# Patient Record
Sex: FEMALE | Employment: FULL TIME | URBAN - METROPOLITAN AREA
[De-identification: names, ages, dates, MRNs, and addresses within clinical notes are randomized per-mention and may not be internally consistent; named-entity substitution may affect disease eponyms.]

---

## 2017-07-14 ENCOUNTER — TRANSFERRED RECORDS (OUTPATIENT)
Dept: HEALTH INFORMATION MANAGEMENT | Facility: CLINIC | Age: 47
End: 2017-07-14

## 2017-07-17 ENCOUNTER — PRE VISIT (OUTPATIENT)
Dept: ORTHOPEDICS | Facility: CLINIC | Age: 47
End: 2017-07-17

## 2017-07-17 NOTE — TELEPHONE ENCOUNTER
Records received from [facility]. Waiting for images.   Included  Office notes: 6/5/17, 7/14/17  Radiology reports: US right lower ext 6/6/17   MR tib fib right 6/9/17   Xray right knee on 7/14/17

## 2017-07-17 NOTE — TELEPHONE ENCOUNTER
1.  Date/reason for appt: 7/19/17 8:30AM - Rt Lower Leg Mass  2.  Referring provider: Dr. Sebastián Kinsey  3.  Call to patient (Yes / No - short description): no, pt is referred  4.  Previous care at / records requested from: [facility] -- Faxed request for records    [facility] imaging in pacs:  US Rt Lower Ext of Soft Tissue 6/6/17  XR Bilat Knee 7/14/17  MRI Rt Tib/Fib 6/9/17

## 2017-07-19 ENCOUNTER — OFFICE VISIT (OUTPATIENT)
Dept: ORTHOPEDICS | Facility: CLINIC | Age: 47
End: 2017-07-19

## 2017-07-19 VITALS — HEIGHT: 62 IN | WEIGHT: 190.8 LBS | BODY MASS INDEX: 35.11 KG/M2

## 2017-07-19 DIAGNOSIS — M67.40 GANGLION CYST: Primary | ICD-10-CM

## 2017-07-19 RX ORDER — LEVOTHYROXINE SODIUM 150 UG/1
175 TABLET ORAL EVERY MORNING
COMMUNITY
Start: 2017-05-23 | End: 2021-01-20

## 2017-07-19 RX ORDER — PAROXETINE 10 MG/1
40 TABLET, FILM COATED ORAL
COMMUNITY
Start: 2017-06-05 | End: 2021-01-20

## 2017-07-19 RX ORDER — PRAMIPEXOLE DIHYDROCHLORIDE 0.12 MG/1
0.5 TABLET ORAL AT BEDTIME
COMMUNITY
Start: 2017-06-05 | End: 2021-01-06

## 2017-07-19 RX ORDER — PROPRANOLOL HCL 60 MG
60 CAPSULE, EXTENDED RELEASE 24HR ORAL EVERY MORNING
COMMUNITY
Start: 2017-06-05 | End: 2021-06-21

## 2017-07-19 ASSESSMENT — ENCOUNTER SYMPTOMS
BLOOD IN STOOL: 1
ARTHRALGIAS: 1
MYALGIAS: 1
SLEEP DISTURBANCES DUE TO BREATHING: 0
EXERCISE INTOLERANCE: 0
HEARTBURN: 0
JAUNDICE: 0
FATIGUE: 0
EYE PAIN: 0
SPEECH CHANGE: 0
NUMBNESS: 1
PARALYSIS: 0
ABDOMINAL PAIN: 0
POLYPHAGIA: 0
LEG PAIN: 1
RECTAL PAIN: 0
DOUBLE VISION: 0
LEG SWELLING: 1
ORTHOPNEA: 0
VOMITING: 0
SYNCOPE: 0
EYE IRRITATION: 0
PALPITATIONS: 0
INSOMNIA: 1
LOSS OF CONSCIOUSNESS: 0
PANIC: 0
STIFFNESS: 0
WEIGHT LOSS: 0
BACK PAIN: 0
TINGLING: 1
CHILLS: 0
BOWEL INCONTINENCE: 0
FEVER: 0
TREMORS: 0
NERVOUS/ANXIOUS: 1
DIZZINESS: 1
NECK PAIN: 0
LIGHT-HEADEDNESS: 0
EYE REDNESS: 0
NAUSEA: 0
JOINT SWELLING: 0
TACHYCARDIA: 0
BLOATING: 1
SEIZURES: 0
WEAKNESS: 0
HYPERTENSION: 0
DEPRESSION: 1
MUSCLE WEAKNESS: 0
DECREASED CONCENTRATION: 0
DECREASED APPETITE: 0
HYPOTENSION: 0
INCREASED ENERGY: 0
CLAUDICATION: 0
NIGHT SWEATS: 0
DISTURBANCES IN COORDINATION: 0
RECTAL BLEEDING: 1
DIARRHEA: 0
HEADACHES: 1
MUSCLE CRAMPS: 0
EYE WATERING: 0
POLYDIPSIA: 0
MEMORY LOSS: 0
ALTERED TEMPERATURE REGULATION: 0
HALLUCINATIONS: 0
WEIGHT GAIN: 0
CONSTIPATION: 0

## 2017-07-19 NOTE — LETTER
7/19/2017       RE: Carol Gaujardo  [address] [facility] [address]     Dear Colleague,    Thank you for referring your patient, Carol Guajardo, to the [facility] at [facility]. Please see a copy of my visit note below.    [facility] Orthopedic Consultation    Carol Guajardo MRN# [id number]   Age: 47 year old YOB: 1970         Requesting physician: Sebastián Kinsey MD                   Impression and Recommendation (Resident / Clinician):   Impression:  Complex synovial cyst, right knee, with extension into fascial plane of medial gastroc and soleus     Recomendations:  Discussed operative and nonoperative management. Discussed high rate of recurrence(up to 50%).  Explained that if pain starts to limit activity, work, or sleep, would recommend excision with udnerstanding of high rate of recurrence.  Pt understands, states mother has had recurrent bakers cysts.  Explained no urgncy to surgery, this is unlikely to be a cancer.  Pt will discuss with family and contact clinic re decision.  Otherwise WBAT.  ACE wrap to RLE prn.  Fu PRN.            Chief Complaint:   Progressively painful right medial calf mass x 2-3mos         History of Present Illness (Resident / Clinician):   This patient is a 47 year old female with a right medial calf mass.             Past Medical History:   Anxiety, depression, RLS, cholestatoma        Past Surgical History:   B/l CTR, tubal ligation, cholesteatoma excision, no complications          Social History:   Works as  at [employer]  Social History   Substance Use Topics     Smoking status: Light Tobacco Smoker     Smokeless tobacco: Current User     Alcohol use Yes             Family History:   hxof bakers cyst in mom, denies family history of anesthesia, bleeding or clotting complications.          Immunizations:   Immunizations are up to date          Allergies:     Allergies   Allergen  Reactions     Amoxicillin-Pot Clavulanate Hives     Other reaction(s): Edema     Penicillins              Medications:     Current Outpatient Prescriptions   Medication Sig     levothyroxine (SYNTHROID/LEVOTHROID) 150 MCG tablet Take 150 mcg by mouth     PARoxetine (PAXIL) 10 MG tablet Take 10 mg by mouth     pramipexole (MIRAPEX) 0.125 MG tablet Take 0.125 mg by mouth     propranolol (INDERAL LA) 60 MG 24 hr capsule Take 60 mg by mouth     IBUPROFEN PO      No current facility-administered medications for this visit.              Review of Systems:   10 point ROS negative unless noted in HPI          Physical Exam:     General: Awake, alert, appropriate, following commands, NAD.  Neuro: CN II-XII grossly intact.   Skin: No rashes,  skin color normal.  HEENT: Normal.   Lungs: Breathing comfortably and nonlabored, no wheezes or stridor noted.  Heart/Cardiovascular: Regular pulse, no peripheral cyanosis.  Abdomen: Soft, non-tender, non-distended.   Right Lower Extremity: firm tender mass along medial aspect of calf at approx mid calf, no overlying skin changed, does not appear to move with ankle AROM, ankle AROM -15-35, painless, knee ROM 0-125 painless, otherwise No deformity, skin intact. No significant tenderness to palpation over thigh, knee, ankle/foot. No pain with ROM hip/knee/ankle. Motor intact distally TA/GSC/EHL/FHL with 5/5 strength. SILT sp/dp/tibial/saph/sural nerves. DP/PT pulses palpable, 2+, toes warm and well perfused. Compartments soft, no adenopathy appreciated  Psych: normal mood and affect           Data:   Reviewed, bakers cyst along posteromedial aspect of knee confluent via rent in medial gastroc tendon to fascial plane between medial head of gastroc and soleus with larger fluid collection (measuring [id number] mm), hyperintense to fat, isointense with synovial fluid, no surrounding soft tissue edema, mild OA right knee      Lizandro Arguello MD  Orthopedic Surgery, PGY-4  [phone]        This patient was seen and discussed with Dr. Bailey who agrees with the plan             I was present with the resident during the history and exam.  I discussed the case with the resident and agree with the findings as documented in the assessment and plan.      Again, thank you for allowing me to participate in the care of your patient.      Sincerely,    Ion Bailey MD

## 2017-07-19 NOTE — NURSING NOTE
"Reason For Visit:   Chief Complaint   Patient presents with     Consult     Pt. states that she is here today for Right Lower Leg Mass. She mention that she first noticed the mass in May 2017, increase in size. She is having discomfort and pressure. Referring:  YURY RICHEY        Pain Assessment  Patient Currently in Pain: Yes  0-10 Pain Scale: 4  Primary Pain Location: Leg  Pain Orientation: Right  Pain Descriptors: Discomfort  Alleviating Factors: Rest, NSAIDS  Aggravating Factors: Movement, Standing, Walking               HEIGHT: 5' 2\", WEIGHT: 190 lbs 12.8 oz, BMI: Body mass index is 34.9 kg/(m^2).      Current Outpatient Prescriptions   Medication Sig Dispense Refill     levothyroxine (SYNTHROID/LEVOTHROID) 150 MCG tablet Take 150 mcg by mouth       PARoxetine (PAXIL) 10 MG tablet Take 10 mg by mouth       pramipexole (MIRAPEX) 0.125 MG tablet Take 0.125 mg by mouth       propranolol (INDERAL LA) 60 MG 24 hr capsule Take 60 mg by mouth       IBUPROFEN PO             Allergies   Allergen Reactions     Amoxicillin-Pot Clavulanate Hives     Other reaction(s): Edema     Penicillins        "

## 2017-07-19 NOTE — PROGRESS NOTES
[facility] Orthopedic Consultation    Carol Guajardo MRN# [id number]   Age: 47 year old YOB: 1970         Requesting physician: Sebastián Kinsey MD                   Impression and Recommendation (Resident / Clinician):   Impression:  Complex synovial cyst, right knee, with extension into fascial plane of medial gastroc and soleus     Recomendations:  Discussed operative and nonoperative management. Discussed high rate of recurrence(up to 50%).  Explained that if pain starts to limit activity, work, or sleep, would recommend excision with udnerstanding of high rate of recurrence.  Pt understands, states mother has had recurrent bakers cysts.  Explained no urgncy to surgery, this is unlikely to be a cancer.  Pt will discuss with family and contact clinic re decision.  Otherwise WBAT.  ACE wrap to RLE prn.  Fu PRN.            Chief Complaint:   Progressively painful right medial calf mass x 2-3mos         History of Present Illness (Resident / Clinician):   This patient is a 47 year old female with a right medial calf mass.             Past Medical History:   Anxiety, depression, RLS, cholestatoma        Past Surgical History:   B/l CTR, tubal ligation, cholesteatoma excision, no complications          Social History:   Works as  at [employer]  Social History   Substance Use Topics     Smoking status: Light Tobacco Smoker     Smokeless tobacco: Current User     Alcohol use Yes             Family History:   hxof bakers cyst in mom, denies family history of anesthesia, bleeding or clotting complications.          Immunizations:   Immunizations are up to date          Allergies:     Allergies   Allergen Reactions     Amoxicillin-Pot Clavulanate Hives     Other reaction(s): Edema     Penicillins              Medications:     Current Outpatient Prescriptions   Medication Sig     levothyroxine (SYNTHROID/LEVOTHROID) 150 MCG tablet Take 150 mcg by mouth     PARoxetine (PAXIL) 10 MG tablet  Take 10 mg by mouth     pramipexole (MIRAPEX) 0.125 MG tablet Take 0.125 mg by mouth     propranolol (INDERAL LA) 60 MG 24 hr capsule Take 60 mg by mouth     IBUPROFEN PO      No current facility-administered medications for this visit.              Review of Systems:   10 point ROS negative unless noted in HPI          Physical Exam:     General: Awake, alert, appropriate, following commands, NAD.  Neuro: CN II-XII grossly intact.   Skin: No rashes,  skin color normal.  HEENT: Normal.   Lungs: Breathing comfortably and nonlabored, no wheezes or stridor noted.  Heart/Cardiovascular: Regular pulse, no peripheral cyanosis.  Abdomen: Soft, non-tender, non-distended.   Right Lower Extremity: firm tender mass along medial aspect of calf at approx mid calf, no overlying skin changed, does not appear to move with ankle AROM, ankle AROM -15-35, painless, knee ROM 0-125 painless, otherwise No deformity, skin intact. No significant tenderness to palpation over thigh, knee, ankle/foot. No pain with ROM hip/knee/ankle. Motor intact distally TA/GSC/EHL/FHL with 5/5 strength. SILT sp/dp/tibial/saph/sural nerves. DP/PT pulses palpable, 2+, toes warm and well perfused. Compartments soft, no adenopathy appreciated  Psych: normal mood and affect           Data:   Reviewed, bakers cyst along posteromedial aspect of knee confluent via rent in medial gastroc tendon to fascial plane between medial head of gastroc and soleus with larger fluid collection (measuring [id number] mm), hyperintense to fat, isointense with synovial fluid, no surrounding soft tissue edema, mild OA right knee      Lizandro Arguello MD  Orthopedic Surgery, PGY-4  [phone]       This patient was seen and discussed with Dr. Bailey who agrees with the plan

## 2017-10-27 ENCOUNTER — OFFICE VISIT (OUTPATIENT)
Dept: OTOLARYNGOLOGY | Facility: CLINIC | Age: 47
End: 2017-10-27
Payer: COMMERCIAL

## 2017-10-27 VITALS — TEMPERATURE: 96.4 F | HEIGHT: 62 IN | RESPIRATION RATE: 18 BRPM

## 2017-10-27 DIAGNOSIS — H92.12 OTORRHEA, LEFT: Primary | ICD-10-CM

## 2017-10-27 DIAGNOSIS — H95.192 ENCOUNTER FOR MASTOIDECTOMY CAVITY DEBRIDEMENT, LEFT: ICD-10-CM

## 2017-10-27 PROCEDURE — 99203 OFFICE O/P NEW LOW 30 MIN: CPT | Mod: 25 | Performed by: OTOLARYNGOLOGY

## 2017-10-27 PROCEDURE — 69220 CLEAN OUT MASTOID CAVITY: CPT | Performed by: OTOLARYNGOLOGY

## 2017-10-27 RX ORDER — NEOMYCIN SULFATE, POLYMYXIN B SULFATE AND HYDROCORTISONE 10; 3.5; 1 MG/ML; MG/ML; [USP'U]/ML
4 SUSPENSION/ DROPS AURICULAR (OTIC) 3 TIMES DAILY
Qty: 5 ML | Refills: 0 | Status: SHIPPED | OUTPATIENT
Start: 2017-10-27 | End: 2017-11-03

## 2017-10-27 NOTE — PATIENT INSTRUCTIONS
General Scheduling Information  To schedule your CT/MRI scan, please contact [place] [facility] at [phone]   [address] [place] [address]    To schedule your Surgery, please contact our Specialty Schedulers at [phone]    ENT Clinic Locations Clinic Hours Telephone Number     [facility]  [address] [place] [address]   Tuesday:       8:00am -- 4:00pm    Wednesday:  8:00am - 4:00pm   To schedule an appointment with   Dr. Kendall,   please contact our   Specialty Scheduling Department at:     [phone]       [facility]  [address] [place] [address]   Friday:          8:00am - 4:00pm         Urgent Care Locations Clinic Hours Telephone Numbers     [facility]  [address] [place] [address]     Monday-Friday:     11:00pm - 9:00pm    Saturday-Sunday:  9:00am - 5:00pm   [phone]     [facility]  [address] [place] [address]     Monday-Friday:      5:00pm - 9:00pm     Saturday-Sunday:  9:00am - 5:00pm   [phone]

## 2017-10-27 NOTE — NURSING NOTE
"Chief Complaint   Patient presents with     Consult     ear pain        Initial Temp 96.4  F (35.8  C) (Tympanic)  Resp 18  Ht 1.575 m (5' 2\") Estimated body mass index is 34.9 kg/(m^2) as calculated from the following:    Height as of 7/19/17: 1.575 m (5' 2\").    Weight as of 7/19/17: 86.5 kg (190 lb 12.8 oz).  Medication Reconciliation: rochelle Boggs MA    "

## 2017-10-27 NOTE — MR AVS SNAPSHOT
After Visit Summary   10/27/2017    Carol Guajardo    MRN: [id number]           Patient Information     Date Of Birth          1970        Visit Information        Provider Department      10/27/2017 2:30 PM Piotr Kendall MD [facility]        Today's Diagnoses     Otorrhea, left    -  1    Encounter for mastoidectomy cavity debridement, left          Care Instructions    General Scheduling Information  To schedule your CT/MRI scan, please contact [place] [facility] at [phone] [address] [place] [address]    To schedule your Surgery, please contact our Specialty Schedulers at [phone]    ENT Clinic Locations Clinic Hours Telephone Number     [facility]  [address]  [place] [address]   Tuesday:       8:00am -- 4:00pm    Wednesday:  8:00am - 4:00pm   To schedule an appointment with   Dr. Kendall,   please contact our   Specialty Scheduling Department at:     [phone]       [facility]  [address] [place] [address]   Friday:          8:00am - 4:00pm         Urgent Care Locations Clinic Hours Telephone Numbers     [facility]  [address] [place] [address]     Monday-Friday:     11:00pm - 9:00pm    Saturday-Sunday:  9:00am - 5:00pm   [phone]     [facility]  [address] [place] [address]     Monday-Friday:      5:00pm - 9:00pm     Saturday-Sunday:  9:00am - 5:00pm   [phone]                 Follow-ups after your visit        Who to contact     If you have questions or need follow up information about today's clinic visit or your schedule please contact [facility] directly at [phone].  Normal or non-critical lab and imaging results will be communicated to you by MyChart, letter or phone within 4 business days after the clinic has received the results. If you do not hear from us within 7 days, please contact the clinic through MyChart or phone. If you have a  "critical or abnormal lab result, we will notify you by phone as soon as possible.  Submit refill requests through [employer] or call your pharmacy and they will forward the refill request to us. Please allow 3 business days for your refill to be completed.          Additional Information About Your Visit        [employer] Information     [employer] lets you send messages to your doctor, view your test results, renew your prescriptions, schedule appointments and more. To sign up, go to [email or web address] . Click on \"Log in\" on the left side of the screen, which will take you to the Welcome page. Then click on \"Sign up Now\" on the right side of the page.     You will be asked to enter the access code listed below, as well as some personal information. Please follow the directions to create your username and password.     Your access code is: [email or web address]  Expires: 2018  4:18 PM     Your access code will  in 90 days. If you need help or a new code, please call your [facility] clinic or [phone].        Care EveryWhere ID     This is your Care EveryWhere ID. This could be used by other organizations to access your [facility] medical records  [id number]        Your Vitals Were     Temperature Respirations Height             96.4  F (35.8  C) (Tympanic) 18 1.575 m (5' 2\")          Blood Pressure from Last 3 Encounters:   No data found for BP    Weight from Last 3 Encounters:   17 86.5 kg (190 lb 12.8 oz)              We Performed the Following     Debride Mast. Unilat          Today's Medication Changes          These changes are accurate as of: 10/27/17  4:18 PM.  If you have any questions, ask your nurse or doctor.               Start taking these medicines.        Dose/Directions    neomycin-polymyxin-hydrocortisone 3.5-11926-9 otic suspension   Commonly known as:  CORTISPORIN   Used for:  Otorrhea, left   Started by:  Piotr Kendall MD        Dose:  4 drop   Place 4 drops Into the left ear 3 times " daily for 7 days   Quantity:  5 mL   Refills:  0            Where to get your medicines      Some of these will need a paper prescription and others can be bought over the counter.  Ask your nurse if you have questions.     Bring a paper prescription for each of these medications     neomycin-polymyxin-hydrocortisone 3.5-99261-8 otic suspension                Primary Care Provider Office Phone # Fax #    Chava Mayberry [phone]       [place] [id number] [facility] [address]        Equal Access to Services     DANI CHO : Hadii dari ku hadasho Soomaali, waaxda luqadaha, qaybta kaalmada adeegyada, waxjason scherer . So [facility] [phone].    ATENCIÓN: Si habla español, tiene a andrews disposición servicios gratuitos de asistencia lingüística. [facility] [phone].    We comply with applicable federal civil rights laws and [place] laws. We do not discriminate on the basis of race, color, national origin, age, disability, sex, sexual orientation, or gender identity.            Thank you!     Thank you for choosing [facility]  for your [facility]. Our goal is always to provide you with excellent care. Hearing back from our patients is one way we can continue to improve our services. Please take a few minutes to complete the written survey that you may receive in the mail after your visit with us. Thank you!             Your Updated Medication List - Protect others around you: Learn how to safely use, store and throw away your medicines at www.disposemymeds.org.          This list is accurate as of: 10/27/17  4:18 PM.  Always use your most recent med list.                   Brand Name Dispense Instructions for use Diagnosis    IBUPROFEN PO           levothyroxine 150 MCG tablet    SYNTHROID/LEVOTHROID     Take 150 mcg by mouth        neomycin-polymyxin-hydrocortisone 3.5-59546-5 otic suspension    CORTISPORIN    5 mL    Place 4 drops Into the left  ear 3 times daily for 7 days    Otorrhea, left       PARoxetine 10 MG tablet    PAXIL     Take 10 mg by mouth        pramipexole 0.125 MG tablet    MIRAPEX     Take 0.125 mg by mouth        propranolol 60 MG 24 hr capsule    INDERAL LA     Take 60 mg by mouth

## 2017-10-27 NOTE — LETTER
10/27/2017         RE: Carol Guajardo  [address] [facility] [address]        Dear Colleague,    Thank you for referring your patient, Carol Guajardo, to the [facility]. Please see a copy of my visit note below.    Chief Complaint - left ear pain    History of Present Illness - Carol Guajardo is a 47 year old female who presents to me today with left ear aches. Had surgery one year for cholesteatoma. Also had some debri coming out of the ear. Hasn't had f/u because of financial issues. Has some dizziness briefly. Some otorrhea. She has some hearing loss left ear. Right ear is fine. No surgery right side. Smokes.     Past Medical History -   Patient Active Problem List   Diagnosis     Anxiety state     Depression     HLD (hyperlipidemia)     Vitamin D deficiency     Thyroid activity decreased     Obesity     Nicotine dependence     Mass of right lower leg       Current Medications -   Current Outpatient Prescriptions:      levothyroxine (SYNTHROID/LEVOTHROID) 150 MCG tablet, Take 150 mcg by mouth, Disp: , Rfl:      PARoxetine (PAXIL) 10 MG tablet, Take 10 mg by mouth, Disp: , Rfl:      pramipexole (MIRAPEX) 0.125 MG tablet, Take 0.125 mg by mouth, Disp: , Rfl:      propranolol (INDERAL LA) 60 MG 24 hr capsule, Take 60 mg by mouth, Disp: , Rfl:      IBUPROFEN PO, , Disp: , Rfl:     Allergies -   Allergies   Allergen Reactions     Amoxicillin-Pot Clavulanate Hives     Other reaction(s): Edema     Ciprofloxacin      Penicillins        Social History -   Social History     Social History     Marital status:      Spouse name: N/A     Number of children: N/A     Years of education: N/A     Social History Main Topics     Smoking status: Light Tobacco Smoker     Smokeless tobacco: Current User     Alcohol use Yes     Drug use: None     Sexual activity: Not Asked     Other Topics Concern     None     Social History Narrative       Family History - no ear history in family    Review of Systems - As  "per HPI and PMHx, otherwise 7 system review of the head and neck negative.    Physical Exam  Temp 96.4  F (35.8  C) (Tympanic)  Resp 18  Ht 1.575 m (5' 2\")  General - The patient is nontoxic, in no distress.  Alert and oriented to person and place, answers questions and cooperates with examination appropriately.   Voice and Breathing - The patient was breathing comfortably without the use of accessory muscles. There was no wheezing, stridor, or stertor.  The patients voice was clear and strong.  Ears - right ear canal, tympanic membrane, and middle ear is normal. Left ear with a canal wall down mastoidectomy. She has lots of debri, wax and crusts impacting ear and in mastoid bowl. See below.   Eyes - Extraocular movements intact. Sclera were not icteric or injected.  Neck - Palpation of the occipital, submental, submandibular, internal jugular chain, and supraclavicular nodes did not demonstrate any abnormal lymph nodes or masses. No parotid masses. Palpation of the thyroid was soft and smooth, with no nodules or goiter appreciated.  The trachea was mobile and midline.  Neurological - Cranial nerves 2 through 12 were grossly intact. House-Brackmann grade 1 out of 6 bilaterally.    Cerumen Removal    Physical Exam and Procedure  Ears - On examination of the ears, I found that the left ear was impacted with cerumen and the mastoid was full. Using the binocular surgical microscope to perform cerumen removal/mastoid debridement.  I began by using a cerumen loop to gently lift the edges of the cerumen mass away from the walls of the external canal.  Once I did this, I was able to pull/ away fragments of wax and debris with an alligator. I then turned to the mastoid posteriorly. I used the alligator to lift large crusts out. Some moisture beneath the crusts. I removed all the wax and debri. The tympanic membrane was intact, but looked like there was a cartilage graft posteriorly. No sign of perforation or middle ear " effusion. No granulation.         Assessment and Plan - Carol Guajardo is a 47 year old female who presents to me today with left ear canal wall down mastoidectomy. She has pain, likely due to not cleaning this in a year. Has some otorrhea beneath the crusts. I cleaned this today. She should use cortisporin if it remains painful or has otorrhea. Recheck and clean in 6 months.    Piotr Kendall MD  Otolaryngology  [facility]      Again, thank you for allowing me to participate in the care of your patient.        Sincerely,        Piotr Kendall MD

## 2017-10-27 NOTE — PROGRESS NOTES
"Chief Complaint - left ear pain    History of Present Illness - Carol Guajardo is a 47 year old female who presents to me today with left ear aches. Had surgery one year for cholesteatoma. Also had some debri coming out of the ear. Hasn't had f/u because of financial issues. Has some dizziness briefly. Some otorrhea. She has some hearing loss left ear. Right ear is fine. No surgery right side. Smokes.     Past Medical History -   Patient Active Problem List   Diagnosis     Anxiety state     Depression     HLD (hyperlipidemia)     Vitamin D deficiency     Thyroid activity decreased     Obesity     Nicotine dependence     Mass of right lower leg       Current Medications -   Current Outpatient Prescriptions:      levothyroxine (SYNTHROID/LEVOTHROID) 150 MCG tablet, Take 150 mcg by mouth, Disp: , Rfl:      PARoxetine (PAXIL) 10 MG tablet, Take 10 mg by mouth, Disp: , Rfl:      pramipexole (MIRAPEX) 0.125 MG tablet, Take 0.125 mg by mouth, Disp: , Rfl:      propranolol (INDERAL LA) 60 MG 24 hr capsule, Take 60 mg by mouth, Disp: , Rfl:      IBUPROFEN PO, , Disp: , Rfl:     Allergies -   Allergies   Allergen Reactions     Amoxicillin-Pot Clavulanate Hives     Other reaction(s): Edema     Ciprofloxacin      Penicillins        Social History -   Social History     Social History     Marital status:      Spouse name: N/A     Number of children: N/A     Years of education: N/A     Social History Main Topics     Smoking status: Light Tobacco Smoker     Smokeless tobacco: Current User     Alcohol use Yes     Drug use: None     Sexual activity: Not Asked     Other Topics Concern     None     Social History Narrative       Family History - no ear history in family    Review of Systems - As per HPI and PMHx, otherwise 7 system review of the head and neck negative.    Physical Exam  Temp 96.4  F (35.8  C) (Tympanic)  Resp 18  Ht 1.575 m (5' 2\")  General - The patient is nontoxic, in no distress.  Alert and oriented to " person and place, answers questions and cooperates with examination appropriately.   Voice and Breathing - The patient was breathing comfortably without the use of accessory muscles. There was no wheezing, stridor, or stertor.  The patients voice was clear and strong.  Ears - right ear canal, tympanic membrane, and middle ear is normal. Left ear with a canal wall down mastoidectomy. She has lots of debri, wax and crusts impacting ear and in mastoid bowl. See below.   Eyes - Extraocular movements intact. Sclera were not icteric or injected.  Neck - Palpation of the occipital, submental, submandibular, internal jugular chain, and supraclavicular nodes did not demonstrate any abnormal lymph nodes or masses. No parotid masses. Palpation of the thyroid was soft and smooth, with no nodules or goiter appreciated.  The trachea was mobile and midline.  Neurological - Cranial nerves 2 through 12 were grossly intact. House-Brackmann grade 1 out of 6 bilaterally.    Cerumen Removal    Physical Exam and Procedure  Ears - On examination of the ears, I found that the left ear was impacted with cerumen and the mastoid was full. Using the binocular surgical microscope to perform cerumen removal/mastoid debridement.  I began by using a cerumen loop to gently lift the edges of the cerumen mass away from the walls of the external canal.  Once I did this, I was able to pull/ away fragments of wax and debris with an alligator. I then turned to the mastoid posteriorly. I used the alligator to lift large crusts out. Some moisture beneath the crusts. I removed all the wax and debri. The tympanic membrane was intact, but looked like there was a cartilage graft posteriorly. No sign of perforation or middle ear effusion. No granulation.         Assessment and Plan - Carol Guajardo is a 47 year old female who presents to me today with left ear canal wall down mastoidectomy. She has pain, likely due to not cleaning this in a year. Has some  otorrhea beneath the crusts. I cleaned this today. She should use cortisporin if it remains painful or has otorrhea. Recheck and clean in 6 months.    Piotr Kendall MD  Otolaryngology  [facility]

## 2018-04-10 ENCOUNTER — OFFICE VISIT (OUTPATIENT)
Dept: OTOLARYNGOLOGY | Facility: CLINIC | Age: 48
End: 2018-04-10
Payer: COMMERCIAL

## 2018-04-10 VITALS
SYSTOLIC BLOOD PRESSURE: 145 MMHG | HEART RATE: 70 BPM | OXYGEN SATURATION: 98 % | RESPIRATION RATE: 16 BRPM | DIASTOLIC BLOOD PRESSURE: 81 MMHG

## 2018-04-10 DIAGNOSIS — H95.192 ENCOUNTER FOR MASTOIDECTOMY CAVITY DEBRIDEMENT, LEFT: Primary | ICD-10-CM

## 2018-04-10 PROCEDURE — 69220 CLEAN OUT MASTOID CAVITY: CPT | Performed by: OTOLARYNGOLOGY

## 2018-04-10 PROCEDURE — 99213 OFFICE O/P EST LOW 20 MIN: CPT | Mod: 25 | Performed by: OTOLARYNGOLOGY

## 2018-04-10 NOTE — MR AVS SNAPSHOT
After Visit Summary   4/10/2018    Carol Guajardo    MRN: [id number]           Patient Information     Date Of Birth          1970        Visit Information        Provider Department      4/10/2018 9:45 AM Piotr [facility]all [facility]        Today's Diagnoses     Encounter for mastoidectomy cavity debridement, left    -  1      Care Instructions    General Scheduling Information  To schedule your CT/MRI scan, please contact [place] at [phone] [address] [place] [address]    To schedule your Surgery, please contact our Specialty Schedulers at [phone]    ENT Clinic Locations Clinic Hours Telephone Number     [facility]  [address]  [place] [address]   Tuesday:       8:00am -- 4:00pm    Wednesday:  8:00am - 4:00pm   To schedule an appointment with   Dr. Kendall,   please contact our   Specialty Scheduling Department at:     [phone]       [facility]  [address]   [place] [address]   Friday:          8:00am - 4:00pm         Urgent Care Locations Clinic Hours Telephone Numbers     [facility]  [address] [place] [address]     Monday-Friday:     11:00pm - 9:00pm    Saturday-Sunday:  9:00am - 5:00pm   [phone]     [facility]  [address]   [place] [address]     Monday-Friday:      5:00pm - 9:00pm     Saturday-Sunday:  9:00am - 5:00pm   [phone]               Follow-ups after your visit        Who to contact     If you have questions or need follow up information about today's clinic visit or your schedule please contact [facility] directly at [phone].  Normal or non-critical lab and imaging results will be communicated to you by MyChart, letter or phone within 4 business days after the clinic has received the results. If you do not hear from us within 7 days, please contact the clinic through MyChart or phone. If you have a critical or abnormal lab  "result, we will notify you by phone as soon as possible.  Submit refill requests through [employer] or call your pharmacy and they will forward the refill request to us. Please allow 3 business days for your refill to be completed.          Additional Information About Your Visit        [employer] Information     [employer] lets you send messages to your doctor, view your test results, renew your prescriptions, schedule appointments and more. To sign up, go to [email or web address] . Click on \"Log in\" on the left side of the screen, which will take you to the Welcome page. Then click on \"Sign up Now\" on the right side of the page.     You will be asked to enter the access code listed below, as well as some personal information. Please follow the directions to create your username and password.     Your access code is: [email or web address]  Expires: 2018 10:16 AM     Your access code will  in 90 days. If you need help or a new code, please call your [facility] clinic or [phone].        Care EveryWhere ID     This is your Care EveryWhere ID. This could be used by other organizations to access your [facility] medical records  [id number]        Your Vitals Were     Pulse Respirations Pulse Oximetry             70 16 98%          Blood Pressure from Last 3 Encounters:   04/10/18 145/81    Weight from Last 3 Encounters:   17 86.5 kg (190 lb 12.8 oz)              We Performed the Following     Debride Mast. [facility]        Primary Care Provider Office Phone # Fax #    Chava Mayberry [phone]       [address] [address]        Equal Access to Services     [facility]: Hadii dari garcía hadalona Sodominic, waaxda luqadaha, qaybta kaalmada nato, giles hobbs. So [facility] [phone].    ATENCIÓN: Si habla español, tiene a andrews disposición servicios gratuitos de asistencia lingüística. Llame al [phone].    We comply with applicable " federal civil rights laws and [place] laws. We do not discriminate on the basis of race, color, national origin, age, disability, sex, sexual orientation, or gender identity.            Thank you!     Thank you for choosing [facility]  for your care. Our goal is always to provide you with excellent care. Hearing back from our patients is one way we can continue to improve our services. Please take a few minutes to complete the written survey that you may receive in the mail after your visit with us. Thank you!             Your Updated Medication List - Protect others around you: Learn how to safely use, store and throw away your medicines at www.disposemymeds.org.          This list is accurate as of 4/10/18 10:16 AM.  Always use your most recent med list.                   Brand Name Dispense Instructions for use Diagnosis    IBUPROFEN PO           levothyroxine 150 MCG tablet    SYNTHROID/LEVOTHROID     Take 150 mcg by mouth        PARoxetine 10 MG tablet    PAXIL     Take 10 mg by mouth        pramipexole 0.125 MG tablet    MIRAPEX     Take 0.125 mg by mouth        propranolol 60 MG 24 hr capsule    INDERAL LA     Take 60 mg by mouth

## 2018-04-10 NOTE — LETTER
4/10/2018         RE: Carol Guajardo  [address] [facility] [address]        Dear Colleague,    Thank you for referring your patient, Carol Guajardo, to the [facility]. Please see a copy of my visit note below.    Chief Complaint - left ear mastoid cleaning    History of Present Illness - Carol Guajardo is a 48 year old female who returns for left mastoid cleaning. Had surgery a couple years ago for cholesteatoma. She has some hearing loss left ear. Right ear is fine. No surgery right side. Smokes. Ear feels full. No pain or drainage.     Past Medical History -   Patient Active Problem List   Diagnosis     Anxiety state     Depression     HLD (hyperlipidemia)     Vitamin D deficiency     Thyroid activity decreased     Obesity     Nicotine dependence     Mass of right lower leg       Current Medications -   Current Outpatient Prescriptions:      levothyroxine (SYNTHROID/LEVOTHROID) 150 MCG tablet, Take 150 mcg by mouth, Disp: , Rfl:      PARoxetine (PAXIL) 10 MG tablet, Take 10 mg by mouth, Disp: , Rfl:      pramipexole (MIRAPEX) 0.125 MG tablet, Take 0.125 mg by mouth, Disp: , Rfl:      propranolol (INDERAL LA) 60 MG 24 hr capsule, Take 60 mg by mouth, Disp: , Rfl:      IBUPROFEN PO, , Disp: , Rfl:     Allergies -   Allergies   Allergen Reactions     Amoxicillin-Pot Clavulanate Hives     Other reaction(s): Edema     Ciprofloxacin      Penicillins        Social History -   Social History     Social History     Marital status:      Spouse name: N/A     Number of children: N/A     Years of education: N/A     Social History Main Topics     Smoking status: Light Tobacco Smoker     Smokeless tobacco: Current User     Alcohol use Yes     Drug use: None     Sexual activity: Not Asked     Other Topics Concern     None     Social History Narrative       Family History - no ear history in family    Review of Systems - As per HPI and PMHx, otherwise 7 system review of the head and neck  negative.    Physical Exam  /81  Pulse 70  Resp 16  SpO2 98%  General - The patient is nontoxic, in no distress.  Alert and oriented to person and place, answers questions and cooperates with examination appropriately.   Voice and Breathing - The patient was breathing comfortably without the use of accessory muscles. There was no wheezing, stridor, or stertor.  The patients voice was clear and strong.  Ears - right ear canal, tympanic membrane, and middle ear is normal. Left ear with a canal wall down mastoidectomy. She has lots of debri, wax and crusts impacting ear and in mastoid bowl. See below.   Neurological - Cranial nerves 2 through 12 were grossly intact. House-Brackmann grade 1 out of 6 bilaterally.    Cerumen Removal    Physical Exam and Procedure  Ears - On examination of the ears, I found that the left ear was impacted with cerumen and the mastoid was full of debri. Using the binocular surgical microscope to perform cerumen removal/mastoid debridement.  I began by using a cerumen loop to gently lift the edges of the cerumen mass away from the walls of the external canal.  Once I did this, I was able to pull away fragments of wax and debris with an alligator. I then turned to the mastoid posteriorly. I used the alligator to lift large crusts out. Some moisture beneath the crusts.  I suctioned some too, but she got dizzy. I removed all the wax and debri. The tympanic membrane was intact, but looked like there was a cartilage graft posteriorly. No sign of perforation or middle ear effusion. No granulation.         Assessment and Plan - Carol Guajardo is a 48 year old female who returns to me today with left ear canal wall down mastoidectomy. Has some otorrhea beneath the crusts. I cleaned this today. She should use cortisporin for a few days. Recheck and clean in 6 months. Otherwise can try debrox every few months (with flushing at home).    Piotr Kendall MD  Otolaryngology  [facility]      Again, thank you for allowing me to participate in the care of your patient.        Sincerely,        Piotr Kendall MD

## 2018-04-10 NOTE — PROGRESS NOTES
Chief Complaint - left ear mastoid cleaning    History of Present Illness - Carol Guajardo is a 48 year old female who returns for left mastoid cleaning. Had surgery a couple years ago for cholesteatoma. She has some hearing loss left ear. Right ear is fine. No surgery right side. Smokes. Ear feels full. No pain or drainage.     Past Medical History -   Patient Active Problem List   Diagnosis     Anxiety state     Depression     HLD (hyperlipidemia)     Vitamin D deficiency     Thyroid activity decreased     Obesity     Nicotine dependence     Mass of right lower leg       Current Medications -   Current Outpatient Prescriptions:      levothyroxine (SYNTHROID/LEVOTHROID) 150 MCG tablet, Take 150 mcg by mouth, Disp: , Rfl:      PARoxetine (PAXIL) 10 MG tablet, Take 10 mg by mouth, Disp: , Rfl:      pramipexole (MIRAPEX) 0.125 MG tablet, Take 0.125 mg by mouth, Disp: , Rfl:      propranolol (INDERAL LA) 60 MG 24 hr capsule, Take 60 mg by mouth, Disp: , Rfl:      IBUPROFEN PO, , Disp: , Rfl:     Allergies -   Allergies   Allergen Reactions     Amoxicillin-Pot Clavulanate Hives     Other reaction(s): Edema     Ciprofloxacin      Penicillins        Social History -   Social History     Social History     Marital status:      Spouse name: N/A     Number of children: N/A     Years of education: N/A     Social History Main Topics     Smoking status: Light Tobacco Smoker     Smokeless tobacco: Current User     Alcohol use Yes     Drug use: None     Sexual activity: Not Asked     Other Topics Concern     None     Social History Narrative       Family History - no ear history in family    Review of Systems - As per HPI and PMHx, otherwise 7 system review of the head and neck negative.    Physical Exam  /81  Pulse 70  Resp 16  SpO2 98%  General - The patient is nontoxic, in no distress.  Alert and oriented to person and place, answers questions and cooperates with examination appropriately.   Voice and Breathing -  The patient was breathing comfortably without the use of accessory muscles. There was no wheezing, stridor, or stertor.  The patients voice was clear and strong.  Ears - right ear canal, tympanic membrane, and middle ear is normal. Left ear with a canal wall down mastoidectomy. She has lots of debri, wax and crusts impacting ear and in mastoid bowl. See below.   Neurological - Cranial nerves 2 through 12 were grossly intact. House-Brackmann grade 1 out of 6 bilaterally.    Cerumen Removal    Physical Exam and Procedure  Ears - On examination of the ears, I found that the left ear was impacted with cerumen and the mastoid was full of debri. Using the binocular surgical microscope to perform cerumen removal/mastoid debridement.  I began by using a cerumen loop to gently lift the edges of the cerumen mass away from the walls of the external canal.  Once I did this, I was able to pull away fragments of wax and debris with an alligator. I then turned to the mastoid posteriorly. I used the alligator to lift large crusts out. Some moisture beneath the crusts.  I suctioned some too, but she got dizzy. I removed all the wax and debri. The tympanic membrane was intact, but looked like there was a cartilage graft posteriorly. No sign of perforation or middle ear effusion. No granulation.         Assessment and Plan - Carol Guajardo is a 48 year old female who returns to me today with left ear canal wall down mastoidectomy. Has some otorrhea beneath the crusts. I cleaned this today. She should use cortisporin for a few days. Recheck and clean in 6 months. Otherwise can try debrox every few months (with flushing at home).    Piotr Kendall MD  Otolaryngology  [facility]

## 2018-04-10 NOTE — PATIENT INSTRUCTIONS
General Scheduling Information  To schedule your CT/MRI scan, please contact [place] at [phone]   [address] [place] [address]    To schedule your Surgery, please contact our Specialty Schedulers at [phone]    ENT Clinic Locations Clinic Hours Telephone Number     [facility]  [address] [place] [place] [address]   Tuesday:       8:00am -- 4:00pm    Wednesday:  8:00am - 4:00pm   To schedule an appointment with   Dr. Kendall,   please contact our   Specialty Scheduling Department at:     [phone]       [facility]  [address] [place] [address]   Friday:          8:00am - 4:00pm         Urgent Care Locations Clinic Hours Telephone Numbers     [facility]  [address] [place] [address]     Monday-Friday:     11:00pm - 9:00pm    Saturday-Sunday:  9:00am - 5:00pm   [phone]     [facility]  [address] [place] [address]     Monday-Friday:      5:00pm - 9:00pm     Saturday-Sunday:  9:00am - 5:00pm   [phone]

## 2018-07-25 ENCOUNTER — OFFICE VISIT (OUTPATIENT)
Dept: ORTHOPEDICS | Facility: CLINIC | Age: 48
End: 2018-07-25
Payer: COMMERCIAL

## 2018-07-25 VITALS — HEIGHT: 62 IN

## 2018-07-25 DIAGNOSIS — M17.12 PRIMARY OSTEOARTHRITIS OF LEFT KNEE: Primary | ICD-10-CM

## 2018-07-25 RX ORDER — NAPROXEN 500 MG/1
TABLET ORAL
COMMUNITY
Start: 2018-06-01 | End: 2019-09-13

## 2018-07-25 RX ORDER — HYDROCODONE BITARTRATE AND ACETAMINOPHEN 5; 325 MG/1; MG/1
1 TABLET ORAL PRN
COMMUNITY
Start: 2018-06-21 | End: 2019-09-13 | Stop reason: ALTCHOICE

## 2018-07-25 ASSESSMENT — ENCOUNTER SYMPTOMS
NERVOUS/ANXIOUS: 1
PANIC: 1
DECREASED CONCENTRATION: 0
DEPRESSION: 0
MUSCLE CRAMPS: 0
BACK PAIN: 0
STIFFNESS: 1
ARTHRALGIAS: 1
MUSCLE WEAKNESS: 0
INSOMNIA: 0
MYALGIAS: 1
NECK PAIN: 0
JOINT SWELLING: 1

## 2018-07-25 NOTE — PROGRESS NOTES
Chief concern: Left medial sided knee pain and left knee ganglion cyst    Patient is a 48-year-old female patient known to Dr. Bailey for consultation 1 year ago when she was referred to him for a right-sided calf complex ganglion cyst was ultimately managed conservatively.  She now returns for a visit regarding left-sided knee pain and a ganglion cyst that has cropped up near the fibular head left side.  She says that she received an MRI from the outside hospital which demonstrated a medial meniscus tear in addition to a Baker's cyst lateral ganglion cyst.  Since April as her knee is gotten progressively worse.  It really causes a lot of pain particular on the medial side after the end of a 10 hour workday.  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  She denies any tingling numbness or loss of function below the knee.  He says the cyst itself conservatively about the size of the and The size of a ping-pong ball in a matter of 2 weeks.  Is mostly asymptomatic but can get inflamed and tender at the end of the day.  Of note she received a corticosteroid injection to her knee 3 weeks ago which provided her noticeable pain relief and decreasing swelling but did not take away 100% of her pain    Exam  Focused examination of the left knee demonstrates medial sided joint line and proximal tibial tenderness.  There are no overlying skin changes.  She does have a ganglion cyst about 2-1/2 cm in diameter to the lateral aspect of her proximal tibia just anterior to the fibular head.  This is firm but compressible and nontender.  She stable to varus and valgus stress at extension and 30  of flexion.  Unable to reproduce locking or catching on David's test.  Fires EHL FHL gastrocsoleus and tibialis anterior with 5 out of 5 strength.  Sensation is intact throughout the foot.    Imaging  Review of MRI of the left knee demonstrates a medial compartment with full-thickness cartilage loss.  There  is reactive edema at the femoral condyle and medial tibial plateau.  There is extrusion of the medial meniscus outside of the joint.  There is also evidence of a Baker's cyst posteriorly.  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  She also has a Baker's cyst and a complex ganglion cyst appears to originate from the tibiofibular joint.    Ultimately the patient has a total knee replacement in her future.  Given her age smoking status and her weight is would not be recommended at this time.  She is here to exhaust all conservative measures.  Given her isolated medial compartment symptoms she would likely benefit from a medial  brace.  She may also pursue a repeat corticosteroid injection 3 months from her first injection.  Otherwise she is encouraged to engage with her primary care physician in smoking cessation and a strategic weight loss program in anticipation of a future reconstruction.  Otherwise the patient will return to clinic as needed.  She will call with any further questions or concerns    Seen and discussed with Dr. Leo Villanueva MD 07/25/2018  Orthopaedic Surgery Resident, PGY-4  Pager: [phone]

## 2018-07-25 NOTE — LETTER
7/25/2018       RE: Carol Guajardo  [address] [facility] [address]     Dear Colleague,    Thank you for referring your patient, Carol Guajardo, to the [facility]. Please see a copy of my visit note below.    Chief concern: Left medial sided knee pain and left knee ganglion cyst    Patient is a 48-year-old female patient known to Dr. Bailey for consultation 1 year ago when she was referred to him for a right-sided calf complex ganglion cyst was ultimately managed conservatively.  She now returns for a visit regarding left-sided knee pain and a ganglion cyst that has cropped up near the fibular head left side.  She says that she received an MRI from the outside hospital which demonstrated a medial meniscus tear in addition to a Baker's cyst lateral ganglion cyst.  Since April as her knee is gotten progressively worse.  It really causes a lot of pain particular on the medial side after the end of a 10 hour workday.  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  She denies any tingling numbness or loss of function below the knee.  He says the cyst itself conservatively about the size of the and The size of a ping-pong ball in a matter of 2 weeks.  Is mostly asymptomatic but can get inflamed and tender at the end of the day.  Of note she received a corticosteroid injection to her knee 3 weeks ago which provided her noticeable pain relief and decreasing swelling but did not take away 100% of her pain    Exam  Focused examination of the left knee demonstrates medial sided joint line and proximal tibial tenderness.  There are no overlying skin changes.  She does have a ganglion cyst about 2-1/2 cm in diameter to the lateral aspect of her proximal tibia just anterior to the fibular head.  This is firm but compressible and nontender.  She stable to varus and valgus stress at extension and 30  of flexion.  Unable to reproduce  locking or catching on David's test.  Fires EHL FHL gastrocsoleus and tibialis anterior with 5 out of 5 strength.  Sensation is intact throughout the foot.    Imaging  Review of MRI of the left knee demonstrates a medial compartment with full-thickness cartilage loss.  There is reactive edema at the femoral condyle and medial tibial plateau.  There is extrusion of the medial meniscus outside of the joint.  There is also evidence of a Baker's cyst posteriorly.  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  She also has a Baker's cyst and a complex ganglion cyst appears to originate from the tibiofibular joint.    Ultimately the patient has a total knee replacement in her future.  Given her age smoking status and her weight is would not be recommended at this time.  She is here to exhaust all conservative measures.  Given her isolated medial compartment symptoms she would likely benefit from a medial  brace.  She may also pursue a repeat corticosteroid injection 3 months from her first injection.  Otherwise she is encouraged to engage with her primary care physician in smoking cessation and a strategic weight loss program in anticipation of a future reconstruction.  Otherwise the patient will return to clinic as needed.  She will call with any further questions or concerns    Seen and discussed with Dr. Leo Villanueva MD 07/25/2018  Orthopaedic Surgery Resident, PGY-4  Pager: [phone]      I was present with the resident during the history and exam.  I discussed the case with the resident and agree with the findings as documented in the assessment and plan.    Again, thank you for allowing me to participate in the care of your patient.       Sincerely,    Ion Bailey MD

## 2018-07-25 NOTE — MR AVS SNAPSHOT
After Visit Summary   7/25/2018    Carol Guajardo    MRN: [id number]           Patient Information     Date Of Birth          1970        Visit Information        Provider Department      7/25/2018 9:30 AM Ion Bailey MD [facility]        Today's Diagnoses     Primary osteoarthritis of left knee    -  1       Follow-ups after your visit        Additional Services     ORTHOPEDICS ADULT REFERRAL       Your provider has referred you to: [facility]: Orthopaedic Clinic [facility] [phone]         Medial  brace, left knee [email or web address]      Please be aware that coverage of these services is subject to the terms and limitations of your health insurance plan.  Call member services at your health plan with any benefit or coverage questions.      Please bring the following to your appointment:    >>   Any x-rays, CTs or MRIs which have been performed.  Contact the facility where they were done to arrange for  prior to your scheduled appointment.    >>   List of current medications   >>   This referral request   >>   Any documents/labs given to you for this referral                  Who to contact     Please call your clinic at [phone] to:    Ask questions about your health    Make or cancel appointments    Discuss your medicines    Learn about your test results    Speak to your doctor            Additional Information About Your Visit        MyChart Information     [employer] is an electronic gateway that provides easy, online access to your medical records. With [employer], you can request a clinic appointment, read your test results, renew a prescription or communicate with your care team.     To sign up for [employer] visit the website at [email or web address]   You will be asked to enter the access code listed below, as well as some personal information. Please follow the directions to create your username and  "password.     Your access code is: [email or web address]  Expires: 10/23/2018 11:07 AM     Your access code will  in 90 days. If you need help or a new code, please contact your [facility] or call [phone] for assistance.        Care EveryWhere ID     This is your Care EveryWhere ID. This could be used by other organizations to access your [place] medical records  [id number]        Your Vitals Were     Height                   1.575 m (5' 2\")            Blood Pressure from Last 3 Encounters:   04/10/18 145/81    Weight from Last 3 Encounters:   17 86.5 kg (190 lb 12.8 oz)              We Performed the Following     ORTHOPEDICS ADULT REFERRAL       Information about OPIOIDS     PRESCRIPTION OPIOIDS: WHAT YOU NEED TO KNOW   We gave you an opioid (narcotic) pain medicine. It is important to manage your pain, but opioids are not always the best choice. You should first try all the other options your care team gave you. Take this medicine for as short a time (and as few doses) as possible.     These medicines have risks:    DO NOT drive when on new or higher doses of pain medicine. These medicines can affect your alertness and reaction times, and you could be arrested for driving under the influence (DUI). If you need to use opioids long-term, talk to your care team about driving.    DO NOT operate heave machinery    DO NOT do any other dangerous activities while taking these medicines.     DO NOT drink any alcohol while taking these medicines.      If the opioid prescribed includes acetaminophen, DO NOT take with any other medicines that contain acetaminophen. Read all labels carefully. Look for the word  acetaminophen  or  Tylenol.  Ask your pharmacist if you have questions or are unsure.    You can get addicted to pain medicines, especially if you have a history of addiction (chemical, alcohol or substance dependence). Talk to your care team about ways to reduce this " risk.    Store your pills in a secure place, locked if possible. We will not replace any lost or stolen medicine. If you don t finish your medicine, please throw away (dispose) as directed by your pharmacist. The [place] Pollution Control Agency has more information about safe disposal: https://www.pca.Count includes the [facility].mn.us/living-green/managing-unwanted-medications.     All opioids tend to cause constipation. Drink plenty of water and eat foods that have a lot of fiber, such as fruits, vegetables, prune juice, apple juice and high-fiber cereal. Take a laxative (Miralax, milk of magnesia, Colace, Senna) if you don t move your bowels at least every other day.          Primary Care Provider Office Phone # Fax #    Chava Mayberry [phone]       [address] [address]        Equal Access to Services     [facility] : Hadii dari adeno Sodominic, waaxda luqadaha, qaybta kaalmada nato, giles scherer . So [facility] [phone].    ATENCIÓN: Si habla español, tiene a andrews disposición servicios gratuitos de asistencia lingüística. Teddypeter al [phone].    We comply with applicable federal civil rights laws and [place] laws. We do not discriminate on the basis of race, color, national origin, age, disability, sex, sexual orientation, or gender identity.            Thank you!     Thank you for choosing [facility]  for your care. Our goal is always to provide you with excellent care. Hearing back from our patients is one way we can continue to improve our services. Please take a few minutes to complete the written survey that you may receive in the mail after your visit with us. Thank you!             Your Updated Medication List - Protect others around you: Learn how to safely use, store and throw away your medicines at www.disposemymeds.org.          This list is accurate as of 7/25/18 11:07 AM.  Always use your most recent med  list.                   Brand Name Dispense Instructions for use Diagnosis    HYDROcodone-acetaminophen 5-325 MG per tablet    NORCO          IBUPROFEN PO           levothyroxine 150 MCG tablet    SYNTHROID/LEVOTHROID     Take 150 mcg by mouth        naproxen 500 MG tablet    NAPROSYN          PARoxetine 10 MG tablet    PAXIL     Take 10 mg by mouth        pramipexole 0.125 MG tablet    MIRAPEX     Take 0.125 mg by mouth        propranolol 60 MG 24 hr capsule    INDERAL LA     Take 60 mg by mouth

## 2018-07-25 NOTE — NURSING NOTE
"Reason For Visit:   Chief Complaint   Patient presents with     Consult     New Issue, Lt knee mass, with medial meniscal tear       Primary MD: Chava Mayberry     1.575 m (5' 2\")    [id number] [address] [facility]    Allergies   Allergen Reactions     Amoxicillin-Pot Clavulanate Hives     Other reaction(s): Edema     Ciprofloxacin      Penicillins        Current Outpatient Prescriptions   Medication     IBUPROFEN PO     levothyroxine (SYNTHROID/LEVOTHROID) 150 MCG tablet     PARoxetine (PAXIL) 10 MG tablet     pramipexole (MIRAPEX) 0.125 MG tablet     propranolol (INDERAL LA) 60 MG 24 hr capsule     HYDROcodone-acetaminophen (NORCO) 5-325 MG per tablet     naproxen (NAPROSYN) 500 MG tablet     No current facility-administered medications for this visit.        Pain Assessment  Patient Currently in Pain: Yes  Primary Pain Location: Knee  Pain Orientation: Left  Pain Descriptors: Aching  Alleviating Factors: Rest, NSAIDS, Pain medication, Other (comment) (Injection)  Aggravating Factors: Standing, Walking, Movement, Stairs, Bending, Squatting        Rubens AYALA, BRAULIO    "

## 2018-07-30 ENCOUNTER — TELEPHONE (OUTPATIENT)
Dept: ORTHOPEDICS | Facility: CLINIC | Age: 48
End: 2018-07-30

## 2018-07-30 DIAGNOSIS — M17.12 PRIMARY OSTEOARTHRITIS OF LEFT KNEE: Primary | ICD-10-CM

## 2018-07-30 NOTE — TELEPHONE ENCOUNTER
[facility] Call Center    Phone Message    May a detailed message be left on voicemail: yes    Reason for Call: Other: Pt calling regarding order for brace. Order was routed to orthopedics, but we do not schedule for this. Needs different order for DME. Please call pt to discuss     Action Taken: Message routed to:  [facility] Orthopedics

## 2018-07-30 NOTE — TELEPHONE ENCOUNTER
Patient notified of order being completed. She was told that she could also bring the order to either [facility] or [facility] if she would like as they are closer to her home. The order will be mailed to her home address.

## 2019-07-18 DIAGNOSIS — M67.40 GANGLION CYST: ICD-10-CM

## 2019-07-18 DIAGNOSIS — M17.12 PRIMARY OSTEOARTHRITIS OF LEFT KNEE: Primary | ICD-10-CM

## 2019-07-19 ENCOUNTER — OFFICE VISIT (OUTPATIENT)
Dept: ORTHOPEDICS | Facility: CLINIC | Age: 49
End: 2019-07-19
Payer: COMMERCIAL

## 2019-07-19 ENCOUNTER — ANCILLARY PROCEDURE (OUTPATIENT)
Dept: GENERAL RADIOLOGY | Facility: CLINIC | Age: 49
End: 2019-07-19
Attending: ORTHOPAEDIC SURGERY
Payer: COMMERCIAL

## 2019-07-19 VITALS
SYSTOLIC BLOOD PRESSURE: 142 MMHG | BODY MASS INDEX: 40.05 KG/M2 | DIASTOLIC BLOOD PRESSURE: 83 MMHG | WEIGHT: 204 LBS | HEIGHT: 60 IN

## 2019-07-19 DIAGNOSIS — M17.12 PRIMARY OSTEOARTHRITIS OF LEFT KNEE: ICD-10-CM

## 2019-07-19 DIAGNOSIS — M67.40 GANGLION CYST: ICD-10-CM

## 2019-07-19 DIAGNOSIS — M17.12 PRIMARY OSTEOARTHRITIS OF LEFT KNEE: Primary | ICD-10-CM

## 2019-07-19 RX ORDER — BUPROPION HYDROCHLORIDE 300 MG/1
300 TABLET ORAL EVERY MORNING
COMMUNITY
Start: 2019-04-04 | End: 2021-03-23

## 2019-07-19 ASSESSMENT — PAIN SCALES - GENERAL: PAINLEVEL: SEVERE PAIN (6)

## 2019-07-19 ASSESSMENT — MIFFLIN-ST. JEOR: SCORE: 1471.84

## 2019-07-19 NOTE — PROGRESS NOTES
Reason For Visit:   Chief Complaint   Patient presents with     Left Knee - Pain     Knee Pain     left knee pain x 4 months. steroid shot in march 21st. wants to have knee  replacement. burning and numbness going down leg x 2 months       /83 (BP Location: Right arm, Patient Position: Sitting, Cuff Size: Adult Regular)   Ht 1.524 m (5')   Wt 92.5 kg (204 lb)   BMI 39.84 kg/m           Ino Hedrick, CMA

## 2019-07-19 NOTE — PROGRESS NOTES
This patient has been struggling with left knee osteoarthritis especially over the last year.  She has been wearing off  brace which does not seem to help much.  She takes occasional Percocet at night for pain.  She had a knee injection 4 months ago which gave her only 1 week of relief.  She has a very active job and works 10 hours a day including doing bending squatting and lifting and carrying.  I reviewed her patient survey information in the EMR but there has not been any major changes in her medical or social history the family history over the last year.    On examination she is alert oriented has a normal distress.  There is no edema erythema or adenopathy in her left leg.  Respirations are regular and unlabored eyes are nonicteric.  In the left knee there is minimal swelling.  She has full extension and flexion past 90 degrees.  The leg is perfused and sensate.    Standing x-rays show medial left joint space narrowing and soft tissue swelling in the lateral aspect of the left knee.  This patient has a known ganglion cyst on the lateral aspect of her knee.    The patient reminds me that the cyst makes brace were difficult.  We discussed knee replacement.  She is aware of the risk of infection requiring additional surgery.  She knows of the risk of blood clots that necessitate preventative anticoagulation treatment after surgery.  She knows that that to get a good result she must do some therapy and work on her motion.  She is also aware of the risk of infection and wound complication.  I have asked her to stop cigarettes for 2 months to eliminate that risk factor.  I have also asked her to avoid narcotic pain medicines so that pain control can be optimized at the time of surgery.  She has agreed to do these things with some hesitance as she is afraid about gaining weight.  Her spouse who is here seems very supportive.  We will plan for a left total knee at the end of September.

## 2019-07-19 NOTE — NURSING NOTE
Teaching Flowsheet   Relevant Diagnosis: Left total knee arthroplasty  Teaching Topic: Pre op teaching     Person(s) involved in teaching:   Patient and      Motivation Level:  Asks Questions: Yes  Eager to Learn: Yes  Cooperative: Yes  Receptive (willing/able to accept information): Yes  Any cultural factors/Mormonism beliefs that may influence understanding or compliance? No       Patient demonstrates understanding of the following:  Reason for the appointment, diagnosis and treatment plan: Yes  Knowledge of proper use of medications and conditions for which they are ordered (with special attention to potential side effects or drug interactions): Yes  Which situations necessitate calling provider and whom to contact: Yes       Teaching Concerns Addressed: RN discussed pre op teaching with patient and . RN reiterated to patient the importance to quit smoking and pain medication. RN explained to patient post total joint, patient will need to take ABX life time each time patient goes to the dentist for any dental work that is not a regular cleaning. RN advised patient to sign up with joint replacement class and go through the booklet. RN advised patient to pre-hab her knee for strength. RN told patient regarding shower and location of surgery.   Surgery date is set on 09/30/19. RN sent a message to Silver to help schedule her for PAC due to her health history and nicotine dependence. Patient and  demonstrated understanding.       Proper use and care of meds (medical equip, care aids, etc.): Yes  Nutritional needs and diet plan: Yes  Pain management techniques: Yes  Wound Care: Yes  How and/when to access community resources: Yes     Instructional Materials Used/Given: Pre op packet, ABX prior to dental form, Joint replacement class and booklet and antibacterial soap.     Time spent with patient: 15 minutes.

## 2019-07-19 NOTE — LETTER
7/19/2019       RE: Carol Guajardo  [address] [facility] [address]     Dear Colleague,    Thank you for referring your patient, Carol Guajardo, to [facility]. Please see a copy of my visit note below.    Reason For Visit:   Chief Complaint   Patient presents with     Left Knee - Pain     Knee Pain     left knee pain x 4 months. steroid shot in march 21st. wants to have knee  replacement. burning and numbness going down leg x 2 months       /83 (BP Location: Right arm, Patient Position: Sitting, Cuff Size: Adult Regular)   Ht 1.524 m (5')   Wt 92.5 kg (204 lb)   BMI 39.84 kg/m       Ino Hedrick, BRAULIO      This patient has been struggling with left knee osteoarthritis especially over the last year.  She has been wearing off  brace which does not seem to help much.  She takes occasional Percocet at night for pain.  She had a knee injection 4 months ago which gave her only 1 week of relief.  She has a very active job and works 10 hours a day including doing bending squatting and lifting and carrying.  I reviewed her patient survey information in the EMR but there has not been any major changes in her medical or social history the family history over the last year.    On examination she is alert oriented has a normal distress.  There is no edema erythema or adenopathy in her left leg.  Respirations are regular and unlabored eyes are nonicteric.  In the left knee there is minimal swelling.  She has full extension and flexion past 90 degrees.  The leg is perfused and sensate.    Standing x-rays show medial left joint space narrowing and soft tissue swelling in the lateral aspect of the left knee.  This patient has a known ganglion cyst on the lateral aspect of her knee.    The patient reminds me that the cyst makes brace were difficult.  We discussed knee replacement.  She is aware of the risk of infection requiring additional  surgery.  She knows of the risk of blood clots that necessitate preventative anticoagulation treatment after surgery.  She knows that that to get a good result she must do some therapy and work on her motion.  She is also aware of the risk of infection and wound complication.  I have asked her to stop cigarettes for 2 months to eliminate that risk factor.  I have also asked her to avoid narcotic pain medicines so that pain control can be optimized at the time of surgery.  She has agreed to do these things with some hesitance as she is afraid about gaining weight.  Her spouse who is here seems very supportive.  We will plan for a left total knee at the end of September.    Again, thank you for allowing me to participate in the care of your patient.      Sincerely,    Ion Bailey MD

## 2019-09-03 ENCOUNTER — TELEPHONE (OUTPATIENT)
Dept: ORTHOPEDICS | Facility: CLINIC | Age: 49
End: 2019-09-03

## 2019-09-03 NOTE — TELEPHONE ENCOUNTER
RN returned call to patient and left voice message to call us back or use My chart for her questions.

## 2019-09-03 NOTE — TELEPHONE ENCOUNTER
[facility] Call Center    Phone Message    May a detailed message be left on voicemail: yes    Reason for Call: Other: Pt has questions about surgery and would like to speak to Nely.  Pt has lunch from 11:15-11:45am and is off work at 3:30pm.     Action Taken: Message routed to:  [facility] Ortho

## 2019-09-03 NOTE — TELEPHONE ENCOUNTER
RN returned call to Carol.  RN left a voice message to call us or My chart us her questions regarding her surgery.

## 2019-09-05 ENCOUNTER — TELEPHONE (OUTPATIENT)
Dept: ORTHOPEDICS | Facility: CLINIC | Age: 49
End: 2019-09-05

## 2019-09-05 NOTE — TELEPHONE ENCOUNTER
[facility] Call Center    Phone Message    May a detailed message be left on voicemail: yes    Reason for Call: Other: Tierra called in and would like to speak with 's nurse regarding what her rheumatologist is recommending for her surgery on 09/30. The doctor is recommending synovial tissue biopsy and pathology analysis to evaluate for inflammatory or melignant issues. They wanted to make sure  was aware of these tissues and if someone can call Tierra back to let her know he is aware of these recommendations and that he got the message. Thank you.     Action Taken: Message routed to:  [facility] ortho

## 2019-09-09 ENCOUNTER — TELEPHONE (OUTPATIENT)
Dept: ORTHOPEDICS | Facility: CLINIC | Age: 49
End: 2019-09-09

## 2019-09-09 NOTE — TELEPHONE ENCOUNTER
RN returned call to Tierra. She states that Rheumatology reached out to Dr. Smart in a clinic note asking for :    rheumatologist is recommending for her surgery on 09/30. The doctor is recommending synovial tissue biopsy and pathology analysis to evaluate for inflammatory or melignant issues. They wanted to make sure  was aware of these tissues and if someone can call Tierra back to let her know he is aware of these recommendations and that he got the message.    RN will pass the message along.  Tierra states that if he wants to speak with her PCP, Dr. Mayberry he may call him and speak with him.

## 2019-09-11 ENCOUNTER — TELEPHONE (OUTPATIENT)
Dept: ORTHOPEDICS | Facility: CLINIC | Age: 49
End: 2019-09-11

## 2019-09-11 NOTE — TELEPHONE ENCOUNTER
RN called and left a voice message.  I My charted you some basic information.  If you have more questions than that, please either call again or mY chart.

## 2019-09-11 NOTE — TELEPHONE ENCOUNTER
FUTURE VISIT INFORMATION      SURGERY INFORMATION:    Date: 9/30    Location: ur or    Surgeon: DR WAKEFIELD    Anesthesia Type: OTHER        RECORDS REQUESTED FROM:       Primary Care Provider:SAVANNAH MACHADO    Pertinent Medical History: HLD, OBESITY    Most recent EKG with tracings/strips:    Most recent ECHO:    Most recent Cardiac Stress Test:    Most recent Coronary Angiogram:

## 2019-09-13 ENCOUNTER — ANESTHESIA EVENT (OUTPATIENT)
Dept: SURGERY | Facility: CLINIC | Age: 49
End: 2019-09-13

## 2019-09-13 ENCOUNTER — PRE VISIT (OUTPATIENT)
Dept: SURGERY | Facility: CLINIC | Age: 49
End: 2019-09-13

## 2019-09-13 ENCOUNTER — OFFICE VISIT (OUTPATIENT)
Dept: SURGERY | Facility: CLINIC | Age: 49
End: 2019-09-13
Payer: COMMERCIAL

## 2019-09-13 VITALS
OXYGEN SATURATION: 98 % | SYSTOLIC BLOOD PRESSURE: 123 MMHG | BODY MASS INDEX: 40.84 KG/M2 | WEIGHT: 208 LBS | HEIGHT: 60 IN | HEART RATE: 60 BPM | RESPIRATION RATE: 20 BRPM | TEMPERATURE: 98 F | DIASTOLIC BLOOD PRESSURE: 87 MMHG

## 2019-09-13 DIAGNOSIS — M17.12 PRIMARY OSTEOARTHRITIS OF LEFT KNEE: ICD-10-CM

## 2019-09-13 DIAGNOSIS — Z01.818 PREOP EXAMINATION: Primary | ICD-10-CM

## 2019-09-13 DIAGNOSIS — Z01.818 PREOP EXAMINATION: ICD-10-CM

## 2019-09-13 LAB
ALBUMIN UR-MCNC: NEGATIVE MG/DL
ANION GAP SERPL CALCULATED.3IONS-SCNC: 6 MMOL/L (ref 3–14)
APPEARANCE UR: ABNORMAL
BILIRUB UR QL STRIP: NEGATIVE
BUN SERPL-MCNC: 15 MG/DL (ref 7–30)
CALCIUM SERPL-MCNC: 8.8 MG/DL (ref 8.5–10.1)
CHLORIDE SERPL-SCNC: 106 MMOL/L (ref 94–109)
CO2 SERPL-SCNC: 25 MMOL/L (ref 20–32)
COLOR UR AUTO: YELLOW
CREAT SERPL-MCNC: 0.54 MG/DL (ref 0.52–1.04)
ERYTHROCYTE [DISTWIDTH] IN BLOOD BY AUTOMATED COUNT: 12.7 % (ref 10–15)
GFR SERPL CREATININE-BSD FRML MDRD: >90 ML/MIN/{1.73_M2}
GLUCOSE SERPL-MCNC: 85 MG/DL (ref 70–99)
GLUCOSE UR STRIP-MCNC: NEGATIVE MG/DL
HCT VFR BLD AUTO: 38.8 % (ref 35–47)
HGB BLD-MCNC: 12.1 G/DL (ref 11.7–15.7)
HGB UR QL STRIP: ABNORMAL
HYALINE CASTS #/AREA URNS LPF: 1 /LPF (ref 0–2)
KETONES UR STRIP-MCNC: NEGATIVE MG/DL
LEUKOCYTE ESTERASE UR QL STRIP: NEGATIVE
MCH RBC QN AUTO: 30 PG (ref 26.5–33)
MCHC RBC AUTO-ENTMCNC: 31.2 G/DL (ref 31.5–36.5)
MCV RBC AUTO: 96 FL (ref 78–100)
MRSA DNA SPEC QL NAA+PROBE: NEGATIVE
MUCOUS THREADS #/AREA URNS LPF: PRESENT /LPF
NITRATE UR QL: NEGATIVE
PH UR STRIP: 6 PH (ref 5–7)
PLATELET # BLD AUTO: 314 10E9/L (ref 150–450)
POTASSIUM SERPL-SCNC: 4.2 MMOL/L (ref 3.4–5.3)
RBC # BLD AUTO: 4.04 10E12/L (ref 3.8–5.2)
RBC #/AREA URNS AUTO: 9 /HPF (ref 0–2)
SODIUM SERPL-SCNC: 137 MMOL/L (ref 133–144)
SOURCE: ABNORMAL
SP GR UR STRIP: 1.02 (ref 1–1.03)
SPECIMEN SOURCE: NORMAL
SQUAMOUS #/AREA URNS AUTO: 2 /HPF (ref 0–1)
UROBILINOGEN UR STRIP-MCNC: 0 MG/DL (ref 0–2)
WBC # BLD AUTO: 9.9 10E9/L (ref 4–11)
WBC #/AREA URNS AUTO: 5 /HPF (ref 0–5)

## 2019-09-13 RX ORDER — DIAZEPAM 2 MG
2 TABLET ORAL
COMMUNITY
Start: 2018-10-12 | End: 2020-12-16

## 2019-09-13 RX ORDER — OXYCODONE AND ACETAMINOPHEN 5; 325 MG/1; MG/1
1 TABLET ORAL EVERY 12 HOURS PRN
Status: ON HOLD | COMMUNITY
Start: 2019-07-26 | End: 2019-10-01

## 2019-09-13 RX ORDER — PAROXETINE 20 MG/1
20 TABLET, FILM COATED ORAL EVERY MORNING
COMMUNITY
End: 2021-06-21

## 2019-09-13 ASSESSMENT — LIFESTYLE VARIABLES: TOBACCO_USE: 1

## 2019-09-13 ASSESSMENT — PATIENT HEALTH QUESTIONNAIRE - PHQ9
SUM OF ALL RESPONSES TO PHQ QUESTIONS 1-9: 7
SUM OF ALL RESPONSES TO PHQ QUESTIONS 1-9: 7

## 2019-09-13 ASSESSMENT — MIFFLIN-ST. JEOR: SCORE: 1489.98

## 2019-09-13 ASSESSMENT — PAIN SCALES - GENERAL: PAINLEVEL: MILD PAIN (3)

## 2019-09-13 NOTE — PATIENT INSTRUCTIONS
Preparing for Your Surgery      Name:  Carol Guajardo   MRN:  [id number]   :  1970   Today's Date:  2019     Arriving for surgery:  Surgery date:  19  Arrival time:  6AM  Please come to:     [facility] [address]  [address] [place]  [address]    - parking is available in front of [facility] from 5:15AM to 8:00PM. If you prefer, park your car in the Green Lot.    -Proceed to the 3rd floor, check in at the Adult Surgery Waiting Lounge. [phone]    If an escort is needed stop at the Information Desk in the lobby. Inform the information person that you are here for surgery. An escort to the Adult Surgery Waiting Lounge will be provided.        What can I eat or drink?  -  You may have solid food or milk products until 8 hours prior to your surgery. Midnight  -  You may have water, apple juice or 7up/Sprite until 2 hours prior to your surgery. 19, 6AM    Which medicines can I take?  Stop Aspirin, vitamins and supplements one week prior to surgery.  Hold Ibuprofen for 24 hours and/or Naproxen for 48 hours prior to surgery.     -  Please take these medications the day of surgery:    Bupropion(Wellbutrin)  Levothyroxine    Paroxetine(Paxil)  Propranolol(Inderal)    Oxycodone-acetaminophen(Percocet) as needed    How do I prepare myself?  -  Take two showers: one the night before surgery; and one the morning of surgery.         Use Scrubcare or Hibiclens to wash from neck down, leave soap on your skin for up to one minute.  Do not get soap in your eyes or ears.  You may use your own shampoo and conditioner; no other hair products.   -  Do NOT use lotion, powder, deodorant, or antiperspirant the day of your surgery.  -  Do NOT wear any makeup, fingernail polish or jewelry.  -  Begin using Incentive Spirometer 1 week prior to surgery.  Use 4 times per day, up    to 5 breaths each time.  Bring Incentive Spirometer to hospital.  - Do not bring  your own medications to the hospital, except for inhalers and eye   drops.  -  Bring your ID and insurance card.    Questions or Concerns:  -If you are scheduled on the East or West campus and have questions or concerns regarding the day of surgery, please call Preadmission Nursing at [phone].     -For questions after surgery please call your surgeons office.           Enhanced Recovery After Surgery     This is a team effort, including you, to get you back on your feet, eating and drinking normally and out of the hospital as quickly as possible.  The goals are: 1) NO INFECTIONS and   2) RETURN TO NORMAL DIET    How can we achieve these goals?  1) STAY ACTIVE: Walk every day before your surgery; try to increase the amount every day.  Walk after surgery as much as you can-the nurses will help you.  Walking speeds healing and gets you home quicker, you heal better at home and have less risk of infection.     2) INCENTIVE SPIROMETER: Practice your incentive spirometer 4 times per day with 5 repetitions each time.  Using the incentive spirometer can strengthen your muscles between your ribs and help you have a strong cough after surgery.  A more effective cough can help prevent problems with your lungs.    3) STAY HYDRATED: Drink clear liquids up until 2 hours before your surgery. We would like you to purchase a drink such as Gatorade or Ensure Clear (not the milkshake type).  Drink this before bedtime and on the way into the hospital, drink between 8-10 ounces or until you feel hydrated.  Keeping well hydrated leads to your veins being plump, you wake up faster, and you are less likely to be nauseated. Start drinking water as soon as you can after surgery and advance to clear liquids and food as tolerated.  IV fluids contain salt, drinking fluids will minimize the amount of IV fluids you need and decrease the amount of salt you get.    The most common reason for the patient to be readmitted is dehydration. Staying  hydrated after you go home from the hospital is very important.  Ensure or Ensure Clear are good options to keep you hydrated.     4) PAIN MANAGEMENT: If we minimize the amount of opioids and narcotics, and use regional blocks (which numb the area where your surgery is) along with oral pain medications; you will have less side effects of nausea and constipation. Narcotics can slow down your bowels and cause you to stay in the hospital longer.     Our goal is to keep you comfortable; eating and drinking normally and back home safely.     Using an Incentive Spirometer    An incentive spirometer is a device that helps you do deep breathing exercises. These exercises expand your lungs, aid in circulation, and help prevent pneumonia. Deep breathing exercises also help you breathe better and improve the function of your lungs by:    Keeping your lungs clear    Strengthening your breathing muscles    Helping prevent respiratory complications or problems  The incentive spirometer gives you a way to take an active part in your care. A nurse or therapist will teach you breathing exercises. To do these exercises, you will breathe in through your mouth and not your nose. The incentive spirometer only works correctly if you breathe in through your mouth.    Steps to clear lungs  Step 1. Exhale normally. Then, inhale normally.    Relax and breathe out.  Step 2. Place your lips tightly around the mouthpiece.    Make sure the device is upright and not tilted.  Step 3. Inhale as much air as you can through the mouthpiece (don't breath through your nose).    Inhale slowly and deeply.    Hold your breath long enough to keep the balls or disk raised for at least 3 to 5 seconds, or as instructed by your healthcare provider.  Step 4. Repeat the exercise regularly.  Begin using the Incentive Spirometer one week prior to your surgery, 4 times per day-5 times each.

## 2019-09-13 NOTE — H&P
Pre-Operative H & P     CC:  Preoperative exam to assess for increased cardiopulmonary risk while undergoing surgery and anesthesia.    Date of Encounter: 9/13/2019  Primary Care Physician:  Chava Mayberry  Reason for Visit: Primary osteoarthritis of left knee [M17.12]  - Primary    HPI  Carol Guajardo is a 48 y/o female who presents for pre-operative H&P in preparation for Left Total Knee Arthroplasty with Ion Bailey MD on 9/30/19 at [facility] for treatment of Primary osteoarthritis of left knee.    Ms. Guajardo has been struggling with worsening left knee osteoarthritis over the last year.  She has been wearing off  brace which does not seem to help much.  She takes occasional Percocet at night for pain.  She had a knee injection 4 months ago which gave her only 1 week of relief.  She has a very active job and works 10 hours a day including doing bending squatting and lifting and carrying. She now presents for the above procedure.    PMH is also significant for HLD, 25 pk yr smoking hx (quit 7/2019), anxiety/depression (stable on meds), hypothyroidism (stable on med). She manages her pain w/ Percocet 5-325, 0-2 tabs/day.    History was obtained from patient & chart review.     Past Medical History  HLD  Tobacco use  Anxiety  Depression  Hypothyroidism  Osteoarthritis, knees    Past Surgical History  Past Surgical History:   Procedure Laterality Date     CARPAL TUNNEL RELEASE RT/LT Bilateral      EXTERNAL EAR SURGERY       IR LUNG BIOPSY MEDIASTINUM RIGHT Right 08/2018    RLL mass, diagnosed as fibrosis per pt     LAPAROSCOPY DIAGNOSTIC (GENERAL)  02/2019     LAPAROSCOPY, SURGICAL; REPAIR UMBILICAL HERNIA  08/22/2018       Hx of Blood transfusions/reactions: no     Hx of abnormal bleeding or anti-platelet use: no    Menstrual history: No LMP recorded.:      Steroid use in the last year: no    Personal or FH with difficulty with Anesthesia:  no    Prior to  Admission Medications  Current Outpatient Medications   Medication Sig Dispense Refill     buPROPion (WELLBUTRIN XL) 300 MG 24 hr tablet Take 300 mg by mouth every morning        diazepam (VALIUM) 2 MG tablet Take 2 mg by mouth       IBUPROFEN PO Take 600 mg by mouth as needed        levothyroxine (SYNTHROID/LEVOTHROID) 150 MCG tablet Take 175 mcg by mouth every morning 175mg       oxyCODONE-acetaminophen (PERCOCET) 5-325 MG tablet Take 1 tablet by mouth every 12 hours as needed        PARoxetine (PAXIL) 20 MG tablet Take 20 mg by mouth every morning       pramipexole (MIRAPEX) 0.125 MG tablet Take 0.5 mg by mouth At Bedtime        propranolol (INDERAL LA) 60 MG 24 hr capsule Take 60 mg by mouth every morning        PARoxetine (PAXIL) 10 MG tablet Take 40 mg by mouth          Allergies  Allergies   Allergen Reactions     Amoxicillin-Pot Clavulanate Hives     Other reaction(s): Edema     Ciprofloxacin      Penicillins        Social History  Social History     Socioeconomic History     Marital status:      Spouse name: Not on file     Number of children: Not on file     Years of education: Not on file     Highest education level: Not on file   Occupational History     Not on file   Social Needs     Financial resource strain: Not on file     Food insecurity:     Worry: Not on file     Inability: Not on file     Transportation needs:     Medical: Not on file     Non-medical: Not on file   Tobacco Use     Smoking status: Former Smoker     Packs/day: 0.75     Years: 33.00     Pack years: 24.75     Types: Paan with tobacco, gutka, zarda, khaini     Start date:      Last attempt to quit: 2019     Years since quittin.1     Smokeless tobacco: Never Used   Substance and Sexual Activity     Alcohol use: Yes     Drug use: Not on file     Sexual activity: Not on file   Lifestyle     Physical activity:     Days per week: Not on file     Minutes per session: Not on file     Stress: Not on file   Relationships  "    Social connections:     Talks on phone: Not on file     Gets together: Not on file     Attends Restorationism service: Not on file     Active member of club or organization: Not on file     Attends meetings of clubs or organizations: Not on file     Relationship status: Not on file     Intimate partner violence:     Fear of current or ex partner: Not on file     Emotionally abused: Not on file     Physically abused: Not on file     Forced sexual activity: Not on file   Other Topics Concern     Not on file   Social History Narrative     Not on file       Family History  Family History   Problem Relation Age of Onset     Anesthesia Reaction Mother         PONV     CABG Father      Heart Failure Father      Coronary Stenting Father      Cardiovascular Father         ICD implant     Deep Vein Thrombosis (DVT) No family hx of        ROS/MED HX  The complete review of systems is negative other than noted in the HPI or here.  Patient denies recent illness, fever and respiratory infection during past month.  Pt denies steroid use during past year.    ENT/Pulmonary:     (+)tobacco use, Past use 25 pk yr hx, quit 7/2019 packs/day  , . .    Neurologic:     (+)neuropathy - LLE, L foot due to L knee osteoarthritis,     Cardiovascular: Comment: Taking propanolol for depression    (+) Dyslipidemia, ----. : . . . :. . No previous cardiac testing       METS/Exercise Tolerance: Comment: Works on feet 10 hrs/day, walking/bending/lifting as an . Denies SOB & CP. Needs to ice knee daily. >4 METS   Hematologic:    Neg Hematologic     Musculoskeletal: Comment: RLE \"cyst filled with fluid,\" untreated, under surveillance per pt  (+) arthritis,  -       GI/Hepatic:  - neg GI/hepatic ROS       Renal/Genitourinary:  - ROS Renal section negative       Endo:     (+) thyroid problem hypothyroidism, Obesity, .      Psychiatric:     (+) psychiatric history anxiety and depression (taking paxil, wellbutrin, propanolol)      Infectious " "Disease:  - neg infectious disease ROS       Malignancy:      - no malignancy   Other: Comment: Takes Percocet 5-325, 0-2 tabs/day. Has had one per day over past 3 days.   (+) No chance of pregnancy C-spine cleared: N/A, H/O Chronic Pain,H/O chronic opiod use ,              PHYSICAL EXAM:   Mental Status/Neuro: A/A/O; Age Appropriate   Airway: Facies: Feasible  Mallampati: I  Mouth/Opening: Full  TM distance: > 6 cm  Neck ROM: Full   Respiratory: Auscultation: CTAB     Resp. Rate: Normal     Resp. Effort: Normal      CV: Rhythm: Regular  Rate: Age appropriate  Heart: Normal Sounds  Edema: None   Comments:      Dental: Normal Dentition              Preop Vitals    BP Readings from Last 3 Encounters:   09/13/19 123/87   07/19/19 142/83   04/10/18 145/81    Pulse Readings from Last 3 Encounters:   09/13/19 60   04/10/18 70      Resp Readings from Last 3 Encounters:   09/13/19 20   04/10/18 16   10/27/17 18    SpO2 Readings from Last 3 Encounters:   09/13/19 98%   04/10/18 98%      Temp Readings from Last 1 Encounters:   09/13/19 98  F (36.7  C) (Oral)    Ht Readings from Last 1 Encounters:   09/13/19 1.524 m (5')      Wt Readings from Last 1 Encounters:   09/13/19 94.3 kg (208 lb)    Estimated body mass index is 40.62 kg/m  as calculated from the following:    Height as of this encounter: 1.524 m (5').    Weight as of this encounter: 94.3 kg (208 lb).     Temp: 98  F (36.7  C) Temp src: Oral BP: 123/87 Pulse: 60   Resp: 20 SpO2: 98 %         208 lbs 0 oz  5' 0\"   Body mass index is 40.62 kg/m .    Physical Exam  Constitutional: Awake, alert, cooperative, no apparent distress, and appears stated age.  Eyes: Pupils equal, round and reactive to light, extra ocular muscles intact, sclera clear, conjunctiva normal.  HENT: Normocephalic, oral pharynx with moist mucus membranes, fair dentition. No goiter appreciated. No removable dental hardware.  Respiratory: Clear to auscultation bilaterally, no crackles or wheezing. No SOB " when supine.  Cardiovascular: Regular rate and rhythm, normal S1 and S2, and no murmur noted.  Carotids +2, no bruits. Mild non-pitting edema. Palpable pulses to radial, DP and PT arteries.   GI: Normal bowel sounds, soft, obese, non-tender, no masses palpated, no hepatomegaly.    Lymph/Hematologic: No cervical lymphadenopathy and no supraclavicular lymphadenopathy.  Genitourinary:  deferred  Skin: Warm and dry.  No rashes at anticipated surgical site.   Musculoskeletal: full ROM of neck. There is no redness, warmth, or swelling of the joints. Gross motor strength is normal.    Neurologic: Awake, alert, oriented to name, place and time. Cranial nerves II-XII are grossly intact. Gait is mildly antalgic. Ambulates from chair to exam table, seats self, lies supine and sits back up w/o assistance.  Neuropsychiatric: Calm, cooperative. Normal affect. Pleasant. Answers questions appropriately, follows commands w/o difficulty.    PRIOR LABS/DIAGNOSTIC STUDIES:  All labs and imaging personally reviewed    XR KNEE BILATERAL 3 VW  7/19/2019:  IMPRESSION:  1. Right knee moderate grade medial femorotibial joint compartment  osteoarthritis. Remainder of the joint spaces are intact.  2. Left knee mild patellofemoral osteoarthritis. Remainder of the  joint spaces are preserved.    Labs today: (personally reviewed)  BMP, CBC, UA, T&S, MRSA    Outside records reviewed from: Care Everywhere    ASSESSMENT and PLAN  Carol Guajardo is a 49 year old female scheduled to undergo Total Knee Arthroplasty with Ion Bailey MD on 9/30/19 at [facility] for treatment of Primary osteoarthritis of left knee.    Ms. Guajardo has been struggling with worsening left knee osteoarthritis over the last year.  She has been wearing off  brace which does not seem to help much.  She takes occasional Percocet at night for pain.  She had a knee injection 4 months ago which gave her only 1 week of relief.  She  has a very active job and works 10 hours a day including doing bending squatting and lifting and carrying. She now presents for the above procedure.     She has the following specific operative considerations:     Pt has had prior anesthetic. Type: General, MAC and Regional - no complications per pt     - Anesthesia considerations:  Refer to PAC assessment in anesthesia records  - Risk of PONV score = 3.  If > 2, anti-emetic intervention recommended.  - Taking Percocet 5-325 mg, 0-2 tabs/day    CARDIAC: METS >4, Works on feet 10 hrs/day, walking/bending/lifting as an . Denies SOB & CP. Needs to ice knee daily.      - RCRI : No serious cardiac risks.  0.4% risk of major adverse cardiac event.    (taking propanolol for depression)    PULMONARY:    - VALERIE 1/8 = low risk    - Former smoker, 25 pk yr hx, quit 7/2019    - No inhaler use    GI: no GERD     ENDO: BMI 40    - No DM    HEME: VTE risk: 0.5%    ORTHO: full neck ROM, no TMJ       Patient is optimized and is acceptable candidate for the proposed procedure, provided labs today are within acceptable range. No further diagnostic evaluation is needed.Enhanced recovery pathway initiated.    Arrival time, NPO, shower and medication instructions provided by nursing staff today.  Preparing For Your Surgery handout given    Ronit Chavez PA-C  Preoperative Assessment Center  [facility]  Clinic and Surgery Center  Phone: [phone]  Fax: [phone]

## 2019-09-13 NOTE — ANESTHESIA PREPROCEDURE EVALUATION
"Anesthesia Pre-Procedure Evaluation    Patient: Carol Guajardo   MRN:     [id number] Gender:   female   Age:    49 year old :      1970        Preoperative Diagnosis: * No surgery found *        No past medical history on file.   Past Surgical History:   Procedure Laterality Date     CARPAL TUNNEL RELEASE RT/LT Bilateral      EXTERNAL EAR SURGERY       LAPAROSCOPY DIAGNOSTIC (GENERAL)  2019     LAPAROSCOPY, SURGICAL; REPAIR UMBILICAL HERNIA  2018          Anesthesia Evaluation     . Pt has had prior anesthetic. Type: General, MAC and Regional           ROS/MED HX    ENT/Pulmonary:     (+)tobacco use, Past use 25 pk yr hx, quit 2019 packs/day  , . .    Neurologic:     (+)neuropathy - LLE, L foot due to L knee osteoarthritis,     Cardiovascular: Comment: Taking propanolol for depression    (+) Dyslipidemia, ----. : . . . :. . No previous cardiac testing       METS/Exercise Tolerance: Comment: Works on feet 10 hrs/day, walking/bending/lifting as an . Denies SOB & CP. Needs to ice knee daily. >4 METS   Hematologic:         Musculoskeletal: Comment: RLE \"cyst filled with fluid,\" untreated, under surveillance per pt  (+) arthritis,  -       GI/Hepatic:  - neg GI/hepatic ROS       Renal/Genitourinary:  - ROS Renal section negative       Endo:     (+) thyroid problem hypothyroidism, Obesity, .      Psychiatric:     (+) psychiatric history anxiety and depression (taking paxil, wellbutrin, propanolol)      Infectious Disease:  - neg infectious disease ROS       Malignancy:      - no malignancy   Other: Comment: Takes Percocet 5-325, 0-2 tabs/day. Has had one per day over past 3 days.   (+) No chance of pregnancy C-spine cleared: N/A, H/O Chronic Pain,H/O chronic opiod use ,                        PHYSICAL EXAM:   Mental Status/Neuro: A/A/O; Age Appropriate   Airway: Facies: Feasible  Mallampati: I  Mouth/Opening: Full  TM distance: > 6 cm  Neck ROM: Full   Respiratory: Auscultation: CTAB     Resp. " Rate: Normal     Resp. Effort: Normal      CV: Rhythm: Regular  Rate: Age appropriate  Heart: Normal Sounds  Edema: None   Comments:      Dental: Normal Dentition                LABS:  CBC: No results found for: WBC, HGB, HCT, PLT  BMP: No results found for: NA, POTASSIUM, CHLORIDE, CO2, BUN, CR, GLC  COAGS: No results found for: PTT, INR, FIBR  POC: No results found for: BGM, HCG, HCGS  OTHER: No results found for: PH, LACT, A1C, BERTA, PHOS, MAG, ALBUMIN, PROTTOTAL, ALT, AST, GGT, ALKPHOS, BILITOTAL, BILIDIRECT, LIPASE, AMYLASE, SKYLA, TSH, T4, T3, CRP, SED     Preop Vitals    BP Readings from Last 3 Encounters:   09/13/19 123/87   07/19/19 142/83   04/10/18 145/81    Pulse Readings from Last 3 Encounters:   09/13/19 60   04/10/18 70      Resp Readings from Last 3 Encounters:   09/13/19 20   04/10/18 16   10/27/17 18    SpO2 Readings from Last 3 Encounters:   09/13/19 98%   04/10/18 98%      Temp Readings from Last 1 Encounters:   09/13/19 98  F (36.7  C) (Oral)    Ht Readings from Last 1 Encounters:   09/13/19 1.524 m (5')      Wt Readings from Last 1 Encounters:   09/13/19 94.3 kg (208 lb)    Estimated body mass index is 40.62 kg/m  as calculated from the following:    Height as of this encounter: 1.524 m (5').    Weight as of this encounter: 94.3 kg (208 lb).     LDA:        JZG FV AN PLAN NO PONV RULE       PAC Discussion and Assessment    ASA Classification: 2  Case is suitable for: [facility]  Anesthetic techniques and relevant risks discussed: PAC Recommendations anesthetic techniques: Other.  Invasive monitoring and risk discussed: No  Types:   Possibility and Risk of blood transfusion discussed: No  NPO instructions given:   Additional anesthetic preparation and risks discussed:   Needs early admission to pre-op area:   Other:     PAC Resident/NP Anesthesia Assessment:  Carol Guajardo is a 49 year old female scheduled to undergo Total Knee Arthroplasty with Ion Bailey MD on 9/30/19 at [facility] for treatment of Primary osteoarthritis of left knee.    Ms. Guajardo has been struggling with worsening left knee osteoarthritis over the last year.  She has been wearing off  brace which does not seem to help much.  She takes occasional Percocet at night for pain.  She had a knee injection 4 months ago which gave her only 1 week of relief.  She has a very active job and works 10 hours a day including doing bending squatting and lifting and carrying. She now presents for the above procedure.     She has the following specific operative considerations:     Pt has had prior anesthetic. Type: General, MAC and Regional - no complications per pt     - Anesthesia considerations:  Refer to PAC assessment in anesthesia records  - Risk of PONV score = 3.  If > 2, anti-emetic intervention recommended.  - Taking Percocet 5-325 mg, 0-2 tabs/day    CARDIAC: METS >4, Works on feet 10 hrs/day, walking/bending/lifting as an . Denies SOB & CP. Needs to ice knee daily.      - RCRI : No serious cardiac risks.  0.4% risk of major adverse cardiac event.    (taking propanolol for depression)    PULMONARY:    - VALERIE 1/8 = low risk    - Former smoker, 25 pk yr hx, quit 7/2019    - No inhaler use    GI: no GERD     ENDO: BMI 40    - No DM    HEME: VTE risk: 0.5%    ORTHO: full neck ROM, no TMJ       Patient is optimized and is acceptable candidate for the proposed procedure, provided labs today are within acceptable range. No further diagnostic evaluation is needed.Enhanced recovery pathway initiated.      Reviewed and Signed by PAC Mid-Level Provider/Resident  Mid-Level Provider/Resident: Ronit Chavez PA-C  Date: 9/13/19  Time: 1030    Attending Anesthesiologist Anesthesia Assessment:        Anesthesiologist:   Date:   Time:   Pass/Fail:   Disposition:     PAC Pharmacist Assessment:        Pharmacist:   Date:   Time:    Ronit Chavez PA-C

## 2019-09-14 ASSESSMENT — PATIENT HEALTH QUESTIONNAIRE - PHQ9: SUM OF ALL RESPONSES TO PHQ QUESTIONS 1-9: 7

## 2019-09-29 ENCOUNTER — ANESTHESIA EVENT (OUTPATIENT)
Dept: SURGERY | Facility: CLINIC | Age: 49
DRG: 470 | End: 2019-09-29
Payer: COMMERCIAL

## 2019-09-29 ASSESSMENT — LIFESTYLE VARIABLES: TOBACCO_USE: 1

## 2019-09-29 NOTE — ANESTHESIA PREPROCEDURE EVALUATION
"Anesthesia Pre-Procedure Evaluation    Patient: Carol Guajardo   MRN:     [id number] Gender:   female   Age:    49 year old :      1970        Preoperative Diagnosis: Primary Osteoarthritis Of Left Knee   Procedure(s):  Left Total Knee Arthroplasty     Past Medical History:   Diagnosis Date     Anxiety      Depression      HLD (hyperlipidemia)      Hypothyroidism      Osteoarthritis of both knees      Right lower lobe lung mass     Biopsied 2018, diagnosed as fibrosis      Past Surgical History:   Procedure Laterality Date     CARPAL TUNNEL RELEASE RT/LT Bilateral      EXTERNAL EAR SURGERY       IR LUNG BIOPSY MEDIASTINUM RIGHT Right 2018    RLL mass, diagnosed as fibrosis per pt     LAPAROSCOPY DIAGNOSTIC (GENERAL)  2019     LAPAROSCOPY, SURGICAL; REPAIR UMBILICAL HERNIA  2018          Anesthesia Evaluation     . Pt has had prior anesthetic. Type: General, MAC and Regional    No history of anesthetic complications          ROS/MED HX    ENT/Pulmonary:     (+)tobacco use, Past use 25 pk yr hx, quit 2019 packs/day  , . .    Neurologic:     (+)neuropathy - LLE, L foot due to L knee osteoarthritis,     Cardiovascular: Comment: Taking propanolol for depression    (+) Dyslipidemia, ----. : . . . :. . No previous cardiac testing       METS/Exercise Tolerance: Comment: Works on feet 10 hrs/day, walking/bending/lifting as an . Denies SOB & CP. Needs to ice knee daily. >4 METS   Hematologic:  - neg hematologic  ROS       Musculoskeletal: Comment: RLE \"cyst filled with fluid,\" untreated, under surveillance per pt  (+) arthritis,  -       GI/Hepatic:  - neg GI/hepatic ROS       Renal/Genitourinary:  - ROS Renal section negative       Endo:     (+) thyroid problem hypothyroidism, Obesity, .      Psychiatric:     (+) psychiatric history anxiety and depression (taking paxil, wellbutrin, propanolol)      Infectious Disease:  - neg infectious disease ROS       Malignancy:      - no malignancy   Other: " Comment: Takes Percocet 5-325, 0-2 tabs/day.     Pain score that is tolerable is up to a 5.  Occassionally, the pain can be up to a 12.   (+) No chance of pregnancy C-spine cleared: N/A, H/O Chronic Pain,H/O chronic opiod use ,                        PHYSICAL EXAM:   Mental Status/Neuro: A/A/O   Airway: Facies: Feasible  Mallampati: II  Mouth/Opening: Full  TM distance: > 6 cm  Neck ROM: Full   Respiratory: Auscultation: CTAB     Resp. Rate: Normal     Resp. Effort: Normal      CV: Rhythm: Regular  Rate: Age appropriate  Heart: Normal Sounds  Edema: None   Comments:      Dental: Normal Dentition                LABS:  CBC:   Lab Results   Component Value Date    WBC 9.9 09/13/2019    HGB 12.1 09/13/2019    HCT 38.8 09/13/2019     09/13/2019     BMP:   Lab Results   Component Value Date     09/13/2019    POTASSIUM 4.2 09/13/2019    CHLORIDE 106 09/13/2019    CO2 25 09/13/2019    BUN 15 09/13/2019    CR 0.54 09/13/2019    GLC 85 09/13/2019     COAGS: No results found for: PTT, INR, FIBR  POC: No results found for: BGM, HCG, HCGS  OTHER:   Lab Results   Component Value Date    BERTA 8.8 09/13/2019        Preop Vitals    BP Readings from Last 3 Encounters:   09/13/19 123/87   07/19/19 142/83   04/10/18 145/81    Pulse Readings from Last 3 Encounters:   09/13/19 60   04/10/18 70      Resp Readings from Last 3 Encounters:   09/13/19 20   04/10/18 16   10/27/17 18    SpO2 Readings from Last 3 Encounters:   09/13/19 98%   04/10/18 98%      Temp Readings from Last 1 Encounters:   09/13/19 36.7  C (98  F) (Oral)    Ht Readings from Last 1 Encounters:   09/13/19 1.524 m (5')      Wt Readings from Last 1 Encounters:   09/13/19 94.3 kg (208 lb)    Estimated body mass index is 40.62 kg/m  as calculated from the following:    Height as of 9/13/19: 1.524 m (5').    Weight as of 9/13/19: 94.3 kg (208 lb).     LDA:        Assessment:   ASA SCORE: 2    H&P: History and physical reviewed and following examination; no interval  change.   Smoking Status:  Non-Smoker/Unknown   NPO Status: NPO Appropriate     Plan:   Anes. Type:  Spinal; MAC   Pre-Medication: Midazolam   Induction:  IV (Standard)   Airway: Native Airway   Access/Monitoring: PIV   Maintenance: Propofol Sedation     Postop Plan:   Postop Pain: Regional; Opioids  Postop Sedation/Airway: Not planned     PONV Management:   Adult Risk Factors: Female, Non-Smoker, Postop Opioids   Prevention: Ondansetron, Propofol     CONSENT: Direct conversation   Plan and risks discussed with: Mother   Blood Products: Consent Deferred (Minimal Blood Loss)       Comments for Plan/Consent:  Discussed common and potentially harmful risks for MAC (GA as backup), Spinal Anesthesia.   These risks include, but were not limited to: Conversion to secured airway, Sore throat, Airway injury, Dental injury, Aspiration, Respiratory issues (Bronchospasm, Laryngospasm, Desaturation), Hemodynamic issues (Arrhythmia, Hypotension, Ischemia), Potential long term consequences of respiratory and hemodynamic issues, PONV, Emergence delirium  Risks of invasive procedures were discussed: Neuraxial Anesthesia (Hypotension, Block Failure, Post-Punctural HA, Back pain, Infection, Nerve Injury, LA Toxicity (Seizures, Arrhythmia))    All questions were answered.                 Sarah Mendoza MD

## 2019-09-30 ENCOUNTER — ANESTHESIA (OUTPATIENT)
Dept: SURGERY | Facility: CLINIC | Age: 49
DRG: 470 | End: 2019-09-30
Payer: COMMERCIAL

## 2019-09-30 ENCOUNTER — APPOINTMENT (OUTPATIENT)
Dept: PHYSICAL THERAPY | Facility: CLINIC | Age: 49
DRG: 470 | End: 2019-09-30
Attending: ORTHOPAEDIC SURGERY
Payer: COMMERCIAL

## 2019-09-30 ENCOUNTER — HOSPITAL ENCOUNTER (INPATIENT)
Facility: CLINIC | Age: 49
LOS: 2 days | Discharge: HOME HEALTH | DRG: 470 | End: 2019-10-02
Attending: ORTHOPAEDIC SURGERY | Admitting: ORTHOPAEDIC SURGERY
Payer: COMMERCIAL

## 2019-09-30 ENCOUNTER — APPOINTMENT (OUTPATIENT)
Dept: GENERAL RADIOLOGY | Facility: CLINIC | Age: 49
DRG: 470 | End: 2019-09-30
Attending: STUDENT IN AN ORGANIZED HEALTH CARE EDUCATION/TRAINING PROGRAM
Payer: COMMERCIAL

## 2019-09-30 DIAGNOSIS — Z96.652 TOTAL KNEE REPLACEMENT STATUS, LEFT: Primary | ICD-10-CM

## 2019-09-30 LAB
ABO + RH BLD: NORMAL
ABO + RH BLD: NORMAL
BLD GP AB SCN SERPL QL: NORMAL
BLOOD BANK CMNT PATIENT-IMP: NORMAL
CREAT SERPL-MCNC: 0.55 MG/DL (ref 0.52–1.04)
GFR SERPL CREATININE-BSD FRML MDRD: >90 ML/MIN/{1.73_M2}
GLUCOSE BLDC GLUCOMTR-MCNC: 89 MG/DL (ref 70–99)
HCG UR QL: NEGATIVE
PLATELET # BLD AUTO: 332 10E9/L (ref 150–450)
SPECIMEN EXP DATE BLD: NORMAL

## 2019-09-30 PROCEDURE — 40000171 ZZH STATISTIC PRE-PROCEDURE ASSESSMENT III: Performed by: ORTHOPAEDIC SURGERY

## 2019-09-30 PROCEDURE — 25000132 ZZH RX MED GY IP 250 OP 250 PS 637: Performed by: PHYSICIAN ASSISTANT

## 2019-09-30 PROCEDURE — 27210794 ZZH OR GENERAL SUPPLY STERILE: Performed by: ORTHOPAEDIC SURGERY

## 2019-09-30 PROCEDURE — 71000015 ZZH RECOVERY PHASE 1 LEVEL 2 EA ADDTL HR: Performed by: ORTHOPAEDIC SURGERY

## 2019-09-30 PROCEDURE — 25000128 H RX IP 250 OP 636: Performed by: STUDENT IN AN ORGANIZED HEALTH CARE EDUCATION/TRAINING PROGRAM

## 2019-09-30 PROCEDURE — 12000001 ZZH R&B MED SURG/OB UMMC

## 2019-09-30 PROCEDURE — 0SRD0J9 REPLACEMENT OF LEFT KNEE JOINT WITH SYNTHETIC SUBSTITUTE, CEMENTED, OPEN APPROACH: ICD-10-PCS | Performed by: ORTHOPAEDIC SURGERY

## 2019-09-30 PROCEDURE — 00000146 ZZHCL STATISTIC GLUCOSE BY METER IP

## 2019-09-30 PROCEDURE — 71000014 ZZH RECOVERY PHASE 1 LEVEL 2 FIRST HR: Performed by: ORTHOPAEDIC SURGERY

## 2019-09-30 PROCEDURE — 81025 URINE PREGNANCY TEST: CPT | Performed by: STUDENT IN AN ORGANIZED HEALTH CARE EDUCATION/TRAINING PROGRAM

## 2019-09-30 PROCEDURE — 88305 TISSUE EXAM BY PATHOLOGIST: CPT | Performed by: ORTHOPAEDIC SURGERY

## 2019-09-30 PROCEDURE — 97110 THERAPEUTIC EXERCISES: CPT | Mod: GP

## 2019-09-30 PROCEDURE — 25000128 H RX IP 250 OP 636: Performed by: ANESTHESIOLOGY

## 2019-09-30 PROCEDURE — C1776 JOINT DEVICE (IMPLANTABLE): HCPCS | Performed by: ORTHOPAEDIC SURGERY

## 2019-09-30 PROCEDURE — 88305 TISSUE EXAM BY PATHOLOGIST: CPT | Mod: 26 | Performed by: ORTHOPAEDIC SURGERY

## 2019-09-30 PROCEDURE — 25800030 ZZH RX IP 258 OP 636: Performed by: PHYSICIAN ASSISTANT

## 2019-09-30 PROCEDURE — 25800030 ZZH RX IP 258 OP 636: Performed by: ANESTHESIOLOGY

## 2019-09-30 PROCEDURE — 36000064 ZZH SURGERY LEVEL 4 EA 15 ADDTL MIN - UMMC: Performed by: ORTHOPAEDIC SURGERY

## 2019-09-30 PROCEDURE — 97530 THERAPEUTIC ACTIVITIES: CPT | Mod: GP

## 2019-09-30 PROCEDURE — 25800025 ZZH RX 258: Performed by: ORTHOPAEDIC SURGERY

## 2019-09-30 PROCEDURE — 37000008 ZZH ANESTHESIA TECHNICAL FEE, 1ST 30 MIN: Performed by: ORTHOPAEDIC SURGERY

## 2019-09-30 PROCEDURE — 85049 AUTOMATED PLATELET COUNT: CPT | Performed by: STUDENT IN AN ORGANIZED HEALTH CARE EDUCATION/TRAINING PROGRAM

## 2019-09-30 PROCEDURE — 97161 PT EVAL LOW COMPLEX 20 MIN: CPT | Mod: GP

## 2019-09-30 PROCEDURE — 25800030 ZZH RX IP 258 OP 636: Performed by: NURSE ANESTHETIST, CERTIFIED REGISTERED

## 2019-09-30 PROCEDURE — 37000009 ZZH ANESTHESIA TECHNICAL FEE, EACH ADDTL 15 MIN: Performed by: ORTHOPAEDIC SURGERY

## 2019-09-30 PROCEDURE — 99207 ZZC CONSULT E&M CHANGED TO INITIAL LEVEL: CPT | Performed by: INTERNAL MEDICINE

## 2019-09-30 PROCEDURE — 25800030 ZZH RX IP 258 OP 636: Performed by: STUDENT IN AN ORGANIZED HEALTH CARE EDUCATION/TRAINING PROGRAM

## 2019-09-30 PROCEDURE — C1713 ANCHOR/SCREW BN/BN,TIS/BN: HCPCS | Performed by: ORTHOPAEDIC SURGERY

## 2019-09-30 PROCEDURE — 36415 COLL VENOUS BLD VENIPUNCTURE: CPT | Performed by: STUDENT IN AN ORGANIZED HEALTH CARE EDUCATION/TRAINING PROGRAM

## 2019-09-30 PROCEDURE — 25000125 ZZHC RX 250: Performed by: NURSE ANESTHETIST, CERTIFIED REGISTERED

## 2019-09-30 PROCEDURE — 36000062 ZZH SURGERY LEVEL 4 1ST 30 MIN - UMMC: Performed by: ORTHOPAEDIC SURGERY

## 2019-09-30 PROCEDURE — 27810169 ZZH OR IMPLANT GENERAL: Performed by: ORTHOPAEDIC SURGERY

## 2019-09-30 PROCEDURE — 25000125 ZZHC RX 250: Performed by: ANESTHESIOLOGY

## 2019-09-30 PROCEDURE — 40000986 XR KNEE PORT LT 1/2 VW: Mod: LT

## 2019-09-30 PROCEDURE — 25000132 ZZH RX MED GY IP 250 OP 250 PS 637: Performed by: NURSE PRACTITIONER

## 2019-09-30 PROCEDURE — 25000128 H RX IP 250 OP 636: Performed by: NURSE ANESTHETIST, CERTIFIED REGISTERED

## 2019-09-30 PROCEDURE — 25000132 ZZH RX MED GY IP 250 OP 250 PS 637: Performed by: STUDENT IN AN ORGANIZED HEALTH CARE EDUCATION/TRAINING PROGRAM

## 2019-09-30 PROCEDURE — 25000125 ZZHC RX 250: Performed by: STUDENT IN AN ORGANIZED HEALTH CARE EDUCATION/TRAINING PROGRAM

## 2019-09-30 PROCEDURE — 25000125 ZZHC RX 250: Performed by: PHYSICIAN ASSISTANT

## 2019-09-30 PROCEDURE — 25000128 H RX IP 250 OP 636: Performed by: PHYSICIAN ASSISTANT

## 2019-09-30 PROCEDURE — 99222 1ST HOSP IP/OBS MODERATE 55: CPT | Performed by: INTERNAL MEDICINE

## 2019-09-30 PROCEDURE — 82565 ASSAY OF CREATININE: CPT | Performed by: STUDENT IN AN ORGANIZED HEALTH CARE EDUCATION/TRAINING PROGRAM

## 2019-09-30 DEVICE — IMPLANTABLE DEVICE: Type: IMPLANTABLE DEVICE | Site: KNEE | Status: FUNCTIONAL

## 2019-09-30 DEVICE — BONE CEMENT RADIOPAQUE SIMPLEX HV FULL DOSE 6194-1-001: Type: IMPLANTABLE DEVICE | Site: KNEE | Status: FUNCTIONAL

## 2019-09-30 RX ORDER — METHOCARBAMOL 750 MG/1
750 TABLET, FILM COATED ORAL 4 TIMES DAILY PRN
Status: DISCONTINUED | OUTPATIENT
Start: 2019-09-30 | End: 2019-10-01

## 2019-09-30 RX ORDER — SODIUM CHLORIDE, SODIUM LACTATE, POTASSIUM CHLORIDE, CALCIUM CHLORIDE 600; 310; 30; 20 MG/100ML; MG/100ML; MG/100ML; MG/100ML
INJECTION, SOLUTION INTRAVENOUS CONTINUOUS
Status: DISCONTINUED | OUTPATIENT
Start: 2019-09-30 | End: 2019-10-01

## 2019-09-30 RX ORDER — LEVOTHYROXINE SODIUM 175 UG/1
175 TABLET ORAL EVERY MORNING
Status: DISCONTINUED | OUTPATIENT
Start: 2019-10-01 | End: 2019-10-02 | Stop reason: HOSPADM

## 2019-09-30 RX ORDER — PROPOFOL 10 MG/ML
INJECTION, EMULSION INTRAVENOUS CONTINUOUS PRN
Status: DISCONTINUED | OUTPATIENT
Start: 2019-09-30 | End: 2019-09-30

## 2019-09-30 RX ORDER — BUPROPION HYDROCHLORIDE 300 MG/1
300 TABLET ORAL EVERY MORNING
Status: DISCONTINUED | OUTPATIENT
Start: 2019-10-01 | End: 2019-10-02 | Stop reason: HOSPADM

## 2019-09-30 RX ORDER — ACETAMINOPHEN 325 MG/1
650 TABLET ORAL EVERY 4 HOURS PRN
Status: DISCONTINUED | OUTPATIENT
Start: 2019-10-03 | End: 2019-10-01

## 2019-09-30 RX ORDER — AMOXICILLIN 250 MG
2 CAPSULE ORAL 2 TIMES DAILY
Status: DISCONTINUED | OUTPATIENT
Start: 2019-09-30 | End: 2019-10-02 | Stop reason: HOSPADM

## 2019-09-30 RX ORDER — NALOXONE HYDROCHLORIDE 0.4 MG/ML
.1-.4 INJECTION, SOLUTION INTRAMUSCULAR; INTRAVENOUS; SUBCUTANEOUS
Status: DISCONTINUED | OUTPATIENT
Start: 2019-09-30 | End: 2019-09-30 | Stop reason: HOSPADM

## 2019-09-30 RX ORDER — PAROXETINE 20 MG/1
20 TABLET, FILM COATED ORAL EVERY MORNING
Status: DISCONTINUED | OUTPATIENT
Start: 2019-09-30 | End: 2019-09-30

## 2019-09-30 RX ORDER — BUPIVACAINE HYDROCHLORIDE 2.5 MG/ML
INJECTION, SOLUTION EPIDURAL; INFILTRATION; INTRACAUDAL PRN
Status: DISCONTINUED | OUTPATIENT
Start: 2019-09-30 | End: 2019-09-30

## 2019-09-30 RX ORDER — BUPIVACAINE HYDROCHLORIDE 7.5 MG/ML
INJECTION, SOLUTION INTRASPINAL PRN
Status: DISCONTINUED | OUTPATIENT
Start: 2019-09-30 | End: 2019-09-30

## 2019-09-30 RX ORDER — DIPHENHYDRAMINE HYDROCHLORIDE 50 MG/ML
25 INJECTION INTRAMUSCULAR; INTRAVENOUS EVERY 6 HOURS PRN
Status: DISCONTINUED | OUTPATIENT
Start: 2019-09-30 | End: 2019-09-30

## 2019-09-30 RX ORDER — SODIUM CHLORIDE 9 MG/ML
INJECTION, SOLUTION INTRAVENOUS CONTINUOUS
Status: DISCONTINUED | OUTPATIENT
Start: 2019-09-30 | End: 2019-10-02 | Stop reason: HOSPADM

## 2019-09-30 RX ORDER — OXYCODONE HYDROCHLORIDE 5 MG/1
5-10 TABLET ORAL
Status: DISCONTINUED | OUTPATIENT
Start: 2019-09-30 | End: 2019-10-02 | Stop reason: HOSPADM

## 2019-09-30 RX ORDER — SODIUM CHLORIDE, SODIUM LACTATE, POTASSIUM CHLORIDE, CALCIUM CHLORIDE 600; 310; 30; 20 MG/100ML; MG/100ML; MG/100ML; MG/100ML
INJECTION, SOLUTION INTRAVENOUS CONTINUOUS
Status: DISCONTINUED | OUTPATIENT
Start: 2019-09-30 | End: 2019-09-30 | Stop reason: HOSPADM

## 2019-09-30 RX ORDER — CLINDAMYCIN PHOSPHATE 900 MG/50ML
900 INJECTION, SOLUTION INTRAVENOUS
Status: COMPLETED | OUTPATIENT
Start: 2019-09-30 | End: 2019-09-30

## 2019-09-30 RX ORDER — FENTANYL CITRATE 50 UG/ML
25-50 INJECTION, SOLUTION INTRAMUSCULAR; INTRAVENOUS
Status: DISCONTINUED | OUTPATIENT
Start: 2019-09-30 | End: 2019-09-30 | Stop reason: HOSPADM

## 2019-09-30 RX ORDER — ONDANSETRON 2 MG/ML
4 INJECTION INTRAMUSCULAR; INTRAVENOUS EVERY 30 MIN PRN
Status: DISCONTINUED | OUTPATIENT
Start: 2019-09-30 | End: 2019-09-30 | Stop reason: HOSPADM

## 2019-09-30 RX ORDER — SODIUM CHLORIDE, SODIUM LACTATE, POTASSIUM CHLORIDE, CALCIUM CHLORIDE 600; 310; 30; 20 MG/100ML; MG/100ML; MG/100ML; MG/100ML
INJECTION, SOLUTION INTRAVENOUS CONTINUOUS PRN
Status: DISCONTINUED | OUTPATIENT
Start: 2019-09-30 | End: 2019-09-30

## 2019-09-30 RX ORDER — AMOXICILLIN 250 MG
1 CAPSULE ORAL 2 TIMES DAILY
Status: DISCONTINUED | OUTPATIENT
Start: 2019-09-30 | End: 2019-10-02 | Stop reason: HOSPADM

## 2019-09-30 RX ORDER — FLUMAZENIL 0.1 MG/ML
0.2 INJECTION, SOLUTION INTRAVENOUS
Status: DISCONTINUED | OUTPATIENT
Start: 2019-09-30 | End: 2019-09-30 | Stop reason: HOSPADM

## 2019-09-30 RX ORDER — GABAPENTIN 100 MG/1
300 CAPSULE ORAL
Status: COMPLETED | OUTPATIENT
Start: 2019-09-30 | End: 2019-09-30

## 2019-09-30 RX ORDER — NALOXONE HYDROCHLORIDE 0.4 MG/ML
.1-.4 INJECTION, SOLUTION INTRAMUSCULAR; INTRAVENOUS; SUBCUTANEOUS
Status: DISCONTINUED | OUTPATIENT
Start: 2019-09-30 | End: 2019-10-02 | Stop reason: HOSPADM

## 2019-09-30 RX ORDER — EPHEDRINE SULFATE 50 MG/ML
INJECTION, SOLUTION INTRAVENOUS PRN
Status: DISCONTINUED | OUTPATIENT
Start: 2019-09-30 | End: 2019-09-30

## 2019-09-30 RX ORDER — HYDROMORPHONE HYDROCHLORIDE 1 MG/ML
.3-.5 INJECTION, SOLUTION INTRAMUSCULAR; INTRAVENOUS; SUBCUTANEOUS
Status: DISCONTINUED | OUTPATIENT
Start: 2019-09-30 | End: 2019-10-02 | Stop reason: HOSPADM

## 2019-09-30 RX ORDER — PROPOFOL 10 MG/ML
INJECTION, EMULSION INTRAVENOUS PRN
Status: DISCONTINUED | OUTPATIENT
Start: 2019-09-30 | End: 2019-09-30

## 2019-09-30 RX ORDER — PHENYLEPHRINE HYDROCHLORIDE 10 MG/ML
INJECTION INTRAVENOUS PRN
Status: DISCONTINUED | OUTPATIENT
Start: 2019-09-30 | End: 2019-09-30

## 2019-09-30 RX ORDER — HYDROMORPHONE HYDROCHLORIDE 1 MG/ML
.2-.4 INJECTION, SOLUTION INTRAMUSCULAR; INTRAVENOUS; SUBCUTANEOUS EVERY 10 MIN PRN
Status: DISCONTINUED | OUTPATIENT
Start: 2019-09-30 | End: 2019-09-30 | Stop reason: HOSPADM

## 2019-09-30 RX ORDER — LIDOCAINE 40 MG/G
CREAM TOPICAL
Status: DISCONTINUED | OUTPATIENT
Start: 2019-09-30 | End: 2019-10-02 | Stop reason: HOSPADM

## 2019-09-30 RX ORDER — PROCHLORPERAZINE MALEATE 5 MG
10 TABLET ORAL EVERY 6 HOURS PRN
Status: DISCONTINUED | OUTPATIENT
Start: 2019-09-30 | End: 2019-10-02 | Stop reason: HOSPADM

## 2019-09-30 RX ORDER — FENTANYL CITRATE 50 UG/ML
25-50 INJECTION, SOLUTION INTRAMUSCULAR; INTRAVENOUS EVERY 5 MIN PRN
Status: DISCONTINUED | OUTPATIENT
Start: 2019-09-30 | End: 2019-09-30 | Stop reason: HOSPADM

## 2019-09-30 RX ORDER — ACETAMINOPHEN 325 MG/1
975 TABLET ORAL EVERY 8 HOURS
Status: DISCONTINUED | OUTPATIENT
Start: 2019-09-30 | End: 2019-09-30

## 2019-09-30 RX ORDER — MEPERIDINE HYDROCHLORIDE 25 MG/ML
12.5 INJECTION INTRAMUSCULAR; INTRAVENOUS; SUBCUTANEOUS
Status: DISCONTINUED | OUTPATIENT
Start: 2019-09-30 | End: 2019-09-30 | Stop reason: HOSPADM

## 2019-09-30 RX ORDER — ONDANSETRON 4 MG/1
4 TABLET, ORALLY DISINTEGRATING ORAL EVERY 6 HOURS PRN
Status: DISCONTINUED | OUTPATIENT
Start: 2019-09-30 | End: 2019-10-02 | Stop reason: HOSPADM

## 2019-09-30 RX ORDER — DIPHENHYDRAMINE HCL 25 MG
25 CAPSULE ORAL EVERY 6 HOURS PRN
Status: DISCONTINUED | OUTPATIENT
Start: 2019-09-30 | End: 2019-10-02 | Stop reason: HOSPADM

## 2019-09-30 RX ORDER — PROPRANOLOL HCL 60 MG
60 CAPSULE, EXTENDED RELEASE 24HR ORAL EVERY MORNING
Status: DISCONTINUED | OUTPATIENT
Start: 2019-10-01 | End: 2019-10-02 | Stop reason: HOSPADM

## 2019-09-30 RX ORDER — ACETAMINOPHEN 325 MG/1
975 TABLET ORAL ONCE
Status: COMPLETED | OUTPATIENT
Start: 2019-09-30 | End: 2019-09-30

## 2019-09-30 RX ORDER — PRAMIPEXOLE DIHYDROCHLORIDE 0.5 MG/1
0.5 TABLET ORAL AT BEDTIME
Status: DISCONTINUED | OUTPATIENT
Start: 2019-09-30 | End: 2019-10-02 | Stop reason: HOSPADM

## 2019-09-30 RX ORDER — BISACODYL 10 MG
10 SUPPOSITORY, RECTAL RECTAL DAILY
Status: DISCONTINUED | OUTPATIENT
Start: 2019-10-01 | End: 2019-10-02 | Stop reason: HOSPADM

## 2019-09-30 RX ORDER — CLINDAMYCIN PHOSPHATE 900 MG/50ML
900 INJECTION, SOLUTION INTRAVENOUS SEE ADMIN INSTRUCTIONS
Status: DISCONTINUED | OUTPATIENT
Start: 2019-09-30 | End: 2019-09-30 | Stop reason: HOSPADM

## 2019-09-30 RX ORDER — ONDANSETRON 4 MG/1
4 TABLET, ORALLY DISINTEGRATING ORAL EVERY 30 MIN PRN
Status: DISCONTINUED | OUTPATIENT
Start: 2019-09-30 | End: 2019-09-30 | Stop reason: HOSPADM

## 2019-09-30 RX ORDER — ACETAMINOPHEN 325 MG/1
975 TABLET ORAL EVERY 8 HOURS
Status: DISCONTINUED | OUTPATIENT
Start: 2019-09-30 | End: 2019-10-01

## 2019-09-30 RX ORDER — KETOROLAC TROMETHAMINE 30 MG/ML
INJECTION, SOLUTION INTRAMUSCULAR; INTRAVENOUS PRN
Status: DISCONTINUED | OUTPATIENT
Start: 2019-09-30 | End: 2019-09-30

## 2019-09-30 RX ORDER — ONDANSETRON 2 MG/ML
4 INJECTION INTRAMUSCULAR; INTRAVENOUS EVERY 6 HOURS PRN
Status: DISCONTINUED | OUTPATIENT
Start: 2019-09-30 | End: 2019-10-02 | Stop reason: HOSPADM

## 2019-09-30 RX ORDER — CLINDAMYCIN PHOSPHATE 900 MG/50ML
900 INJECTION, SOLUTION INTRAVENOUS EVERY 8 HOURS
Status: COMPLETED | OUTPATIENT
Start: 2019-09-30 | End: 2019-10-01

## 2019-09-30 RX ORDER — OXYCODONE HYDROCHLORIDE 5 MG/1
5 TABLET ORAL EVERY 4 HOURS PRN
Status: DISCONTINUED | OUTPATIENT
Start: 2019-09-30 | End: 2019-09-30

## 2019-09-30 RX ORDER — DEXAMETHASONE SODIUM PHOSPHATE 4 MG/ML
INJECTION, SOLUTION INTRA-ARTICULAR; INTRALESIONAL; INTRAMUSCULAR; INTRAVENOUS; SOFT TISSUE PRN
Status: DISCONTINUED | OUTPATIENT
Start: 2019-09-30 | End: 2019-09-30

## 2019-09-30 RX ORDER — LIDOCAINE 40 MG/G
CREAM TOPICAL
Status: DISCONTINUED | OUTPATIENT
Start: 2019-09-30 | End: 2019-09-30 | Stop reason: HOSPADM

## 2019-09-30 RX ADMIN — OXYCODONE HYDROCHLORIDE 10 MG: 5 TABLET ORAL at 15:43

## 2019-09-30 RX ADMIN — GABAPENTIN 300 MG: 300 CAPSULE ORAL at 07:15

## 2019-09-30 RX ADMIN — DEXAMETHASONE SODIUM PHOSPHATE 2 MG: 4 INJECTION, SOLUTION INTRAMUSCULAR; INTRAVENOUS at 08:20

## 2019-09-30 RX ADMIN — EPHEDRINE SULFATE 5 MG: 50 INJECTION, SOLUTION INTRAVENOUS at 08:48

## 2019-09-30 RX ADMIN — ACETAMINOPHEN 975 MG: 325 TABLET, FILM COATED ORAL at 07:15

## 2019-09-30 RX ADMIN — PHENYLEPHRINE HYDROCHLORIDE 100 MCG: 10 INJECTION INTRAVENOUS at 08:47

## 2019-09-30 RX ADMIN — PROPOFOL 75 MCG/KG/MIN: 10 INJECTION, EMULSION INTRAVENOUS at 08:40

## 2019-09-30 RX ADMIN — BUPIVACAINE HYDROCHLORIDE 20 ML: 2.5 INJECTION, SOLUTION EPIDURAL; INFILTRATION; INTRACAUDAL at 08:20

## 2019-09-30 RX ADMIN — ACETAMINOPHEN 975 MG: 325 TABLET, FILM COATED ORAL at 15:43

## 2019-09-30 RX ADMIN — SODIUM CHLORIDE, POTASSIUM CHLORIDE, SODIUM LACTATE AND CALCIUM CHLORIDE: 600; 310; 30; 20 INJECTION, SOLUTION INTRAVENOUS at 08:22

## 2019-09-30 RX ADMIN — BUPIVACAINE HYDROCHLORIDE IN DEXTROSE 1.6 ML: 7.5 INJECTION, SOLUTION SUBARACHNOID at 08:39

## 2019-09-30 RX ADMIN — OXYCODONE HYDROCHLORIDE 5 MG: 5 TABLET ORAL at 12:24

## 2019-09-30 RX ADMIN — DEXAMETHASONE SODIUM PHOSPHATE 8 MG: 4 INJECTION, SOLUTION INTRAMUSCULAR; INTRAVENOUS at 08:44

## 2019-09-30 RX ADMIN — SODIUM CHLORIDE 1 G: 9 INJECTION, SOLUTION INTRAVENOUS at 08:53

## 2019-09-30 RX ADMIN — SENNOSIDES AND DOCUSATE SODIUM 2 TABLET: 8.6; 5 TABLET ORAL at 19:03

## 2019-09-30 RX ADMIN — KETOROLAC TROMETHAMINE 15 MG: 30 INJECTION, SOLUTION INTRAMUSCULAR at 10:27

## 2019-09-30 RX ADMIN — CLINDAMYCIN PHOSPHATE 900 MG: 900 INJECTION, SOLUTION INTRAVENOUS at 16:35

## 2019-09-30 RX ADMIN — MIDAZOLAM 2 MG: 1 INJECTION INTRAMUSCULAR; INTRAVENOUS at 08:37

## 2019-09-30 RX ADMIN — PRAMIPEXOLE DIHYDROCHLORIDE 0.5 MG: 0.5 TABLET ORAL at 21:20

## 2019-09-30 RX ADMIN — PROPOFOL 30 MG: 10 INJECTION, EMULSION INTRAVENOUS at 08:41

## 2019-09-30 RX ADMIN — OXYCODONE HYDROCHLORIDE 10 MG: 5 TABLET ORAL at 19:03

## 2019-09-30 RX ADMIN — CLINDAMYCIN PHOSPHATE 900 MG: 18 INJECTION, SOLUTION INTRAVENOUS at 08:49

## 2019-09-30 RX ADMIN — FAMOTIDINE 20 MG: 20 INJECTION, SOLUTION INTRAVENOUS at 10:20

## 2019-09-30 RX ADMIN — SODIUM CHLORIDE, POTASSIUM CHLORIDE, SODIUM LACTATE AND CALCIUM CHLORIDE: 600; 310; 30; 20 INJECTION, SOLUTION INTRAVENOUS at 22:44

## 2019-09-30 RX ADMIN — PHENYLEPHRINE HYDROCHLORIDE 100 MCG: 10 INJECTION INTRAVENOUS at 08:56

## 2019-09-30 RX ADMIN — MIDAZOLAM 1 MG: 1 INJECTION INTRAMUSCULAR; INTRAVENOUS at 08:16

## 2019-09-30 RX ADMIN — DEXMEDETOMIDINE HYDROCHLORIDE 20 MCG: 100 INJECTION, SOLUTION INTRAVENOUS at 08:20

## 2019-09-30 RX ADMIN — FENTANYL CITRATE 50 MCG: 50 INJECTION INTRAMUSCULAR; INTRAVENOUS at 08:16

## 2019-09-30 RX ADMIN — PHENYLEPHRINE HYDROCHLORIDE 0.3 MCG/KG/MIN: 10 INJECTION INTRAVENOUS at 09:04

## 2019-09-30 ASSESSMENT — ACTIVITIES OF DAILY LIVING (ADL)
DRESS: 0-->INDEPENDENT
ADLS_ACUITY_SCORE: 14
ADLS_ACUITY_SCORE: 14
RETIRED_EATING: 0-->INDEPENDENT

## 2019-09-30 ASSESSMENT — MIFFLIN-ST. JEOR: SCORE: 1493.5

## 2019-09-30 NOTE — ANESTHESIA POSTPROCEDURE EVALUATION
Anesthesia POST Procedure Evaluation    Patient: Carol Guajardo   MRN:     [id number] Gender:   female   Age:    49 year old :      1970        Preoperative Diagnosis: Primary Osteoarthritis Of Left Knee   Procedure(s):  Left Total Knee Arthroplasty   Postop Comments: No value filed.       Anesthesia Type:  Not documented  Spinal, MAC    Reportable Event: NO     PAIN: Uncomplicated   Sign Out status: Comfortable, Well controlled pain     PONV: No PONV   Sign Out status:  No Nausea or Vomiting     Neuro/Psych: Uneventful perioperative course   Sign Out Status: Preoperative baseline; Age appropriate mentation     Airway/Resp.: Uneventful perioperative course   Sign Out Status: Non labored breathing, age appropriate RR; Resp. Status within EXPECTED Parameters     CV: Uneventful perioperative course   Sign Out status: Appropriate BP and perfusion indices; Appropriate HR/Rhythm     Disposition:   Sign Out in:  PACU  Disposition:  Floor  Recovery Course: Uneventful  Follow-Up: Not required     Comments/Narrative:  Patient comfortable, tolerating PO. Questions regarding anesthesia answered.           Last Anesthesia Record Vitals:  CRNA VITALS  2019 1020 - 2019 1120      2019             Pulse:  72    SpO2:  98 %          Last PACU Vitals:  Vitals Value Taken Time   /60 2019  2:10 PM   Temp 36.2  C (97.1  F) 2019 12:40 PM   Pulse 77 2019 12:30 PM   Resp 19 2019  2:30 PM   SpO2 96 % 2019 12:30 PM   Temp src     NIBP     Pulse     SpO2     Resp     Temp     Ht Rate     Temp 2     Vitals shown include unvalidated device data.      Electronically Signed By: Sarah Mendoza MD, 2019, 2:53 PM

## 2019-09-30 NOTE — CONSULTS
Consult Date:  09/30/2019      INTERNAL MEDICINE CONSULTATION       ASSESSMENT:   1.  Status post left total knee arthroplasty.  The patient is doing well postoperatively.   2.  Chronic knee pain for which the patient takes 0-2 Percocet per day.   3.  Obesity.   4.  Chronic depression, managed.   5.  History of right lower lung fibrotic changes with negative biopsy 1 year ago and the patient described negative followup.   6.  Chronic cigarette use, with discontinuation 2 months ago.      RECOMMENDATIONS:  Routine postoperative labs are ordered.  DVT prophylaxis will be with Lovenox.      The patient does not have a formal history of sleep apnea.  Her , who is in attendance, notes that she does snore and intermittently pauses in her breathing.  Her respiratory status needs to be monitored on capnography postoperatively.      Thank you for having me see this patient.      DISCUSSION:  Carol Guajardo is a 49-year-old female who underwent a left total knee arthroplasty today by Dr. Bailey for the management of osteoarthritis of the knee.  I have been asked to see her by Dr. Bailey to assess managing depression and assist in pain management postoperatively.      Please see Dr. Bailey's notes in the charting for details regarding the patient's orthopedic history and indication for surgery.  The patient notes progressive pain over the last several months in the left knee.  She works 8-hour shifts and is on her feet the entire time she works as an .      The events of surgery are noted.  The patient had spinal and local anesthesia.  Estimated blood loss was 20 mL.  Blood pressures have been stable perioperatively.      The patient is seen by me shortly after admission to the Orthopedic Unit.  Her  is present.  She describes good pain control and overall feels well.  She denies cough, chest pain, shortness of breath, nausea, vomiting.        PAST MEDICAL HISTORY:   1.  Chronic knee pain secondary to  osteoarthritis.   2.  Depression.   3.  Obesity.   4.  History of right lower lung mass versus fibrosis.  The patient did have a biopsy of the area 1 year ago, which was negative for malignancy.  She states she has had followup CT scans which revealed no change.  She does continue to follow up with her primary provider for this.   5.  History of cigarette use.  The patient discontinued cigarettes 2 months ago.      MEDICATIONS PRIOR TO ADMISSION:   1.  Wellbutrin  mg every morning.   2.  Levothyroxine 175 mcg daily.   3.  Paxil 60 mg daily.   4.  Mirapex 0.5 mg at bedtime.   5.  Propranolol ER 60 mg daily.   6.  Diazepam on an infrequent p.r.n. basis.   7.  Percocet 5/325 up to 2 pills per day, though not on a daily basis.      ALLERGIES:  AMOXICILLIN CAUSES EDEMA, CIPRO CAUSED UNCLEAR REACTION.      SOCIAL HISTORY:  The patient is .  She works as an .  She and her  live in [place].  She denies significant alcohol use.  She quit smoking cigarettes 2 months ago.      PAST SURGICAL HISTORY:  Include right lung mass biopsy, umbilical hernia repair, and bilateral carpal tunnel release.      FAMILY HISTORY:  Remarkable for coronary artery disease.      PHYSICAL EXAMINATION:   GENERAL:  She is a very pleasant female who appears comfortable lying in bed.  BMI is 40.77.   VITAL SIGNS:  Blood pressure has been normal.   HEENT:  Pharynx benign.   NECK:  Supple.   LUNGS:  Clear.  There is no thyromegaly or carotid bruits.   CARDIOVASCULAR:  Exam reveals rate and rhythm without murmurs.   ABDOMEN:  Soft, protuberant, nontender.   EXTREMITIES:  No edema.      LABORATORY DATA:  Preoperatively, included creatinine 0.55, hemoglobin was 12.1.  There are no recent EKGs available.         SAVANNAH BRICEÑO MD             D: 2019   T: 2019   MT: WT      Name:     KASSIDY HENDERSON   MRN:      [id number]        Account:       [id number]   :      1970           Consult Date:  2019       Document: [id number]

## 2019-09-30 NOTE — PLAN OF CARE
Discharge Planner PT   Patient plan for discharge: Home with assist  Current status: PT evaluation complete and treatment initiated. On commode on arrival, agreeable to PT. Completes sit<>stand transfer with CGA and use of walker. Tolerated ambulating in massey with CGA and use of walker, fairly steady but limited a bit by pain. Completes sit to supine transfer with supervision. Completed supine exercises per protocol. L knee AAROM=[id number]. Ended with needs in reach.   Barriers to return to prior living situation: safety, independence, pain, decreased L LE strength and ROM  Recommendations for discharge: Home with assist and home PT  Rationale for recommendations: For safety evaluation and progression       Entered by: Kaur Brown 09/30/2019 3:20 PM

## 2019-09-30 NOTE — ANESTHESIA CARE TRANSFER NOTE
Patient: Carol Guajardo    Procedure(s):  Left Total Knee Arthroplasty    Diagnosis: Primary Osteoarthritis Of Left Knee  Diagnosis Additional Information: No value filed.    Anesthesia Type:   Spinal, MAC     Note:  Airway :Room Air  Patient transferred to:PACU  Comments: At end of procedure, spontaneous respirations, patient alert to voice, able to follow commands. Patient breathing room air to PACU. SpO2, NiBP, and EKG monitors and alarms on and functioning, Lynda Hugger warmer connected to patient gown, report on patient's clinical status given to PACU RN, RN questions answered.Handoff Report: Identifed the Patient, Identified the Reponsible Provider, Reviewed the pertinent medical history, Discussed the surgical course, Reviewed Intra-OP anesthesia mangement and issues during anesthesia, Set expectations for post-procedure period and Allowed opportunity for questions and acknowledgement of understanding      Vitals: (Last set prior to Anesthesia Care Transfer)    CRNA VITALS  9/30/2019 1020 - 9/30/2019 1056      9/30/2019             Pulse:  72    SpO2:  98 %                Electronically Signed By: ARNULFO Davenport CRNA  September 30, 2019  10:56 AM

## 2019-09-30 NOTE — ANESTHESIA PROCEDURE NOTES
Peripheral Nerve Block Procedure Note    Staff:     Anesthesiologist:  Janes Willett MD    Resident/CRNA:  Jakob Ricks MD    Block performed by resident/CRNA in the presence of a teaching physician    Location: Pre-op  Procedure Start/Stop TImes:      9/30/2019 8:15 AM     9/30/2019 8:20 AM    patient identified, IV checked, site marked, risks and benefits discussed, informed consent, monitors and equipment checked, pre-op evaluation, at physician/surgeon's request and post-op pain management      Correct Patient: Yes      Correct Position: Yes      Correct Site: Yes      Correct Procedure: Yes      Correct Laterality:  Yes    Site Marked:  Yes  Procedure details:     Procedure:  Adductor canal    ASA:  2    Laterality:  Left    Position:  Supine    Sterile Prep: chloraprep, mask and sterile gloves      Needle:  Short bevel    Needle gauge:  21    Needle length (mm):  100    Ultrasound: Yes      Ultrasound used to identify targeted nerve, plexus, or vascular structure and placed a needle adjacent to it      Permanent Image entered into patiient's record      Abnormal pain on injection: No      Blood Aspirated: No      Paresthesias:  No    Bleeding at site: No      Bolus via:  Needle    Infusion Method:  Single Shot    Blood aspirated via catheter: No      Complications:  None

## 2019-09-30 NOTE — LETTER
Transition Communication Hand-off for Care Transitions to Next Level of Care Provider    Name: Carol Guajardo  : 1970  MRN #: [id number]  Primary Care Provider: Chava Mayberry     Primary Clinic: [place] [address] [address] [facility] [address]     Reason for Hospitalization:  Primary Osteoarthritis Of Left Knee  Total knee replacement status, left  Admit Date/Time: 2019  5:40 AM  Discharge Date: 10/1/2019  Payor Source: Payor: PREFERREDONE / Plan: [employer] PREFERREDONE / Product Type: PPO /     Reason for Communication Hand-off Referral: Avoidable readmission within 30 days    Discharge Plan: Home with home care.  [facility]  Phone  [phone]  Fax  [phone]     Discharge Needs Assessment:  Needs      Most Recent Value   Equipment Currently Used at Home  none        Follow-up specialty is recommended: Yes    Follow-up plan:    Future Appointments   Date Time Provider Department Center   10/2/2019  8:00 AM Didi Mayberry OT [facility] [place]   10/16/2019  1:00 PM Ion Bailey MD [facility]       Any outstanding tests or procedures:        Referrals     Future Labs/Procedures    Home care nursing referral     Comments:    [facility]  Phone  [phone]  Fax  [phone]     RN skilled nursing visit.   RN to assess vital signs and weight, respiratory and cardiac status, pain level and activity tolerance, hydration, nutrition and bowel status   RN to complete home safety evaluation.  RN to provide lab draws as needed and communicate results to PCP     Physical Therapy to evaluate and treat    Your provider has ordered home care nursing services. If you have not been contacted within 2 days of your discharge please call the inpatient department phone number at [phone] .            Carol Ayoub RN, BSN  Care Coordinator, 8A  Phone [phone]  Pager [phone]      AVS/Discharge Summary is the source of truth; this is a helpful guide for  improved communication of patient story

## 2019-09-30 NOTE — ANESTHESIA PROCEDURE NOTES
Spinal/LP Procedure Note    Spinal Block  Staff:     Anesthesiologist:  Sarah Hendricks MD    Referred By:  Ion Bailey MD  Location: In OR BEFORE Induction  Procedure Start/Stop Times:     patient identified, IV checked, site marked, risks and benefits discussed, informed consent, monitors and equipment checked, pre-op evaluation, at physician/surgeon's request and post-op pain management      Correct Patient: Yes      Correct Position: Yes      Correct Site: Yes      Correct Procedure: Yes      Correct Laterality:  N/A    Site Marked:  N/A  Procedure:     Procedure:  Intrathecal    ASA:  2    Diagnosis:  Knee pain    Position:  Sitting    Sterile Prep: chloraprep      Insertion site:  L3-4    Approach:  Midline    Needle Type:  Sonal    Needle gauge (G):  25    Local Skin Infiltration:  1% lidocaine    amount (ml):  3    Needle Length (in):  4    Introducer used: Yes      Introducer gauge:  20 G    Attempts:  1    Redirects:  0    CSF:  Clear    Paresthesias:  No

## 2019-09-30 NOTE — OR NURSING
PACU to Inpatient Nursing Handoff    Patient Carol Guajardo is a 49 year old female who speaks English.   Procedure Procedure(s):  Left Total Knee Arthroplasty   Surgeon(s) Primary: Ion Bailey MD  Assisting: Adelaide Willett PA-C  Resident - Assisting: Jakob Green MD     Allergies   Allergen Reactions     Amoxicillin-Pot Clavulanate Hives     Other reaction(s): Edema     Ciprofloxacin      Penicillins        Isolation  None    Past Medical History   has a past medical history of Anxiety, Depression, HLD (hyperlipidemia), Hypothyroidism, Osteoarthritis of both knees, and Right lower lobe lung mass.    Anesthesia Other   Dermatome Level Dermatomes Left: L3  Dermatomes Right: L3   Preop Meds acetaminophen (Tylenol) - time given: 0715  gabapentin (Neurontin) - time given: 0715   Nerve block Adductor canal.  Location:left. Med:bupivicaine. Time given: 0820   Intraop Meds dexamethasone (Decadron)  famotidine (Pepcid): last given at 1020  ketorolac (Toradol): last given at 1027   Local Meds Yes - Local Cocktail (morphine, ropivacaine, epinephrine, Toradol)   Antibiotics clindamycin (Cleocin) - last given at 0849     Pain Patient Currently in Pain: denies   PACU meds  oxycodone (Roxicodone): 5 mg (total dose) last given at 1224    PCA / epidural No   Capnography     Telemetry ECG Rhythm: Normal sinus rhythm   Inpatient Telemetry Monitor Ordered? No        Labs Glucose Lab Results   Component Value Date    GLC 85 09/13/2019       Hgb Lab Results   Component Value Date    HGB 12.1 09/13/2019       INR No results found for: INR   PACU Imaging Completed     Wound/Incision Incision/Surgical Site 09/30/19 Anterior;Left;Mid Knee (Active)   Incision Assessment UTV 9/30/2019 10:53 AM   Sofiya-Incision Assessment UTV 9/30/2019 10:53 AM   Dressing Intervention Clean, dry, intact 9/30/2019 10:53 AM   Number of days: 0       Incision/Surgical Site 09/30/19 Left Knee (Active)   Incision Assessment UT  9/30/2019 10:53 AM   Sofiya-Incision Assessment UTV 9/30/2019 10:53 AM   Dressing Intervention Clean, dry, intact 9/30/2019 10:53 AM   Number of days: 0      CMS        Equipment ice pack   Other LDA       IV Access Peripheral IV 09/30/19 Right Hand (Active)   Site Assessment WDL 9/30/2019 10:53 AM   Line Status Infusing 9/30/2019 10:53 AM   Phlebitis Scale 0-->no symptoms 9/30/2019 10:53 AM   Infiltration Scale 0 9/30/2019 10:53 AM   Number of days: 0      Blood Products Not applicable EBL 10 mL   Intake/Output Date 09/30/19 0700 - 10/01/19 0659   Shift 0014-2077 0646-7079 0494-9880 24 Hour Total   INTAKE   I.V. 900   900   Shift Total(mL/kg) 900(9.5)   900(9.5)   OUTPUT   Blood 10   10   Shift Total(mL/kg) 10(0.11)   10(0.11)   Weight (kg) 94.7 94.7 94.7 94.7      Drains / Peguero Closed/Suction Drain 1 Left;Lateral Knee Bulb 10 Irish (Active)   Site Description UTV 9/30/2019 10:53 AM   Dressing Status Normal: Clean, Dry & Intact 9/30/2019 10:53 AM   Status To bulb suction 9/30/2019 10:53 AM   Number of days: 0      Time of void PreOp Void Prior to Procedure: 0715 (09/30/19 0708)    PostOp Urine Occurrence: 1 (09/30/19 0715)    Diapered? No   Bladder Scan Bladder Scan Volume (mL): 254 ml (09/30/19 1212)    mL (09/30/19 1212)  tolerating sips and ice chips     Vitals    B/P: 109/64  T: 98  F (36.7  C)    Temp src: Oral  P:  Pulse: 70 (09/30/19 1130)    Heart Rate: 67 (09/30/19 1130)     R: 24  O2:  SpO2: 97 %    O2 Device: None (Room air) (09/30/19 1130)              Family/support present significant other   Patient belongings     Patient transported on bed and air mat   DC meds/scripts (obs/outpt) Not applicable   Inpatient Pain Meds Released? Yes       Special needs/considerations None   Tasks needing completion Mel Weeks, RN  [facility] [id number]

## 2019-09-30 NOTE — OP NOTE
DATE OF SURGERY: 9/30/2019    PREOPERATIVE DIAGNOSIS: Left knee arthritis    POSTOPERATIVE DIAGNOSIS: Left knee arthritis    PROCEDURE: Left total knee arthroplasty    SURGEON: Ion Bailey MD     ASSISTANT: BASHIR Alvarado, Jakob Green MD    PATIENT HISTORY: This patient has arthritis in the left knee especially in the medial compartment.  She presents now for a joint replacement understand the risks of infection bleeding numbness tingling weakness deep venous thrombosis pulmonary embolism stiffness and pain.    DESCRIPTION OF PROCEDURE: The patient was placed supine after anesthesia and the leg was washed and sterilely prepped and draped.  I did an anterior incision and a median parapatellar approach splitting the quadricep tendon.  I used an intramedullary guide to remove 8 mm in the distal femur and used an extra medullary guide and took 2 mm off the medial tibial plateau.  The patient's bones were small.  Femur sized at a #2.  Patient was much tighter medially than laterally so we took a little more off of the posterior medial condyle by rotating the guide 1 degree.  We made the anterior posterior and chamfer cuts.  The tibia sized at a 1.  I used a drop davon to keep the rotation centered over the second ray then we did a trial with the cruciate retaining components.  Patient was extremely tight medially.  Still so I released the deep component of the collateral ligament and some of the peds insertion.  She was then more balanced.  I found that a 13 mm insert fit well and completely tightened her up laterally while she was still a little lax medially from our release.  I then instrumented the tibia for universal baseplate and we cut about 8 mm off the patella and instrumented that for around 8 mm patellar button.  We removed the trial implants cleaned and dried the bone ends and cemented in a size 2 CR femur size 1 and reversal tibial baseplate with the 8 mm patellar button.  Once excess cement  was removed and it was hardened we found the patella tracked well.  We then put in our size 13 insert.  The patient was stable and would not dislocate posteriorly.  We closed the capsule and repair the quad tendon.  We repaired the peritenon and the subcutaneous tissue and then closed the skin with Monocryl followed by Steri-Strips and a sterile dry dressing.  There were no complications.  I was present for all critical portions of the procedure.  The estimate of blood loss is 50 mL.  Following surgery the patient was taken to the recovery room in stable condition.    Ion Bailey MD

## 2019-09-30 NOTE — PLAN OF CARE
Pt arrived to unit at 1240 and was oriented to room and call light  VS: VSS   O2: >90% on RA   Output: Adequate output; last PVR 15   Last BM: 9/30   Activity: WBAT; 1A with FWW and gait belt. Ambulated in massey with PT   Up for meals? Yes   Skin: Incision/drain L knee   Pain: 10mg oxycodone q3h   CMS: Intact after spinal wore off   Dressing: ACE CDI   Diet: Regular   LDA: PIV infusing, HV patent   Equipment: PCDs, IV pole, capnography, FWW, gait belt   Plan: TBD   Additional Info: Pt received spinal anesthesia + bupivicaine block + cocktail. EBL 10.

## 2019-09-30 NOTE — BRIEF OP NOTE
[facility], [place]    Brief Operative Note    Pre-operative diagnosis: Primary Osteoarthritis Of Left Knee  Post-operative diagnosis Same    Procedure(s):  Left Total Knee Arthroplasty  Surgeon(s) and Role:     * Ion Bailey MD - Primary     * Adelaide Willett PA-ANTHONY - Assisting     * Jakob Green MD - Resident - Assisting    Anesthesia: Spinal and local   Estimated blood loss: 20 cc  Drains: Left thigh    Specimens:   ID Type Source Tests Collected by Time Destination   A : Synovium Tissue Knee, Left SURGICAL PATHOLOGY EXAM Ion Bailey MD 9/30/2019  9:08 AM      Findings:  See operative report  Complications: None    Implants:    Implant Name Type Inv. Item Serial No.  Lot No. LRB No. Used   BONE CEMENT RADIOPAQUE SIMPLEX HV FULL DOSE 6194-1-001 Cement, Bone BONE CEMENT RADIOPAQUE SIMPLEX HV FULL DOSE 6194-1-001  [employer] [id number] Left 2   Universal Tibial Baseplate Metallic Hardware/[place]   NOE [id number] Left 1   IMP COMP FEM STRK TRIATHLN CR LT 2 5510-F-201 Total Joint Component/Insert IMP COMP FEM STRK TRIATHLN CR LT 2 5510-F-201  [employer] CXY3T Left 1   IMP COMP PATELLA TRI 27X8MM 5550-L-278 Total Joint Component/Insert IMP COMP PATELLA TRI 27X8MM 5550-L-278  [employer] [id number] Left 1   IMP INSERT TIBIAL HOWM TRI 1X13MM 5531-G-113 Total Joint Component/Insert IMP INSERT TIBIAL HOWM TRI 1X13MM 5531-G-113  NOE [facility] [id number] Left 1      Plan:  Ortho Primary  Activity: Up with assist.  Weight bearing status: WBAT.  Antibiotics: Clindamycin x 2 doses.  Diet: Begin with clear fluids and progress diet as tolerated.  DVT prophylaxis: Lovenox and mechanical while in the hospital, discharge on lovenox x 4 weeks.  Bracing/Splinting: None.  Elevation: Elevate LLE on pillows to keep above heart as much as possible.  Wound Care: Dressing c/d/i for 7 days.  Drains: Document output per shift,  discontinue when <30cc/shift or POD1.  Pain management: Transition from IV to orals as tolerated.  X-rays: PACU.  Physical Therapy: Ambulation, ROM, ADL's.  Occupational Therapy: ADL's.  Labs: Trend Hgb on POD #1, 2  Pathology: Pending, follow closely.  Follow-up: Clinic with Dr. Bailey in 2 weeks  Disposition: Pending progress with therapies, pain control on orals, and medical stability, anticipate discharge to home on POD #2-3.    -----  Jakob Green MD, PhD 09/30/2019  Orthopaedic Surgery Resident, PGY4  Pager: [phone]

## 2019-09-30 NOTE — PROGRESS NOTES
09/30/19 1510   Quick Adds   Type of Visit Initial PT Evaluation       Present no   Language English   Living Environment   Lives With spouse   Living Arrangements house   Home Accessibility stairs to enter home   Number of Stairs, Main Entrance 4   Stair Railings, Main Entrance railing on right side (ascending)   Transportation Anticipated family or friend will provide   Self-Care   Usual Activity Tolerance good   Current Activity Tolerance moderate   Regular Exercise No   Equipment Currently Used at Home none   Functional Level Prior   Ambulation 0-->independent   Transferring 0-->independent   Toileting 0-->independent   Bathing 0-->independent   Communication 0-->understands/communicates without difficulty   Swallowing 0-->swallows foods/liquids without difficulty   Cognition 0 - no cognition issues reported   Fall history within last six months no   Which of the above functional risks had a recent onset or change? ambulation;transferring   General Information   Onset of Illness/Injury or Date of Surgery - Date 09/30/19   Referring Physician Jakob Green MD   Patient/Family Goals Statement Did not endorse   Pertinent History of Current Problem (include personal factors and/or comorbidities that impact the POC) s/p L TKA   Precautions/Limitations fall precautions   Weight-Bearing Status - LUE full weight-bearing   Weight-Bearing Status - RUE full weight-bearing   Weight-Bearing Status - LLE weight-bearing as tolerated   Weight-Bearing Status - RLE full weight-bearing   General Observations Seated on commode upon arrival, agreeable   General Info Comments IV, hemovac, capnography   Cognitive Status Examination   Orientation orientation to person, place and time   Level of Consciousness alert   Follows Commands and Answers Questions 100% of the time;able to follow multistep instructions   Personal Safety and Judgment intact   Memory intact   Pain Assessment   Patient Currently in  Pain Yes, see Vital Sign flowsheet   Integumentary/Edema   Integumentary/Edema Comments Patient with normal post-surgical swelling   Posture    Posture Forward head position;Protracted shoulders;Kyphosis   Posture Comments Mild sitting EOB and standing   Range of Motion (ROM)   ROM Comment L knee AAROM=[id number]   Strength   Strength Comments Did not formally assess, demonstrates good quality contractions with supine exercises   Bed Mobility   Bed Mobility Comments Completes sit to supine transfer with supervision   Transfer Skills   Transfer Comments Completes sit<>stand transfer with CGA and use of walker   Gait   Gait Comments Ambulated into massey with CGA and use of walker, limited a bit by pain   Balance   Balance Comments Independent sitting balance, CGA for standing balance with UE support from walker   Sensory Examination   Sensory Perception no deficits were identified   General Therapy Interventions   Planned Therapy Interventions balance training;bed mobility training;gait training;ROM;strengthening;transfer training;risk factor education;home program guidelines;progressive activity/exercise   Clinical Impression   Criteria for Skilled Therapeutic Intervention yes, treatment indicated   PT Diagnosis impaired functional mobility   Influenced by the following impairments increased post-op pain, decreased L LE strength and ROM   Functional limitations due to impairments impaired bed mobility, transfers and ambulation   Clinical Presentation Stable/Uncomplicated   Clinical Presentation Rationale s/p L TKA, mobilizing well   Clinical Decision Making (Complexity) Low complexity   Therapy Frequency 2x/day   Predicted Duration of Therapy Intervention (days/wks) 3 days   Anticipated Equipment Needs at Discharge front wheeled walker   Anticipated Discharge Disposition Home with Assist;Home with Home Therapy   Risk & Benefits of therapy have been explained Yes   Patient, Family & other staff in agreement with plan of  "care Yes   [facility] AM-PAC TM \"6 Clicks\"   2016, Trustees of [facility], under license to [employer].  All rights reserved.   6 Clicks Short Forms Basic Mobility Inpatient Short Form   [facility]  \"6 Clicks\" V.2 Basic Mobility Inpatient Short Form   1. Turning from your back to your side while in a flat bed without using bedrails? 4 - None   2. Moving from lying on your back to sitting on the side of a flat bed without using bedrails? 4 - None   3. Moving to and from a bed to a chair (including a wheelchair)? 4 - None   4. Standing up from a chair using your arms (e.g., wheelchair, or bedside chair)? 4 - None   5. To walk in hospital room? 4 - None   6. Climbing 3-5 steps with a railing? 3 - A Little   Basic Mobility Raw Score (Score out of 24.Lower scores equate to lower levels of function) 23   Total Evaluation Time   Total Evaluation Time (Minutes) 8     "

## 2019-10-01 ENCOUNTER — APPOINTMENT (OUTPATIENT)
Dept: OCCUPATIONAL THERAPY | Facility: CLINIC | Age: 49
DRG: 470 | End: 2019-10-01
Attending: ORTHOPAEDIC SURGERY
Payer: COMMERCIAL

## 2019-10-01 ENCOUNTER — TELEPHONE (OUTPATIENT)
Dept: MEDSURG UNIT | Facility: CLINIC | Age: 49
End: 2019-10-01

## 2019-10-01 ENCOUNTER — APPOINTMENT (OUTPATIENT)
Dept: PHYSICAL THERAPY | Facility: CLINIC | Age: 49
DRG: 470 | End: 2019-10-01
Attending: STUDENT IN AN ORGANIZED HEALTH CARE EDUCATION/TRAINING PROGRAM
Payer: COMMERCIAL

## 2019-10-01 LAB
GLUCOSE SERPL-MCNC: 124 MG/DL (ref 70–99)
HGB BLD-MCNC: 10.6 G/DL (ref 11.7–15.7)

## 2019-10-01 PROCEDURE — 25000132 ZZH RX MED GY IP 250 OP 250 PS 637: Performed by: STUDENT IN AN ORGANIZED HEALTH CARE EDUCATION/TRAINING PROGRAM

## 2019-10-01 PROCEDURE — 85018 HEMOGLOBIN: CPT | Performed by: ORTHOPAEDIC SURGERY

## 2019-10-01 PROCEDURE — 97535 SELF CARE MNGMENT TRAINING: CPT | Mod: GO | Performed by: OCCUPATIONAL THERAPIST

## 2019-10-01 PROCEDURE — 25000132 ZZH RX MED GY IP 250 OP 250 PS 637: Performed by: NURSE PRACTITIONER

## 2019-10-01 PROCEDURE — 25000132 ZZH RX MED GY IP 250 OP 250 PS 637: Performed by: INTERNAL MEDICINE

## 2019-10-01 PROCEDURE — 25000128 H RX IP 250 OP 636: Performed by: STUDENT IN AN ORGANIZED HEALTH CARE EDUCATION/TRAINING PROGRAM

## 2019-10-01 PROCEDURE — 12000001 ZZH R&B MED SURG/OB UMMC

## 2019-10-01 PROCEDURE — 97530 THERAPEUTIC ACTIVITIES: CPT | Mod: GO | Performed by: OCCUPATIONAL THERAPIST

## 2019-10-01 PROCEDURE — 25000132 ZZH RX MED GY IP 250 OP 250 PS 637: Performed by: ORTHOPAEDIC SURGERY

## 2019-10-01 PROCEDURE — 25000125 ZZHC RX 250: Performed by: STUDENT IN AN ORGANIZED HEALTH CARE EDUCATION/TRAINING PROGRAM

## 2019-10-01 PROCEDURE — 99233 SBSQ HOSP IP/OBS HIGH 50: CPT | Performed by: INTERNAL MEDICINE

## 2019-10-01 PROCEDURE — 97116 GAIT TRAINING THERAPY: CPT | Mod: GP | Performed by: PHYSICAL THERAPIST

## 2019-10-01 PROCEDURE — 97110 THERAPEUTIC EXERCISES: CPT | Mod: GP | Performed by: PHYSICAL THERAPIST

## 2019-10-01 PROCEDURE — 97530 THERAPEUTIC ACTIVITIES: CPT | Mod: GP | Performed by: PHYSICAL THERAPIST

## 2019-10-01 PROCEDURE — 82947 ASSAY GLUCOSE BLOOD QUANT: CPT | Performed by: ORTHOPAEDIC SURGERY

## 2019-10-01 PROCEDURE — 36415 COLL VENOUS BLD VENIPUNCTURE: CPT | Performed by: ORTHOPAEDIC SURGERY

## 2019-10-01 PROCEDURE — 97165 OT EVAL LOW COMPLEX 30 MIN: CPT | Mod: GO | Performed by: OCCUPATIONAL THERAPIST

## 2019-10-01 RX ORDER — OXYCODONE HYDROCHLORIDE 5 MG/1
5-10 TABLET ORAL EVERY 6 HOURS PRN
Qty: 56 TABLET | Refills: 0 | Status: SHIPPED | OUTPATIENT
Start: 2019-10-01 | End: 2019-10-01

## 2019-10-01 RX ORDER — GABAPENTIN 100 MG/1
100 CAPSULE ORAL 3 TIMES DAILY
Status: DISCONTINUED | OUTPATIENT
Start: 2019-10-01 | End: 2019-10-02 | Stop reason: HOSPADM

## 2019-10-01 RX ORDER — METHOCARBAMOL 750 MG/1
750 TABLET, FILM COATED ORAL 4 TIMES DAILY PRN
Status: DISCONTINUED | OUTPATIENT
Start: 2019-10-01 | End: 2019-10-02 | Stop reason: HOSPADM

## 2019-10-01 RX ORDER — BISACODYL 10 MG
10 SUPPOSITORY, RECTAL RECTAL DAILY PRN
Qty: 2 SUPPOSITORY | Refills: 0 | Status: SHIPPED | OUTPATIENT
Start: 2019-10-01 | End: 2020-12-16

## 2019-10-01 RX ORDER — ACETAMINOPHEN 325 MG/1
650 TABLET ORAL EVERY 4 HOURS PRN
Qty: 50 TABLET | Refills: 0 | Status: SHIPPED | OUTPATIENT
Start: 2019-10-03 | End: 2020-12-16

## 2019-10-01 RX ORDER — METHOCARBAMOL 500 MG/1
500 TABLET, FILM COATED ORAL 4 TIMES DAILY PRN
Status: DISCONTINUED | OUTPATIENT
Start: 2019-10-01 | End: 2019-10-01

## 2019-10-01 RX ORDER — PAROXETINE 20 MG/1
60 TABLET, FILM COATED ORAL DAILY
Status: DISCONTINUED | OUTPATIENT
Start: 2019-10-01 | End: 2019-10-02 | Stop reason: HOSPADM

## 2019-10-01 RX ORDER — OXYCODONE HYDROCHLORIDE 5 MG/1
5-10 TABLET ORAL
Qty: 56 TABLET | Refills: 0 | Status: SHIPPED | OUTPATIENT
Start: 2019-10-01 | End: 2020-12-16

## 2019-10-01 RX ORDER — POLYETHYLENE GLYCOL 3350 17 G/17G
1 POWDER, FOR SOLUTION ORAL DAILY
Qty: 7 PACKET | Refills: 0 | Status: SHIPPED | OUTPATIENT
Start: 2019-10-01 | End: 2019-10-08

## 2019-10-01 RX ORDER — ACETAMINOPHEN 325 MG/1
650 TABLET ORAL EVERY 4 HOURS PRN
Qty: 50 TABLET | Refills: 0 | Status: SHIPPED | OUTPATIENT
Start: 2019-10-03 | End: 2019-10-01

## 2019-10-01 RX ORDER — AMOXICILLIN 250 MG
1 CAPSULE ORAL 2 TIMES DAILY
Qty: 28 TABLET | Refills: 0 | Status: SHIPPED | OUTPATIENT
Start: 2019-10-01 | End: 2020-12-16

## 2019-10-01 RX ORDER — ACETAMINOPHEN 500 MG
1000 TABLET ORAL EVERY 6 HOURS
Status: DISCONTINUED | OUTPATIENT
Start: 2019-10-01 | End: 2019-10-02 | Stop reason: HOSPADM

## 2019-10-01 RX ADMIN — ACETAMINOPHEN 975 MG: 325 TABLET, FILM COATED ORAL at 16:13

## 2019-10-01 RX ADMIN — ACETAMINOPHEN 975 MG: 325 TABLET, FILM COATED ORAL at 01:01

## 2019-10-01 RX ADMIN — HYDROMORPHONE HYDROCHLORIDE 0.5 MG: 1 INJECTION, SOLUTION INTRAMUSCULAR; INTRAVENOUS; SUBCUTANEOUS at 14:41

## 2019-10-01 RX ADMIN — LEVOTHYROXINE SODIUM 175 MCG: 175 TABLET ORAL at 07:52

## 2019-10-01 RX ADMIN — METHOCARBAMOL 750 MG: 750 TABLET, FILM COATED ORAL at 22:41

## 2019-10-01 RX ADMIN — ACETAMINOPHEN 975 MG: 325 TABLET, FILM COATED ORAL at 07:46

## 2019-10-01 RX ADMIN — ACETAMINOPHEN 1000 MG: 500 TABLET ORAL at 22:00

## 2019-10-01 RX ADMIN — OXYCODONE HYDROCHLORIDE 10 MG: 5 TABLET ORAL at 12:34

## 2019-10-01 RX ADMIN — OXYCODONE HYDROCHLORIDE 10 MG: 5 TABLET ORAL at 01:01

## 2019-10-01 RX ADMIN — HYDROMORPHONE HYDROCHLORIDE 0.5 MG: 1 INJECTION, SOLUTION INTRAMUSCULAR; INTRAVENOUS; SUBCUTANEOUS at 17:50

## 2019-10-01 RX ADMIN — OXYCODONE HYDROCHLORIDE 10 MG: 5 TABLET ORAL at 16:13

## 2019-10-01 RX ADMIN — PRAMIPEXOLE DIHYDROCHLORIDE 0.5 MG: 0.5 TABLET ORAL at 22:00

## 2019-10-01 RX ADMIN — CLINDAMYCIN PHOSPHATE 900 MG: 900 INJECTION, SOLUTION INTRAVENOUS at 01:02

## 2019-10-01 RX ADMIN — PROPRANOLOL HYDROCHLORIDE 60 MG: 60 CAPSULE, EXTENDED RELEASE ORAL at 07:53

## 2019-10-01 RX ADMIN — METHOCARBAMOL 500 MG: 500 TABLET, FILM COATED ORAL at 15:30

## 2019-10-01 RX ADMIN — SENNOSIDES AND DOCUSATE SODIUM 1 TABLET: 8.6; 5 TABLET ORAL at 19:23

## 2019-10-01 RX ADMIN — PAROXETINE 60 MG: 20 TABLET, FILM COATED ORAL at 07:53

## 2019-10-01 RX ADMIN — ENOXAPARIN SODIUM 40 MG: 40 INJECTION SUBCUTANEOUS at 10:24

## 2019-10-01 RX ADMIN — HYDROMORPHONE HYDROCHLORIDE 0.5 MG: 1 INJECTION, SOLUTION INTRAMUSCULAR; INTRAVENOUS; SUBCUTANEOUS at 22:00

## 2019-10-01 RX ADMIN — HYDROMORPHONE HYDROCHLORIDE 0.5 MG: 1 INJECTION, SOLUTION INTRAMUSCULAR; INTRAVENOUS; SUBCUTANEOUS at 07:46

## 2019-10-01 RX ADMIN — SENNOSIDES AND DOCUSATE SODIUM 2 TABLET: 8.6; 5 TABLET ORAL at 07:53

## 2019-10-01 RX ADMIN — GABAPENTIN 100 MG: 100 CAPSULE ORAL at 19:23

## 2019-10-01 RX ADMIN — BUPROPION HYDROCHLORIDE 300 MG: 300 TABLET, EXTENDED RELEASE ORAL at 07:53

## 2019-10-01 RX ADMIN — OXYCODONE HYDROCHLORIDE 10 MG: 5 TABLET ORAL at 05:57

## 2019-10-01 RX ADMIN — OXYCODONE HYDROCHLORIDE 10 MG: 5 TABLET ORAL at 09:23

## 2019-10-01 RX ADMIN — OXYCODONE HYDROCHLORIDE 10 MG: 5 TABLET ORAL at 19:23

## 2019-10-01 ASSESSMENT — ACTIVITIES OF DAILY LIVING (ADL)
ADLS_ACUITY_SCORE: 14
ADLS_ACUITY_SCORE: 13
ADLS_ACUITY_SCORE: 14
ADLS_ACUITY_SCORE: 13
ADLS_ACUITY_SCORE: 14
ADLS_ACUITY_SCORE: 14

## 2019-10-01 NOTE — PROGRESS NOTES
Care Coordinator Progress Note    Admission Date/Time:  9/30/2019  Attending MD:  Ion Bailey*    Data  Chart reviewed, discussed with interdisciplinary team.   Patient was admitted for: Total knee replacement status, left.    Concerns with insurance coverage for discharge needs: None.  Current Living Situation: Patient lives with spouse.  Support System: Supportive  Services Involved: Home Care  Transportation at Discharge: Family or friend will provide  Transportation to Medical Appointments:   - Not applicable  Barriers to Discharge: NA    Coordination of Care and Referrals: Provided patient/family with options for Home Care.        Assessment  Referral for home care, skilled nursing and physical therapy sent to [facility]. No further discharge concerns. RNCC available as needed.    [facility]  Phone  [phone]  Fax  [phone]      Plan  Anticipated Discharge Date:  10/1/2019  Anticipated Discharge Plan:  Home with home care.    Carol Ayoub RN, BSN  Care Coordinator, 8A  Phone [phone]  Pager [phone]

## 2019-10-01 NOTE — PROGRESS NOTES
[facility] Internal Medicine Progress Note            Interval History:   Record reviewed.  Status post left total knee arthroplasty 9/30 Dr. Bailey.  Seen earlier with RN.  Anticipated discharge but sub optimal pain control with oxycodone 5-10 mg q3h requiring IV dilaudid.  Spasm like discomfort.  Ambulated in massey, tolerated stairs without LH.   No CP, SOB, cough, nausea, reflux, abd pain or distention.  Passing  flatus.  Last BM 9/30.  Voiding OK.           Medications:   All medications reviewed today          Physical Exam:   Blood pressure 105/54, pulse 72, temperature 96.3  F (35.7  C), temperature source Oral, resp. rate 18, height 1.524 m (5'), weight 94.7 kg (208 lb 12.4 oz), last menstrual period 08/03/2019, SpO2 96 %.    Intake/Output Summary (Last 24 hours) at 10/1/2019 1747  Last data filed at 10/1/2019 1614  Gross per 24 hour   Intake 240 ml   Output 855 ml   Net -615 ml       General:  Alert.  Appropriate.  No distress.  No O2.     Heent:      Neck:    Skin:    Chest:  clear    Cardiac:  Reg without gallop, murmur.  No JVD.     Abdomen:  Non distended, soft, non-tender.  BS normal.     Extremities:  Perfused.  No edema, calf, posterior thigh tenderness to suggest DVT.     Neuro:            Data:     Results for orders placed or performed during the hospital encounter of 09/30/19 (from the past 24 hour(s))   Glucose   Result Value Ref Range    Glucose 124 (H) 70 - 99 mg/dL   Hemoglobin   Result Value Ref Range    Hemoglobin 10.6 (L) 11.7 - 15.7 g/dL                  Assessment and Plan:   1.  Status post left total knee arthroplasty.  Mobilizing.  Sub optimal pain control requiring IV opiates.   2.  Chronic knee pain for which on 0-2 Percocet per day.   3.  Obesity.   4.  Chronic depression, managed.   5.  History of right lower lung fibrotic changes with negative biopsy 1 year ago and the patient described negative followup.   6.  Chronic cigarette use, with discontinuation 2 months ago.   7.   Acute blood loss anemia without symptomatic compromise.        PLAN:  1)  Defer discharge.  2)  Add robaxin for spasm like discomfort.  3)  Same opiates, scheduled tylenol,  low dose neurontin.  4)  Bowel medsd, IS, lovenox, mobilize.  5)  AM labs.  Monitor clinically.   Disposition Plan   Expected discharge in 1 days to prior living arrangement once pain controlled. .     Entered: Fady Larsen 10/01/2019, 5:48 PM              Attestation:  I have reviewed today's vital signs, notes, medications, labs and imaging.     Fady Larsen MD

## 2019-10-01 NOTE — PLAN OF CARE
VS: VSS   O2: Mid 90s on RA   Output: Voiding adequately and spontaneously   Last BM: 9/30/2019   Activity: A1 w walker   Skin: Intact ex L knee incision   Pain: Managed w mild relief w oxycodone, acetaminophen, and ice pack use   CMS: Intact. Denies numbness and tingling   Dressing: CDI   Diet: Regular   LDA: R hand PIV infusing. Hemovac output 40mL   Equipment: IV pole, walker, PCDs   Plan: TBD   Additional Info:

## 2019-10-01 NOTE — PLAN OF CARE
Pt Alert and Oriented X 4. Pt is independent in her room. VSS. Afebrile. Lungs- Clear bilaterally with both anterior and posterior. IS encouraged. Bowels- active in all four quadrants. PP+ DP+. CMS and Neuro's are intact to baseline. Denies numbness and tingling in all extremities. Denies nausea, shortness of breath, and chest pain. Has pain in the left knee/incisional and given PRN oxycodone, scheduled pain meds, ICE applied, and is tolerating with discomfort. Incisional dressing is C/D/I. Voids spontaneously without difficulty in BR. Is on a regular diet and appetite was good this shift. PIV is patent in the left hand and SL. PCD on. Pt's spouse was at bedside, attentive. Pt is able to make needs known and the call light is within the pt's reach. Continue to monitor.

## 2019-10-01 NOTE — PLAN OF CARE
Patient A & O x4. Pt up with A1 with a walker. Lung sounds clear throughout. Patient denies chest pain, SOB, lightheadedness, dizziness, tingling, numbness, nausea, and vomiting. Bowel sounds active, last BM 9/30, passing flatus, and tolerating regular diet. Drinking well and voiding spontaneously without difficulties. PIV infusing R lower. Patient able to wiggle toes. CMS intact. Dressing clean, dry, and intact. Pain is tolerable and patient taking PRN oxy for pain, ice pack applied.Plan is TBD based on therapies. Patient demonstrates ability to use call light appropriately. Will continue to monitor patient.

## 2019-10-01 NOTE — PLAN OF CARE
VS: VSS   O2: >90% on RA   Output: Voiding in adequate amounts independently   Last BM: LBM 9/30 per pt report, passing flatus, BS active   Activity: Pt up independently, passed therapies, using walker    Skin: Skin intact ex for incision, skin is warm and dry   Pain: Pain is being managed with PRN oxycodone 10 mg Q3H around-the-clock dosing, was in a lot of pain this morning and in tears, also in a lot of pain near end of shift. PRN robaxin added as well and given. Given IV dilaudid 0.5 mg x2.    CMS: CMS intact, DP +2 bilaterally, able to wiggle toes   Dressing: Dressing to L knee CDI, no drainage noted.    Diet: Tolerating regular diet without N/V, adequate intake of PO fluids    LDA: PIV SL, hemovac removed by RN   Equipment: Personal belongings, walker   Plan: Continue to monitor patient and ensure oxycodone is managing pain. Plan to stay overnight for pain control and discharge home tomorrow with home care. May need increase in oxycodone or may need to switch to a different narcotic if not managing pain.   Additional Info:  at bedside and supportive. Pt upset and tearful about not discharging home but states acceptance.

## 2019-10-01 NOTE — PROGRESS NOTES
Orthopaedic Surgery Progress Note 10/01/2019    E: No acute events overnight.    S: Pain well controlled. Worked with PT and able to ambulate. Denies numbness or tingling. Denies cp/sob/n/v. Tolerating oral diet. -BM +flatus. Voiding spontaneously. Plan of care reviewed and questions answered.    O:  Temp: 96.2  F (35.7  C) Temp src: Oral BP: 131/61 Pulse: 72 Heart Rate: 78 Resp: 21 SpO2: 98 % O2 Device: None (Room air)      Exam:  Gen: No acute distress, resting comfortably in bed.  Resp: Non-labored breathing  MSK:  LLE:  - Dressings c/d/i  - SILT sp/dp/t/s/s  - Fires ta/ehl/fhl/gsc  - DP pulse palpable, foot wwp    Drain output: 25/100/40 ml last 3 shifts.    Recent Labs   Lab 10/01/19  0612 09/30/19  1118   HGB 10.6*  --    PLT  --  332       Assessment: Carol Guajardo is a 49 year old female s/p left primary TKA for OA on 9/30/2019 with Dr. Bailey. Doing well postoperatively.    Plan:  Ortho Primary  Activity: Up with assist.  Weight bearing status: WBAT.  Antibiotics: Clindamycin x 2 doses.  Diet: Begin with clear fluids and progress diet as tolerated.  DVT prophylaxis: Lovenox and mechanical while in the hospital, discharge on lovenox x 4 weeks.  Bracing/Splinting: None.  Elevation: Elevate LLE on pillows to keep above heart as much as possible.  Wound Care: Dressing c/d/i for 7 days.  Drains: Document output per shift, discontinue when <30cc/shift or POD1 per Ortho.  Pain management: Transition from IV to orals as tolerated.  X-rays: PACU - reviewed, vertical lucency below tibial component noted.  Physical Therapy: Ambulation, ROM, ADL's.  Occupational Therapy: ADL's.  Labs: Trend Hgb on POD #1, 2  Pathology: Pending, follow closely.  Follow-up: Clinic with Dr. Bailey in 2 weeks  Disposition: Pending progress with therapies, pain control on orals, and medical stability, anticipate discharge to home on POD #1-3.    Future Appointments   Date Time Provider Department Center   10/1/2019 10:30 AM Camila Fuller  R Pt, PT [facility]   10/1/2019  4:30 PM Camila Fuller R Pt, PT [facility]   10/16/2019  1:00 PM Ion Bailey MD [facility]     Jakob Green MD PhD  Orthopaedic Surgery PGY4  Pager [phone]    Please page me directly with any questions/concerns during regular weekday hours before 5pm. If there is no response, if it is a weekend, or if it is during evening hours then please page the orthopaedic surgery resident on call.

## 2019-10-01 NOTE — PROGRESS NOTES
10/01/19 0756   Quick Adds   Type of Visit Initial Occupational Therapy Evaluation   Living Environment   Lives With spouse   Living Arrangements house   Home Accessibility stairs to enter home   Number of Stairs, Main Entrance 4   Stair Railings, Main Entrance railing on right side (ascending)   Transportation Anticipated family or friend will provide   Living Environment Comment everything she needs is on one level, tub shower with no shower chair no grab bars, raised toilet with no grab bars. Did not use any equipment at home    Self-Care   Usual Activity Tolerance good   Current Activity Tolerance moderate   Regular Exercise No   Equipment Currently Used at Home none   Activity/Exercise/Self-Care Comment IND at baseline   Functional Level   Ambulation 0-->independent   Transferring 0-->independent   Toileting 0-->independent   Bathing 0-->independent   Dressing 0-->independent   Eating 0-->independent   Communication 0-->understands/communicates without difficulty   Swallowing 0-->swallows foods/liquids without difficulty   Cognition 0 - no cognition issues reported   Fall history within last six months no   Which of the above functional risks had a recent onset or change? ambulation;transferring;toileting;bathing;dressing   Prior Functional Level Comment IND   General Information   Onset of Illness/Injury or Date of Surgery - Date 09/30/19   Referring Physician Ion Bailey MD - Primary   Patient/Family Goals Statement to do what I need to do to get home   Additional Occupational Profile Info/Pertinent History of Current Problem POD #2 s/p: Left TKA   Weight-Bearing Status - LLE weight-bearing as tolerated   General Observations capno, hemovac   Cognitive Status Examination   Orientation orientation to person, place and time   Level of Consciousness alert   Follows Commands (Cognition) WNL   Memory intact   Attention No deficits were identified   Organization/Problem Solving No deficits were  identified   Executive Function No deficits were identified   Visual Perception   Visual Perception Comments No concerns   Sensory Examination   Sensory Comments No N/T reported   Pain Assessment   Patient Currently in Pain Yes, see Vital Sign flowsheet   Range of Motion (ROM)   ROM Comment B UEs and R LE WFL, L LE currently limited due to post op    Strength   Strength Comments WFL   Hand Strength   Hand Strength Comments WFL   Muscle Tone Assessment   Muscle Tone Comments WFL   Coordination   Coordination Comments WFL   Transfer Skill: Bed to Chair/Chair to Bed   Level of Maynard: Bed to Chair stand-by assist   Weight-Bearing Restrictions weight-bearing as tolerated   Assistive Device - Transfer Skill Bed to Chair Chair to Bed Rehab Eval standard walker   Transfer Skill: Sit to Stand   Level of [place]nard: Sit/Stand stand-by assist   Transfer Skill: Sit to Stand weight-bearing as tolerated   Assistive Device for Transfer: Sit/Stand standard walker   Transfer Skill: Toilet Transfer   Level of Maynard: Toilet stand-by assist   Weight-Bearing Restrictions: Toilet weight-bearing as tolerated   Assistive Device standard walker;grab bars  (commode overaly)   Bathing   Level of Maynard stand-by assist   Upper Body Dressing   Level of Maynard: Dress Upper Body independent   Lower Body Dressing   Level of Maynard: Dress Lower Body stand-by assist   Assistive Device sock-aid   Toileting   Level of Maynard: Toilet stand-by assist   Grooming   Level of Maynard: Grooming independent   Eating/Self Feeding   Level of Maynard: Eating independent   Activities of Daily Living Analysis   Impairments Contributing to Impaired Activities of Daily Living balance impaired;pain;post surgical precautions;ROM decreased   General Therapy Interventions   Planned Therapy Interventions ADL retraining   Clinical Impression   Criteria for Skilled Therapeutic Interventions Met yes, treatment indicated   OT  "Diagnosis decreased IND in ADLs and functional mobility   Influenced by the following impairments pain, post op precautions   Assessment of Occupational Performance 1-3 Performance Deficits   Identified Performance Deficits dressing, bathing, toileting   Clinical Decision Making (Complexity) Low complexity   Therapy Frequency Daily   Predicted Duration of Therapy Intervention (days/wks) 1-2 days   Anticipated Equipment Needs at Discharge   (tbd - pt thinking about sock aide and tub transfer bench)   Anticipated Discharge Disposition Home with Assist   Risks and Benefits of Treatment have been explained. Yes   Patient, Family & other staff in agreement with plan of care Yes   [facility] TM \"6 Clicks\"   2016, Trustees of [facility], under license to [email or web address].  All rights reserved.   6 Clicks Short Forms Daily Activity Inpatient Short Form   [facility]  \"6 Clicks\" Daily Activity Inpatient Short Form   1. Putting on and taking off regular lower body clothing? 3 - A Little   2. Bathing (including washing, rinsing, drying)? 3 - A Little   3. Toileting, which includes using toilet, bedpan or urinal? 4 - None   4. Putting on and taking off regular upper body clothing? 4 - None   5. Taking care of personal grooming such as brushing teeth? 4 - None   6. Eating meals? 4 - None   Daily Activity Raw Score (Score out of 24.Lower scores equate to lower levels of function) 22   Total Evaluation Time   Total Evaluation Time (Minutes) 8     "

## 2019-10-01 NOTE — DISCHARGE SUMMARY
[facility] [place]  Orthopaedic Discharge Summary    Patient: Carol Guajardo MRN# [id number]   Age/Sex: 49 year old female YOB: 1970      Date of Admission:  9/30/2019  Date of Discharge:  10/2/2019  Admitting Physician:  Ion Bailey MD  Discharge Physician:  Jakob Green MD  Primary Care Provider:  Chava Mayberry    DISCHARGE DIAGNOSIS:  Primary Osteoarthritis Of Left Knee    PROCEDURE(S):   9/30/2019 Procedure(s):  Left Total Knee Arthroplasty     BRIEF HISTORY:  Carol Mcclure is a 49-year-old female with a BMI of 41 who initially presented to clinic with left knee end-stage osteoarthritis.  She has tried and failed conservative management.  Continue nonsurgical versus operative treatment was discussed.  The nature of surgery including benefits risks and alternatives were explained.  After demonstrating understanding the patient to proceed with a total knee replacement.    HOSPITAL COURSE:    Patient was admitted on 9/30/2019 and underwent the above stated procedure(s). There were no complications and the patient was transferred to the PACU in stable condition with a drain in place.  Medicine was consulted postoperatively to aid in management of medical comorbidities. Patient received routine nursing cares on the floor.  A clear liquid diet was started and advanced to regular on POD1.  PT/OT was initiated on POD1 for safety training, ADLs, mobility and ROM. The drain was removed prior to hospital discharge.  After demonstrating the ability to tolerate a diet, pain control on PO pain meds, and clearance by PT/OT, patient was deemed medically safe for discharge to home on 10/2/2019.    Antibiotics:  Clindamycin given preop and 24 hours postop for prophylaxis.  DVT Prophylaxis:  Lovenox daily for 4 weeks.  PT/OT Progress: Has met PT/OT goals for safe mobility.  Pain Medications: Weaned off all IV pain meds by time of discharge.  Inpatient Events: No significant  events or complications.     DISCHARGE MEDICATIONS AND PROCEDURES:      Discharge Medication List as of 10/2/2019  2:05 PM      START taking these medications    Details   bisacodyl (DULCOLAX) 10 MG suppository Place 1 suppository (10 mg) rectally daily as needed, Disp-2 suppository, R-0, E-PrescribeUse if 3 days and no BM      gabapentin (NEURONTIN) 100 MG capsule Take 1 capsule (100 mg) by mouth 3 times daily for 10 days, Disp-30 capsule, R-0, E-Prescribe      methocarbamol (ROBAXIN) 750 MG tablet Take 1 tablet (750 mg) by mouth 4 times daily as needed for muscle spasms or other (pain), Disp-28 tablet, R-0, E-Prescribe      order for DME Equipment being ordered: Walker Wheels () and Walker ()  Treatment Diagnosis: Gait instabilityDisp-1 each, R-0, Local Print      polyethylene glycol (MIRALAX/GLYCOLAX) packet Take 17 g by mouth daily for 7 days, Disp-7 packet, R-0, E-Prescribe      senna-docusate (SENOKOT-S/PERICOLACE) 8.6-50 MG tablet Take 1 tablet by mouth 2 times daily, Disp-28 tablet, R-0, E-Prescribe         CONTINUE these medications which have CHANGED    Details   acetaminophen (TYLENOL) 325 MG tablet Take 2 tablets (650 mg) by mouth every 4 hours as needed for mild pain, Disp-50 tablet, R-0, E-Prescribe      enoxaparin (LOVENOX) 40 MG/0.4ML syringe Inject 0.4 mLs (40 mg) Subcutaneous every 24 hours, Disp-27 Syringe, R-1, E-Prescribe      oxyCODONE (ROXICODONE) 5 MG tablet Take 1-2 tablets (5-10 mg) by mouth every 3 hours as needed for pain, Disp-56 tablet, R-0, E-Prescribe         CONTINUE these medications which have NOT CHANGED    Details   buPROPion (WELLBUTRIN XL) 300 MG 24 hr tablet Take 300 mg by mouth every morning , Historical      diazepam (VALIUM) 2 MG tablet Take 2 mg by mouth, Historical      levothyroxine (SYNTHROID/LEVOTHROID) 150 MCG tablet Take 175 mcg by mouth every morning 175mg, Historical      !! PARoxetine (PAXIL) 10 MG tablet Take 40 mg by mouth , Historical      !!  PARoxetine (PAXIL) 20 MG tablet Take 20 mg by mouth every morning, Historical      pramipexole (MIRAPEX) 0.125 MG tablet Take 0.5 mg by mouth At Bedtime , Historical      propranolol (INDERAL LA) 60 MG 24 hr capsule Take 60 mg by mouth every morning , Historical       !! - Potential duplicate medications found. Please discuss with provider.      STOP taking these medications       IBUPROFEN PO Comments:   Reason for Stopping:         oxyCODONE-acetaminophen (PERCOCET) 5-325 MG tablet Comments:   Reason for Stopping:                 Discharge Procedure Orders   Home care nursing referral   Referral Priority: Routine Referral Type: Home Health Therapies & Aides   Number of Visits Requested: 1     Reason for your hospital stay   Order Comments: You had knee surgery.     Follow Up and recommended labs and tests   Order Comments: Follow up with Dr Bailey as planned. For questions call [phone].     Activity   Order Comments: Your activity upon discharge: as tolerated. Follow the exercises and physical therapy recommendations you were given in the hospital     Order Specific Question Answer Comments   Is discharge order? Yes      Wound care and dressings   Order Comments: Instructions to care for your wound at home: you have a waterproof dressing on which will stay on for 7 days post op. After 7 days, remove the dressing and leave open to air. You may shower over the waterproof dressing. You also may shower after the dressing comes off with it uncovered unless the wound is draining. Should you note drainage, cover wound with a clean dry dressing and call the clinic. [phone].     When to contact your care team   Order Comments: Call your physician for fevers greater than 101.5, chills, increased pain, redness, swelling or discharge at the surgical site. During regular business hours call Dr Carter's office and request to speak with his nurse or the triage nurses. If you see him at [facility] call [phone].  If you see him at [facility] [place] call [phone]. After hours or on weekends call the hospital  at [phone] and ask to speak with the resident on call.     Discharge Instructions   Order Comments: Call primary MD to arrange follow up - recheck hemoglobin in next 1-2 weeks  Dulcolox suppository if 3 days and no BM     Discharge Instructions   Order Comments: Call primary MD to arrange follow up.     Diet   Order Comments: Follow this diet upon discharge: Normal as tolerated     Order Specific Question Answer Comments   Is discharge order? Yes      Assign Questionnaire Series to Patient     FOLLOWUP:    Future Appointments   Date Time Provider Department Center   10/16/2019  1:00 PM Ion Bailey MD [facility]     Appointments at [facility]: [address] [place], [place] [id number]    ATTESTATION:  I have reviewed today's vital signs, notes, medications, labs and imaging. Patient discussed with Dr. Bailey who agrees with the above.    Jakob Green MD, PhD  Orthopaedic Surgery Resident, PGY4  Pager: [phone]

## 2019-10-01 NOTE — PLAN OF CARE
Discharge Planner OT   Patient plan for discharge: Home with assist from family  Current status: Pt supine in bed, agreeable to session. Pt experiencing increased pain this AM, waiting for IV to be replaced for pain medication. Pt SBA supine <> sit, SBA using FWW sit <> stand, toilet transfer, ambulation to/from bathroom. Training completed for LB dressing with AE, pt SBA. Unable to meet all OT goals today (tub transfer) due to increased pain.   Barriers to return to prior living situation: pain, post op precautions  Recommendations for discharge: home with assist from family  Rationale for recommendations: Pt near [place] in ADLs and functional mobility, pt has assistance available to her at home and shows confidence in her abilities.        Entered by: Sly Sutherland 10/01/2019 8:51 AM      PM: Returned in PM for tub transfer,  present, pt SBA using tub transfer bench and FWW, pt purchasing bench on her way home. Pt excited to return home and confident in 's assistance.     Occupational Therapy Discharge Summary    Reason for therapy discharge:    All goals and outcomes met, no further needs identified.    Progress towards therapy goal(s). See goals on Care Plan in [facility] electronic health record for goal details.  Goals met    Therapy recommendation(s):    No further therapy is recommended.

## 2019-10-01 NOTE — PLAN OF CARE
Discharge Planner PT   Patient plan for discharge: Home with spouse for assist and home PT  Current status: Pt demonstrated all bed mobility independently.  Sit to/from standing from EOB with FWW and SBA x 1.  Pt was able to ambulated 200' with FWW and SBA x 1.  Pt was able to safely go up/down the steps with 2 railings and then with 1 railing/HHA.  Pt demonstrated and verbalized understanding of TKA HEP, left knee ROM 0-10-65 degrees.    Barriers to return to prior living situation: No barriers to discharge home.   Recommendations for discharge: Home with home PT  Rationale for recommendations: Pt would benefit from further skilled PT for LE strengthening/ROM and gait training.        Entered by: Camila Fuller 10/01/2019 11:45 AM       Physical Therapy Discharge Summary    Reason for therapy discharge:    Discharged to home with home therapy.    Progress towards therapy goal(s). See goals on Care Plan in [facility] electronic health record for goal details.  Goals partially met.  Barriers to achieving goals:   discharge from facility.    Therapy recommendation(s):    Continued therapy is recommended.  Rationale/Recommendations:  See above.

## 2019-10-01 NOTE — TELEPHONE ENCOUNTER
Prior Authorization **INITIATED**    Medication: Enoxaparin 40mg/0.4ml syringes  Insurance Company: [employer] - Phone [phone] Fax [phone]  Pharmacy Filling the Rx: [facility] - [place], [place] - [address]  Filling Pharmacy Phone: [phone]  Filling Pharmacy Fax: [phone]  Start Date: 10/1/2019  Reference #: CMM Key: [id number]  Comments:  Plan limits to 17 day supply per fill--if not approved by the time patient needs to discharge, patient can leave with a 17 day supply of medication and refill remainder.    Nila Gandhi  [place] Discharge Pharmacy Liaison    [facility]  [address] [facility]  [address], [place] [address]   [email or web address]  [email or web address]   Phone: [phone]  Pager: [phone]  Fax: [phone]

## 2019-10-02 ENCOUNTER — PATIENT OUTREACH (OUTPATIENT)
Dept: CARE COORDINATION | Facility: CLINIC | Age: 49
End: 2019-10-02

## 2019-10-02 VITALS
TEMPERATURE: 96.8 F | SYSTOLIC BLOOD PRESSURE: 110 MMHG | WEIGHT: 208.78 LBS | DIASTOLIC BLOOD PRESSURE: 52 MMHG | HEART RATE: 72 BPM | BODY MASS INDEX: 40.99 KG/M2 | RESPIRATION RATE: 16 BRPM | OXYGEN SATURATION: 95 % | HEIGHT: 60 IN

## 2019-10-02 LAB
ANION GAP SERPL CALCULATED.3IONS-SCNC: 9 MMOL/L (ref 3–14)
BUN SERPL-MCNC: 19 MG/DL (ref 7–30)
CALCIUM SERPL-MCNC: 8.4 MG/DL (ref 8.5–10.1)
CHLORIDE SERPL-SCNC: 103 MMOL/L (ref 94–109)
CO2 SERPL-SCNC: 25 MMOL/L (ref 20–32)
CREAT SERPL-MCNC: 0.56 MG/DL (ref 0.52–1.04)
GFR SERPL CREATININE-BSD FRML MDRD: >90 ML/MIN/{1.73_M2}
GLUCOSE SERPL-MCNC: 103 MG/DL (ref 70–99)
HGB BLD-MCNC: 11.1 G/DL (ref 11.7–15.7)
POTASSIUM SERPL-SCNC: 4.3 MMOL/L (ref 3.4–5.3)
SODIUM SERPL-SCNC: 137 MMOL/L (ref 133–144)

## 2019-10-02 PROCEDURE — 99232 SBSQ HOSP IP/OBS MODERATE 35: CPT | Performed by: INTERNAL MEDICINE

## 2019-10-02 PROCEDURE — 25000128 H RX IP 250 OP 636: Performed by: STUDENT IN AN ORGANIZED HEALTH CARE EDUCATION/TRAINING PROGRAM

## 2019-10-02 PROCEDURE — 25000132 ZZH RX MED GY IP 250 OP 250 PS 637: Performed by: INTERNAL MEDICINE

## 2019-10-02 PROCEDURE — 36415 COLL VENOUS BLD VENIPUNCTURE: CPT | Performed by: STUDENT IN AN ORGANIZED HEALTH CARE EDUCATION/TRAINING PROGRAM

## 2019-10-02 PROCEDURE — 25000132 ZZH RX MED GY IP 250 OP 250 PS 637: Performed by: ORTHOPAEDIC SURGERY

## 2019-10-02 PROCEDURE — 25000132 ZZH RX MED GY IP 250 OP 250 PS 637: Performed by: STUDENT IN AN ORGANIZED HEALTH CARE EDUCATION/TRAINING PROGRAM

## 2019-10-02 PROCEDURE — 99207 ZZC CDG-CUT & PASTE-POTENTIAL IMPACT ON LEVEL: CPT | Performed by: INTERNAL MEDICINE

## 2019-10-02 PROCEDURE — 85018 HEMOGLOBIN: CPT | Performed by: STUDENT IN AN ORGANIZED HEALTH CARE EDUCATION/TRAINING PROGRAM

## 2019-10-02 PROCEDURE — 80048 BASIC METABOLIC PNL TOTAL CA: CPT | Performed by: STUDENT IN AN ORGANIZED HEALTH CARE EDUCATION/TRAINING PROGRAM

## 2019-10-02 RX ORDER — METHOCARBAMOL 750 MG/1
750 TABLET, FILM COATED ORAL 4 TIMES DAILY PRN
Qty: 28 TABLET | Refills: 0 | Status: SHIPPED | OUTPATIENT
Start: 2019-10-02 | End: 2020-12-16

## 2019-10-02 RX ORDER — GABAPENTIN 100 MG/1
100 CAPSULE ORAL 3 TIMES DAILY
Qty: 30 CAPSULE | Refills: 0 | Status: SHIPPED | OUTPATIENT
Start: 2019-10-02 | End: 2020-12-16

## 2019-10-02 RX ADMIN — OXYCODONE HYDROCHLORIDE 10 MG: 5 TABLET ORAL at 01:03

## 2019-10-02 RX ADMIN — BUPROPION HYDROCHLORIDE 300 MG: 300 TABLET, EXTENDED RELEASE ORAL at 08:02

## 2019-10-02 RX ADMIN — GABAPENTIN 100 MG: 100 CAPSULE ORAL at 08:03

## 2019-10-02 RX ADMIN — PAROXETINE 60 MG: 20 TABLET, FILM COATED ORAL at 08:02

## 2019-10-02 RX ADMIN — GABAPENTIN 100 MG: 100 CAPSULE ORAL at 13:32

## 2019-10-02 RX ADMIN — SENNOSIDES AND DOCUSATE SODIUM 2 TABLET: 8.6; 5 TABLET ORAL at 08:04

## 2019-10-02 RX ADMIN — OXYCODONE HYDROCHLORIDE 10 MG: 5 TABLET ORAL at 04:26

## 2019-10-02 RX ADMIN — OXYCODONE HYDROCHLORIDE 10 MG: 5 TABLET ORAL at 13:00

## 2019-10-02 RX ADMIN — ACETAMINOPHEN 1000 MG: 500 TABLET ORAL at 10:47

## 2019-10-02 RX ADMIN — ACETAMINOPHEN 1000 MG: 500 TABLET ORAL at 04:24

## 2019-10-02 RX ADMIN — PROPRANOLOL HYDROCHLORIDE 60 MG: 60 CAPSULE, EXTENDED RELEASE ORAL at 08:03

## 2019-10-02 RX ADMIN — LEVOTHYROXINE SODIUM 175 MCG: 175 TABLET ORAL at 08:03

## 2019-10-02 RX ADMIN — OXYCODONE HYDROCHLORIDE 10 MG: 5 TABLET ORAL at 08:13

## 2019-10-02 RX ADMIN — ENOXAPARIN SODIUM 40 MG: 40 INJECTION SUBCUTANEOUS at 10:47

## 2019-10-02 ASSESSMENT — ACTIVITIES OF DAILY LIVING (ADL)
ADLS_ACUITY_SCORE: 13
ADLS_ACUITY_SCORE: 14
ADLS_ACUITY_SCORE: 13
ADLS_ACUITY_SCORE: 13

## 2019-10-02 NOTE — PLAN OF CARE
VS: Stable.   O2: RA.   Output: Voiding independently in bathroom.   Last BM: 9/30/19.   Activity: Independent with walker, WBAT.   Skin: Incision.   Pain: Burning, aching pain in left knee managed with oxycodone, scheduled tylenol, and ice.   CMS: Intact.   Dressing: CDI.   Diet: Regular.   LDA: PIV left lower forearm SL.   Equipment: Walker, IS, refused PCDs, pillows, call light within reach.   Plan: Continue to monitor. Anticipate discharge to home.   Additional Info:

## 2019-10-02 NOTE — PROGRESS NOTES
[facility] Internal Medicine Progress Note            Interval History:   Record reviewed.  Seen with RN.   Status post left total knee arthroplasty 9/30 Dr. Bailey.  Adequate pain control with continued oxycodone, addition of robaxin and neurontin.  Ambulated in room without LH.   Passed therapy 10/1.  No CP, SOB, cough, nausea, reflux, abd pain or distention.  Passing  flatus.  Last BM 9/30.  Voiding OK.           Medications:   All medications reviewed today          Physical Exam:   Blood pressure 110/52, pulse 72, temperature 96.8  F (36  C), temperature source Oral, resp. rate 16, height 1.524 m (5'), weight 94.7 kg (208 lb 12.4 oz), last menstrual period 08/03/2019, SpO2 95 %.    Intake/Output Summary (Last 24 hours) at 10/1/2019 1748  Last data filed at 10/1/2019 1614  Gross per 24 hour   Intake 240 ml   Output 855 ml   Net -615 ml       General:  Alert.  Appropriate.  No distress.  No O2.     Heent:      Neck:    Skin:    Chest:  clear    Cardiac:  Reg without gallop, murmur.  No JVD.     Abdomen:  Non distended, soft, non-tender.  BS normal.     Extremities:  Perfused.  No edema, calf, posterior thigh tenderness to suggest DVT.     Neuro:            Data:     Results for orders placed or performed during the hospital encounter of 09/30/19 (from the past 24 hour(s))   Hemoglobin   Result Value Ref Range    Hemoglobin 11.1 (L) 11.7 - 15.7 g/dL   Basic metabolic panel   Result Value Ref Range    Sodium 137 133 - 144 mmol/L    Potassium 4.3 3.4 - 5.3 mmol/L    Chloride 103 94 - 109 mmol/L    Carbon Dioxide 25 20 - 32 mmol/L    Anion Gap 9 3 - 14 mmol/L    Glucose 103 (H) 70 - 99 mg/dL    Urea Nitrogen 19 7 - 30 mg/dL    Creatinine 0.56 0.52 - 1.04 mg/dL    GFR Estimate >90 >60 mL/min/[1.73_m2]    GFR Estimate If Black >90 >60 mL/min/[1.73_m2]    Calcium 8.4 (L) 8.5 - 10.1 mg/dL                  Assessment and Plan:   1.  Status post left total knee arthroplasty.  Mobilizing. Improved, adequate pain  control.    2.  Chronic knee pain for which on 0-2 Percocet per day.   3.  Obesity.   4.  Chronic depression, managed.   5.  History of right lower lung fibrotic changes with negative biopsy 1 year ago and the patient described negative followup.   6.  Chronic cigarette use, with discontinuation 2 months ago.   7.  Acute blood loss anemia without symptomatic compromise.        PLAN:  1)  Clinically stable for discharge.  2)  meds reviewed.  3)  Oxycodone, robaxin, neurontin.  Lovenox 1 month.  Bowel meds.   4)  Call primary MD to arrange follow up.    Disposition Plan   Expected discharge today to prior living arrangement ..     Entered: Fady Larsen 10/02/2019, 3:34 PM              Attestation:  I have reviewed today's vital signs, notes, medications, labs and imaging.     Fady Larsen MD

## 2019-10-02 NOTE — PROGRESS NOTES
Orthopaedic Surgery Progress Note 10/02/2019    E: No acute events overnight.    S: POD2. Complains of breakthrough pain yesterday. Working with PT and able to ambulate. Denies cp/sob/n/v. Tolerating oral diet. -BM, +flatus. Voiding spontaneously. Plan of care reviewed and questions answered.    O:  Temp: 97.5  F (36.4  C) Temp src: Oral BP: 113/51 Pulse: 72 Heart Rate: 71 Resp: 16 SpO2: 95 % O2 Device: None (Room air)      Exam:  Gen: No acute distress, resting comfortably in bed.  Resp: Non-labored breathing  MSK:  LLE:  - Dressings c/d/i  - SILT sp/dp/t/s/s  - Fires ta/ehl/fhl/gsc  - DP pulse palpable, foot wwp    Recent Labs   Lab 10/02/19  0543 10/01/19  0612 09/30/19  1118   HGB 11.1* 10.6*  --    PLT  --   --  332       Assessment: Carol Guajardo is a 49 year old female s/p left primary TKA for OA on 9/30/2019 with Dr. Bailey. Some breakthrough pain, progressing well postoperatively.     Plan:  Ortho Primary  Activity: Up with assist.  Weight bearing status: WBAT.  Antibiotics: Clindamycin x 2 doses - completed.  Diet: Begin with clear fluids and progress diet as tolerated.  DVT prophylaxis: Lovenox and mechanical while in the hospital, discharge on lovenox x 4 weeks.  Bracing/Splinting: None.  Elevation: Elevate LLE on pillows to keep above heart as much as possible.  Wound Care: Dressing c/d/i for 7 days.  Drains: Document output per shift, discontinue when <30cc/shift or POD1 per Ortho.  Pain management: Transition from IV to orals as tolerated.  X-rays: PACU - reviewed, vertical lucency below tibial component noted.  Physical Therapy: Ambulation, ROM, ADL's.  Occupational Therapy: ADL's.  Labs: Trend Hgb on POD #1, 2  Pathology: Pending, follow closely.  Follow-up: Clinic with Dr. Bailey in 2 weeks  Disposition: Pending progress with therapies, pain control on orals, and medical stability, anticipate discharge to home on POD #2-3.    Future Appointments   Date Time Provider Department Center   10/16/2019   1:00 PM Ion Bailey MD [facility]       Jakob Green MD PhD  Orthopaedic Surgery PG4  Pager [phone]    Please page me directly with any questions/concerns during regular weekday hours before 5pm. If there is no response, if it is a weekend, or if it is during evening hours then please page the orthopaedic surgery resident on call.

## 2019-10-02 NOTE — PLAN OF CARE
VS: /52 (BP Location: Left arm)   Pulse 72   Temp 96.8  F (36  C) (Oral)   Resp 16   Ht 1.524 m (5')   Wt 94.7 kg (208 lb 12.4 oz)   LMP 08/03/2019 (Exact Date)   SpO2 95%   BMI 40.77 kg/m     O2: Room air   Output: Voids spontaneously to bathroom   Last BM: 9/30/2019   Activity: Independent with walker   Skin: Incision on left thigh CDI.   Pain: Well-managed with oxycodone 10 mg x 3hr as well as gabapentin   CMS: Intact   Dressing: CDI   Diet: Regular   LDA: PIV in left arm removed today.   Equipment: IV pump and pole.    Plan: Patient will discharge home this afternoon with education on medications sent.   Additional Info:

## 2019-10-03 LAB — COPATH REPORT: NORMAL

## 2019-10-04 ENCOUNTER — TELEPHONE (OUTPATIENT)
Dept: ORTHOPEDICS | Facility: CLINIC | Age: 49
End: 2019-10-04

## 2019-10-04 DIAGNOSIS — M17.12 PRIMARY OSTEOARTHRITIS OF LEFT KNEE: Primary | ICD-10-CM

## 2019-10-04 RX ORDER — OXYCODONE HYDROCHLORIDE 5 MG/1
5 TABLET ORAL EVERY 6 HOURS PRN
Qty: 20 TABLET | Refills: 0 | Status: SHIPPED | OUTPATIENT
Start: 2019-10-04 | End: 2019-10-07

## 2019-10-04 NOTE — TELEPHONE ENCOUNTER
Prior Authorization **APPROVED**    Authorization Effective Date: 10/3/2019  Authorization Expiration Date: 11/3/2019  Medication: Enoxaparin 40mg/0.4ml syringes  Approved Dose/Quantity: 30 days per fill  Reference #: CMM Key: [id number]   Insurance Company: [employer] - Phone [phone] Fax [phone]  Expected CoPay: $21.49     CoPay Card Available: No    Foundation Assistance Needed: n/a  Which Pharmacy is filling the prescription (Not needed for infusion/clinic administered): [facility] [place], [place] - [address]  Pharmacy Notified: Yes  Patient Notified: No  Comments:  Plan limits to 17 day supply per fill--patient discharged with a 17 day supply of medication to refill remainder when needed.        Nila Gandhi  [place] Discharge Pharmacy Liaison    [facility]  [address] [facility]  [address] [address]   [email or web address]  [email or web address]   Phone: [phone]  Pager: [phone]  Fax: [phone]

## 2019-10-04 NOTE — TELEPHONE ENCOUNTER
Health Call Center    Phone Message    May a detailed message be left on voicemail: yes    Reason for Call: Medication Refill Request    Has the patient contacted the pharmacy for the refill? Yes   Name of medication being requested: oxyCODONE (ROXICODONE) 5 MG tablet  Provider who prescribed the medication:Marielos Alvarez APRN CNP  Pharmacy: [facility] [address]  Date medication is needed: As soon as possible because the pt is running low and will be out soon. Please call the pt back to discuss.         Action Taken: Message routed to:  [facility] ([facility] orthopedics

## 2019-10-04 NOTE — PROGRESS NOTES
Patient was called three times and no answer so post 24 hr DC follow up calls will be closed out, message was left with contact number for department seen by or following up     Call your physician for fevers greater than 101.5, chills, increased pain, redness, swelling or discharge at the surgical site. During regular business hours call Dr Carter's office and request to speak with his nurse or the triage nurses. If you see him at [facility] call [phone]. If you see him at [facility] call [phone]. After hours or on weekends call the hospital  at [phone] and ask to speak with the resident on call.

## 2019-10-05 NOTE — TELEPHONE ENCOUNTER
Health Call Center    Phone Message    May a detailed message be left on voicemail: yes    Reason for Call: Medication Question or concern regarding medication   Prescription Clarification  Name of Medication: oxyCODONE (ROXICODONE) 5 MG tablet     Prescribing Provider: Adelaide Willett PA-C   Pharmacy: [facility] [address] [place] [address] Phone: [phone]     What on the order needs clarification? Please resend medications to this pharmacy the original order had the incorrect pharmacy           Action Taken: Message routed to:  [facility] ([facility]):  ORTHOPEDICS

## 2019-10-07 DIAGNOSIS — M17.12 PRIMARY OSTEOARTHRITIS OF LEFT KNEE: ICD-10-CM

## 2019-10-07 RX ORDER — OXYCODONE HYDROCHLORIDE 5 MG/1
5 TABLET ORAL EVERY 6 HOURS PRN
Qty: 20 TABLET | Refills: 0 | Status: SHIPPED | OUTPATIENT
Start: 2019-10-07 | End: 2019-10-23

## 2019-10-15 ENCOUNTER — MEDICAL CORRESPONDENCE (OUTPATIENT)
Dept: HEALTH INFORMATION MANAGEMENT | Facility: CLINIC | Age: 49
End: 2019-10-15

## 2019-10-15 LAB — HGB BLD-MCNC: 12.2 G/DL (ref 11.7–15.7)

## 2019-10-15 PROCEDURE — 85018 HEMOGLOBIN: CPT | Performed by: FAMILY MEDICINE

## 2019-10-15 ASSESSMENT — ENCOUNTER SYMPTOMS
INSOMNIA: 1
NERVOUS/ANXIOUS: 1
DECREASED CONCENTRATION: 0
DEPRESSION: 1

## 2019-10-16 ENCOUNTER — OFFICE VISIT (OUTPATIENT)
Dept: ORTHOPEDICS | Facility: CLINIC | Age: 49
End: 2019-10-16
Payer: COMMERCIAL

## 2019-10-16 VITALS — WEIGHT: 208 LBS | HEIGHT: 60 IN | BODY MASS INDEX: 40.84 KG/M2

## 2019-10-16 DIAGNOSIS — Z96.652 HISTORY OF TOTAL KNEE ARTHROPLASTY, LEFT: ICD-10-CM

## 2019-10-16 DIAGNOSIS — M17.12 PRIMARY OSTEOARTHRITIS OF LEFT KNEE: Primary | ICD-10-CM

## 2019-10-16 ASSESSMENT — MIFFLIN-ST. JEOR: SCORE: 1489.98

## 2019-10-16 NOTE — LETTER
10/16/2019       RE: Carol Guajardo  [id number] [id number]     Dear Colleague,    Thank you for referring your patient, Carol Guajardo, to the [facility] at [facility]. Please see a copy of my visit note below.    This 49-year-old is 2 weeks out from a left total knee.  She is doing well and takes a single tablet of 5 mg of oxycodone at night but not every day.  She is doing physical therapy and can get from 0-78 with therapy.    Today her incision is clean dry and intact with the mild amount of knee swelling to the degree 1 would expect.  Calf is not swollen.  Her motion is about 5 to 72 degrees today from the.  She will continue to work with therapy and return to see me in 4 weeks to check her knee motion.  I have answered all of her questions.    Again, thank you for allowing me to participate in the care of your patient.      Sincerely,    Ion Bailey MD

## 2019-10-16 NOTE — NURSING NOTE
Reason For Visit:   Chief Complaint   Patient presents with     RECHECK     2 wk POP left TKA DOS 9/30/19       Ht 1.524 m (5')   Wt 94.3 kg (208 lb)   BMI 40.62 kg/m      Pain Assessment  Patient Currently in Pain: Denies(no pain, feels a little achy)    Veronika Castillo, ATC

## 2019-10-16 NOTE — PROGRESS NOTES
This 49-year-old is 2 weeks out from a left total knee.  She is doing well and takes a single tablet of 5 mg of oxycodone at night but not every day.  She is doing physical therapy and can get from 0-78 with therapy.    Today her incision is clean dry and intact with the mild amount of knee swelling to the degree 1 would expect.  Calf is not swollen.  Her motion is about 5 to 72 degrees today from the.  She will continue to work with therapy and return to see me in 4 weeks to check her knee motion.  I have answered all of her questions.

## 2019-10-21 ENCOUNTER — TELEPHONE (OUTPATIENT)
Dept: ORTHOPEDICS | Facility: CLINIC | Age: 49
End: 2019-10-21

## 2019-10-21 NOTE — TELEPHONE ENCOUNTER
[facility] Call Center    Phone Message    May a detailed message be left on voicemail: yes    Reason for Call: Medication Question or concern regarding medication   Prescription Clarification  Name of Medication: tylenol for pain  Prescribing Provider: Dr. Bailey   Pharmacy: [facility]   What on the order needs clarification? Pt needing to know if there is something stronger she can take for her pain, pt is trying to wean off the prescription pain medication but the the tylenol is not working          Action Taken: Message routed to:  [facility] ([facility] ortho

## 2019-10-23 DIAGNOSIS — M17.12 PRIMARY OSTEOARTHRITIS OF LEFT KNEE: ICD-10-CM

## 2019-10-23 RX ORDER — OXYCODONE HYDROCHLORIDE 5 MG/1
5 TABLET ORAL EVERY 6 HOURS PRN
Qty: 20 TABLET | Refills: 0 | Status: SHIPPED | OUTPATIENT
Start: 2019-10-23 | End: 2021-01-20

## 2019-10-28 ENCOUNTER — TELEPHONE (OUTPATIENT)
Dept: ORTHOPEDICS | Facility: CLINIC | Age: 49
End: 2019-10-28

## 2019-10-28 DIAGNOSIS — M17.12 PRIMARY OSTEOARTHRITIS OF LEFT KNEE: Primary | ICD-10-CM

## 2019-10-28 NOTE — TELEPHONE ENCOUNTER
[facility] Call Center    Phone Message    May a detailed message be left on voicemail: yes    Reason for Call: Order(s): Other:   Reason for requested: Outpatient PT  Date needed: ASAP  Provider name: Leo Brenner in [place], fax orders to [phone]      Action Taken: Message routed to:  [facility] ([facility] Ortho

## 2019-11-04 ENCOUNTER — HOSPITAL ENCOUNTER (OUTPATIENT)
Dept: PHYSICAL THERAPY | Facility: CLINIC | Age: 49
Setting detail: THERAPIES SERIES
End: 2019-11-04
Attending: PHYSICIAN ASSISTANT
Payer: COMMERCIAL

## 2019-11-04 DIAGNOSIS — M17.12 PRIMARY OSTEOARTHRITIS OF LEFT KNEE: ICD-10-CM

## 2019-11-04 PROCEDURE — 97110 THERAPEUTIC EXERCISES: CPT | Mod: GP | Performed by: PHYSICAL THERAPIST

## 2019-11-04 PROCEDURE — 97161 PT EVAL LOW COMPLEX 20 MIN: CPT | Mod: GP | Performed by: PHYSICAL THERAPIST

## 2019-11-07 ENCOUNTER — HOSPITAL ENCOUNTER (OUTPATIENT)
Dept: PHYSICAL THERAPY | Facility: CLINIC | Age: 49
Setting detail: THERAPIES SERIES
End: 2019-11-07
Attending: PHYSICIAN ASSISTANT
Payer: COMMERCIAL

## 2019-11-07 PROCEDURE — 97110 THERAPEUTIC EXERCISES: CPT | Mod: GP | Performed by: PHYSICAL THERAPIST

## 2019-11-11 ENCOUNTER — HOSPITAL ENCOUNTER (OUTPATIENT)
Dept: PHYSICAL THERAPY | Facility: CLINIC | Age: 49
Setting detail: THERAPIES SERIES
End: 2019-11-11
Attending: PHYSICIAN ASSISTANT
Payer: COMMERCIAL

## 2019-11-11 PROCEDURE — 97116 GAIT TRAINING THERAPY: CPT | Mod: GP | Performed by: PHYSICAL THERAPIST

## 2019-11-11 PROCEDURE — 97110 THERAPEUTIC EXERCISES: CPT | Mod: GP | Performed by: PHYSICAL THERAPIST

## 2019-11-13 ENCOUNTER — OFFICE VISIT (OUTPATIENT)
Dept: ORTHOPEDICS | Facility: CLINIC | Age: 49
End: 2019-11-13
Payer: COMMERCIAL

## 2019-11-13 DIAGNOSIS — Z96.652 HISTORY OF TOTAL KNEE ARTHROPLASTY, LEFT: Primary | ICD-10-CM

## 2019-11-13 NOTE — LETTER
11/13/2019       RE: Carol Guajardo  [address] [facility] [address]     Dear Colleague,    Thank you for referring your patient, Carol Guajardo, to the [facility] at [facility]. Please see a copy of my visit note below.    Chief Complaint: Left TKA, 6 wks post-op    HPI: Carol is a 49-year-old female who is here with her  today for follow-up 6 weeks status post left total knee arthroplasty by Dr. Bailey.  She reports overall she is doing fairly well.  She does have some aching in the anterior knee the last couple days, may be related to the weather changes.  She is using a cane today for prolonged walking and some pain associated with the aching.  Normally she does not use one.  She has not returned to driving yet.  She only takes pain medicine once every several days if she is really having difficulty.  She has completed physical therapy.  She is wondering about return to work.  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  No other concerns.    Physical Exam: Carol is a pleasant 49-year-old female who is alert and oriented in no apparent distress.  She has a mildly antalgic gait with cane today.  Her left knee incision is healing well.  There is a small scab present in the middle of the incision that is resolving.  No erythema or drainage.  Her range of motion is 0 to 105 degrees.  She has mild swelling.  No calf tenderness.  She is neurovascularly intact distally.  She has a small area of paresthesia just lateral to the incision.    Imaging: None    Impression: 49-year-old female doing well 6 weeks status post left total knee arthroplasty    Plan: She can continue to progress her activities as tolerated.  She can discontinue physical therapy and continue to work on her home exercises daily.  In 3 weeks she can return to work, but I would recommend a gradual return to work duties.  I would recommend 4-hour  work days x1 week, 6-hour work days x1 week, 8-hour work days x1 week, and then returned up to 10-hour work days.  I recommend intermittent bending and squatting, no kneeling, and no lifting over 20 pounds until 4 weeks after return to work.  Otherwise, we will see her back at 1 year postop for an x-ray and check of her progress.  We can see her sooner if any concerns.  She agrees with the plan of care.  All questions answered.  Patient was also examined by Dr. Bailey and he agrees with the plan of care.     Again, thank you for allowing me to participate in the care of your patient.      Sincerely,    Ion Bailey MD

## 2019-11-13 NOTE — PROGRESS NOTES
Chief Complaint: Left TKA, 6 wks post-op    HPI: Carol is a 49-year-old female who is here with her  today for follow-up 6 weeks status post left total knee arthroplasty by Dr. Bailey.  She reports overall she is doing fairly well.  She does have some aching in the anterior knee the last couple days, may be related to the weather changes.  She is using a cane today for prolonged walking and some pain associated with the aching.  Normally she does not use one.  She has not returned to driving yet.  She only takes pain medicine once every several days if she is really having difficulty.  She has completed physical therapy.  She is wondering about return to work.  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  No other concerns.    Physical Exam: Carol is a pleasant 49-year-old female who is alert and oriented in no apparent distress.  She has a mildly antalgic gait with cane today.  Her left knee incision is healing well.  There is a small scab present in the middle of the incision that is resolving.  No erythema or drainage.  Her range of motion is 0 to 105 degrees.  She has mild swelling.  No calf tenderness.  She is neurovascularly intact distally.  She has a small area of paresthesia just lateral to the incision.    Imaging: None    Impression: 49-year-old female doing well 6 weeks status post left total knee arthroplasty    Plan: She can continue to progress her activities as tolerated.  She can discontinue physical therapy and continue to work on her home exercises daily.  In 3 weeks she can return to work, but I would recommend a gradual return to work duties.  I would recommend 4-hour work days x1 week, 6-hour work days x1 week, 8-hour work days x1 week, and then returned up to 10-hour work days.  I recommend intermittent bending and squatting, no kneeling, and no lifting over 20 pounds until 4 weeks after return to work.  Otherwise, we will see her back at 1 year  postop for an x-ray and check of her progress.  We can see her sooner if any concerns.  She agrees with the plan of care.  All questions answered.  Patient was also examined by Dr. Bailey and he agrees with the plan of care.

## 2019-11-13 NOTE — NURSING NOTE
"Reason For Visit:   Chief Complaint   Patient presents with     RECHECK     6 wk POP left TKA DOS 9/30/19       There were no vitals taken for this visit.    Pain Assessment  Patient Currently in Pain: Yes(\"it's been bad last couple days\")  0-10 Pain Scale: 3  Primary Pain Location: Knee(left)  Pain Descriptors: Constant, Aching(\"feels pulling and stretchng\")  Alleviating Factors: Pain medication  Aggravating Factors: Walking, Movement    Banda Anna, ATC    "

## 2019-11-13 NOTE — LETTER
[facility]  [address] [id number] [phone]          November 13, 2019    RE:  Carol Guajardo                                                                                                                                                       [address] [facility] [address]            To whom it may concern:    Carol Guajardo is under my professional care for History of total knee arthroplasty, left. She may return to work with the following: The employee is UNABLE to return to work until 12/2/19.    When the patient returns to work, we would recommend a gradual return to work duties by working 4 hour days x 1 week, 6 hour days x 1 week, 8 hour days x 1 week and then return to full duties in week four.  During the gradual return to work, the following restrictions apply:  A) Bend: Occasionally (1-3 hours)  B) Squat: Occasionally (1-3 hours)  C) Walk/Stand: Frequently (4-8 hours)  D) Reach Above Shoulders: Frequently (4-8 hours)  E) Lift, carry, push, and pull no more than:  11-20 lbs.     Sincerely,        Carol Willett PA-C

## 2020-02-24 ENCOUNTER — HEALTH MAINTENANCE LETTER (OUTPATIENT)
Age: 50
End: 2020-02-24

## 2020-06-22 LAB — PAP SMEAR - HIM PATIENT REPORTED: NEGATIVE

## 2020-12-13 ENCOUNTER — HEALTH MAINTENANCE LETTER (OUTPATIENT)
Age: 50
End: 2020-12-13

## 2020-12-16 ENCOUNTER — OFFICE VISIT (OUTPATIENT)
Dept: FAMILY MEDICINE | Facility: CLINIC | Age: 50
End: 2020-12-16
Payer: COMMERCIAL

## 2020-12-16 VITALS
SYSTOLIC BLOOD PRESSURE: 112 MMHG | WEIGHT: 211 LBS | DIASTOLIC BLOOD PRESSURE: 70 MMHG | TEMPERATURE: 99.2 F | BODY MASS INDEX: 41.43 KG/M2 | HEIGHT: 60 IN | RESPIRATION RATE: 16 BRPM | HEART RATE: 60 BPM

## 2020-12-16 DIAGNOSIS — Z86.69 HISTORY OF CHOLESTEATOMA: ICD-10-CM

## 2020-12-16 DIAGNOSIS — F41.1 ANXIETY STATE: ICD-10-CM

## 2020-12-16 DIAGNOSIS — H65.192 ACUTE MUCOID OTITIS MEDIA OF LEFT EAR: Primary | ICD-10-CM

## 2020-12-16 PROCEDURE — 99203 OFFICE O/P NEW LOW 30 MIN: CPT | Performed by: NURSE PRACTITIONER

## 2020-12-16 RX ORDER — NEOMYCIN SULFATE, POLYMYXIN B SULFATE, HYDROCORTISONE 3.5; 10000; 1 MG/ML; [USP'U]/ML; MG/ML
3 SOLUTION/ DROPS AURICULAR (OTIC) 4 TIMES DAILY
Qty: 10 ML | Refills: 0 | Status: SHIPPED | OUTPATIENT
Start: 2020-12-16 | End: 2021-01-20

## 2020-12-16 RX ORDER — DIAZEPAM 2 MG
2 TABLET ORAL EVERY 8 HOURS PRN
Qty: 20 TABLET | Refills: 0 | Status: SHIPPED | OUTPATIENT
Start: 2020-12-16 | End: 2022-06-01

## 2020-12-16 RX ORDER — CEFDINIR 300 MG/1
300 CAPSULE ORAL 2 TIMES DAILY
Qty: 20 CAPSULE | Refills: 0 | Status: SHIPPED | OUTPATIENT
Start: 2020-12-16 | End: 2020-12-26

## 2020-12-16 RX ORDER — PAROXETINE 40 MG/1
40 TABLET, FILM COATED ORAL EVERY MORNING
COMMUNITY
Start: 2020-09-23 | End: 2021-06-21

## 2020-12-16 RX ORDER — GABAPENTIN 300 MG/1
CAPSULE ORAL
COMMUNITY
Start: 2020-11-03 | End: 2021-03-18

## 2020-12-16 ASSESSMENT — ANXIETY QUESTIONNAIRES
GAD7 TOTAL SCORE: 7
4. TROUBLE RELAXING: SEVERAL DAYS
GAD7 TOTAL SCORE: 7
5. BEING SO RESTLESS THAT IT IS HARD TO SIT STILL: SEVERAL DAYS
7. FEELING AFRAID AS IF SOMETHING AWFUL MIGHT HAPPEN: SEVERAL DAYS
GAD7 TOTAL SCORE: 7
1. FEELING NERVOUS, ANXIOUS, OR ON EDGE: SEVERAL DAYS
2. NOT BEING ABLE TO STOP OR CONTROL WORRYING: SEVERAL DAYS
7. FEELING AFRAID AS IF SOMETHING AWFUL MIGHT HAPPEN: SEVERAL DAYS
3. WORRYING TOO MUCH ABOUT DIFFERENT THINGS: SEVERAL DAYS
6. BECOMING EASILY ANNOYED OR IRRITABLE: SEVERAL DAYS

## 2020-12-16 ASSESSMENT — MIFFLIN-ST. JEOR: SCORE: 1498.59

## 2020-12-16 ASSESSMENT — PATIENT HEALTH QUESTIONNAIRE - PHQ9
SUM OF ALL RESPONSES TO PHQ QUESTIONS 1-9: 8
SUM OF ALL RESPONSES TO PHQ QUESTIONS 1-9: 8
10. IF YOU CHECKED OFF ANY PROBLEMS, HOW DIFFICULT HAVE THESE PROBLEMS MADE IT FOR YOU TO DO YOUR WORK, TAKE CARE OF THINGS AT HOME, OR GET ALONG WITH OTHER PEOPLE: SOMEWHAT DIFFICULT

## 2020-12-16 NOTE — PROGRESS NOTES
SUBJECTIVE   Carol Guajardo is a  female who presents to clinic today for the following health issue(s):       ENT Symptoms             Symptoms: cc Present Absent Comment   Fever/Chills       Fatigue       Muscle Aches       Eye Irritation       Sneezing       Nasal Nav/Drg       Sinus Pressure/Pain       Loss of smell       Dental pain       Sore Throat       Swollen Glands       Ear Pain/Fullness x x     Cough       Wheeze       Chest Pain       Shortness of breath       Rash       Other  x  Dizziness, headache - dizziness come and goes      Symptom duration:  1 week    Symptom severity:  not improving    Treatments tried:  none   Contacts:  none     Had cholesteoma - had surgery 4-5 years ago - no further follow up   Seems to have had issues since, needing wax removal  No ear infections since  If flushed gets dizzy       PCP   Chava Mayberry [phone]    Health Maintenance        Health Maintenance Due   Topic Date Due     PREVENTIVE CARE VISIT  1970     DEPRESSION ACTION PLAN  1970     MAMMO SCREENING  1970     COLORECTAL CANCER SCREENING  03/11/1980     HIV SCREENING  03/11/1985     HEPATITIS C SCREENING  03/11/1988     DTAP/TDAP/TD IMMUNIZATION (1 - Tdap) 03/11/1995     LIPID  03/11/2015     PHQ-9  03/13/2020     INFLUENZA VACCINE (1) 09/01/2020     ZOSTER IMMUNIZATION (1 of 2) 03/11/2020       HPI        Patient Active Problem List   Diagnosis     Anxiety state     Depression     HLD (hyperlipidemia)     Vitamin D deficiency     Thyroid activity decreased     Obesity     Nicotine dependence     Mass of right lower leg     Primary osteoarthritis of left knee     Total knee replacement status, left     Current Outpatient Medications   Medication     buPROPion (WELLBUTRIN XL) 300 MG 24 hr tablet     cefdinir (OMNICEF) 300 MG capsule     diazepam (VALIUM) 2 MG tablet     gabapentin (NEURONTIN) 300 MG capsule     levothyroxine (SYNTHROID/LEVOTHROID) 150 MCG tablet      neomycin-polymyxin-hydrocortisone (CORTISPORIN) 3.5-28468-5 otic solution     PARoxetine (PAXIL) 20 MG tablet     PARoxetine (PAXIL) 40 MG tablet     pramipexole (MIRAPEX) 0.125 MG tablet     propranolol (INDERAL LA) 60 MG 24 hr capsule     acetaminophen (TYLENOL) 325 MG tablet     bisacodyl (DULCOLAX) 10 MG suppository     enoxaparin (LOVENOX) 40 MG/0.4ML syringe     gabapentin (NEURONTIN) 100 MG capsule     methocarbamol (ROBAXIN) 750 MG tablet     order for DME     oxyCODONE (ROXICODONE) 5 MG tablet     oxyCODONE (ROXICODONE) 5 MG tablet     PARoxetine (PAXIL) 10 MG tablet     senna-docusate (SENOKOT-S/PERICOLACE) 8.6-50 MG tablet     No current facility-administered medications for this visit.        Reviewed and updated as needed this visit by Provider:  Tobacco  Allergies  Meds  Med Hx  Surg Hx  Fam Hx  Soc Hx     ROS:  Constitutional, HEENT, cardiovascular, pulmonary, gi and gu systems are negative, except as otherwise noted.    PHYSICAL EXAM   /70   Pulse 60   Temp 99.2  F (37.3  C) (Tympanic)   Resp 16   Ht 1.524 m (5')   Wt 95.7 kg (211 lb)   BMI 41.21 kg/m    Body mass index is 41.21 kg/m .  GENERAL APPEARANCE: healthy, alert and no distress  HENT: right ear canal and TM normal, left ear canal with dried drainage and mild erythema, TM covered by mucoid discharge and nose and mouth without ulcers or lesions  NECK: no adenopathy and no asymmetry, masses, or scars      Diagnostic Test Results:  none     ASSESSMENT & PLAN   Assessment & Plan     1. Acute mucoid otitis media of left ear  Acute, mucoid unable to visualize Tympanic membrane. History of cholesteatoma, given history and continued symptoms since surgery will refer to ENT. Schedule appointment at number attached. Start antibiotics two times daily for 10 days as well as ear drops. Tylenol and/or ibuprofen as needed.   - neomycin-polymyxin-hydrocortisone (CORTISPORIN) 3.5-90522-4 otic solution; Place 3 drops Into the left ear 4 times  daily  Dispense: 10 mL; Refill: 0  - cefdinir (OMNICEF) 300 MG capsule; Take 1 capsule (300 mg) by mouth 2 times daily for 10 days  Dispense: 20 capsule; Refill: 0  - OTOLARYNGOLOGY REFERRAL    2. History of cholesteatoma  History, schedule with ENT.   - OTOLARYNGOLOGY REFERRAL    3. Anxiety state  Chronic, uses Valium very sparingly. Needs follow up office visit to discuss further. Will give small quantity at this time. Use sparingly. Return to clinic for routine physical.   - diazepam (VALIUM) 2 MG tablet; Take 1 tablet (2 mg) by mouth every 8 hours as needed for anxiety  Dispense: 20 tablet; Refill: 0       Risks, benefits, side effects and rationale for treatment plan fully discussed with the patient and understanding expressed.    See Patient Instructions    Return in about 1 month (around 1/16/2021) for Physical Exam, Recheck.    ARNULFO Dex[facility]    Risks, benefits, side effects and rationale for treatment plan fully discussed with the patient and understanding expressed.

## 2020-12-16 NOTE — PATIENT INSTRUCTIONS
1. Acute mucoid otitis media of left ear  Acute, mucoid unable to visualize Tympanic membrane. History of cholesteatoma, given history and continued symptoms since surgery will refer to ENT. Schedule appointment at number attached. Start antibiotics two times daily for 10 days as well as ear drops. Tylenol and/or ibuprofen as needed.   - neomycin-polymyxin-hydrocortisone (CORTISPORIN) 3.5-39046-9 otic solution; Place 3 drops Into the left ear 4 times daily  Dispense: 10 mL; Refill: 0  - cefdinir (OMNICEF) 300 MG capsule; Take 1 capsule (300 mg) by mouth 2 times daily for 10 days  Dispense: 20 capsule; Refill: 0  - OTOLARYNGOLOGY REFERRAL    2. History of cholesteatoma  History, schedule with ENT.   - OTOLARYNGOLOGY REFERRAL    3. Anxiety state  Chronic, uses Valium very sparingly. Needs follow up office visit to discuss further. Will give small quantity at this time. Use sparingly. Return to clinic for routine physical.   - diazepam (VALIUM) 2 MG tablet; Take 1 tablet (2 mg) by mouth every 8 hours as needed for anxiety  Dispense: 20 tablet; Refill: 0

## 2020-12-17 ASSESSMENT — ANXIETY QUESTIONNAIRES: GAD7 TOTAL SCORE: 7

## 2020-12-17 ASSESSMENT — PATIENT HEALTH QUESTIONNAIRE - PHQ9: SUM OF ALL RESPONSES TO PHQ QUESTIONS 1-9: 8

## 2020-12-24 ENCOUNTER — MYC MEDICAL ADVICE (OUTPATIENT)
Dept: FAMILY MEDICINE | Facility: CLINIC | Age: 50
End: 2020-12-24

## 2020-12-24 NOTE — TELEPHONE ENCOUNTER
Per 12-16-20 OV-  1. Acute mucoid otitis media of left ear  Acute, mucoid unable to visualize Tympanic membrane. History of cholesteatoma, given history and continued symptoms since surgery will refer to ENT. Schedule appointment at number attached. Start antibiotics two times daily for 10 days as well as ear drops. Tylenol and/or ibuprofen as needed.   - neomycin-polymyxin-hydrocortisone (CORTISPORIN) 3.5-32940-8 otic solution; Place 3 drops Into the left ear 4 times daily  Dispense: 10 mL; Refill: 0  - cefdinir (OMNICEF) 300 MG capsule; Take 1 capsule (300 mg) by mouth 2 times daily for 10 days  Dispense: 20 capsule; Refill: 0  - OTOLARYNGOLOGY REFERRAL    Has ENT appt 01-22-20.   LM to call clinic nurse.  Mingleplay message sent offering 11:20 AM VV Dr. Valdez today.  DAWNA Navarrete RN

## 2021-01-05 ENCOUNTER — MYC MEDICAL ADVICE (OUTPATIENT)
Dept: FAMILY MEDICINE | Facility: CLINIC | Age: 51
End: 2021-01-05

## 2021-01-05 DIAGNOSIS — G25.81 RESTLESS LEG SYNDROME: Primary | ICD-10-CM

## 2021-01-06 ENCOUNTER — MYC MEDICAL ADVICE (OUTPATIENT)
Dept: FAMILY MEDICINE | Facility: CLINIC | Age: 51
End: 2021-01-06

## 2021-01-06 RX ORDER — PRAMIPEXOLE DIHYDROCHLORIDE 0.5 MG/1
1 TABLET ORAL AT BEDTIME
Qty: 60 TABLET | Refills: 0 | Status: SHIPPED | OUTPATIENT
Start: 2021-01-06 | End: 2021-01-20

## 2021-01-20 ENCOUNTER — OFFICE VISIT (OUTPATIENT)
Dept: FAMILY MEDICINE | Facility: CLINIC | Age: 51
End: 2021-01-20
Payer: COMMERCIAL

## 2021-01-20 VITALS
SYSTOLIC BLOOD PRESSURE: 130 MMHG | HEART RATE: 72 BPM | WEIGHT: 211 LBS | DIASTOLIC BLOOD PRESSURE: 70 MMHG | TEMPERATURE: 97.8 F | BODY MASS INDEX: 39.84 KG/M2 | RESPIRATION RATE: 24 BRPM | HEIGHT: 61 IN | OXYGEN SATURATION: 96 %

## 2021-01-20 DIAGNOSIS — E03.9 HYPOTHYROIDISM, UNSPECIFIED TYPE: ICD-10-CM

## 2021-01-20 DIAGNOSIS — G25.81 RESTLESS LEG SYNDROME: ICD-10-CM

## 2021-01-20 DIAGNOSIS — Z12.31 ENCOUNTER FOR SCREENING MAMMOGRAM FOR BREAST CANCER: ICD-10-CM

## 2021-01-20 DIAGNOSIS — Z00.00 ROUTINE GENERAL MEDICAL EXAMINATION AT A HEALTH CARE FACILITY: Primary | ICD-10-CM

## 2021-01-20 PROCEDURE — 99396 PREV VISIT EST AGE 40-64: CPT | Performed by: NURSE PRACTITIONER

## 2021-01-20 RX ORDER — PRAMIPEXOLE DIHYDROCHLORIDE 0.5 MG/1
1 TABLET ORAL AT BEDTIME
Qty: 180 TABLET | Refills: 1 | Status: SHIPPED | OUTPATIENT
Start: 2021-01-20 | End: 2021-08-19

## 2021-01-20 RX ORDER — LEVOTHYROXINE SODIUM 150 UG/1
150 TABLET ORAL EVERY MORNING
Qty: 90 TABLET | Refills: 3 | Status: SHIPPED | OUTPATIENT
Start: 2021-01-20 | End: 2021-01-21

## 2021-01-20 ASSESSMENT — ENCOUNTER SYMPTOMS
CHILLS: 0
HEMATOCHEZIA: 0
HEMATURIA: 0
COUGH: 0
CONSTIPATION: 0
DIARRHEA: 0
ABDOMINAL PAIN: 0

## 2021-01-20 ASSESSMENT — MIFFLIN-ST. JEOR: SCORE: 1506.53

## 2021-01-20 ASSESSMENT — PAIN SCALES - GENERAL: PAINLEVEL: MILD PAIN (2)

## 2021-01-20 NOTE — PROGRESS NOTES
SUBJECTIVE:   CC: Carol Guajardo is an 50 year old woman who presents for preventive health visit.       Patient has been advised of split billing requirements and indicates understanding: Yes  Healthy Habits:     Getting at least 3 servings of Calcium per day:  Yes    Bi-annual eye exam:  Yes    Dental care twice a year:  NO    Sleep apnea or symptoms of sleep apnea:  None    Diet:  Regular (no restrictions)    Frequency of exercise:  None    Taking medications regularly:  Yes    Medication side effects:  None    PHQ-2 Total Score: 2    Additional concerns today:  No        Today's PHQ-2 Score:   PHQ-2 ( 1999 Pfizer) 1/20/2021   Q1: Little interest or pleasure in doing things 1   Q2: Feeling down, depressed or hopeless 1   PHQ-2 Score 2   Q1: Little interest or pleasure in doing things Several days   Q2: Feeling down, depressed or hopeless Several days   PHQ-2 Score 2       Abuse: Current or Past (Physical, Sexual or Emotional) - No  Do you feel safe in your environment? Yes        Social History     Tobacco Use     Smoking status: Current Every Day Smoker     Packs/day: 0.75     Years: 33.00     Pack years: 24.75     Types: Paan with tobacco, gutka, zarda, khaini     Start date: 1986     Smokeless tobacco: Never Used   Substance Use Topics     Alcohol use: Yes         Alcohol Use 1/20/2021   Prescreen: >3 drinks/day or >7 drinks/week? No       Reviewed orders with patient.  Reviewed health maintenance and updated orders accordingly - Yes  Labs reviewed in EPIC  BP Readings from Last 3 Encounters:   01/20/21 130/70   12/16/20 112/70   10/02/19 110/52    Wt Readings from Last 3 Encounters:   01/20/21 95.7 kg (211 lb)   12/16/20 95.7 kg (211 lb)   10/16/19 94.3 kg (208 lb)                  Patient Active Problem List   Diagnosis     Anxiety state     Depression     HLD (hyperlipidemia)     Vitamin D deficiency     Thyroid activity decreased     Obesity     Nicotine dependence     Mass of right lower leg      Primary osteoarthritis of left knee     Total knee replacement status, left     Past Surgical History:   Procedure Laterality Date     ARTHROPLASTY KNEE Left 9/30/2019    Procedure: Left Total Knee Arthroplasty;  Surgeon: Ion Bailey MD;  Location: [facility] OR     CARPAL TUNNEL RELEASE RT/LT Bilateral      EXTERNAL EAR SURGERY       IR LUNG BIOPSY MEDIASTINUM RIGHT Right 08/2018    RLL mass, diagnosed as fibrosis per pt     LAPAROSCOPY DIAGNOSTIC (GENERAL)  02/2019     LAPAROSCOPY, SURGICAL; REPAIR UMBILICAL HERNIA  08/22/2018       Social History     Tobacco Use     Smoking status: Current Every Day Smoker     Packs/day: 0.75     Years: 33.00     Pack years: 24.75     Types: Paan with tobacco, gutka, zarda, khaini     Start date: 1986     Smokeless tobacco: Never Used   Substance Use Topics     Alcohol use: Yes     Family History   Problem Relation Age of Onset     Anesthesia Reaction Mother         PONV     Hyperlipidemia Mother      Hypertension Mother      Diabetes Mother      Thyroid Disease Mother      CABG Father      Heart Failure Father      Coronary Stenting Father      Cardiovascular Father         ICD implant     Lung Cancer Father      Coronary Artery Disease Father      Kidney Disease Maternal Grandmother      Breast Cancer Maternal Grandmother      Abdominal Aortic Aneurysm Maternal Grandmother      Abdominal Aortic Aneurysm Paternal Grandfather      Abdominal Aortic Aneurysm Paternal Uncle      Deep Vein Thrombosis (DVT) No family hx of          Current Outpatient Medications   Medication Sig Dispense Refill     buPROPion (WELLBUTRIN XL) 300 MG 24 hr tablet Take 300 mg by mouth every morning        diazepam (VALIUM) 2 MG tablet Take 1 tablet (2 mg) by mouth every 8 hours as needed for anxiety 20 tablet 0     gabapentin (NEURONTIN) 300 MG capsule        levothyroxine (SYNTHROID/LEVOTHROID) 150 MCG tablet Take 1 tablet (150 mcg) by mouth every morning 175mg 90 tablet 3     PARoxetine (PAXIL) 20  MG tablet Take 20 mg by mouth every morning       PARoxetine (PAXIL) 40 MG tablet TAKE 1 TABLET BY MOUTH EVERY MORNING along with 20mg tab       pramipexole (MIRAPEX) 0.5 MG tablet Take 2 tablets (1 mg) by mouth At Bedtime 180 tablet 1     propranolol (INDERAL LA) 60 MG 24 hr capsule Take 60 mg by mouth every morning        Allergies   Allergen Reactions     Amoxicillin-Pot Clavulanate Hives     Other reaction(s): Edema     Ciprofloxacin      Penicillins        Mammogram Screening: Patient over age 50, mutual decision to screen reflected in health maintenance.    Pertinent mammograms are reviewed under the imaging tab.  History of abnormal Pap smear: NO - age 30-65 PAP every 5 years with negative HPV co-testing recommended     Reviewed and updated as needed this visit by clinical staff  Tobacco  Allergies  Meds  Problems  Med Hx  Surg Hx  Fam Hx  Soc Hx          Reviewed and updated as needed this visit by Provider  Tobacco  Allergies  Meds  Problems  Med Hx  Surg Hx  Fam Hx         Past Medical History:   Diagnosis Date     Anxiety      Depression      HLD (hyperlipidemia)      Hypothyroidism      Osteoarthritis of both knees      Right lower lobe lung mass     Biopsied 8/2018, diagnosed as fibrosis      Past Surgical History:   Procedure Laterality Date     ARTHROPLASTY KNEE Left 9/30/2019    Procedure: Left Total Knee Arthroplasty;  Surgeon: Ion Bailey MD;  Location: [facility] OR     CARPAL TUNNEL RELEASE RT/LT Bilateral      EXTERNAL EAR SURGERY       IR LUNG BIOPSY MEDIASTINUM RIGHT Right 08/2018    RLL mass, diagnosed as fibrosis per pt     LAPAROSCOPY DIAGNOSTIC (GENERAL)  02/2019     LAPAROSCOPY, SURGICAL; REPAIR UMBILICAL HERNIA  08/22/2018     OB History   No obstetric history on file.       Review of Systems   Constitutional: Negative for chills.   HENT: Negative for congestion.    Respiratory: Negative for cough.    Cardiovascular: Negative for chest pain.   Gastrointestinal:  "Negative for abdominal pain, constipation, diarrhea and hematochezia.   Genitourinary: Negative for hematuria.        OBJECTIVE:   /70   Pulse 72   Temp 97.8  F (36.6  C)   Resp 24   Ht 1.537 m (5' 0.5\")   Wt 95.7 kg (211 lb)   LMP 01/12/2021   SpO2 96%   Breastfeeding No   BMI 40.53 kg/m    Physical Exam  GENERAL: healthy, alert and no distress  EYES: Eyes grossly normal to inspection, PERRL and conjunctivae and sclerae normal  HENT: normal cephalic/atraumatic, right ear: normal: no effusions, no erythema, normal landmarks, left ear: significant amount of purulent drainage in canal unable to visualize TM, nose and mouth without ulcers or lesions, oropharynx clear and oral mucous membranes moist  NECK: no adenopathy, no asymmetry, masses, or scars and thyroid normal to palpation  RESP: lungs clear to auscultation - no rales, rhonchi or wheezes  BREAST: normal without masses, tenderness or nipple discharge and no palpable axillary masses or adenopathy  CV: regular rate and rhythm, normal S1 S2, no S3 or S4, no murmur, click or rub, no peripheral edema and peripheral pulses strong  ABDOMEN: soft, nontender, no hepatosplenomegaly, no masses and bowel sounds normal  MS: no gross musculoskeletal defects noted, no edema  SKIN: no suspicious lesions or rashes  NEURO: Normal strength and tone, mentation intact and speech normal  PSYCH: mentation appears normal, affect normal/bright    Diagnostic Test Results:  Labs reviewed in Epic  none     ASSESSMENT/PLAN:   1. Routine general medical examination at a health care facility      2. Restless leg syndrome  Chronic, stable.  Continue Mirapex without any changes.  - pramipexole (MIRAPEX) 0.5 MG tablet; Take 2 tablets (1 mg) by mouth At Bedtime  Dispense: 180 tablet; Refill: 1    3. Hypothyroidism, unspecified type  Chronic, stable.  Last TSH normal.  Continue without any changes.  Repeat labs in 6 months.  - levothyroxine (SYNTHROID/LEVOTHROID) 150 MCG tablet; " "Take 1 tablet (150 mcg) by mouth every morning 175mg  Dispense: 90 tablet; Refill: 3    4. Encounter for screening mammogram for breast cancer  Screen, schedule mammogram February or after.  - *MA Screening Digital Bilateral; Future    Patient has been advised of split billing requirements and indicates understanding: Yes  COUNSELING:  Reviewed preventive health counseling, as reflected in patient instructions    Estimated body mass index is 40.53 kg/m  as calculated from the following:    Height as of this encounter: 1.537 m (5' 0.5\").    Weight as of this encounter: 95.7 kg (211 lb).      She reports that she has been smoking paan with tobacco, gutka, zarda, khaini. She started smoking about 35 years ago. She has a 24.75 pack-year smoking history. She has never used smokeless tobacco.      Counseling Resources:  ATP IV Guidelines  Pooled Cohorts Equation Calculator  Breast Cancer Risk Calculator  BRCA-Related Cancer Risk Assessment: FHS-7 Tool  FRAX Risk Assessment  ICSI Preventive Guidelines  Dietary Guidelines for Americans, 2010  USDA's MyPlate  ASA Prophylaxis  Lung CA Screening    Barbara Kirby, ARNULFO [facility]  "

## 2021-01-20 NOTE — PATIENT INSTRUCTIONS
1. Routine general medical examination at a health care facility      2. Restless leg syndrome  Chronic, stable.  Continue Mirapex without any changes.  - pramipexole (MIRAPEX) 0.5 MG tablet; Take 2 tablets (1 mg) by mouth At Bedtime  Dispense: 180 tablet; Refill: 1    3. Hypothyroidism, unspecified type  Chronic, stable.  Last TSH normal.  Continue without any changes.  Repeat labs in 6 months.  - levothyroxine (SYNTHROID/LEVOTHROID) 150 MCG tablet; Take 1 tablet (150 mcg) by mouth every morning 175mg  Dispense: 90 tablet; Refill: 3    4. Encounter for screening mammogram for breast cancer  Screen, schedule mammogram February or after.  - *MA Screening Digital Bilateral; Future      Preventive Health Recommendations  Female Ages 50 - 64    Yearly exam: See your health care provider every year in order to  o Review health changes.   o Discuss preventive care.    o Review your medicines if your doctor has prescribed any.      Get a Pap test every three years (unless you have an abnormal result and your provider advises testing more often).    If you get Pap tests with HPV test, you only need to test every 5 years, unless you have an abnormal result.     You do not need a Pap test if your uterus was removed (hysterectomy) and you have not had cancer.    You should be tested each year for STDs (sexually transmitted diseases) if you're at risk.     Have a mammogram every 1 to 2 years.    Have a colonoscopy at age 50, or have a yearly FIT test (stool test). These exams screen for colon cancer.      Have a cholesterol test every 5 years, or more often if advised.    Have a diabetes test (fasting glucose) every three years. If you are at risk for diabetes, you should have this test more often.     If you are at risk for osteoporosis (brittle bone disease), think about having a bone density scan (DEXA).    Shots: Get a flu shot each year. Get a tetanus shot every 10 years.    Nutrition:     Eat at least 5 servings of fruits  and vegetables each day.    Eat whole-grain bread, whole-wheat pasta and brown rice instead of white grains and rice.    Get adequate Calcium and Vitamin D.     Lifestyle    Exercise at least 150 minutes a week (30 minutes a day, 5 days a week). This will help you control your weight and prevent disease.    Limit alcohol to one drink per day.    No smoking.     Wear sunscreen to prevent skin cancer.     See your dentist every six months for an exam and cleaning.    See your eye doctor every 1 to 2 years.

## 2021-01-20 NOTE — PROGRESS NOTES
History of Present Illness - Carol Guajardo is a 50 year old female here to see me for the first time, but has seen Dr. Kendall as recently as 2018 for the same issue.    She has a history of a LEFT canal wall down mastoidectomy and needs cleaning regularly.    She has had some smelly drainage from the LEFT ear the last month, and was seen by her PCP who sent her to me. She was placed on Cortisporin drops.    Past medical history -   Patient Active Problem List   Diagnosis     Anxiety state     Depression     HLD (hyperlipidemia)     Vitamin D deficiency     Thyroid activity decreased     Obesity     Nicotine dependence     Mass of right lower leg     Primary osteoarthritis of left knee     Total knee replacement status, left       BP (!) 141/77 (BP Location: Right arm, Patient Position: Chair, Cuff Size: Adult Large)   Pulse 68   Temp 96.2  F (35.7  C) (Tympanic)   LMP 01/12/2021     General - The patient is well nourished and well developed, and appears to have good nutritional status.  Alert and oriented to person and place, answers questions and cooperates with examination appropriately.   Head and Face - Normocephalic and atraumatic, with no gross asymmetry noted of the contour of the facial features.  The facial nerve is intact, with strong symmetric movements.  Eyes - Extraocular movements intact, and the pupils were reactive to light.  Sclera were not icteric or injected, conjunctiva were pink and moist.  Mouth - Examination of the oral cavity shows pink, healthy, moist mucosa.  No lesions or ulceration noted.  The dentition are in good repair.  The tongue is mobile and midline.    Procedure - Cleaning and debridement of mastoid bowl  The patient was positioned semi-supine in the examination chair.  I examined the mastoid bowl and performed debridement using the binocular surgical microscope on the LEFT ear.  I began by using a cerumen loop to gently peel the squamous debris away from the anterior and  inferior aspects of the external canal.  Working my medially, I exposed the tympanic membrane and cleared the debris in an anterior to posterior direction.  Once I was clear of the TM, I then switched to a #7 suction to debulk as much debris as I could from the mastoid bowl.  Once this was done, I used an alligator forcep to grasp the edge of the impacted mass, and rotated out of the ear in several large pieces.  I inspected the mastoid bowl and it was well epithelialized.  No evidence of cholesteatoma or herniation through the tegmen.  The patient tolerated the procedure well.      A/P - Carol Guajardo  (Z90.89) History of mastoidectomy  (primary encounter diagnosis)  (H70.12) Chronic mastoiditis, left  (H61.22) Impacted cerumen of left ear    I have gotten a good debridement today, but there is some purulent otitis externa.  I will treat with Gentamicin drops for 10 days.    Follow up annually.    Time spent on history and physical, procedural debridement, review of previous records, totaled 20min

## 2021-01-21 ENCOUNTER — TELEPHONE (OUTPATIENT)
Dept: FAMILY MEDICINE | Facility: CLINIC | Age: 51
End: 2021-01-21

## 2021-01-21 DIAGNOSIS — E03.9 HYPOTHYROIDISM, UNSPECIFIED TYPE: ICD-10-CM

## 2021-01-21 RX ORDER — LEVOTHYROXINE SODIUM 150 UG/1
150 TABLET ORAL EVERY MORNING
Qty: 90 TABLET | Refills: 3 | Status: SHIPPED | OUTPATIENT
Start: 2021-01-21 | End: 2021-07-30

## 2021-01-21 NOTE — TELEPHONE ENCOUNTER
Patient did return call and verified the correct does of Levothyroxine is 150 mg. Corrected prescription sent. Deidre LUKE RN

## 2021-01-21 NOTE — TELEPHONE ENCOUNTER
Reason for Call:  Medication or medication refill:    Do you use a [facility]?  Name of the pharmacy and phone number for the current request:  [facility]    Name of the medication requested: Levothyroxine  150 mcg- Need Clarification of Sig as there is 175 mcg is listed    Can we leave a detailed message on this number? YES    Phone number patient can be reached at: Home number on file [phone]    Best Time: Any Time      Call taken on 1/21/2021 at 8:41 AM by Shayna Adkins

## 2021-01-22 ENCOUNTER — OFFICE VISIT (OUTPATIENT)
Dept: OTOLARYNGOLOGY | Facility: CLINIC | Age: 51
End: 2021-01-22
Payer: COMMERCIAL

## 2021-01-22 VITALS — DIASTOLIC BLOOD PRESSURE: 77 MMHG | HEART RATE: 68 BPM | SYSTOLIC BLOOD PRESSURE: 141 MMHG | TEMPERATURE: 96.2 F

## 2021-01-22 DIAGNOSIS — H70.12 CHRONIC MASTOIDITIS, LEFT: ICD-10-CM

## 2021-01-22 DIAGNOSIS — Z90.89 HISTORY OF MASTOIDECTOMY: Primary | ICD-10-CM

## 2021-01-22 DIAGNOSIS — H61.22 IMPACTED CERUMEN OF LEFT EAR: ICD-10-CM

## 2021-01-22 PROCEDURE — 99213 OFFICE O/P EST LOW 20 MIN: CPT | Mod: 25 | Performed by: OTOLARYNGOLOGY

## 2021-01-22 PROCEDURE — 69220 CLEAN OUT MASTOID CAVITY: CPT | Mod: LT | Performed by: OTOLARYNGOLOGY

## 2021-01-22 RX ORDER — GENTAMICIN SULFATE 3 MG/ML
SOLUTION/ DROPS OPHTHALMIC
Qty: 15 ML | Refills: 4 | Status: SHIPPED | OUTPATIENT
Start: 2021-01-22 | End: 2021-03-23

## 2021-01-22 NOTE — NURSING NOTE
"Initial BP (!) 141/77 (BP Location: Right arm, Patient Position: Chair, Cuff Size: Adult Large)   Pulse 68   Temp 96.2  F (35.7  C) (Tympanic)   LMP 01/12/2021  Estimated body mass index is 40.53 kg/m  as calculated from the following:    Height as of 1/20/21: 1.537 m (5' 0.5\").    Weight as of 1/20/21: 95.7 kg (211 lb). .      "

## 2021-01-22 NOTE — LETTER
1/22/2021         RE: Carol Guajardo  [address] [address] [facility] [address]        Dear Colleague,    Thank you for referring your patient, Carol Guajardo, to the [facility]. Please see a copy of my visit note below.    History of Present Illness - Carol Guajardo is a 50 year old female here to see me for the first time, but has seen Dr. Kendall as recently as 2018 for the same issue.    She has a history of a LEFT canal wall down mastoidectomy and needs cleaning regularly.    She has had some smelly drainage from the LEFT ear the last month, and was seen by her PCP who sent her to me. She was placed on Cortisporin drops.    Past medical history -   Patient Active Problem List   Diagnosis     Anxiety state     Depression     HLD (hyperlipidemia)     Vitamin D deficiency     Thyroid activity decreased     Obesity     Nicotine dependence     Mass of right lower leg     Primary osteoarthritis of left knee     Total knee replacement status, left       BP (!) 141/77 (BP Location: Right arm, Patient Position: Chair, Cuff Size: Adult Large)   Pulse 68   Temp 96.2  F (35.7  C) (Tympanic)   LMP 01/12/2021     General - The patient is well nourished and well developed, and appears to have good nutritional status.  Alert and oriented to person and place, answers questions and cooperates with examination appropriately.   Head and Face - Normocephalic and atraumatic, with no gross asymmetry noted of the contour of the facial features.  The facial nerve is intact, with strong symmetric movements.  Eyes - Extraocular movements intact, and the pupils were reactive to light.  Sclera were not icteric or injected, conjunctiva were pink and moist.  Mouth - Examination of the oral cavity shows pink, healthy, moist mucosa.  No lesions or ulceration noted.  The dentition are in good repair.  The tongue is mobile and midline.    Procedure - Cleaning and debridement of mastoid bowl  The patient was positioned  semi-supine in the examination chair.  I examined the mastoid bowl and performed debridement using the binocular surgical microscope on the LEFT ear.  I began by using a cerumen loop to gently peel the squamous debris away from the anterior and inferior aspects of the external canal.  Working my medially, I exposed the tympanic membrane and cleared the debris in an anterior to posterior direction.  Once I was clear of the TM, I then switched to a #7 suction to debulk as much debris as I could from the mastoid bowl.  Once this was done, I used an alligator forcep to grasp the edge of the impacted mass, and rotated out of the ear in several large pieces.  I inspected the mastoid bowl and it was well epithelialized.  No evidence of cholesteatoma or herniation through the tegmen.  The patient tolerated the procedure well.      A/P - Carol Guajardo  (Z90.89) History of mastoidectomy  (primary encounter diagnosis)  (H70.12) Chronic mastoiditis, left  (H61.22) Impacted cerumen of left ear    I have gotten a good debridement today, but there is some purulent otitis externa.  I will treat with Gentamicin drops for 10 days.    Follow up annually.    Time spent on history and physical, procedural debridement, review of previous records, totaled 20min        Again, thank you for allowing me to participate in the care of your patient.        Sincerely,        Chava Rivera MD

## 2021-03-02 ENCOUNTER — MYC MEDICAL ADVICE (OUTPATIENT)
Dept: FAMILY MEDICINE | Facility: CLINIC | Age: 51
End: 2021-03-02

## 2021-03-02 DIAGNOSIS — R91.8 RIGHT LOWER LOBE LUNG MASS: Primary | ICD-10-CM

## 2021-03-12 ENCOUNTER — MYC MEDICAL ADVICE (OUTPATIENT)
Dept: FAMILY MEDICINE | Facility: CLINIC | Age: 51
End: 2021-03-12

## 2021-03-12 ENCOUNTER — HOSPITAL ENCOUNTER (OUTPATIENT)
Dept: CT IMAGING | Facility: CLINIC | Age: 51
Discharge: HOME | End: 2021-03-12
Attending: NURSE PRACTITIONER | Admitting: NURSE PRACTITIONER
Payer: COMMERCIAL

## 2021-03-12 DIAGNOSIS — R91.8 RIGHT LOWER LOBE LUNG MASS: ICD-10-CM

## 2021-03-12 PROCEDURE — 250N000011 HC RX IP 250 OP 636: Performed by: NURSE PRACTITIONER

## 2021-03-12 PROCEDURE — 71260 CT THORAX DX C+: CPT

## 2021-03-12 PROCEDURE — 250N000009 HC RX 250: Performed by: NURSE PRACTITIONER

## 2021-03-12 RX ORDER — IOPAMIDOL 755 MG/ML
80 INJECTION, SOLUTION INTRAVASCULAR ONCE
Status: COMPLETED | OUTPATIENT
Start: 2021-03-12 | End: 2021-03-12

## 2021-03-12 RX ADMIN — IOPAMIDOL 80 ML: 755 INJECTION, SOLUTION INTRAVENOUS at 09:26

## 2021-03-12 RX ADMIN — SODIUM CHLORIDE 80 ML: 9 INJECTION, SOLUTION INTRAVENOUS at 09:26

## 2021-03-15 ENCOUNTER — MYC MEDICAL ADVICE (OUTPATIENT)
Dept: FAMILY MEDICINE | Facility: CLINIC | Age: 51
End: 2021-03-15

## 2021-03-15 DIAGNOSIS — R91.8 MASS OF RIGHT LUNG: Primary | ICD-10-CM

## 2021-03-16 ENCOUNTER — MYC MEDICAL ADVICE (OUTPATIENT)
Dept: FAMILY MEDICINE | Facility: CLINIC | Age: 51
End: 2021-03-16

## 2021-03-16 NOTE — TELEPHONE ENCOUNTER
ONCOLOGY INTAKE: Records Information      APPT INFORMATION:  Referring provider:  Barbara Kirby APRN CNP  Referring provider s clinic:  PCP  Reason for visit/diagnosis: Mass of right lung  Has patient been notified of appointment date and time?: Yes    RECORDS INFORMATION:  Were the records received with the referral (via [employer])? No    Has patient been seen for any external appt for this diagnosis? No    If yes, where? N/a    Has patient had any imaging or procedures outside of [place]  [facility] for this condition? No      If Yes, where? N/a    ADDITIONAL INFORMATION:  None

## 2021-03-17 NOTE — TELEPHONE ENCOUNTER
Action    Action Taken 3/18/21:    -Imaging resolved to PACS  9:05 AM           RECORDS STATUS - ALL OTHER DIAGNOSIS      [facility]   DATE RECEIVED: 3/18   NOTES STATUS DETAILS   OFFICE NOTE from referring provider Barbara Moreno APRN CNP in [facility]   OFFICE NOTE from medical oncologist     DISCHARGE SUMMARY from hospital [facility] 2/28/20, 2/25/20   DISCHARGE REPORT from the ER     OPERATIVE REPORT CE - [facility] 8/31/18: Lung Bx   MEDICATION LIST [facility]    CLINICAL TRIAL TREATMENTS TO DATE     LABS     PATHOLOGY REPORTS [facility], Report in CE 8/31/18: [id number]   ANYTHING RELATED TO DIAGNOSIS Epic/ 7/21/20   GENONOMIC TESTING     TYPE:     IMAGING (NEED IMAGES & REPORT)     CT SCANS PACS 3/12/21: Epic    [facility]:  2/25/20, 2/18/19, 2/15/19, 8/31/18, 8/22/18, 8/22/18   MRI     MAMMO     ULTRASOUND     PET

## 2021-03-18 ENCOUNTER — MYC MEDICAL ADVICE (OUTPATIENT)
Dept: FAMILY MEDICINE | Facility: CLINIC | Age: 51
End: 2021-03-18

## 2021-03-18 DIAGNOSIS — G25.81 RESTLESS LEGS SYNDROME (RLS): Primary | ICD-10-CM

## 2021-03-19 RX ORDER — GABAPENTIN 300 MG/1
600 CAPSULE ORAL 3 TIMES DAILY
Qty: 180 CAPSULE | Refills: 5 | Status: SHIPPED | OUTPATIENT
Start: 2021-03-19 | End: 2021-06-10

## 2021-03-22 ENCOUNTER — MYC MEDICAL ADVICE (OUTPATIENT)
Dept: FAMILY MEDICINE | Facility: CLINIC | Age: 51
End: 2021-03-22

## 2021-03-22 DIAGNOSIS — F41.1 ANXIETY STATE: Primary | ICD-10-CM

## 2021-03-23 ENCOUNTER — PRE VISIT (OUTPATIENT)
Dept: PULMONOLOGY | Facility: CLINIC | Age: 51
End: 2021-03-23

## 2021-03-23 ENCOUNTER — VIRTUAL VISIT (OUTPATIENT)
Dept: PULMONOLOGY | Facility: CLINIC | Age: 51
End: 2021-03-23
Attending: NURSE PRACTITIONER
Payer: COMMERCIAL

## 2021-03-23 DIAGNOSIS — R91.8 LUNG MASS: Primary | ICD-10-CM

## 2021-03-23 PROCEDURE — 99213 OFFICE O/P EST LOW 20 MIN: CPT | Mod: TEL | Performed by: INTERNAL MEDICINE

## 2021-03-23 RX ORDER — BUPROPION HYDROCHLORIDE 300 MG/1
300 TABLET ORAL EVERY MORNING
Qty: 90 TABLET | Refills: 0 | Status: SHIPPED | OUTPATIENT
Start: 2021-03-23 | End: 2021-06-21

## 2021-03-23 NOTE — PROGRESS NOTES
Carol is a 51 year old who is being evaluated via a billable telephone visit.      What phone number would you like to be contacted at? [phone]  How would you like to obtain your AVS? 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  There is a question of whether it is getting larger.  No direct comparison on her recent CT compared to previous.  She not having any pain or other symptoms.    Her primary concern is having a clear answer as to what to expect and whether she should consider surgery at this time.    She is currently working.  On her feet and moving around all day.    Currently smoking.  Has quit before.  Will attempt to quit now.    Outside records reviewed including pathology report.  Previous CT results.  Chest CT reviewed and interpreted by me: Possible 11 R.  Large right lower lobe mass with some heterogeneity.  Possibly increased in size.    I will discuss with our surgeons to see if they want to discuss with her.  I cant see evidence of description of mitotic figures so we will ask for second review of pathology here.    27 minutes spent on this telephone visit.    Matthew Mtz MD

## 2021-03-23 NOTE — PROGRESS NOTES
Carol is a 51 year old who is being evaluated via a billable telephone visit.      What phone number would you like to be contacted at? [phone]  How would you like to obtain your AVS? Mail a copy      Nikki Galaviz CMA on 3/23/2021 at 1:32 PM

## 2021-03-23 NOTE — LETTER
3/23/2021       RE: Carol Guajardo  [address] [address] [facility] [address]     Dear Colleague,    Thank you for referring your patient, Carol Guajardo, to the [facility] at [facility]. Please see a copy of my visit note below.    Carol is a 51 year old who is being evaluated via a billable telephone visit.      What phone number would you like to be contacted at? [phone]  How would you like to obtain your AVS? Mail a copy      Nikki Galaviz CMA on 3/23/2021 at 1:32 PM      Carol is a 51 year old who is being evaluated via a billable telephone visit.      What phone number would you like to be contacted at? [phone]  How would you like to obtain your AVS? 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  There is a question of whether it is getting larger.  No direct comparison on her recent CT compared to previous.  She not having any pain or other symptoms.    Her primary concern is having a clear answer as to what to expect and whether she should consider surgery at this time.    She is currently working.  On her feet and moving around all day.    Currently smoking.  Has quit before.  Will attempt to quit now.    Outside records reviewed including pathology report.  Previous CT results.  Chest CT reviewed and interpreted by me: Possible 11 R.  Large right lower lobe mass with some heterogeneity.  Possibly increased in size.    I will discuss with our surgeons to see if they want to discuss with her.  I cant see evidence of description of mitotic figures so we will ask for second review of pathology here.    27 minutes spent on this telephone visit.    Matthew Mtz MD

## 2021-03-24 ENCOUNTER — DOCUMENTATION ONLY (OUTPATIENT)
Dept: PULMONOLOGY | Facility: CLINIC | Age: 51
End: 2021-03-24

## 2021-03-24 NOTE — PROGRESS NOTES
Action    Action Taken 3/24/21:    -Slides requested per IB from clinic.    -[employer] Tracking/[facility] - Path: [id number]    3/31/21:     Slides from [facility] received  4:04 PM

## 2021-03-26 DIAGNOSIS — R91.8 LUNG MASS: Primary | ICD-10-CM

## 2021-03-30 DIAGNOSIS — R91.8 LUNG MASS: ICD-10-CM

## 2021-03-30 PROCEDURE — 94726 PLETHYSMOGRAPHY LUNG VOLUMES: CPT | Performed by: INTERNAL MEDICINE

## 2021-03-30 PROCEDURE — 94060 EVALUATION OF WHEEZING: CPT | Performed by: INTERNAL MEDICINE

## 2021-03-30 PROCEDURE — 94729 DIFFUSING CAPACITY: CPT | Performed by: INTERNAL MEDICINE

## 2021-03-30 NOTE — PROGRESS NOTES
"Carol is a 51 year old who is being evaluated via a billable video visit.      How would you like to obtain your AVS? MyChart     If the video visit is dropped, the invitation should be resent by: Text to cell phone: [phone]     Will anyone else be joining your video visit? No          Vitals - Patient Reported  Weight (Patient Reported): 95.3 kg (210 lb)  Height (Patient Reported): 153.7 cm (5' 0.51\")  BMI (Based on Pt Reported Ht/Wt): 40.32  Pain Score: No Pain (0)    Anthony Lee [facility]    PHONE VISIT: We were unable to connect through video. Phone visit time: 30 minutes.       THORACIC SURGERY - NEW PATIENT OFFICE VISIT      Dear Dr. Kirby, Barbara Hudson,    I saw Ms. Guajardo at Barbara Kiryb's APRN CNP request in consultation for the evaluation and treatment of a right solitary fibrous tumor.     HPI  Ms. Carol Guajardo is a 51 year old female current smoker, who presented with a right chest mass in 2018. Biopsy showed solitary fibrous tumor. Interval imaging revealed increase in size. She was referred for consideration of resection.       Previsit Tests   PFT 3/30/21 FEV1 1.48L, 60%. DLCO 84%.    CT scan 3/12/2021   Large solid RLL mass measuring 13 x 7 x 10 cm. No lymphadenopathy.           CT scan 2/25/2020   Pleural-based mass in the right hemithorax, which measures 10.6 x 5.4 cm in AP and transverse dimensions.          PMH    Past Medical History:   Diagnosis Date     Anxiety      Depression      HLD (hyperlipidemia)      Hypothyroidism      Osteoarthritis of both knees      Right lower lobe lung mass     Biopsied 8/2018, diagnosed as fibrosis      Past Surgical History:   Procedure Laterality Date     ARTHROPLASTY KNEE Left 9/30/2019    Procedure: Left Total Knee Arthroplasty;  Surgeon: Ion Bailey MD;  Location: [facility] OR     CARPAL TUNNEL RELEASE RT/LT Bilateral      EXTERNAL EAR SURGERY       IR LUNG BIOPSY MEDIASTINUM RIGHT Right 08/2018    RLL mass, diagnosed as fibrosis per pt     " LAPAROSCOPY DIAGNOSTIC (GENERAL)  02/2019     LAPAROSCOPY, SURGICAL; REPAIR UMBILICAL HERNIA  08/22/2018        Allergies   Allergen Reactions     Amoxicillin-Pot Clavulanate Hives     Other reaction(s): Edema     Augmentin Hives and Itching     Ciprofloxacin      Penicillins      Current Outpatient Medications   Medication     buPROPion (WELLBUTRIN XL) 300 MG 24 hr tablet     diazepam (VALIUM) 2 MG tablet     gabapentin (NEURONTIN) 300 MG capsule     levothyroxine (SYNTHROID/LEVOTHROID) 150 MCG tablet     PARoxetine (PAXIL) 20 MG tablet     PARoxetine (PAXIL) 40 MG tablet     pramipexole (MIRAPEX) 0.5 MG tablet     propranolol (INDERAL LA) 60 MG 24 hr capsule     No current facility-administered medications for this visit.        ETOH Occasional.   TOB: current smoker, attempting to quit.     Physical examination  BMI 40  Deferred (virtual visit)    From a personal perspective, she is at home with her family.       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  I had an extensive discussion with the patient and family regarding the rationale for surgery, the alternatives risks and benefits of the procedure. I explained the expected hospital stay, postoperative course, recovery time, diet and activity restrictions. Informed consent was obtained and they agreed to proceed.    PLAN  I spent a total of 30 minutes with Ms. Guajardo, reviewing her chart, relevant laboratory and imaging studies. I reviewed the plan as follows:    -Procedure planned: Right posterolateral thoracotomy, resection of solitary fibrous tumor, possible lung wedge resection, possible chest wall resection.    -We do not plan on repairing her Morgagni hernia at this time.   -Necessary Preop Testing: in-person visit and PAC.   -Regional Anesthesia Plan: Thoracic epidural  -Anticoagulation Plan: Enoxaparin in preop.   -Smoking cessation: I strongly encouraged her to stop smoking at least 3 weeks in  preparation for surgery.     They had all their questions answered and were in agreement with the plan.  I appreciate the opportunity to participate in the care of your patient and will keep you updated.  Sincerely,    Keli Raya MD

## 2021-03-31 ENCOUNTER — VIRTUAL VISIT (OUTPATIENT)
Dept: SURGERY | Facility: CLINIC | Age: 51
End: 2021-03-31
Attending: CLINICAL NURSE SPECIALIST
Payer: COMMERCIAL

## 2021-03-31 DIAGNOSIS — D49.2 SOLITARY FIBROUS TUMOR: Primary | ICD-10-CM

## 2021-03-31 LAB
DLCOUNC-%PRED-PRE: 84 %
DLCOUNC-PRE: 15.74 ML/MIN/MMHG
DLCOUNC-PRED: 18.56 ML/MIN/MMHG
ERV-%PRED-PRE: 27 %
ERV-PRE: 0.07 L
ERV-PRED: 0.25 L
EXPTIME-PRE: 4.93 SEC
FEF2575-%PRED-POST: 94 %
FEF2575-%PRED-PRE: 82 %
FEF2575-POST: 2.34 L/SEC
FEF2575-PRE: 2.03 L/SEC
FEF2575-PRED: 2.48 L/SEC
FEFMAX-%PRED-PRE: 69 %
FEFMAX-PRE: 4.31 L/SEC
FEFMAX-PRED: 6.18 L/SEC
FEV1-%PRED-PRE: 60 %
FEV1-PRE: 1.48 L
FEV1FEV6-PRE: 88 %
FEV1FEV6-PRED: 82 %
FEV1FVC-PRE: 88 %
FEV1FVC-PRED: 81 %
FEV1SVC-PRE: 83 %
FEV1SVC-PRED: 81 %
FIFMAX-PRE: 2.71 L/SEC
FRCPLETH-%PRED-PRE: 66 %
FRCPLETH-PRE: 1.66 L
FRCPLETH-PRED: 2.49 L
FVC-%PRED-PRE: 55 %
FVC-PRE: 1.68 L
FVC-PRED: 3.02 L
IC-%PRED-PRE: 61 %
IC-PRE: 1.71 L
IC-PRED: 2.77 L
RVPLETH-%PRED-PRE: 99 %
RVPLETH-PRE: 1.59 L
RVPLETH-PRED: 1.6 L
TLCPLETH-%PRED-PRE: 77 %
TLCPLETH-PRE: 3.37 L
TLCPLETH-PRED: 4.35 L
VA-%PRED-PRE: 65 %
VA-PRE: 2.73 L
VC-%PRED-PRE: 58 %
VC-PRE: 1.78 L
VC-PRED: 3.02 L

## 2021-03-31 PROCEDURE — 99204 OFFICE O/P NEW MOD 45 MIN: CPT | Mod: 95 | Performed by: THORACIC SURGERY (CARDIOTHORACIC VASCULAR SURGERY)

## 2021-03-31 PROCEDURE — 999N001193 HC VIDEO/TELEPHONE VISIT; NO CHARGE

## 2021-03-31 NOTE — LETTER
"    3/31/2021         RE: Carol Guajardo  [address] [address] [address]        Dear Colleague,    Thank you for referring your patient, Carol Guajardo, to the [facility]. Please see a copy of my visit note below.    Carol is a 51 year old who is being evaluated via a billable video visit.      How would you like to obtain your AVS? MyChart     If the video visit is dropped, the invitation should be resent by: Text to cell phone: [phone]     Will anyone else be joining your video visit? No          Vitals - Patient Reported  Weight (Patient Reported): 95.3 kg (210 lb)  Height (Patient Reported): 153.7 cm (5' 0.51\")  BMI (Based on Pt Reported Ht/Wt): 40.32  Pain Score: No Pain (0)    Anthony Lee N    PHONE VISIT: We were unable to connect through video. Phone visit time: 30 minutes.       THORACIC SURGERY - NEW PATIENT OFFICE VISIT      Dear Dr. Kirby, Barbara Hudson,    I saw Ms. Guajardo at Barbara Kirby's APRN CNP request in consultation for the evaluation and treatment of a right solitary fibrous tumor.     HPI  Ms. Carol Guajardo is a 51 year old female current smoker, who presented with a right chest mass in 2018. Biopsy showed solitary fibrous tumor. Interval imaging revealed increase in size. She was referred for consideration of resection.       Previsit Tests   PFT 3/30/21 FEV1 1.48L, 60%. DLCO 84%.    CT scan 3/12/2021   Large solid RLL mass measuring 13 x 7 x 10 cm. No lymphadenopathy.           CT scan 2/25/2020   Pleural-based mass in the right hemithorax, which measures 10.6 x 5.4 cm in AP and transverse dimensions.          PMH    Past Medical History:   Diagnosis Date     Anxiety      Depression      HLD (hyperlipidemia)      Hypothyroidism      Osteoarthritis of both knees      Right lower lobe lung mass     Biopsied 8/2018, diagnosed as fibrosis      Past Surgical History:   Procedure Laterality Date     ARTHROPLASTY KNEE Left 9/30/2019    " Procedure: Left Total Knee Arthroplasty;  Surgeon: Ion Bailey MD;  Location: [facility]     CARPAL TUNNEL RELEASE RT/LT Bilateral      EXTERNAL EAR SURGERY       IR LUNG BIOPSY MEDIASTINUM RIGHT Right 08/2018    RLL mass, diagnosed as fibrosis per pt     LAPAROSCOPY DIAGNOSTIC (GENERAL)  02/2019     LAPAROSCOPY, SURGICAL; REPAIR UMBILICAL HERNIA  08/22/2018        Allergies   Allergen Reactions     Amoxicillin-Pot Clavulanate Hives     Other reaction(s): Edema     Augmentin Hives and Itching     Ciprofloxacin      Penicillins      Current Outpatient Medications   Medication     buPROPion (WELLBUTRIN XL) 300 MG 24 hr tablet     diazepam (VALIUM) 2 MG tablet     gabapentin (NEURONTIN) 300 MG capsule     levothyroxine (SYNTHROID/LEVOTHROID) 150 MCG tablet     PARoxetine (PAXIL) 20 MG tablet     PARoxetine (PAXIL) 40 MG tablet     pramipexole (MIRAPEX) 0.5 MG tablet     propranolol (INDERAL LA) 60 MG 24 hr capsule     No current facility-administered medications for this visit.        ETOH Occasional.   TOB: current smoker, attempting to quit.     Physical examination  BMI 40  Deferred (virtual visit)    From a personal perspective, she is at home with her family.       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  I had an extensive discussion with the patient and family regarding the rationale for surgery, the alternatives risks and benefits of the procedure. I explained the expected hospital stay, postoperative course, recovery time, diet and activity restrictions. Informed consent was obtained and they agreed to proceed.    PLAN  I spent a total of 30 minutes with Ms. Guajardo, reviewing her chart, relevant laboratory and imaging studies. I reviewed the plan as follows:    -Procedure planned: Right posterolateral thoracotomy, resection of solitary fibrous tumor, possible lung wedge resection, possible chest wall resection.    -We do not plan on repairing her  Morgagni hernia at this time.   -Necessary Preop Testing: in-person visit and PAC.   -Regional Anesthesia Plan: Thoracic epidural  -Anticoagulation Plan: Enoxaparin in preop.   -Smoking cessation: I strongly encouraged her to stop smoking at least 3 weeks in preparation for surgery.     They had all their questions answered and were in agreement with the plan.  I appreciate the opportunity to participate in the care of your patient and will keep you updated.  Sincerely,    Keli Raya MD        Again, thank you for allowing me to participate in the care of your patient.        Sincerely,        Alex Maya MD

## 2021-04-01 ENCOUNTER — PREP FOR PROCEDURE (OUTPATIENT)
Dept: SURGERY | Facility: CLINIC | Age: 51
End: 2021-04-01

## 2021-04-01 DIAGNOSIS — D49.2 SOLITARY FIBROUS TUMOR: Primary | ICD-10-CM

## 2021-04-01 RX ORDER — CLINDAMYCIN PHOSPHATE 900 MG/50ML
900 INJECTION, SOLUTION INTRAVENOUS
Status: CANCELLED | OUTPATIENT
Start: 2021-04-01

## 2021-04-01 RX ORDER — CLINDAMYCIN PHOSPHATE 900 MG/50ML
900 INJECTION, SOLUTION INTRAVENOUS SEE ADMIN INSTRUCTIONS
Status: CANCELLED | OUTPATIENT
Start: 2021-04-01

## 2021-04-01 RX ORDER — ACETAMINOPHEN 325 MG/1
975 TABLET ORAL ONCE
Status: CANCELLED | OUTPATIENT
Start: 2021-04-01 | End: 2021-04-01

## 2021-04-02 PROCEDURE — 88321 CONSLTJ&REPRT SLD PREP ELSWR: CPT | Mod: 26 | Performed by: PATHOLOGY

## 2021-04-02 PROCEDURE — 999N001032 HC STATISTIC REVIEW OUTSIDE SLIDES TC 88321: Performed by: INTERNAL MEDICINE

## 2021-04-05 DIAGNOSIS — Z11.59 ENCOUNTER FOR SCREENING FOR OTHER VIRAL DISEASES: ICD-10-CM

## 2021-04-05 PROBLEM — D49.2 SOLITARY FIBROUS TUMOR: Status: ACTIVE | Noted: 2021-04-05

## 2021-04-05 LAB — COPATH REPORT: NORMAL

## 2021-04-06 ENCOUNTER — TELEPHONE (OUTPATIENT)
Dept: SURGERY | Facility: CLINIC | Age: 51
End: 2021-04-06

## 2021-04-06 NOTE — TELEPHONE ENCOUNTER
Spoke with patient to schedule procedure with Dr. Keli Johnson, patient confirmed surgery date yesterday when we talked.     Today I called to confirm PAC and COVID test, there was no answer, detail message was left.     Procedure was scheduled on 04/19 at [facility] OR  Patient will have H&P with PAC 04/15    Patient is aware a COVID-19 test is needed before their procedure. The test should be with-in 4 days of their procedure.   Test Details: Date 04/15 Location [facility]    Patient is aware a / is needed day of surgery.   Surgery letter was sent via [employer], patient has my direct contact information for any further questions.

## 2021-04-07 NOTE — TELEPHONE ENCOUNTER
FUTURE VISIT INFORMATION      SURGERY INFORMATION:    Date: 4.19.21    Location: [facility] OR    Surgeon:  Francesco Johnson    Anesthesia Type:  combined general with epidural    Procedure:Right Thoracotomy, excision of solitary fibrous tumor, possible lung wedge resection    Consult:  3.31.21    RECORDS REQUESTED FROM:       Primary Care Provider: Barbara Kirby    Most recent PFT's: 3.30.21

## 2021-04-14 ENCOUNTER — OFFICE VISIT (OUTPATIENT)
Dept: SURGERY | Facility: CLINIC | Age: 51
End: 2021-04-14
Attending: THORACIC SURGERY (CARDIOTHORACIC VASCULAR SURGERY)
Payer: COMMERCIAL

## 2021-04-14 VITALS
TEMPERATURE: 97.8 F | WEIGHT: 215 LBS | HEART RATE: 64 BPM | HEIGHT: 65 IN | OXYGEN SATURATION: 95 % | BODY MASS INDEX: 35.82 KG/M2 | DIASTOLIC BLOOD PRESSURE: 72 MMHG | SYSTOLIC BLOOD PRESSURE: 122 MMHG

## 2021-04-14 DIAGNOSIS — E66.01 MORBID OBESITY (H): ICD-10-CM

## 2021-04-14 DIAGNOSIS — D49.2 SOLITARY FIBROUS TUMOR: Primary | ICD-10-CM

## 2021-04-14 DIAGNOSIS — Z01.818 PREOP EXAMINATION: ICD-10-CM

## 2021-04-14 PROCEDURE — 999N000102 HC STATISTIC NO CHARGE CLINIC VISIT

## 2021-04-14 PROCEDURE — 99215 OFFICE O/P EST HI 40 MIN: CPT | Performed by: THORACIC SURGERY (CARDIOTHORACIC VASCULAR SURGERY)

## 2021-04-14 PROCEDURE — G0463 HOSPITAL OUTPT CLINIC VISIT: HCPCS

## 2021-04-14 ASSESSMENT — MIFFLIN-ST. JEOR: SCORE: 1591.11

## 2021-04-14 ASSESSMENT — PAIN SCALES - GENERAL: PAINLEVEL: NO PAIN (0)

## 2021-04-14 NOTE — LETTER
4/14/2021         RE: Carol Guajardo  [address] [facility] [facility] [id number]        Dear Colleague,    Thank you for referring your patient, Carol Guajardo, to the [facility]. Please see a copy of my visit note below.    THORACIC SURGERY FOLLOW UP VISIT    I saw Ms. Guajardo in follow-up today. The clinical summary follows:     DIAGNOSIS   Right solitary fibrous tumor.     NEODJUVANT THERAPY   None    HISTOPATHOLOGY   CT guided biopsy 4/2/21 ([facility]): Solitary fibrous tumor. STAT6 and CD34 (+), but negative for S100, desmin, cytokeratin, calretinin and WT1.    COMPLICATIONS  None    PREOPERATIVE STUDIES  PFT 3/30/21 FEV1 1.48L, 60%. DLCO 84%.     CT scan 3/12/2021   Large solid RLL mass measuring 13 x 7 x 10 cm. No lymphadenopathy.          Past Medical History:   Diagnosis Date     Anxiety      Depression      HLD (hyperlipidemia)      Hypothyroidism      Osteoarthritis of both knees      Right lower lobe lung mass     Biopsied 8/2018, diagnosed as fibrosis     Past Surgical History:   Procedure Laterality Date     ARTHROPLASTY KNEE Left 9/30/2019    Procedure: Left Total Knee Arthroplasty;  Surgeon: Ion Bailey MD;  Location: [facility] OR     CARPAL TUNNEL RELEASE RT/LT Bilateral      EXTERNAL EAR SURGERY       IR LUNG BIOPSY MEDIASTINUM RIGHT Right 08/2018    RLL mass, diagnosed as fibrosis per pt     LAPAROSCOPY DIAGNOSTIC (GENERAL)  02/2019     LAPAROSCOPY, SURGICAL; REPAIR UMBILICAL HERNIA  08/22/2018        Allergies   Allergen Reactions     Amoxicillin-Pot Clavulanate Hives     Other reaction(s): Edema     Augmentin Hives and Itching     Ciprofloxacin      Penicillins      Current Outpatient Medications   Medication     buPROPion (WELLBUTRIN XL) 300 MG 24 hr tablet     diazepam (VALIUM) 2 MG tablet     gabapentin (NEURONTIN) 300 MG capsule     levothyroxine (SYNTHROID/LEVOTHROID) 150 MCG tablet     PARoxetine (PAXIL) 20 MG tablet     PARoxetine (PAXIL) 40 MG tablet      "pramipexole (MIRAPEX) 0.5 MG tablet     propranolol (INDERAL LA) 60 MG 24 hr capsule     No current facility-administered medications for this visit.        ETOH Occasional.   TOB: current smoker, quit 1 week ago.   BMI 40    Exam   /72   Pulse 64   Temp 97.8  F (36.6  C) (Oral)   Ht 1.651 m (5' 5\")   Wt 97.5 kg (215 lb)   LMP 04/04/2021   SpO2 95%   BMI 35.78 kg/m    Alert and oriented.   Decreased right basilar breath sounds.   RRR.    SUBJECTIVE   Carol comes to clinic for a preop visit. She is essentially asymptomatic.     From a personal perspective, she comes to clinic with her  Brant.     IMPRESSION   51 year old female with a right solitary fibrous tumor.  I had an extensive discussion with the patient and her family regarding the rationale for surgery, the alternatives risks and benefits of the procedure. We talked about the increased risk for complications given her high BMI and smoking status. I explained the expected hospital stay, postoperative course, recovery time, diet and activity restrictions. Informed consent was obtained and they agreed to proceed.    PLAN  I spent 60 min on the date of the encounter in chart review, patient visit, review of tests, documentation and/or discussion with other providers about the issues documented above. I reviewed the plan as follows:  Procedure planned: Right thoracotomy, SFT resection, intercostal nerve cryoablation.   Necessary Tests & Appointments: None  Pain Control Plan: Epidural and intercostal nerve cryoablation.  Anticoagulation Plan: Enoxaparin in preop.   Smoking Cessation: She was encouraged to continue abstinent from smoking.   All questions were answered and the patient and present family were in agreement with the plan.  I appreciate the opportunity to participate in the care of your patient and will keep you updated.  Sincerely,  Keli Raya MD          Again, thank you for allowing me to participate in the care of your " patient.        Sincerely,        Alex Maya MD

## 2021-04-14 NOTE — NURSING NOTE
"Oncology Rooming Note    April 14, 2021 4:28 PM   Carol Guajardo is a 51 year old female who presents for:    No chief complaint on file.    Initial Vitals: /72   Pulse 64   Temp 97.8  F (36.6  C) (Oral)   Ht 1.651 m (5' 5\")   Wt 97.5 kg (215 lb)   LMP 04/04/2021   SpO2 95%   BMI 35.78 kg/m   Estimated body mass index is 35.78 kg/m  as calculated from the following:    Height as of this encounter: 1.651 m (5' 5\").    Weight as of this encounter: 97.5 kg (215 lb). Body surface area is 2.11 meters squared.  No Pain (0) Comment: Data Unavailable   Patient's last menstrual period was 04/04/2021.  Allergies reviewed: Yes  Medications reviewed: Yes    Medications: Medication refills not needed today.  Pharmacy name entered into [facility]: [id number] - [place], [place] [facility] [address] [address]    Clinical concerns: New pt.         Maggie Zamora CMA                "

## 2021-04-14 NOTE — PROGRESS NOTES
Preoperative Assessment Center Medication History Note    Medication history completed on April 14, 2021 by this writer. See Epic admission navigator for prior to admission medications. Operating room staff will still need to confirm medications and last dose information on day of surgery.     Medication history interview sources:  patient, payor information, care everywhere ([place])    Changes made to PTA medication list (reason)  Added: none  Deleted: none  Changed: paxil dose/sig, gabapentin dose/sig,     Additional medication history information (including reliability of information, actions taken by pharmacist):    -- No recent (within 30 days) course of antibiotics  -- No recent (within 30 days) course of systemic steroids  -- Patient declines being on any other prescription or over-the-counter medications    Prior to Admission medications    Medication Sig Last Dose Taking? Auth Provider   buPROPion (WELLBUTRIN XL) 300 MG 24 hr tablet Take 1 tablet (300 mg) by mouth every morning Taking Yes Barbara Kirby APRN CNP   diazepam (VALIUM) 2 MG tablet Take 1 tablet (2 mg) by mouth every 8 hours as needed for anxiety Taking Yes Barbara Kirby APRN CNP   gabapentin (NEURONTIN) 300 MG capsule Take 2 capsules (600 mg) by mouth 3 times daily  Patient taking differently: Take 600 mg by mouth 2 times daily  Taking Yes Barbara Kirby APRN CNP   levothyroxine (SYNTHROID/LEVOTHROID) 150 MCG tablet Take 1 tablet (150 mcg) by mouth every morning Taking Yes Barbara Kirby APRN CNP   PARoxetine (PAXIL) 20 MG tablet Take 20 mg by mouth every morning Take with the 40 mg tablet for total daily dose of 60 mg in the morning. Taking Yes Reported, Patient   PARoxetine (PAXIL) 40 MG tablet Take 40 mg by mouth every morning Take with the 20 mg tablet for total daily dose of 60 mg in the morning. Taking Yes Reported, Patient   pramipexole (MIRAPEX) 0.5 MG tablet Take 2 tablets (1 mg) by mouth At Bedtime Taking Yes  Barbara Kirby APRN CNP   propranolol (INDERAL LA) 60 MG 24 hr capsule Take 60 mg by mouth every morning  Taking Yes Reported, Patient            Medication history completed by: Tong Austin RP

## 2021-04-15 ENCOUNTER — OFFICE VISIT (OUTPATIENT)
Dept: SURGERY | Facility: CLINIC | Age: 51
End: 2021-04-15
Payer: COMMERCIAL

## 2021-04-15 ENCOUNTER — ANESTHESIA EVENT (OUTPATIENT)
Dept: SURGERY | Facility: CLINIC | Age: 51
DRG: 164 | End: 2021-04-15
Payer: COMMERCIAL

## 2021-04-15 ENCOUNTER — PRE VISIT (OUTPATIENT)
Dept: SURGERY | Facility: CLINIC | Age: 51
End: 2021-04-15

## 2021-04-15 VITALS
WEIGHT: 218.9 LBS | DIASTOLIC BLOOD PRESSURE: 84 MMHG | HEIGHT: 60 IN | OXYGEN SATURATION: 95 % | TEMPERATURE: 97.8 F | SYSTOLIC BLOOD PRESSURE: 125 MMHG | RESPIRATION RATE: 20 BRPM | HEART RATE: 66 BPM | BODY MASS INDEX: 42.98 KG/M2

## 2021-04-15 DIAGNOSIS — Z11.59 ENCOUNTER FOR SCREENING FOR OTHER VIRAL DISEASES: ICD-10-CM

## 2021-04-15 DIAGNOSIS — D49.2 SOLITARY FIBROUS TUMOR: Primary | ICD-10-CM

## 2021-04-15 DIAGNOSIS — Z01.818 PREOP EXAMINATION: ICD-10-CM

## 2021-04-15 DIAGNOSIS — D49.2 SOLITARY FIBROUS TUMOR: ICD-10-CM

## 2021-04-15 LAB
ANION GAP SERPL CALCULATED.3IONS-SCNC: 3 MMOL/L (ref 3–14)
BUN SERPL-MCNC: 17 MG/DL (ref 7–30)
CALCIUM SERPL-MCNC: 8.8 MG/DL (ref 8.5–10.1)
CHLORIDE SERPL-SCNC: 107 MMOL/L (ref 94–109)
CO2 SERPL-SCNC: 27 MMOL/L (ref 20–32)
CREAT SERPL-MCNC: 0.55 MG/DL (ref 0.52–1.04)
ERYTHROCYTE [DISTWIDTH] IN BLOOD BY AUTOMATED COUNT: 13.3 % (ref 10–15)
GFR SERPL CREATININE-BSD FRML MDRD: >90 ML/MIN/{1.73_M2}
GLUCOSE SERPL-MCNC: 87 MG/DL (ref 70–99)
HCT VFR BLD AUTO: 40.4 % (ref 35–47)
HGB BLD-MCNC: 12.7 G/DL (ref 11.7–15.7)
LABORATORY COMMENT REPORT: NORMAL
MCH RBC QN AUTO: 29.6 PG (ref 26.5–33)
MCHC RBC AUTO-ENTMCNC: 31.4 G/DL (ref 31.5–36.5)
MCV RBC AUTO: 94 FL (ref 78–100)
PLATELET # BLD AUTO: 320 10E9/L (ref 150–450)
POTASSIUM SERPL-SCNC: 4.4 MMOL/L (ref 3.4–5.3)
RBC # BLD AUTO: 4.29 10E12/L (ref 3.8–5.2)
SARS-COV-2 RNA RESP QL NAA+PROBE: NEGATIVE
SARS-COV-2 RNA RESP QL NAA+PROBE: NORMAL
SODIUM SERPL-SCNC: 138 MMOL/L (ref 133–144)
SPECIMEN SOURCE: NORMAL
SPECIMEN SOURCE: NORMAL
WBC # BLD AUTO: 9.5 10E9/L (ref 4–11)

## 2021-04-15 PROCEDURE — 85027 COMPLETE CBC AUTOMATED: CPT | Performed by: PATHOLOGY

## 2021-04-15 PROCEDURE — U0003 INFECTIOUS AGENT DETECTION BY NUCLEIC ACID (DNA OR RNA); SEVERE ACUTE RESPIRATORY SYNDROME CORONAVIRUS 2 (SARS-COV-2) (CORONAVIRUS DISEASE [COVID-19]), AMPLIFIED PROBE TECHNIQUE, MAKING USE OF HIGH THROUGHPUT TECHNOLOGIES AS DESCRIBED BY CMS-2020-01-R: HCPCS | Performed by: PATHOLOGY

## 2021-04-15 PROCEDURE — 86901 BLOOD TYPING SEROLOGIC RH(D): CPT | Performed by: PATHOLOGY

## 2021-04-15 PROCEDURE — 36415 COLL VENOUS BLD VENIPUNCTURE: CPT | Performed by: PATHOLOGY

## 2021-04-15 PROCEDURE — 80048 BASIC METABOLIC PNL TOTAL CA: CPT | Performed by: PATHOLOGY

## 2021-04-15 PROCEDURE — 86850 RBC ANTIBODY SCREEN: CPT | Performed by: PATHOLOGY

## 2021-04-15 PROCEDURE — 99203 OFFICE O/P NEW LOW 30 MIN: CPT | Performed by: PHYSICIAN ASSISTANT

## 2021-04-15 PROCEDURE — U0005 INFEC AGEN DETEC AMPLI PROBE: HCPCS | Performed by: PATHOLOGY

## 2021-04-15 PROCEDURE — 86900 BLOOD TYPING SEROLOGIC ABO: CPT | Performed by: PATHOLOGY

## 2021-04-15 RX ORDER — CHLORHEXIDINE GLUCONATE ORAL RINSE 1.2 MG/ML
15 SOLUTION DENTAL ONCE
Status: CANCELLED | OUTPATIENT
Start: 2021-04-15 | End: 2021-04-15

## 2021-04-15 RX ORDER — GABAPENTIN 300 MG/1
300 CAPSULE ORAL ONCE
Status: CANCELLED | OUTPATIENT
Start: 2021-04-15 | End: 2021-04-15

## 2021-04-15 RX ORDER — ACETAMINOPHEN 325 MG/1
975 TABLET ORAL ONCE
Status: CANCELLED | OUTPATIENT
Start: 2021-04-15 | End: 2021-04-15

## 2021-04-15 RX ORDER — CELECOXIB 200 MG/1
200 CAPSULE ORAL ONCE
Status: CANCELLED | OUTPATIENT
Start: 2021-04-15 | End: 2021-04-15

## 2021-04-15 ASSESSMENT — MIFFLIN-ST. JEOR: SCORE: 1529.42

## 2021-04-15 ASSESSMENT — PAIN SCALES - GENERAL: PAINLEVEL: NO PAIN (0)

## 2021-04-15 ASSESSMENT — LIFESTYLE VARIABLES: TOBACCO_USE: 1

## 2021-04-15 ASSESSMENT — ENCOUNTER SYMPTOMS: SEIZURES: 0

## 2021-04-15 NOTE — H&P
Pre-Operative H & P     CC:  Preoperative exam to assess for increased cardiopulmonary risk while undergoing surgery and anesthesia.    Date of Encounter: 4/15/2021  Primary Care Physician:  Barbara Kirby  Reason for Visit: Solitary fibrous tumor        HPI     Carol Guajardo is a 50 y/o female who presents for pre-operative H&P in preparation for Right Thoracotomy, excision of solitary fibrous tumor, possible lung wedge resection; possible chest wall resection with Keil Johnson MD on 4/19/21 at [facility] for treatment of Solitary fibrous tumor.  Patient is being evaluated for comorbid conditions of HLD, morbid obesity, anxiety, depression, RLS, hypothyroidism.      Ms. Guajardo presented with a right chest mass in 2018. Biopsy showed solitary fibrous tumor. Interval imaging revealed increase in size. 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 She has a 25+ pk yr smoking history and was advised to stop smoking at least 2 weeks prior to her date of surgery. She quit on 4/5/21. She now presents for the above procedure.      PMH is also significant for s/p left knee arthroplasty in 2019.      History was obtained from patient & chart review.     Past Medical History  Past Medical History:   Diagnosis Date     Anxiety      Depression      HLD (hyperlipidemia)      Hypothyroidism      Osteoarthritis of both knees      Right lower lobe lung mass     Biopsied 8/2018, diagnosed as fibrosis       Past Surgical History  Past Surgical History:   Procedure Laterality Date     ARTHROPLASTY KNEE Left 9/30/2019    Procedure: Left Total Knee Arthroplasty;  Surgeon: Ion Bailey MD;  Location: [facility] OR     CARPAL TUNNEL RELEASE RT/LT Bilateral      EXTERNAL EAR SURGERY       IR LUNG BIOPSY MEDIASTINUM RIGHT Right 08/2018    RLL mass, diagnosed as fibrosis per pt     LAPAROSCOPY DIAGNOSTIC (GENERAL)  02/2019     LAPAROSCOPY, SURGICAL; REPAIR UMBILICAL HERNIA   08/22/2018       Hx of Blood transfusions/reactions: denies     Hx of abnormal bleeding or anti-platelet use: denies    Menstrual history: Patient's last menstrual period was 04/04/2021.:      Steroid use in the last year: denies    Personal or FH with difficulty with Anesthesia:  denies    Prior to Admission Medications  Current Outpatient Medications   Medication Sig Dispense Refill     buPROPion (WELLBUTRIN XL) 300 MG 24 hr tablet Take 1 tablet (300 mg) by mouth every morning 90 tablet 0     diazepam (VALIUM) 2 MG tablet Take 1 tablet (2 mg) by mouth every 8 hours as needed for anxiety 20 tablet 0     gabapentin (NEURONTIN) 300 MG capsule Take 2 capsules (600 mg) by mouth 3 times daily (Patient taking differently: Take 600 mg by mouth 2 times daily ) 180 capsule 5     levothyroxine (SYNTHROID/LEVOTHROID) 150 MCG tablet Take 1 tablet (150 mcg) by mouth every morning 90 tablet 3     PARoxetine (PAXIL) 20 MG tablet Take 20 mg by mouth every morning Take with the 40 mg tablet for total daily dose of 60 mg in the morning.       PARoxetine (PAXIL) 40 MG tablet Take 40 mg by mouth every morning Take with the 20 mg tablet for total daily dose of 60 mg in the morning.       pramipexole (MIRAPEX) 0.5 MG tablet Take 2 tablets (1 mg) by mouth At Bedtime 180 tablet 1     propranolol (INDERAL LA) 60 MG 24 hr capsule Take 60 mg by mouth every morning          Allergies  Allergies   Allergen Reactions     Amoxicillin-Pot Clavulanate Hives     Other reaction(s): Edema     Augmentin Hives and Itching     Ciprofloxacin      Penicillins        Social History  Social History     Socioeconomic History     Marital status:      Spouse name: Not on file     Number of children: Not on file     Years of education: Not on file     Highest education level: Not on file   Occupational History     Not on file   Social Needs     Financial resource strain: Not on file     Food insecurity     Worry: Not on file     Inability: Not on file      Transportation needs     Medical: Not on file     Non-medical: Not on file   Tobacco Use     Smoking status: Former Smoker     Packs/day: 0.75     Years: 33.00     Pack years: 24.75     Types: Paan with tobacco, gutka, zarda, khaini     Start date: 1986     Smokeless tobacco: Former User     Quit date: 4/5/2021   Substance and Sexual Activity     Alcohol use: Yes     Drug use: Not on file     Sexual activity: Not on file   Lifestyle     Physical activity     Days per week: Not on file     Minutes per session: Not on file     Stress: Not on file   Relationships     Social connections     Talks on phone: Not on file     Gets together: Not on file     Attends Mandaen service: Not on file     Active member of club or organization: Not on file     Attends meetings of clubs or organizations: Not on file     Relationship status: Not on file     Intimate partner violence     Fear of current or ex partner: Not on file     Emotionally abused: Not on file     Physically abused: Not on file     Forced sexual activity: Not on file   Other Topics Concern     Not on file   Social History Narrative     Not on file       Family History  Family History   Problem Relation Age of Onset     Anesthesia Reaction Mother         PONV     Hyperlipidemia Mother      Hypertension Mother      Diabetes Mother      Thyroid Disease Mother      CABG Father      Heart Failure Father      Coronary Stenting Father      Cardiovascular Father         ICD implant     Lung Cancer Father      Coronary Artery Disease Father      Kidney Disease Maternal Grandmother      Breast Cancer Maternal Grandmother      Abdominal Aortic Aneurysm Maternal Grandmother      Abdominal Aortic Aneurysm Paternal Grandfather      Abdominal Aortic Aneurysm Paternal Uncle      Deep Vein Thrombosis (DVT) No family hx of           ROS/MED HX  The complete review of systems is negative other than noted in the HPI or here.  Patient denies recent illness, fever and respiratory  "infection during past month.  Pt denies steroid use during past year.    ENT/Pulmonary: Comment: 25+ pk yr smoking hx, quit 4/5/21    Denies inhaler use    Has solitary fibrous tumor, right lung. Under surveillance x3 yrs. Recent imaging shows growth in size.     PFT 3/30/21: moderate obstruction    (+) tobacco use, Past use,  (-) asthma and sleep apnea   Neurologic: Comment: RLS      (+) no peripheral neuropathy  (-) no seizures and no CVA   Cardiovascular:     (+) Dyslipidemia -----    METS/Exercise Tolerance: 4 - Raking leaves, gardening Comment: Works as an , on feet all day x10 hours, bending, lifting.   Hematologic:  - neg hematologic  ROS  (-) history of blood clots and history of blood transfusion   Musculoskeletal: Comment: S/P left knee arthroplasty 9/2019        GI/Hepatic:  - neg GI/hepatic ROS  (-) GERD and liver disease   Renal/Genitourinary:  - neg Renal ROS     Endo:     (+) thyroid problem, hypothyroidism, Obesity,  (-) Type II DM   Psychiatric/Substance Use:     (+) psychiatric history anxiety and depression     Infectious Disease:  - neg infectious disease ROS     Malignancy: Comment: Neg     Other:  - neg other ROS                     Temp: 97.8  F (36.6  C) Temp src: Oral BP: 125/84 Pulse: 66   Resp: 20 SpO2: 95 %         218 lbs 14.4 oz  5' 0\"[pt reported[   Body mass index is 42.75 kg/m .       Physical Exam  Constitutional: Awake, alert, cooperative, no apparent distress, and appears stated age.  Eyes: Pupils equal, round and reactive to light, extra ocular muscles intact, sclera clear, conjunctiva normal.  HENT: Normocephalic, oral pharynx with moist mucus membranes, good dentition. No goiter appreciated. No removable dental hardware.  Respiratory: Clear to auscultation bilaterally, no crackles or wheezing. No SOB when supine.  Cardiovascular: Regular rate and rhythm, normal S1 and S2, and no murmur noted.  Carotids +2, no bruits. No edema. Palpable pulses to radial, DP and PT " arteries.   GI: Normal bowel sounds, soft, obese, non-tender, no masses palpated.  Exam limited due to body habitus.  Lymph/Hematologic: No cervical lymphadenopathy and no supraclavicular lymphadenopathy.  Genitourinary:  deferred  Skin: Warm and dry.  No rashes.   Musculoskeletal: full ROM of neck. There is no redness, warmth, or swelling of the joints. Gross motor strength is normal.    Neurologic: Awake, alert, oriented to name, place and time. Cranial nerves II-XII are grossly intact. Gait is normal. Ambulates from chair to exam table, seats self, lies supine and sits back up w/o assistance.  Neuropsychiatric: Calm, cooperative. Normal affect. Pleasant. Answers questions appropriately, follows commands w/o difficulty.        PRIOR LABS/DIAGNOSTIC STUDIES:    All labs and imaging personally reviewed      CT CHEST WITH CONTRAST  3/12/2021   FINDINGS:    LUNGS AND PLEURA: There is a large solid right lower lobe mass   measuring 13 x 7 x 10 cm (series 2, image 36 and series 6, image 77).   Thin linear atelectasis is seen in the right lower lobe adjacent to   the mass. This mass demonstrates broad-based abutment with the   posterolateral aspect of the pleural surface of the right lower lobe   as well as the right hemidiaphragm. No extension of the mass to the   hilum. No definite invasion of the chest wall. The left lung is clear.   Central airways are patent. No pleural effusion.       MEDIASTINUM/AXILLAE: As mentioned above, the large right lower lobe   mass does not involve the hilum. No enlarged axillary, mediastinal, or   hilar lymph nodes by CT size criteria. The heart size is within normal   limits. No pericardial effusion. Thoracic aorta is normal in course   and caliber. The esophagus is normal appearing. There is a prominent   right pericardial fat pad noted.       UPPER ABDOMEN: Mild hypertrophic degenerative changes of the spine are   present. No acute osseous abnormality.       MUSCULOSKELETAL:  Unremarkable.                                                                     IMPRESSION:    1.  Large right lower lobe mass, suspicious for a malignant versus   neoplastic process.   2.  No lymphadenopathy of the chest.          PATHOLOGY 4/2/21   FINAL DIAGNOSIS:  CASE FROM [place], [place] ([id number], OBTAINED 8/31/18):  Lung, right lower lobe, CT-guided core biopsy  - Solitary fibrous tumor (see comment)    COMMENT:  Immunostains with appropriate controls performed at the outside  institution were reviewed.  The tumor cells  are positive for STAT6 and CD34, but negative for S100, desmin,  cytokeratin, calretinin and WT1.  These  findings are consistent with solitary fibrous tumor.            PFT 3/30/21   FVC-Pred  3.02       L    03/30/2021  4:15 PM  224    FVC-Pre  1.68       L    03/30/2021  4:15 PM  224    FVC-%Pred-Pre  55       %    03/30/2021  4:15 PM  224    FEV1-Pre  1.48       L    03/30/2021  4:15 PM  224    FEV1-%Pred-Pre  60       %    03/30/2021  4:15 PM  224    FEV1FVC-Pred  81       %    03/30/2021  4:15 PM  224    FEV1FVC-Pre  88       %    03/30/2021  4:15 PM  224    FEFMax-Pred  6.18       L/sec    03/30/2021  4:15 PM  224    FEFMax-Pre  4.31       L/sec    03/30/2021  4:15 PM  224    FEFMax-%Pred-Pre  69       %    03/30/2021  4:15 PM  224    FEF2575-Pred  2.48       L/sec    03/30/2021  4:15 PM  224    FEF2575-Pre  2.03       L/sec    03/30/2021  4:15 PM  224    GAG8771-%Pred-Pre  82       %    03/30/2021  4:15 PM  224    FEF2575-Post  2.34       L/sec    03/30/2021  4:15 PM  224    SYN3675-%Pred-Post  94       %    03/30/2021  4:15 PM  224    ExpTime-Pre  4.93       sec    03/30/2021  4:15 PM  224    FIFMax-Pre  2.71       L/sec    03/30/2021  4:15 PM  224    VC-Pred  3.02       L    03/30/2021  4:15 PM  224    VC-Pre  1.78       L    03/30/2021  4:15 PM  224    VC-%Pred-Pre  58       %    03/30/2021  4:15 PM  224    IC-Pred  2.77       L    03/30/2021  4:15 PM  224     IC-Pre  1.71       L    03/30/2021  4:15 PM  224    IC-%Pred-Pre  61       %    03/30/2021  4:15 PM  224    ERV-Pred  0.25       L    03/30/2021  4:15 PM  224    ERV-Pre  0.07       L    03/30/2021  4:15 PM  224    ERV-%Pred-Pre  27       %    03/30/2021  4:15 PM  224    FEV1FEV6-Pred  82       %    03/30/2021  4:15 PM  224    FEV1FEV6-Pre  88       %    03/30/2021  4:15 PM  224    FRCPleth-Pred  2.49       L    03/30/2021  4:15 PM  224    FRCPleth-Pre  1.66       L    03/30/2021  4:15 PM  224    FRCPleth-%Pred-Pre  66       %    03/30/2021  4:15 PM  224    RVPleth-Pred  1.60       L    03/30/2021  4:15 PM  224    RVPleth-Pre  1.59       L    03/30/2021  4:15 PM  224    RVPleth-%Pred-Pre  99       %    03/30/2021  4:15 PM  224    TLCPleth-Pred  4.35       L    03/30/2021  4:15 PM  224    TLCPleth-Pre  3.37       L    03/30/2021  4:15 PM  224    TLCPleth-%Pred-Pre  77       %    03/30/2021  4:15 PM  224    DLCOunc-Pred  18.56       ml/min/mmHg    03/30/2021  4:15 PM  224    DLCOunc-Pre  15.74       ml/min/mmHg    03/30/2021  4:15 PM  224    DLCOunc-%Pred-Pre  84       %    03/30/2021  4:15 PM  224    VA-Pre  2.73       L    03/30/2021  4:15 PM  224    VA-%Pred-Pre  65       %    03/30/2021  4:15 PM  224    FEV1SVC-Pred  81       %    03/30/2021  4:15 PM  224    FEV1SVC-Pre  83       %    03/30/2021  4:15 PM  224       Although the FEV1 and FVC are reduced, the FEV1/FVC ratio is normal. The inspiratory flow rates are reduced. Lung volumes are within normal limits.  Following administration of bronchodilators, there is no significant response.  The diffusing capacity is   normal.  However, the diffusing capacity was not corrected for the patient's hemoglobin.  IMPRESSION:  Nonspecific spirometry pattern suggestsModerate airflow obstruction; there is no significant bronchodilator response.  Normal lung volumes.  Normal diffusing capacity.        CBC:   Lab Results   Component Value Date    WBC 9.9 09/13/2019    HGB  12.2 10/15/2019    HGB 11.1 (L) 10/02/2019    HCT 38.8 09/13/2019     09/30/2019     09/13/2019     BMP:   Lab Results   Component Value Date     10/02/2019     09/13/2019    POTASSIUM 4.3 10/02/2019    POTASSIUM 4.2 09/13/2019    CHLORIDE 103 10/02/2019    CHLORIDE 106 09/13/2019    CO2 25 10/02/2019    CO2 25 09/13/2019    BUN 19 10/02/2019    BUN 15 09/13/2019    CR 0.56 10/02/2019    CR 0.55 09/30/2019     (H) 10/02/2019     (H) 10/01/2019     COAGS: No results found for: PTT, INR, FIBR  POC:   Lab Results   Component Value Date    BGM 89 09/30/2019    HCG Negative 09/30/2019     HEPATIC: No results found for: ALBUMIN, PROTTOTAL, ALT, AST, GGT, ALKPHOS, BILITOTAL, BILIDIRECT, SKYLA  OTHER:   Lab Results   Component Value Date    BERTA 8.4 (L) 10/02/2019     Labs today: (personally reviewed)  BMP, CBC, T&S, COVID    Sodium 133 - 144 mmol/L 138      Potassium 3.4 - 5.3 mmol/L 4.4     Chloride 94 - 109 mmol/L 107     Carbon Dioxide 20 - 32 mmol/L 27     Anion Gap 3 - 14 mmol/L 3     Glucose 70 - 99 mg/dL 87     Urea Nitrogen 7 - 30 mg/dL 17     Creatinine 0.52 - 1.04 mg/dL 0.55     GFR Estimate >60 mL/min/ >90    Comment: Non  GFR Calc   Starting 12/18/2018, serum creatinine based estimated GFR (eGFR) will be   calculated using the Chronic Kidney Disease Epidemiology Collaboration   (CKD-EPI) equation.     GFR Estimate If Black >60 mL/min/ >90    Comment:  GFR Calc   Starting 12/18/2018, serum creatinine based estimated GFR (eGFR) will be   calculated using the Chronic Kidney Disease Epidemiology Collaboration   (CKD-EPI) equation.     Calcium 8.5 - 10.1 mg/dL 8.8        WBC 4.0 - 11.0 10e9/L 9.5      RBC Count 3.8 - 5.2 10e12/L 4.29     Hemoglobin 11.7 - 15.7 g/dL 12.7     Hematocrit 35.0 - 47.0 % 40.4     MCV 78 - 100 fl 94     MCH 26.5 - 33.0 pg 29.6     MCHC 31.5 - 36.5 g/dL 31.4Low      RDW 10.0 - 15.0 % 13.3     Platelet Count 150 - 450  10e9/L 320          ASSESSMENT and PLAN  Carol Guajardo is a 51 year old female scheduled to undergo Right Thoracotomy, excision of solitary fibrous tumor, possible lung wedge resection; possible chest wall resection with Ilitch Francesco Johnson MD on 4/19/21 at [facility] for treatment of Solitary fibrous tumor.      Pt has had prior anesthetic.     No history of anesthetic complications     She has the following specific operative considerations:   # VALERIE 3/8 = intermediate risk  # VTE risk: 0.5%  # Risk of PONV score = 2.  If > 2, anti-emetic intervention recommended.  # Anesthesia considerations:  Refer to PAC assessment in anesthesia records      CARDIAC: METS 4,  Works as an , on feet all day x10 hours, bending, lifting.     # RCRI : High risk surgery.  0.4% risk of major adverse cardiac event.     #  Taking propanolol for depression, per pt       PULMONARY:     # Former smoker, 25+ pk yr hx, quit 4/5/21 (was instructed to quit at least 3 weeks prior to surgery)    # Solitary fibrous tumor, right lung. Under surveillance x3 yrs; recent imaging shows growth in size    # Morgagni hernia noted on imaging    # Denies asthma or inhaler use    # PFT 3/30/21: Nonspecific spirometry pattern suggestsModerate airflow obstruction; there is no significant bronchodilator response.  Normal lung volumes.  Normal diffusing capacity.     GI:    # Denies GERD    ENDO: BMI 43  -   Recommend careful positioning to prevent airway/ventilatory compromise, or tissue injury. Consideration for safe lifting techniques.    # Hypothyroidism    # No DM    ORTHO:     # full ROM of neck    # s/p left knee arthroplasty 9/2019      Patient is optimized and is acceptable candidate for the proposed procedure. No further diagnostic evaluation is needed.      Final plan per anesthesiologist on day of surgery.     Arrival time, NPO, shower and medication instructions provided by nursing staff  today.  Preparing For Your Surgery handout given.    40 minutes spent on the date of the encounter doing chart review, history and exam, documentation and further activities as noted below:    Prep time: 12 minutes  Visit time: 14 minutes  Documentation time: 14 minutes  ------------------------------------------  Total time: 40 minutes      Ronit Chavez PA-C  Preoperative Assessment Center  [facility]  Phone: [phone]  Fax: [phone]

## 2021-04-15 NOTE — ANESTHESIA PREPROCEDURE EVALUATION
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]   Procedure : Procedure(s):  Right Thoracotomy, excision of solitary fibrous tumor, possible lung wedge resection  possible chest wall resection     Past Medical History:   Diagnosis Date     Anxiety      Depression      HLD (hyperlipidemia)      Hypothyroidism      Osteoarthritis of both knees      Right lower lobe lung mass     Biopsied 2018, diagnosed as fibrosis      Past Surgical History:   Procedure Laterality Date     ARTHROPLASTY KNEE Left 2019    Procedure: Left Total Knee Arthroplasty;  Surgeon: Ion Bailey MD;  Location: [facility]     CARPAL TUNNEL RELEASE RT/LT Bilateral      EXTERNAL EAR SURGERY       IR LUNG BIOPSY MEDIASTINUM RIGHT Right 2018    RLL mass, diagnosed as fibrosis per pt     LAPAROSCOPY DIAGNOSTIC (GENERAL)  2019     LAPAROSCOPY, SURGICAL; REPAIR UMBILICAL HERNIA  2018      Allergies   Allergen Reactions     Amoxicillin-Pot Clavulanate Hives     Other reaction(s): Edema     Augmentin Hives and Itching     Ciprofloxacin      Penicillins       Social History     Tobacco Use     Smoking status: Former Smoker     Packs/day: 0.75     Years: 33.00     Pack years: 24.75     Types: Paan with tobacco, gutka, zarda, khaini     Start date:      Smokeless tobacco: Former User     Quit date: 2021   Substance Use Topics     Alcohol use: Yes      Wt Readings from Last 1 Encounters:   04/15/21 99.3 kg (218 lb 14.4 oz)        Anesthesia Evaluation   Pt has had prior anesthetic. Type: General.    No history of anesthetic complications       ROS/MED HX  ENT/Pulmonary: Comment: 25+ pk yr smoking hx, quit 21    Denies inhaler use    Has solitary fibrous tumor, right lung. Under surveillance x3 yrs. Recent imaging shows growth in size.    PFT 3/30/21: moderate obstruction    LEFT ear issues (s/p mastoidectomy,  chronic maintenance required)    (+) tobacco use, Past use,  (-) asthma and sleep apnea   Neurologic: Comment: RLS      (+) no peripheral neuropathy  (-) no seizures and no CVA   Cardiovascular:     (+) Dyslipidemia hypertension-----No previous cardiac testing  (-) CAD   METS/Exercise Tolerance: 4 - Raking leaves, gardening Comment: Works as an , on feet all day x10 hours, bending, lifting.   Hematologic:  - neg hematologic  ROS  (-) history of blood clots and history of blood transfusion   Musculoskeletal: Comment: S/P left knee arthroplasty 9/2019    (+) arthritis,  (-) cervical spine instability   GI/Hepatic:     (+) hiatal hernia (Morgagni hernia),  (-) GERD and liver disease   Renal/Genitourinary:  - neg Renal ROS  (-) renal disease   Endo:     (+) thyroid problem, hypothyroidism, Obesity,  (-) Type II DM   Psychiatric/Substance Use:     (+) psychiatric history anxiety and depression     Infectious Disease: Comment: COVID NEGATIVE 4/15/21 - neg infectious disease ROS  (-) Recent Fever   Malignancy:  - neg malignancy ROS     Other:  - neg other ROS   (-) Any chance pregnant       Physical Exam    Airway  airway exam normal      Mallampati: II   TM distance: > 3 FB   Neck ROM: full   Mouth opening: > 3 cm    Respiratory Devices and Support         Dental  no notable dental history         Cardiovascular   cardiovascular exam normal          Pulmonary   pulmonary exam normal        breath sounds clear to auscultation           OUTSIDE LABS:  CBC:   Lab Results   Component Value Date    WBC 9.9 09/13/2019    HGB 12.2 10/15/2019    HGB 11.1 (L) 10/02/2019    HCT 38.8 09/13/2019     09/30/2019     09/13/2019     BMP:   Lab Results   Component Value Date     10/02/2019     09/13/2019    POTASSIUM 4.3 10/02/2019    POTASSIUM 4.2 09/13/2019    CHLORIDE 103 10/02/2019    CHLORIDE 106 09/13/2019    CO2 25 10/02/2019    CO2 25 09/13/2019    BUN 19 10/02/2019    BUN 15 09/13/2019    CR  0.56 10/02/2019    CR 0.55 09/30/2019     (H) 10/02/2019     (H) 10/01/2019     COAGS: No results found for: PTT, INR, FIBR  POC:   Lab Results   Component Value Date    BGM 89 09/30/2019    HCG Negative 09/30/2019     HEPATIC: No results found for: ALBUMIN, PROTTOTAL, ALT, AST, GGT, ALKPHOS, BILITOTAL, BILIDIRECT, SKYLA  OTHER:   Lab Results   Component Value Date    BERTA 8.4 (L) 10/02/2019       Anesthesia Plan    ASA Status:  3   NPO Status:  ELEVATED Aspiration Risk/Unknown    Anesthesia Type: General.     - Airway: ETT   Induction: Intravenous, RSI.   Maintenance: Balanced.   Techniques and Equipment:     - Airway: Video-Laryngoscope, Double lumen ETT     - Lines/Monitors: 2nd IV, Arterial Line, BIS     - Drips/Meds: Ketamine, Phenylephrine     Consents    Anesthesia Plan(s) and associated risks, benefits, and realistic alternatives discussed. Questions answered and patient/representative(s) expressed understanding.     - Discussed with:  Patient      - Extended Intubation/Ventilatory Support Discussed: No.      - Patient is DNR/DNI Status: No    Use of blood products discussed: Yes.     - Discussed with: Patient.     - Consented: consented to blood products     Postoperative Care    Pain management: Multi-modal analgesia, Neuraxial analgesia.   PONV prophylaxis: Ondansetron (or other 5HT-3), Dexamethasone or Solumedrol     Comments:    51 yoF w/ PMH of HLD, hypothyroidism, anxiety and depression presents with large RIGHT lung mass for RIGHT thoracotomy and wedge resection. Plan for epidural, GETA w/ JOAO and arterial line. If unable to achieve large bore IV access, will place central line.          PAC Discussion and Assessment    ASA Classification: 3  Case is suitable for: [place]  Anesthetic techniques and relevant risks discussed: GA with regional block for post-op pain control  Invasive monitoring and risk discussed: No    Possibility and Risk of blood transfusion discussed:  No            PAC Resident/NP Anesthesia Assessment: Carol Guajardo is a 51 year old female scheduled to undergo Right Thoracotomy, excision of solitary fibrous tumor, possible lung wedge resection; possible chest wall resection with Ilitch Francesco Johnson MD on 4/19/21 at [facility] for treatment of Solitary fibrous tumor.      Pt has had prior anesthetic.     No history of anesthetic complications     She has the following specific operative considerations:   # VALERIE 3/8 = intermediate risk  # VTE risk: 0.5%  # Risk of PONV score = 2.  If > 2, anti-emetic intervention recommended.  # Anesthesia considerations:  Refer to PAC assessment in anesthesia records      CARDIAC: METS 4,  Works as an , on feet all day x10 hours, bending, lifting.     # RCRI : High risk surgery.  0.4% risk of major adverse cardiac event.     #  Taking propanolol for depression, per pt       PULMONARY:     # Former smoker, 25+ pk yr hx, quit 4/5/21 (was instructed to quit at least 3 weeks prior to surgery)    # Solitary fibrous tumor, right lung. Under surveillance x3 yrs; recent imaging shows growth in size    # Morgagni hernia noted on imaging    # Denies asthma or inhaler use    # PFT 3/30/21: Nonspecific spirometry pattern suggestsModerate airflow obstruction; there is no significant bronchodilator response.  Normal lung volumes.  Normal diffusing capacity.     GI:    # Denies GERD    ENDO: BMI 43  -   Recommend careful positioning to prevent airway/ventilatory compromise, or tissue injury. Consideration for safe lifting techniques.    # Hypothyroidism    # No DM    ORTHO:     # full ROM of neck    # s/p left knee arthroplasty 9/2019      Patient is optimized and is acceptable candidate for the proposed procedure. No further diagnostic evaluation is needed.      Final plan per anesthesiologist on day of surgery.     Reviewed and Signed by PAC Mid-Level Provider/Resident  Mid-Level  Provider/Resident: Ronit Chavez PA-C  Date: 4/15/21  Time: 1007                               Ronit Chavez PA-C

## 2021-04-15 NOTE — H&P (VIEW-ONLY)
Pre-Operative H & P     CC:  Preoperative exam to assess for increased cardiopulmonary risk while undergoing surgery and anesthesia.    Date of Encounter: 4/15/2021  Primary Care Physician:  Barbara Kirby  Reason for Visit: Solitary fibrous tumor        HPI     Carol Guajardo is a 50 y/o female who presents for pre-operative H&P in preparation for Right Thoracotomy, excision of solitary fibrous tumor, possible lung wedge resection; possible chest wall resection with Keli Johnson MD on 4/19/21 at [facility] for treatment of Solitary fibrous tumor.  Patient is being evaluated for comorbid conditions of HLD, morbid obesity, anxiety, depression, RLS, hypothyroidism.      Ms. Guajardo presented with a right chest mass in 2018. Biopsy showed solitary fibrous tumor. Interval imaging revealed increase in size. 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 She has a 25+ pk yr smoking history and was advised to stop smoking at least 2 weeks prior to her date of surgery. She quit on 4/5/21. She now presents for the above procedure.      PMH is also significant for s/p left knee arthroplasty in 2019.      History was obtained from patient & chart review.     Past Medical History  Past Medical History:   Diagnosis Date     Anxiety      Depression      HLD (hyperlipidemia)      Hypothyroidism      Osteoarthritis of both knees      Right lower lobe lung mass     Biopsied 8/2018, diagnosed as fibrosis       Past Surgical History  Past Surgical History:   Procedure Laterality Date     ARTHROPLASTY KNEE Left 9/30/2019    Procedure: Left Total Knee Arthroplasty;  Surgeon: Ion Bailey MD;  Location: [facility] OR     CARPAL TUNNEL RELEASE RT/LT Bilateral      EXTERNAL EAR SURGERY       IR LUNG BIOPSY MEDIASTINUM RIGHT Right 08/2018    RLL mass, diagnosed as fibrosis per pt     LAPAROSCOPY DIAGNOSTIC (GENERAL)  02/2019     LAPAROSCOPY, SURGICAL; REPAIR UMBILICAL HERNIA   08/22/2018       Hx of Blood transfusions/reactions: denies     Hx of abnormal bleeding or anti-platelet use: denies    Menstrual history: Patient's last menstrual period was 04/04/2021.:      Steroid use in the last year: denies    Personal or FH with difficulty with Anesthesia:  denies    Prior to Admission Medications  Current Outpatient Medications   Medication Sig Dispense Refill     buPROPion (WELLBUTRIN XL) 300 MG 24 hr tablet Take 1 tablet (300 mg) by mouth every morning 90 tablet 0     diazepam (VALIUM) 2 MG tablet Take 1 tablet (2 mg) by mouth every 8 hours as needed for anxiety 20 tablet 0     gabapentin (NEURONTIN) 300 MG capsule Take 2 capsules (600 mg) by mouth 3 times daily (Patient taking differently: Take 600 mg by mouth 2 times daily ) 180 capsule 5     levothyroxine (SYNTHROID/LEVOTHROID) 150 MCG tablet Take 1 tablet (150 mcg) by mouth every morning 90 tablet 3     PARoxetine (PAXIL) 20 MG tablet Take 20 mg by mouth every morning Take with the 40 mg tablet for total daily dose of 60 mg in the morning.       PARoxetine (PAXIL) 40 MG tablet Take 40 mg by mouth every morning Take with the 20 mg tablet for total daily dose of 60 mg in the morning.       pramipexole (MIRAPEX) 0.5 MG tablet Take 2 tablets (1 mg) by mouth At Bedtime 180 tablet 1     propranolol (INDERAL LA) 60 MG 24 hr capsule Take 60 mg by mouth every morning          Allergies  Allergies   Allergen Reactions     Amoxicillin-Pot Clavulanate Hives     Other reaction(s): Edema     Augmentin Hives and Itching     Ciprofloxacin      Penicillins        Social History  Social History     Socioeconomic History     Marital status:      Spouse name: Not on file     Number of children: Not on file     Years of education: Not on file     Highest education level: Not on file   Occupational History     Not on file   Social Needs     Financial resource strain: Not on file     Food insecurity     Worry: Not on file     Inability: Not on file      Transportation needs     Medical: Not on file     Non-medical: Not on file   Tobacco Use     Smoking status: Former Smoker     Packs/day: 0.75     Years: 33.00     Pack years: 24.75     Types: Paan with tobacco, gutka, zarda, khaini     Start date: 1986     Smokeless tobacco: Former User     Quit date: 4/5/2021   Substance and Sexual Activity     Alcohol use: Yes     Drug use: Not on file     Sexual activity: Not on file   Lifestyle     Physical activity     Days per week: Not on file     Minutes per session: Not on file     Stress: Not on file   Relationships     Social connections     Talks on phone: Not on file     Gets together: Not on file     Attends Latter day service: Not on file     Active member of club or organization: Not on file     Attends meetings of clubs or organizations: Not on file     Relationship status: Not on file     Intimate partner violence     Fear of current or ex partner: Not on file     Emotionally abused: Not on file     Physically abused: Not on file     Forced sexual activity: Not on file   Other Topics Concern     Not on file   Social History Narrative     Not on file       Family History  Family History   Problem Relation Age of Onset     Anesthesia Reaction Mother         PONV     Hyperlipidemia Mother      Hypertension Mother      Diabetes Mother      Thyroid Disease Mother      CABG Father      Heart Failure Father      Coronary Stenting Father      Cardiovascular Father         ICD implant     Lung Cancer Father      Coronary Artery Disease Father      Kidney Disease Maternal Grandmother      Breast Cancer Maternal Grandmother      Abdominal Aortic Aneurysm Maternal Grandmother      Abdominal Aortic Aneurysm Paternal Grandfather      Abdominal Aortic Aneurysm Paternal Uncle      Deep Vein Thrombosis (DVT) No family hx of           ROS/MED HX  The complete review of systems is negative other than noted in the HPI or here.  Patient denies recent illness, fever and respiratory  "infection during past month.  Pt denies steroid use during past year.    ENT/Pulmonary: Comment: 25+ pk yr smoking hx, quit 4/5/21    Denies inhaler use    Has solitary fibrous tumor, right lung. Under surveillance x3 yrs. Recent imaging shows growth in size.     PFT 3/30/21: moderate obstruction    (+) tobacco use, Past use,  (-) asthma and sleep apnea   Neurologic: Comment: RLS      (+) no peripheral neuropathy  (-) no seizures and no CVA   Cardiovascular:     (+) Dyslipidemia -----    METS/Exercise Tolerance: 4 - Raking leaves, gardening Comment: Works as an , on feet all day x10 hours, bending, lifting.   Hematologic:  - neg hematologic  ROS  (-) history of blood clots and history of blood transfusion   Musculoskeletal: Comment: S/P left knee arthroplasty 9/2019        GI/Hepatic:  - neg GI/hepatic ROS  (-) GERD and liver disease   Renal/Genitourinary:  - neg Renal ROS     Endo:     (+) thyroid problem, hypothyroidism, Obesity,  (-) Type II DM   Psychiatric/Substance Use:     (+) psychiatric history anxiety and depression     Infectious Disease:  - neg infectious disease ROS     Malignancy: Comment: Neg     Other:  - neg other ROS                     Temp: 97.8  F (36.6  C) Temp src: Oral BP: 125/84 Pulse: 66   Resp: 20 SpO2: 95 %         218 lbs 14.4 oz  5' 0\"[pt reported[   Body mass index is 42.75 kg/m .       Physical Exam  Constitutional: Awake, alert, cooperative, no apparent distress, and appears stated age.  Eyes: Pupils equal, round and reactive to light, extra ocular muscles intact, sclera clear, conjunctiva normal.  HENT: Normocephalic, oral pharynx with moist mucus membranes, good dentition. No goiter appreciated. No removable dental hardware.  Respiratory: Clear to auscultation bilaterally, no crackles or wheezing. No SOB when supine.  Cardiovascular: Regular rate and rhythm, normal S1 and S2, and no murmur noted.  Carotids +2, no bruits. No edema. Palpable pulses to radial, DP and PT " arteries.   GI: Normal bowel sounds, soft, obese, non-tender, no masses palpated.  Exam limited due to body habitus.  Lymph/Hematologic: No cervical lymphadenopathy and no supraclavicular lymphadenopathy.  Genitourinary:  deferred  Skin: Warm and dry.  No rashes.   Musculoskeletal: full ROM of neck. There is no redness, warmth, or swelling of the joints. Gross motor strength is normal.    Neurologic: Awake, alert, oriented to name, place and time. Cranial nerves II-XII are grossly intact. Gait is normal. Ambulates from chair to exam table, seats self, lies supine and sits back up w/o assistance.  Neuropsychiatric: Calm, cooperative. Normal affect. Pleasant. Answers questions appropriately, follows commands w/o difficulty.        PRIOR LABS/DIAGNOSTIC STUDIES:    All labs and imaging personally reviewed      CT CHEST WITH CONTRAST  3/12/2021   FINDINGS:    LUNGS AND PLEURA: There is a large solid right lower lobe mass   measuring 13 x 7 x 10 cm (series 2, image 36 and series 6, image 77).   Thin linear atelectasis is seen in the right lower lobe adjacent to   the mass. This mass demonstrates broad-based abutment with the   posterolateral aspect of the pleural surface of the right lower lobe   as well as the right hemidiaphragm. No extension of the mass to the   hilum. No definite invasion of the chest wall. The left lung is clear.   Central airways are patent. No pleural effusion.       MEDIASTINUM/AXILLAE: As mentioned above, the large right lower lobe   mass does not involve the hilum. No enlarged axillary, mediastinal, or   hilar lymph nodes by CT size criteria. The heart size is within normal   limits. No pericardial effusion. Thoracic aorta is normal in course   and caliber. The esophagus is normal appearing. There is a prominent   right pericardial fat pad noted.       UPPER ABDOMEN: Mild hypertrophic degenerative changes of the spine are   present. No acute osseous abnormality.       MUSCULOSKELETAL:  Unremarkable.                                                                     IMPRESSION:    1.  Large right lower lobe mass, suspicious for a malignant versus   neoplastic process.   2.  No lymphadenopathy of the chest.          PATHOLOGY 4/2/21   FINAL DIAGNOSIS:  CASE FROM [place], [place] ([id number], OBTAINED 8/31/18):  Lung, right lower lobe, CT-guided core biopsy  - Solitary fibrous tumor (see comment)    COMMENT:  Immunostains with appropriate controls performed at the outside  institution were reviewed.  The tumor cells  are positive for STAT6 and CD34, but negative for S100, desmin,  cytokeratin, calretinin and WT1.  These  findings are consistent with solitary fibrous tumor.            PFT 3/30/21   FVC-Pred  3.02       L    03/30/2021  4:15 PM  224    FVC-Pre  1.68       L    03/30/2021  4:15 PM  224    FVC-%Pred-Pre  55       %    03/30/2021  4:15 PM  224    FEV1-Pre  1.48       L    03/30/2021  4:15 PM  224    FEV1-%Pred-Pre  60       %    03/30/2021  4:15 PM  224    FEV1FVC-Pred  81       %    03/30/2021  4:15 PM  224    FEV1FVC-Pre  88       %    03/30/2021  4:15 PM  224    FEFMax-Pred  6.18       L/sec    03/30/2021  4:15 PM  224    FEFMax-Pre  4.31       L/sec    03/30/2021  4:15 PM  224    FEFMax-%Pred-Pre  69       %    03/30/2021  4:15 PM  224    FEF2575-Pred  2.48       L/sec    03/30/2021  4:15 PM  224    FEF2575-Pre  2.03       L/sec    03/30/2021  4:15 PM  224    ZMV8898-%Pred-Pre  82       %    03/30/2021  4:15 PM  224    FEF2575-Post  2.34       L/sec    03/30/2021  4:15 PM  224    KVF1658-%Pred-Post  94       %    03/30/2021  4:15 PM  224    ExpTime-Pre  4.93       sec    03/30/2021  4:15 PM  224    FIFMax-Pre  2.71       L/sec    03/30/2021  4:15 PM  224    VC-Pred  3.02       L    03/30/2021  4:15 PM  224    VC-Pre  1.78       L    03/30/2021  4:15 PM  224    VC-%Pred-Pre  58       %    03/30/2021  4:15 PM  224    IC-Pred  2.77       L    03/30/2021  4:15 PM  224     IC-Pre  1.71       L    03/30/2021  4:15 PM  224    IC-%Pred-Pre  61       %    03/30/2021  4:15 PM  224    ERV-Pred  0.25       L    03/30/2021  4:15 PM  224    ERV-Pre  0.07       L    03/30/2021  4:15 PM  224    ERV-%Pred-Pre  27       %    03/30/2021  4:15 PM  224    FEV1FEV6-Pred  82       %    03/30/2021  4:15 PM  224    FEV1FEV6-Pre  88       %    03/30/2021  4:15 PM  224    FRCPleth-Pred  2.49       L    03/30/2021  4:15 PM  224    FRCPleth-Pre  1.66       L    03/30/2021  4:15 PM  224    FRCPleth-%Pred-Pre  66       %    03/30/2021  4:15 PM  224    RVPleth-Pred  1.60       L    03/30/2021  4:15 PM  224    RVPleth-Pre  1.59       L    03/30/2021  4:15 PM  224    RVPleth-%Pred-Pre  99       %    03/30/2021  4:15 PM  224    TLCPleth-Pred  4.35       L    03/30/2021  4:15 PM  224    TLCPleth-Pre  3.37       L    03/30/2021  4:15 PM  224    TLCPleth-%Pred-Pre  77       %    03/30/2021  4:15 PM  224    DLCOunc-Pred  18.56       ml/min/mmHg    03/30/2021  4:15 PM  224    DLCOunc-Pre  15.74       ml/min/mmHg    03/30/2021  4:15 PM  224    DLCOunc-%Pred-Pre  84       %    03/30/2021  4:15 PM  224    VA-Pre  2.73       L    03/30/2021  4:15 PM  224    VA-%Pred-Pre  65       %    03/30/2021  4:15 PM  224    FEV1SVC-Pred  81       %    03/30/2021  4:15 PM  224    FEV1SVC-Pre  83       %    03/30/2021  4:15 PM  224       Although the FEV1 and FVC are reduced, the FEV1/FVC ratio is normal. The inspiratory flow rates are reduced. Lung volumes are within normal limits.  Following administration of bronchodilators, there is no significant response.  The diffusing capacity is   normal.  However, the diffusing capacity was not corrected for the patient's hemoglobin.  IMPRESSION:  Nonspecific spirometry pattern suggestsModerate airflow obstruction; there is no significant bronchodilator response.  Normal lung volumes.  Normal diffusing capacity.        CBC:   Lab Results   Component Value Date    WBC 9.9 09/13/2019    HGB  12.2 10/15/2019    HGB 11.1 (L) 10/02/2019    HCT 38.8 09/13/2019     09/30/2019     09/13/2019     BMP:   Lab Results   Component Value Date     10/02/2019     09/13/2019    POTASSIUM 4.3 10/02/2019    POTASSIUM 4.2 09/13/2019    CHLORIDE 103 10/02/2019    CHLORIDE 106 09/13/2019    CO2 25 10/02/2019    CO2 25 09/13/2019    BUN 19 10/02/2019    BUN 15 09/13/2019    CR 0.56 10/02/2019    CR 0.55 09/30/2019     (H) 10/02/2019     (H) 10/01/2019     COAGS: No results found for: PTT, INR, FIBR  POC:   Lab Results   Component Value Date    BGM 89 09/30/2019    HCG Negative 09/30/2019     HEPATIC: No results found for: ALBUMIN, PROTTOTAL, ALT, AST, GGT, ALKPHOS, BILITOTAL, BILIDIRECT, SKYLA  OTHER:   Lab Results   Component Value Date    BERTA 8.4 (L) 10/02/2019     Labs today: (personally reviewed)  BMP, CBC, T&S, COVID    Sodium 133 - 144 mmol/L 138      Potassium 3.4 - 5.3 mmol/L 4.4     Chloride 94 - 109 mmol/L 107     Carbon Dioxide 20 - 32 mmol/L 27     Anion Gap 3 - 14 mmol/L 3     Glucose 70 - 99 mg/dL 87     Urea Nitrogen 7 - 30 mg/dL 17     Creatinine 0.52 - 1.04 mg/dL 0.55     GFR Estimate >60 mL/min/ >90    Comment: Non  GFR Calc   Starting 12/18/2018, serum creatinine based estimated GFR (eGFR) will be   calculated using the Chronic Kidney Disease Epidemiology Collaboration   (CKD-EPI) equation.     GFR Estimate If Black >60 mL/min/ >90    Comment:  GFR Calc   Starting 12/18/2018, serum creatinine based estimated GFR (eGFR) will be   calculated using the Chronic Kidney Disease Epidemiology Collaboration   (CKD-EPI) equation.     Calcium 8.5 - 10.1 mg/dL 8.8        WBC 4.0 - 11.0 10e9/L 9.5      RBC Count 3.8 - 5.2 10e12/L 4.29     Hemoglobin 11.7 - 15.7 g/dL 12.7     Hematocrit 35.0 - 47.0 % 40.4     MCV 78 - 100 fl 94     MCH 26.5 - 33.0 pg 29.6     MCHC 31.5 - 36.5 g/dL 31.4Low      RDW 10.0 - 15.0 % 13.3     Platelet Count 150 - 450  10e9/L 320          ASSESSMENT and PLAN  Carol Guajardo is a 51 year old female scheduled to undergo Right Thoracotomy, excision of solitary fibrous tumor, possible lung wedge resection; possible chest wall resection with Ilitch Francesco Johnson MD on 4/19/21 at [facility] for treatment of Solitary fibrous tumor.      Pt has had prior anesthetic.     No history of anesthetic complications     She has the following specific operative considerations:   # VALERIE 3/8 = intermediate risk  # VTE risk: 0.5%  # Risk of PONV score = 2.  If > 2, anti-emetic intervention recommended.  # Anesthesia considerations:  Refer to PAC assessment in anesthesia records      CARDIAC: METS 4,  Works as an , on feet all day x10 hours, bending, lifting.     # RCRI : High risk surgery.  0.4% risk of major adverse cardiac event.     #  Taking propanolol for depression, per pt       PULMONARY:     # Former smoker, 25+ pk yr hx, quit 4/5/21 (was instructed to quit at least 3 weeks prior to surgery)    # Solitary fibrous tumor, right lung. Under surveillance x3 yrs; recent imaging shows growth in size    # Morgagni hernia noted on imaging    # Denies asthma or inhaler use    # PFT 3/30/21: Nonspecific spirometry pattern suggestsModerate airflow obstruction; there is no significant bronchodilator response.  Normal lung volumes.  Normal diffusing capacity.     GI:    # Denies GERD    ENDO: BMI 43  -   Recommend careful positioning to prevent airway/ventilatory compromise, or tissue injury. Consideration for safe lifting techniques.    # Hypothyroidism    # No DM    ORTHO:     # full ROM of neck    # s/p left knee arthroplasty 9/2019      Patient is optimized and is acceptable candidate for the proposed procedure. No further diagnostic evaluation is needed.      Final plan per anesthesiologist on day of surgery.     Arrival time, NPO, shower and medication instructions provided by nursing staff  today.  Preparing For Your Surgery handout given.    40 minutes spent on the date of the encounter doing chart review, history and exam, documentation and further activities as noted below:    Prep time: 12 minutes  Visit time: 14 minutes  Documentation time: 14 minutes  ------------------------------------------  Total time: 40 minutes      Ronit Chavez PA-C  Preoperative Assessment Center  [facility]  Phone: [phone]  Fax: [phone]

## 2021-04-15 NOTE — PATIENT INSTRUCTIONS
Preparing for Your Surgery      Name:  Carol Guajardo   MRN:  [id number]   :  1970   Today's Date:  4/15/2021       Arriving for surgery:  Surgery date:  2021  Arrival time:  7:30 am    Restrictions due to COVID 19:  One consistent visitor per patient is allowed.  The visitor will be allowed in the pre-op area.  Visitors are asked to leave the building during the surgery.  No ill visitors.  All visitors must wear face mask.    [employer] parking is available for anyone with mobility limitations or disabilities.  ([facility]  24 hours/ 7 days a week; [facility]  7 am- 3:30 pm, Mon- Fri)    Please come to:     [facility] [address]  [address] [place]  [address]         -    Please proceed to [address] on the 3rd floor. [phone]?     - ?If you are in need of directions, wheelchair or escort please stop at the Information Desk in the lobby.      What can I eat or drink?  -  You may eat and drink normally for up to 8 hours before your surgery. (Until midnight)  -  You may have clear liquids until 2 hours before surgery. (Until 7:30 am arrival time)    Examples of clear liquids:  Water  Clear broth  Juices (apple, white grape, white cranberry  and cider) without pulp  Noncarbonated, powder based beverages  (lemonade and Alban-Aid)  Sodas (Sprite, 7-Up, ginger ale and seltzer)  Coffee or tea (without milk or cream)  Gatorade    -  No Alcohol for at least 24 hours before surgery     Which medicines can I take?    Hold Aspirin for 7 days before surgery.   Hold Multivitamins for 7 days before surgery.  Hold Supplements for 7 days before surgery.  Hold Ibuprofen (Advil, Motrin) for 1 day before surgery--unless otherwise directed by surgeon.  Hold Naproxen (Aleve) for 4 days before surgery      -  PLEASE TAKE these medications the day of surgery:  Bupropion   Diazepam if needed  Gabapentin  Levothyroxine   Paroxetine   Propranolol      How do I  prepare myself?  - Please take 2 showers before surgery using Scrubcare or Hibiclens soap.    Use this soap only from the neck to your toes.     Leave the soap on your skin for one minute--then rinse thoroughly.      You may use your own shampoo and conditioner; no other hair products.   - Please remove all jewelry and body piercings.  - No lotions, deodorants or fragrance.  - No makeup or fingernail polish.   - Bring your ID and insurance card.    - All patients are required to have a Covid-19 test within 4 days of surgery/procedure.      -Patients will be contacted by the [facility] scheduling team within 1 week of surgery to make an appointment.      - Patients may call the Scheduling team at [phone] if they have not been scheduled within 4 days of  surgery.        Questions or Concerns:    - For any questions regarding the day of surgery or your hospital stay, please contact the Pre Admission Nursing Office at [phone].       - If you have health changes between today and your surgery please call your surgeon.       For questions after surgery please call your surgeons office.   Enhanced Recovery After Surgery     This is a team effort, including you, to get you back on your feet, eating and drinking normally and out of the hospital as quickly as possible.  The goals are: 1) NO INFECTIONS and   2) RETURN TO NORMAL DIET    How can we achieve these goals?  1) STAY ACTIVE: Walk every day before your surgery; try to increase the amount every day.  Walk after surgery as much as you can-the nurses will help you.  Walking speeds healing and gets you home quicker, you heal better at home and have less risk of infection.     2) INCENTIVE SPIROMETER: Practice your incentive spirometer 4 times per day with 5 repetitions each time.  Using the incentive spirometer can strengthen your muscles between your ribs and help you have a strong cough after surgery.  A more effective cough can help prevent problems  with your lungs.    3) STAY HYDRATED: Drink clear liquids up until 2 hours before your surgery.     We would like you to purchase a drink such as Gatorade or Ensure Clear (not the milkshake type).  Drink this before bedtime and on the way into the hospital, drink between 8-10 ounces or until you feel hydrated.      Keeping well hydrated leads to your veins being plump, you wake up faster, and you are less likely to be nauseated. Start drinking water as soon as you can after surgery and advance to clear liquids and food as tolerated.  IV fluids contain salt, drinking fluids will minimize the amount of IV fluids you need and decrease the amount of salt you get.    The most common reason for the patient to be readmitted is dehydration. Staying hydrated after you go home from the hospital is very important.  Ensure or Ensure Clear are good options to keep you hydrated.     4) PAIN MANAGEMENT: If we minimize the amount of opioids and narcotics, and use regional blocks (which numb the area where your surgery is) along with oral pain medications; you will have less side effects of nausea and constipation. Narcotics can slow down your bowels and cause you to stay in the hospital longer.     Our goal is to keep you comfortable; eating and drinking normally and back home safely.

## 2021-04-16 PROBLEM — E66.01 MORBID OBESITY (H): Status: ACTIVE | Noted: 2021-04-16

## 2021-04-16 NOTE — PROGRESS NOTES
THORACIC SURGERY FOLLOW UP VISIT    I saw Ms. Guajardo in follow-up today. The clinical summary follows:     DIAGNOSIS   Right solitary fibrous tumor.     NEODJUVANT THERAPY   None    HISTOPATHOLOGY   CT guided biopsy 4/2/21 ([facility]): Solitary fibrous tumor. STAT6 and CD34 (+), but negative for S100, desmin, cytokeratin, calretinin and WT1.    COMPLICATIONS  None    PREOPERATIVE STUDIES  PFT 3/30/21 FEV1 1.48L, 60%. DLCO 84%.     CT scan 3/12/2021   Large solid RLL mass measuring 13 x 7 x 10 cm. No lymphadenopathy.          Past Medical History:   Diagnosis Date     Anxiety      Depression      HLD (hyperlipidemia)      Hypothyroidism      Osteoarthritis of both knees      Right lower lobe lung mass     Biopsied 8/2018, diagnosed as fibrosis     Past Surgical History:   Procedure Laterality Date     ARTHROPLASTY KNEE Left 9/30/2019    Procedure: Left Total Knee Arthroplasty;  Surgeon: Ion Bailey MD;  Location: [facility]     CARPAL TUNNEL RELEASE RT/LT Bilateral      EXTERNAL EAR SURGERY       IR LUNG BIOPSY MEDIASTINUM RIGHT Right 08/2018    RLL mass, diagnosed as fibrosis per pt     LAPAROSCOPY DIAGNOSTIC (GENERAL)  02/2019     LAPAROSCOPY, SURGICAL; REPAIR UMBILICAL HERNIA  08/22/2018        Allergies   Allergen Reactions     Amoxicillin-Pot Clavulanate Hives     Other reaction(s): Edema     Augmentin Hives and Itching     Ciprofloxacin      Penicillins      Current Outpatient Medications   Medication     buPROPion (WELLBUTRIN XL) 300 MG 24 hr tablet     diazepam (VALIUM) 2 MG tablet     gabapentin (NEURONTIN) 300 MG capsule     levothyroxine (SYNTHROID/LEVOTHROID) 150 MCG tablet     PARoxetine (PAXIL) 20 MG tablet     PARoxetine (PAXIL) 40 MG tablet     pramipexole (MIRAPEX) 0.5 MG tablet     propranolol (INDERAL LA) 60 MG 24 hr capsule     No current facility-administered medications for this visit.        ETOH Occasional.   TOB: current smoker, quit 1 week ago.   BMI 40    Exam   /72   Pulse 64    "Temp 97.8  F (36.6  C) (Oral)   Ht 1.651 m (5' 5\")   Wt 97.5 kg (215 lb)   LMP 04/04/2021   SpO2 95%   BMI 35.78 kg/m    Alert and oriented.   Decreased right basilar breath sounds.   RRR.    SUBJECTIVE   Carol comes to clinic for a preop visit. She is essentially asymptomatic.     From a personal perspective, she comes to clinic with her  Brant.     IMPRESSION   51 year old female with a right solitary fibrous tumor.  I had an extensive discussion with the patient and her family regarding the rationale for surgery, the alternatives risks and benefits of the procedure. We talked about the increased risk for complications given her high BMI and smoking status. I explained the expected hospital stay, postoperative course, recovery time, diet and activity restrictions. Informed consent was obtained and they agreed to proceed.    PLAN  I spent 60 min on the date of the encounter in chart review, patient visit, review of tests, documentation and/or discussion with other providers about the issues documented above. I reviewed the plan as follows:  Procedure planned: Right thoracotomy, SFT resection, intercostal nerve cryoablation.   Necessary Tests & Appointments: None  Pain Control Plan: Epidural and intercostal nerve cryoablation.  Anticoagulation Plan: Enoxaparin in preop.   Smoking Cessation: She was encouraged to continue abstinent from smoking.   All questions were answered and the patient and present family were in agreement with the plan.  I appreciate the opportunity to participate in the care of your patient and will keep you updated.  Sincerely,  Keli Raya MD      "

## 2021-04-19 ENCOUNTER — APPOINTMENT (OUTPATIENT)
Dept: GENERAL RADIOLOGY | Facility: CLINIC | Age: 51
DRG: 164 | End: 2021-04-19
Attending: THORACIC SURGERY (CARDIOTHORACIC VASCULAR SURGERY)
Payer: COMMERCIAL

## 2021-04-19 ENCOUNTER — HOSPITAL ENCOUNTER (INPATIENT)
Facility: CLINIC | Age: 51
LOS: 2 days | Discharge: HOME | DRG: 164 | End: 2021-04-21
Attending: THORACIC SURGERY (CARDIOTHORACIC VASCULAR SURGERY) | Admitting: STUDENT IN AN ORGANIZED HEALTH CARE EDUCATION/TRAINING PROGRAM
Payer: COMMERCIAL

## 2021-04-19 ENCOUNTER — ANESTHESIA (OUTPATIENT)
Dept: SURGERY | Facility: CLINIC | Age: 51
DRG: 164 | End: 2021-04-19
Payer: COMMERCIAL

## 2021-04-19 DIAGNOSIS — D49.2 SOLITARY FIBROUS TUMOR: ICD-10-CM

## 2021-04-19 LAB
ABO + RH BLD: NORMAL
ABO + RH BLD: NORMAL
ANION GAP SERPL CALCULATED.3IONS-SCNC: 7 MMOL/L (ref 3–14)
BASE EXCESS BLDA CALC-SCNC: 1 MMOL/L
BASE EXCESS BLDA CALC-SCNC: 2.5 MMOL/L
BLD GP AB SCN SERPL QL: NORMAL
BLOOD BANK CMNT PATIENT-IMP: NORMAL
BLOOD BANK CMNT PATIENT-IMP: NORMAL
BUN SERPL-MCNC: 16 MG/DL (ref 7–30)
CA-I BLD-MCNC: 4.6 MG/DL (ref 4.4–5.2)
CALCIUM SERPL-MCNC: 8.3 MG/DL (ref 8.5–10.1)
CHLORIDE SERPL-SCNC: 101 MMOL/L (ref 94–109)
CO2 SERPL-SCNC: 27 MMOL/L (ref 20–32)
CREAT SERPL-MCNC: 0.58 MG/DL (ref 0.52–1.04)
CREAT SERPL-MCNC: 0.58 MG/DL (ref 0.52–1.04)
ERYTHROCYTE [DISTWIDTH] IN BLOOD BY AUTOMATED COUNT: 13.2 % (ref 10–15)
GFR SERPL CREATININE-BSD FRML MDRD: >90 ML/MIN/{1.73_M2}
GFR SERPL CREATININE-BSD FRML MDRD: >90 ML/MIN/{1.73_M2}
GLUCOSE BLD-MCNC: 113 MG/DL (ref 70–99)
GLUCOSE BLDC GLUCOMTR-MCNC: 96 MG/DL (ref 70–99)
GLUCOSE SERPL-MCNC: 115 MG/DL (ref 70–99)
HCG UR QL: NEGATIVE
HCO3 BLD-SCNC: 28 MMOL/L (ref 21–28)
HCO3 BLD-SCNC: 29 MMOL/L (ref 21–28)
HCT VFR BLD AUTO: 37.5 % (ref 35–47)
HGB BLD-MCNC: 11.7 G/DL (ref 11.7–15.7)
HGB BLD-MCNC: 11.9 G/DL (ref 11.7–15.7)
MCH RBC QN AUTO: 29.5 PG (ref 26.5–33)
MCHC RBC AUTO-ENTMCNC: 31.2 G/DL (ref 31.5–36.5)
MCV RBC AUTO: 95 FL (ref 78–100)
O2/TOTAL GAS SETTING VFR VENT: 1 %
O2/TOTAL GAS SETTING VFR VENT: ABNORMAL %
PCO2 BLD: 51 MM HG (ref 35–45)
PCO2 BLD: 55 MM HG (ref 35–45)
PH BLD: 7.32 PH (ref 7.35–7.45)
PH BLD: 7.36 PH (ref 7.35–7.45)
PLATELET # BLD AUTO: 306 10E9/L (ref 150–450)
PO2 BLD: 144 MM HG (ref 80–105)
PO2 BLD: 67 MM HG (ref 80–105)
POTASSIUM BLD-SCNC: 4.3 MMOL/L (ref 3.4–5.3)
POTASSIUM SERPL-SCNC: 4.4 MMOL/L (ref 3.4–5.3)
RBC # BLD AUTO: 3.97 10E12/L (ref 3.8–5.2)
SODIUM BLD-SCNC: 137 MMOL/L (ref 133–144)
SODIUM SERPL-SCNC: 136 MMOL/L (ref 133–144)
SPECIMEN EXP DATE BLD: NORMAL
WBC # BLD AUTO: 13.6 10E9/L (ref 4–11)

## 2021-04-19 PROCEDURE — 250N000011 HC RX IP 250 OP 636: Performed by: STUDENT IN AN ORGANIZED HEALTH CARE EDUCATION/TRAINING PROGRAM

## 2021-04-19 PROCEDURE — P9041 ALBUMIN (HUMAN),5%, 50ML: HCPCS | Performed by: STUDENT IN AN ORGANIZED HEALTH CARE EDUCATION/TRAINING PROGRAM

## 2021-04-19 PROCEDURE — 370N000017 HC ANESTHESIA TECHNICAL FEE, PER MIN: Performed by: THORACIC SURGERY (CARDIOTHORACIC VASCULAR SURGERY)

## 2021-04-19 PROCEDURE — 88341 IMHCHEM/IMCYTCHM EA ADD ANTB: CPT | Mod: TC | Performed by: THORACIC SURGERY (CARDIOTHORACIC VASCULAR SURGERY)

## 2021-04-19 PROCEDURE — 82565 ASSAY OF CREATININE: CPT | Performed by: STUDENT IN AN ORGANIZED HEALTH CARE EDUCATION/TRAINING PROGRAM

## 2021-04-19 PROCEDURE — 250N000013 HC RX MED GY IP 250 OP 250 PS 637: Performed by: THORACIC SURGERY (CARDIOTHORACIC VASCULAR SURGERY)

## 2021-04-19 PROCEDURE — 88342 IMHCHEM/IMCYTCHM 1ST ANTB: CPT | Mod: TC | Performed by: THORACIC SURGERY (CARDIOTHORACIC VASCULAR SURGERY)

## 2021-04-19 PROCEDURE — 258N000003 HC RX IP 258 OP 636: Performed by: STUDENT IN AN ORGANIZED HEALTH CARE EDUCATION/TRAINING PROGRAM

## 2021-04-19 PROCEDURE — C2618 PROBE/NEEDLE, CRYO: HCPCS | Performed by: THORACIC SURGERY (CARDIOTHORACIC VASCULAR SURGERY)

## 2021-04-19 PROCEDURE — 82803 BLOOD GASES ANY COMBINATION: CPT | Performed by: STUDENT IN AN ORGANIZED HEALTH CARE EDUCATION/TRAINING PROGRAM

## 2021-04-19 PROCEDURE — 88309 TISSUE EXAM BY PATHOLOGIST: CPT | Mod: TC | Performed by: THORACIC SURGERY (CARDIOTHORACIC VASCULAR SURGERY)

## 2021-04-19 PROCEDURE — 258N000003 HC RX IP 258 OP 636

## 2021-04-19 PROCEDURE — 250N000011 HC RX IP 250 OP 636

## 2021-04-19 PROCEDURE — 272N000001 HC OR GENERAL SUPPLY STERILE: Performed by: THORACIC SURGERY (CARDIOTHORACIC VASCULAR SURGERY)

## 2021-04-19 PROCEDURE — 0BJ08ZZ INSPECTION OF TRACHEOBRONCHIAL TREE, VIA NATURAL OR ARTIFICIAL OPENING ENDOSCOPIC: ICD-10-PCS | Performed by: THORACIC SURGERY (CARDIOTHORACIC VASCULAR SURGERY)

## 2021-04-19 PROCEDURE — 999N000065 XR CHEST PORT 1 VW

## 2021-04-19 PROCEDURE — 999N000141 HC STATISTIC PRE-PROCEDURE NURSING ASSESSMENT: Performed by: THORACIC SURGERY (CARDIOTHORACIC VASCULAR SURGERY)

## 2021-04-19 PROCEDURE — 85027 COMPLETE CBC AUTOMATED: CPT | Performed by: STUDENT IN AN ORGANIZED HEALTH CARE EDUCATION/TRAINING PROGRAM

## 2021-04-19 PROCEDURE — 710N000011 HC RECOVERY PHASE 1, LEVEL 3, PER MIN: Performed by: THORACIC SURGERY (CARDIOTHORACIC VASCULAR SURGERY)

## 2021-04-19 PROCEDURE — 82947 ASSAY GLUCOSE BLOOD QUANT: CPT | Performed by: THORACIC SURGERY (CARDIOTHORACIC VASCULAR SURGERY)

## 2021-04-19 PROCEDURE — 80048 BASIC METABOLIC PNL TOTAL CA: CPT | Performed by: STUDENT IN AN ORGANIZED HEALTH CARE EDUCATION/TRAINING PROGRAM

## 2021-04-19 PROCEDURE — 250N000024 HC ISOFLURANE, PER MIN: Performed by: THORACIC SURGERY (CARDIOTHORACIC VASCULAR SURGERY)

## 2021-04-19 PROCEDURE — 250N000009 HC RX 250: Performed by: THORACIC SURGERY (CARDIOTHORACIC VASCULAR SURGERY)

## 2021-04-19 PROCEDURE — 250N000025 HC SEVOFLURANE, PER MIN: Performed by: THORACIC SURGERY (CARDIOTHORACIC VASCULAR SURGERY)

## 2021-04-19 PROCEDURE — 84295 ASSAY OF SERUM SODIUM: CPT | Performed by: THORACIC SURGERY (CARDIOTHORACIC VASCULAR SURGERY)

## 2021-04-19 PROCEDURE — 360N000077 HC SURGERY LEVEL 4, PER MIN: Performed by: THORACIC SURGERY (CARDIOTHORACIC VASCULAR SURGERY)

## 2021-04-19 PROCEDURE — 88331 PATH CONSLTJ SURG 1 BLK 1SPC: CPT | Mod: 26 | Performed by: PATHOLOGY

## 2021-04-19 PROCEDURE — 999N001017 HC STATISTIC GLUCOSE BY METER IP

## 2021-04-19 PROCEDURE — 01530ZZ DESTRUCTION OF BRACHIAL PLEXUS, OPEN APPROACH: ICD-10-PCS | Performed by: THORACIC SURGERY (CARDIOTHORACIC VASCULAR SURGERY)

## 2021-04-19 PROCEDURE — 88342 IMHCHEM/IMCYTCHM 1ST ANTB: CPT | Mod: 26 | Performed by: PATHOLOGY

## 2021-04-19 PROCEDURE — 120N000005 HC R&B MS OVERFLOW UMMC

## 2021-04-19 PROCEDURE — 88309 TISSUE EXAM BY PATHOLOGIST: CPT | Mod: 26 | Performed by: PATHOLOGY

## 2021-04-19 PROCEDURE — 88331 PATH CONSLTJ SURG 1 BLK 1SPC: CPT | Mod: TC | Performed by: THORACIC SURGERY (CARDIOTHORACIC VASCULAR SURGERY)

## 2021-04-19 PROCEDURE — 84132 ASSAY OF SERUM POTASSIUM: CPT | Performed by: THORACIC SURGERY (CARDIOTHORACIC VASCULAR SURGERY)

## 2021-04-19 PROCEDURE — 82330 ASSAY OF CALCIUM: CPT | Performed by: THORACIC SURGERY (CARDIOTHORACIC VASCULAR SURGERY)

## 2021-04-19 PROCEDURE — 36415 COLL VENOUS BLD VENIPUNCTURE: CPT | Performed by: STUDENT IN AN ORGANIZED HEALTH CARE EDUCATION/TRAINING PROGRAM

## 2021-04-19 PROCEDURE — 94667 MNPJ CHEST WALL 1ST: CPT

## 2021-04-19 PROCEDURE — 71045 X-RAY EXAM CHEST 1 VIEW: CPT | Mod: 26 | Performed by: RADIOLOGY

## 2021-04-19 PROCEDURE — 82803 BLOOD GASES ANY COMBINATION: CPT | Performed by: THORACIC SURGERY (CARDIOTHORACIC VASCULAR SURGERY)

## 2021-04-19 PROCEDURE — 999N000157 HC STATISTIC RCP TIME EA 10 MIN

## 2021-04-19 PROCEDURE — 88341 IMHCHEM/IMCYTCHM EA ADD ANTB: CPT | Mod: 26 | Performed by: PATHOLOGY

## 2021-04-19 PROCEDURE — 250N000009 HC RX 250: Performed by: STUDENT IN AN ORGANIZED HEALTH CARE EDUCATION/TRAINING PROGRAM

## 2021-04-19 PROCEDURE — 81025 URINE PREGNANCY TEST: CPT | Performed by: STUDENT IN AN ORGANIZED HEALTH CARE EDUCATION/TRAINING PROGRAM

## 2021-04-19 PROCEDURE — 250N000013 HC RX MED GY IP 250 OP 250 PS 637: Performed by: STUDENT IN AN ORGANIZED HEALTH CARE EDUCATION/TRAINING PROGRAM

## 2021-04-19 PROCEDURE — 250N000013 HC RX MED GY IP 250 OP 250 PS 637: Performed by: PHYSICIAN ASSISTANT

## 2021-04-19 PROCEDURE — 0BBF0ZZ EXCISION OF RIGHT LOWER LUNG LOBE, OPEN APPROACH: ICD-10-PCS | Performed by: THORACIC SURGERY (CARDIOTHORACIC VASCULAR SURGERY)

## 2021-04-19 RX ORDER — HEPARIN SODIUM 5000 [USP'U]/.5ML
INJECTION, SOLUTION INTRAVENOUS; SUBCUTANEOUS PRN
Status: DISCONTINUED | OUTPATIENT
Start: 2021-04-19 | End: 2021-04-19

## 2021-04-19 RX ORDER — SODIUM CHLORIDE, SODIUM LACTATE, POTASSIUM CHLORIDE, CALCIUM CHLORIDE 600; 310; 30; 20 MG/100ML; MG/100ML; MG/100ML; MG/100ML
INJECTION, SOLUTION INTRAVENOUS CONTINUOUS PRN
Status: DISCONTINUED | OUTPATIENT
Start: 2021-04-19 | End: 2021-04-19

## 2021-04-19 RX ORDER — PRAMIPEXOLE DIHYDROCHLORIDE 0.5 MG/1
1 TABLET ORAL AT BEDTIME
Status: DISCONTINUED | OUTPATIENT
Start: 2021-04-19 | End: 2021-04-21 | Stop reason: HOSPADM

## 2021-04-19 RX ORDER — SODIUM CHLORIDE, SODIUM LACTATE, POTASSIUM CHLORIDE, CALCIUM CHLORIDE 600; 310; 30; 20 MG/100ML; MG/100ML; MG/100ML; MG/100ML
INJECTION, SOLUTION INTRAVENOUS CONTINUOUS
Status: DISCONTINUED | OUTPATIENT
Start: 2021-04-19 | End: 2021-04-20

## 2021-04-19 RX ORDER — FENTANYL CITRATE 50 UG/ML
25-50 INJECTION, SOLUTION INTRAMUSCULAR; INTRAVENOUS
Status: DISCONTINUED | OUTPATIENT
Start: 2021-04-19 | End: 2021-04-19 | Stop reason: HOSPADM

## 2021-04-19 RX ORDER — ONDANSETRON 2 MG/ML
4 INJECTION INTRAMUSCULAR; INTRAVENOUS EVERY 6 HOURS PRN
Status: DISCONTINUED | OUTPATIENT
Start: 2021-04-19 | End: 2021-04-21 | Stop reason: HOSPADM

## 2021-04-19 RX ORDER — CLINDAMYCIN PHOSPHATE 900 MG/50ML
900 INJECTION, SOLUTION INTRAVENOUS SEE ADMIN INSTRUCTIONS
Status: DISCONTINUED | OUTPATIENT
Start: 2021-04-19 | End: 2021-04-19 | Stop reason: HOSPADM

## 2021-04-19 RX ORDER — NALOXONE HYDROCHLORIDE 0.4 MG/ML
0.2 INJECTION, SOLUTION INTRAMUSCULAR; INTRAVENOUS; SUBCUTANEOUS
Status: DISCONTINUED | OUTPATIENT
Start: 2021-04-19 | End: 2021-04-19 | Stop reason: HOSPADM

## 2021-04-19 RX ORDER — NALOXONE HYDROCHLORIDE 0.4 MG/ML
0.2 INJECTION, SOLUTION INTRAMUSCULAR; INTRAVENOUS; SUBCUTANEOUS
Status: DISCONTINUED | OUTPATIENT
Start: 2021-04-19 | End: 2021-04-21 | Stop reason: HOSPADM

## 2021-04-19 RX ORDER — ACETAMINOPHEN 325 MG/1
650 TABLET ORAL EVERY 4 HOURS PRN
Status: DISCONTINUED | OUTPATIENT
Start: 2021-04-22 | End: 2021-04-21 | Stop reason: HOSPADM

## 2021-04-19 RX ORDER — FLUMAZENIL 0.1 MG/ML
0.2 INJECTION, SOLUTION INTRAVENOUS
Status: DISCONTINUED | OUTPATIENT
Start: 2021-04-19 | End: 2021-04-19 | Stop reason: HOSPADM

## 2021-04-19 RX ORDER — ONDANSETRON 4 MG/1
4 TABLET, ORALLY DISINTEGRATING ORAL EVERY 30 MIN PRN
Status: DISCONTINUED | OUTPATIENT
Start: 2021-04-19 | End: 2021-04-19

## 2021-04-19 RX ORDER — LEVOTHYROXINE SODIUM 150 UG/1
150 TABLET ORAL EVERY MORNING
Status: DISCONTINUED | OUTPATIENT
Start: 2021-04-20 | End: 2021-04-21 | Stop reason: HOSPADM

## 2021-04-19 RX ORDER — FENTANYL CITRATE 50 UG/ML
25-50 INJECTION, SOLUTION INTRAMUSCULAR; INTRAVENOUS
Status: DISCONTINUED | OUTPATIENT
Start: 2021-04-19 | End: 2021-04-19

## 2021-04-19 RX ORDER — ONDANSETRON 2 MG/ML
4 INJECTION INTRAMUSCULAR; INTRAVENOUS EVERY 30 MIN PRN
Status: DISCONTINUED | OUTPATIENT
Start: 2021-04-19 | End: 2021-04-19

## 2021-04-19 RX ORDER — LIDOCAINE 40 MG/G
CREAM TOPICAL
Status: DISCONTINUED | OUTPATIENT
Start: 2021-04-19 | End: 2021-04-19 | Stop reason: HOSPADM

## 2021-04-19 RX ORDER — CELECOXIB 200 MG/1
200 CAPSULE ORAL ONCE
Status: COMPLETED | OUTPATIENT
Start: 2021-04-19 | End: 2021-04-19

## 2021-04-19 RX ORDER — CHLORHEXIDINE GLUCONATE ORAL RINSE 1.2 MG/ML
15 SOLUTION DENTAL ONCE
Status: DISCONTINUED | OUTPATIENT
Start: 2021-04-19 | End: 2021-04-19 | Stop reason: HOSPADM

## 2021-04-19 RX ORDER — ACETAMINOPHEN 325 MG/1
975 TABLET ORAL ONCE
Status: DISCONTINUED | OUTPATIENT
Start: 2021-04-19 | End: 2021-04-19 | Stop reason: HOSPADM

## 2021-04-19 RX ORDER — GABAPENTIN 300 MG/1
300 CAPSULE ORAL ONCE
Status: COMPLETED | OUTPATIENT
Start: 2021-04-19 | End: 2021-04-19

## 2021-04-19 RX ORDER — HYDROMORPHONE HCL IN WATER/PF 6 MG/30 ML
0.2 PATIENT CONTROLLED ANALGESIA SYRINGE INTRAVENOUS
Status: DISCONTINUED | OUTPATIENT
Start: 2021-04-19 | End: 2021-04-21 | Stop reason: HOSPADM

## 2021-04-19 RX ORDER — ONDANSETRON 2 MG/ML
INJECTION INTRAMUSCULAR; INTRAVENOUS PRN
Status: DISCONTINUED | OUTPATIENT
Start: 2021-04-19 | End: 2021-04-19

## 2021-04-19 RX ORDER — ACETAMINOPHEN 325 MG/1
975 TABLET ORAL ONCE
Status: COMPLETED | OUTPATIENT
Start: 2021-04-19 | End: 2021-04-19

## 2021-04-19 RX ORDER — PANTOPRAZOLE SODIUM 40 MG/1
40 TABLET, DELAYED RELEASE ORAL DAILY
Status: DISCONTINUED | OUTPATIENT
Start: 2021-04-19 | End: 2021-04-21 | Stop reason: HOSPADM

## 2021-04-19 RX ORDER — HYDROMORPHONE HYDROCHLORIDE 1 MG/ML
0.4 INJECTION, SOLUTION INTRAMUSCULAR; INTRAVENOUS; SUBCUTANEOUS
Status: DISCONTINUED | OUTPATIENT
Start: 2021-04-19 | End: 2021-04-21 | Stop reason: HOSPADM

## 2021-04-19 RX ORDER — ACETAMINOPHEN 325 MG/1
975 TABLET ORAL EVERY 8 HOURS
Status: DISCONTINUED | OUTPATIENT
Start: 2021-04-19 | End: 2021-04-21 | Stop reason: HOSPADM

## 2021-04-19 RX ORDER — LIDOCAINE 4 G/G
1 PATCH TOPICAL
Status: DISCONTINUED | OUTPATIENT
Start: 2021-04-20 | End: 2021-04-21 | Stop reason: HOSPADM

## 2021-04-19 RX ORDER — BISACODYL 10 MG
10 SUPPOSITORY, RECTAL RECTAL DAILY PRN
Status: DISCONTINUED | OUTPATIENT
Start: 2021-04-19 | End: 2021-04-21 | Stop reason: HOSPADM

## 2021-04-19 RX ORDER — CLINDAMYCIN PHOSPHATE 900 MG/50ML
900 INJECTION, SOLUTION INTRAVENOUS
Status: COMPLETED | OUTPATIENT
Start: 2021-04-19 | End: 2021-04-19

## 2021-04-19 RX ORDER — FENTANYL CITRATE 50 UG/ML
INJECTION, SOLUTION INTRAMUSCULAR; INTRAVENOUS PRN
Status: DISCONTINUED | OUTPATIENT
Start: 2021-04-19 | End: 2021-04-19

## 2021-04-19 RX ORDER — DEXAMETHASONE SODIUM PHOSPHATE 4 MG/ML
INJECTION, SOLUTION INTRA-ARTICULAR; INTRALESIONAL; INTRAMUSCULAR; INTRAVENOUS; SOFT TISSUE PRN
Status: DISCONTINUED | OUTPATIENT
Start: 2021-04-19 | End: 2021-04-19

## 2021-04-19 RX ORDER — NALOXONE HYDROCHLORIDE 0.4 MG/ML
0.4 INJECTION, SOLUTION INTRAMUSCULAR; INTRAVENOUS; SUBCUTANEOUS
Status: DISCONTINUED | OUTPATIENT
Start: 2021-04-19 | End: 2021-04-21 | Stop reason: HOSPADM

## 2021-04-19 RX ORDER — GABAPENTIN 300 MG/1
600 CAPSULE ORAL 3 TIMES DAILY
Status: DISCONTINUED | OUTPATIENT
Start: 2021-04-19 | End: 2021-04-21 | Stop reason: HOSPADM

## 2021-04-19 RX ORDER — HEPARIN SODIUM 5000 [USP'U]/.5ML
5000 INJECTION, SOLUTION INTRAVENOUS; SUBCUTANEOUS
Status: DISCONTINUED | OUTPATIENT
Start: 2021-04-19 | End: 2021-04-19 | Stop reason: HOSPADM

## 2021-04-19 RX ORDER — OXYCODONE HYDROCHLORIDE 10 MG/1
10 TABLET ORAL EVERY 4 HOURS PRN
Status: DISCONTINUED | OUTPATIENT
Start: 2021-04-19 | End: 2021-04-21 | Stop reason: HOSPADM

## 2021-04-19 RX ORDER — LIDOCAINE HYDROCHLORIDE 20 MG/ML
INJECTION, SOLUTION INFILTRATION; PERINEURAL PRN
Status: DISCONTINUED | OUTPATIENT
Start: 2021-04-19 | End: 2021-04-19

## 2021-04-19 RX ORDER — AMOXICILLIN 250 MG
1 CAPSULE ORAL 2 TIMES DAILY
Status: DISCONTINUED | OUTPATIENT
Start: 2021-04-19 | End: 2021-04-21 | Stop reason: HOSPADM

## 2021-04-19 RX ORDER — PROPOFOL 10 MG/ML
INJECTION, EMULSION INTRAVENOUS PRN
Status: DISCONTINUED | OUTPATIENT
Start: 2021-04-19 | End: 2021-04-19

## 2021-04-19 RX ORDER — OXYCODONE HYDROCHLORIDE 5 MG/1
5 TABLET ORAL EVERY 4 HOURS PRN
Status: DISCONTINUED | OUTPATIENT
Start: 2021-04-19 | End: 2021-04-21 | Stop reason: HOSPADM

## 2021-04-19 RX ORDER — NALOXONE HYDROCHLORIDE 0.4 MG/ML
0.4 INJECTION, SOLUTION INTRAMUSCULAR; INTRAVENOUS; SUBCUTANEOUS
Status: DISCONTINUED | OUTPATIENT
Start: 2021-04-19 | End: 2021-04-19 | Stop reason: HOSPADM

## 2021-04-19 RX ORDER — POLYETHYLENE GLYCOL 3350 17 G/17G
17 POWDER, FOR SOLUTION ORAL DAILY
Status: DISCONTINUED | OUTPATIENT
Start: 2021-04-20 | End: 2021-04-21 | Stop reason: HOSPADM

## 2021-04-19 RX ORDER — BUPROPION HYDROCHLORIDE 300 MG/1
300 TABLET ORAL EVERY MORNING
Status: DISCONTINUED | OUTPATIENT
Start: 2021-04-20 | End: 2021-04-21 | Stop reason: HOSPADM

## 2021-04-19 RX ORDER — ALBUMIN HUMAN 5 %
INTRAVENOUS SOLUTION INTRAVENOUS PRN
Status: DISCONTINUED | OUTPATIENT
Start: 2021-04-19 | End: 2021-04-19

## 2021-04-19 RX ORDER — NALBUPHINE HYDROCHLORIDE 10 MG/ML
2.5-5 INJECTION, SOLUTION INTRAMUSCULAR; INTRAVENOUS; SUBCUTANEOUS EVERY 6 HOURS PRN
Status: DISCONTINUED | OUTPATIENT
Start: 2021-04-19 | End: 2021-04-21

## 2021-04-19 RX ORDER — ONDANSETRON 4 MG/1
4 TABLET, ORALLY DISINTEGRATING ORAL EVERY 6 HOURS PRN
Status: DISCONTINUED | OUTPATIENT
Start: 2021-04-19 | End: 2021-04-21 | Stop reason: HOSPADM

## 2021-04-19 RX ORDER — PROPRANOLOL HCL 60 MG
60 CAPSULE, EXTENDED RELEASE 24HR ORAL EVERY MORNING
Status: DISCONTINUED | OUTPATIENT
Start: 2021-04-20 | End: 2021-04-21 | Stop reason: HOSPADM

## 2021-04-19 RX ORDER — NICARDIPINE HYDROCHLORIDE 0.2 MG/ML
2.5-15 INJECTION INTRAVENOUS CONTINUOUS
Status: DISCONTINUED | OUTPATIENT
Start: 2021-04-19 | End: 2021-04-19 | Stop reason: HOSPADM

## 2021-04-19 RX ADMIN — ROCURONIUM BROMIDE 100 MG: 10 INJECTION INTRAVENOUS at 10:12

## 2021-04-19 RX ADMIN — SUGAMMADEX 200 MG: 100 INJECTION, SOLUTION INTRAVENOUS at 12:55

## 2021-04-19 RX ADMIN — FENTANYL CITRATE 100 MCG: 50 INJECTION, SOLUTION INTRAMUSCULAR; INTRAVENOUS at 10:12

## 2021-04-19 RX ADMIN — CELECOXIB 200 MG: 200 CAPSULE ORAL at 08:40

## 2021-04-19 RX ADMIN — SODIUM CHLORIDE, POTASSIUM CHLORIDE, SODIUM LACTATE AND CALCIUM CHLORIDE: 600; 310; 30; 20 INJECTION, SOLUTION INTRAVENOUS at 15:17

## 2021-04-19 RX ADMIN — GABAPENTIN 300 MG: 300 CAPSULE ORAL at 08:40

## 2021-04-19 RX ADMIN — ALBUMIN (HUMAN) 250 ML: 12.5 SOLUTION INTRAVENOUS at 10:24

## 2021-04-19 RX ADMIN — SODIUM CHLORIDE, POTASSIUM CHLORIDE, SODIUM LACTATE AND CALCIUM CHLORIDE: 600; 310; 30; 20 INJECTION, SOLUTION INTRAVENOUS at 10:05

## 2021-04-19 RX ADMIN — LIDOCAINE HYDROCHLORIDE 100 MG: 20 INJECTION, SOLUTION INFILTRATION; PERINEURAL at 10:12

## 2021-04-19 RX ADMIN — PANTOPRAZOLE SODIUM 40 MG: 40 TABLET, DELAYED RELEASE ORAL at 17:42

## 2021-04-19 RX ADMIN — ACETAMINOPHEN 975 MG: 325 TABLET, FILM COATED ORAL at 08:41

## 2021-04-19 RX ADMIN — HEPARIN SODIUM 5000 UNITS: 5000 INJECTION, SOLUTION INTRAVENOUS; SUBCUTANEOUS at 10:55

## 2021-04-19 RX ADMIN — PROPOFOL 150 MG: 10 INJECTION, EMULSION INTRAVENOUS at 10:12

## 2021-04-19 RX ADMIN — ACETAMINOPHEN 975 MG: 325 TABLET, FILM COATED ORAL at 17:42

## 2021-04-19 RX ADMIN — FENTANYL CITRATE 100 MCG: 50 INJECTION, SOLUTION INTRAMUSCULAR; INTRAVENOUS at 11:11

## 2021-04-19 RX ADMIN — ROCURONIUM BROMIDE 20 MG: 10 INJECTION INTRAVENOUS at 11:48

## 2021-04-19 RX ADMIN — BUPIVACAINE HYDROCHLORIDE 8 ML/HR: 7.5 INJECTION, SOLUTION EPIDURAL; RETROBULBAR at 11:29

## 2021-04-19 RX ADMIN — ROCURONIUM BROMIDE 30 MG: 10 INJECTION INTRAVENOUS at 11:11

## 2021-04-19 RX ADMIN — FENTANYL CITRATE 50 MCG: 50 INJECTION, SOLUTION INTRAMUSCULAR; INTRAVENOUS at 09:24

## 2021-04-19 RX ADMIN — GABAPENTIN 600 MG: 300 CAPSULE ORAL at 19:31

## 2021-04-19 RX ADMIN — DOCUSATE SODIUM 50 MG AND SENNOSIDES 8.6 MG 1 TABLET: 8.6; 5 TABLET, FILM COATED ORAL at 19:31

## 2021-04-19 RX ADMIN — DEXAMETHASONE SODIUM PHOSPHATE 8 MG: 4 INJECTION, SOLUTION INTRA-ARTICULAR; INTRALESIONAL; INTRAMUSCULAR; INTRAVENOUS; SOFT TISSUE at 12:00

## 2021-04-19 RX ADMIN — FENTANYL CITRATE 50 MCG: 50 INJECTION, SOLUTION INTRAMUSCULAR; INTRAVENOUS at 13:36

## 2021-04-19 RX ADMIN — ONDANSETRON 4 MG: 2 INJECTION INTRAMUSCULAR; INTRAVENOUS at 12:47

## 2021-04-19 RX ADMIN — MIDAZOLAM 1 MG: 1 INJECTION INTRAMUSCULAR; INTRAVENOUS at 09:23

## 2021-04-19 RX ADMIN — PRAMIPEXOLE DIHYDROCHLORIDE 1 MG: 0.5 TABLET ORAL at 22:41

## 2021-04-19 RX ADMIN — CLINDAMYCIN PHOSPHATE 900 MG: 900 INJECTION, SOLUTION INTRAVENOUS at 11:07

## 2021-04-19 ASSESSMENT — ACTIVITIES OF DAILY LIVING (ADL): ADLS_ACUITY_SCORE: 15

## 2021-04-19 ASSESSMENT — MIFFLIN-ST. JEOR: SCORE: 1549.44

## 2021-04-19 NOTE — ANESTHESIA PROCEDURE NOTES
Arterial Line Procedure Note  Pre-Procedure   Staff -        Anesthesiologist:  Domingo Meza MD       Resident/Fellow: Perlita Tovar MD       Performed By: resident       Location: OR       Pre-Anesthestic Checklist: patient identified, IV checked, risks and benefits discussed, informed consent, monitors and equipment checked, pre-op evaluation and at physician/surgeon's request  Timeout:       Correct Patient: Yes        Correct Procedure: Yes        Correct Site: Yes        Correct Position: Yes   Procedure   Procedure: arterial line       Laterality: left       Insertion Site: radial.  Sterile Prep        Standard elements of sterile barrier followed       Skin prep: Chloraprep  Insertion/Injection        Technique: ultrasound guided        - Artery evaluated via U/S for patency/adequacy of catheter insertion is adequate, and using realtime U/S imaging the artery was punctured, and needle was observed entering artery on U/S       Catheter Type/Size: 20 G, 1.75 in/4.5 cm quick cath (integral wire)  Narrative        Tegaderm dressing used.       Complications: None apparent,        Arterial waveform: Yes        IBP within 10% of NIBP: Yes

## 2021-04-19 NOTE — ANESTHESIA PROCEDURE NOTES
Airway       Patient location during procedure: OR  Staff -        Anesthesiologist:  Domingo Meza MD       Performed By: anesthesiologist  Consent for Airway        Urgency: elective  Indications and Patient Condition       Indications for airway management: deanna-procedural         Mask difficulty assessment: 0 - not attempted    Final Airway Details       Final airway type: endotracheal airway       Successful airway: ETT - double lumen left  Endotracheal Airway Details        Cuffed: yes       Successful intubation technique: direct laryngoscopy       DL Blade Type: MAC 3       Grade View of Cords: 1       Adjucts: stylet       Position: Center       Measured from: gums/teeth       Bite block used: None       ETT Double lumen (fr): 35    Post intubation assessment        Placement verified by: capnometry, equal breath sounds and chest rise        Number of attempts at approach: 1       Number of other approaches attempted: 0       Secured with: pink tape       Ease of procedure: easy       Dentition: Unchanged and Intact    Medication(s) Administered   Medication Administration Time: 4/19/2021 10:44 AM

## 2021-04-19 NOTE — BRIEF OP NOTE
[facility]    Brief Operative Note    Pre-operative diagnosis: Solitary fibrous tumor [D49.2]  Post-operative diagnosis Same as pre-operative diagnosis    Procedure: Procedure(s):  Right Thoracotomy, excision of solitary fibrous tumor, intercostal nerve cryo ablation  Surgeon: Surgeon(s) and Role:     * Keli Webber MD - Primary     * Kristen Sanchez MD - Resident - Assisting      * Thomas Thomsa MD (Fellow)  Anesthesia: Combined General with Epidural    Estimated blood loss: 25 ml  Drains: Chest tube right chest  Specimens:   ID Type Source Tests Collected by Time Destination   A : Right Solitary Fibrous Tumor for Margins Tissue Other SURGICAL PATHOLOGY EXAM Keli Webber MD 4/19/2021 11:52 AM      Findings:   lung margins negative for tumor on frozen, see detailed operative report.  Complications: None.  Implants: * No implants in log *

## 2021-04-19 NOTE — PROGRESS NOTES
Surgery Progress Note  4/19/2021     Subjective:  Pain fairly controlled. Denies N/V, n/w/t, CP, SOB, tachypnea, tachycardia, irregular heart beat or F/C.     Objective:  Temp:  [97.8  F (36.6  C)-98.2  F (36.8  C)] 97.9  F (36.6  C)  Pulse:  [61-68] 64  Resp:  [12-18] 16  BP: ()/(39-74) 113/59  MAP:  [0 mmHg-108 mmHg] 66 mmHg  Arterial Line BP: (0-135)/(0-86) 89/49  SpO2:  [96 %-100 %] 96 %  I/O last 3 completed shifts:  In: 150 [I.V.:150]  Out: 200 [Urine:200]    Gen: Awake, alert, NAD   Resp: NLB on 2L NC. CT to -20, no AL   Abd: Soft, non-distended, appropriately tender  Ext: WWP  Dressing/Incision: C/d/i       A/P: Carol Guajardo is a 51 year old female with h/o anxiety, depression, HLD, hypothyroid, RLL s/p bx 2018 which showed solitary fibrous tumor. She is POD0 from R thoracotomy, excision of solitary fibrous tumor, intercostal nerve cryo ablation     - Pain control.   - Peguero to remain for tonight  - ADAT   - IS and OLIVIA Hannon MD  Surgery PGY-1

## 2021-04-19 NOTE — ANESTHESIA POSTPROCEDURE EVALUATION
Patient: Carol Guajardo    Procedure(s):  Right Thoracotomy, excision of solitary fibrous tumor, intercostal nerve cryo ablation    Diagnosis:Solitary fibrous tumor [D49.2]  Diagnosis Additional Information: No value filed.    Anesthesia Type:  General    Note:  Disposition: Admission   Postop Pain Control: Uneventful            Sign Out: Well controlled pain   PONV: No   Neuro/Psych: Uneventful            Sign Out: Acceptable/Baseline neuro status   Airway/Respiratory: Uneventful            Sign Out: Acceptable/Baseline resp. status   CV/Hemodynamics: Uneventful            Sign Out: Acceptable CV status   Other NRE: NONE   DID A NON-ROUTINE EVENT OCCUR? No         Last vitals:  Vitals:    04/19/21 1359 04/19/21 1400 04/19/21 1410   BP: 102/56 102/56 97/41   Pulse: 63 64 62   Resp: 14 13 12   Temp:      SpO2: 97% 97% 97%       Last vitals prior to Anesthesia Care Transfer:  CRNA VITALS  4/19/2021 1242 - 4/19/2021 1342      4/19/2021             Pulse:  65    SpO2:  96 %          Electronically Signed By: Micheal Marte MD  April 19, 2021  2:23 PM

## 2021-04-19 NOTE — PROGRESS NOTES
1423 Second page to thoracic team to review post op labs and cxr. Also clarify room status IMCU or med/surg. Awaiting return call.

## 2021-04-19 NOTE — ANESTHESIA PROCEDURE NOTES
Epidural catheter Procedure Note  Pre-Procedure   Staff -        Anesthesiologist:  Cristian Ch MD       Resident/Fellow: Shawn Stevens MD       Performed By: resident       Pre-Anesthestic Checklist: patient identified, IV checked, risks and benefits discussed, informed consent, monitors and equipment checked, pre-op evaluation, at physician/surgeon's request and post-op pain management  Timeout:       Correct Patient: Yes        Correct Procedure: Yes        Correct Site: Yes        Correct Position: Yes   Procedure Documentation  Procedure: epidural catheter       Patient Position: sitting       Patient Prep/Sterile Barriers: sterile gloves, mask, patient draped       Skin prep: DuraPrep       Local skin infiltrated with 3 mL of 1% lidocaine.        Insertion Site: T5-6. (right paramedian approach).       Technique: LORT saline        TICO at 6 cm.       Needle Type: Touhy needle       Needle Gauge: 17.        Needle Length (Inches): 3.5        Catheter: 19 G.         Catheter threaded easily.         4.5 cm epidural space.         Threaded 10.5 cm at skin.         # of attempts: 2 and  # of redirects:  0    Assessment/Narrative         Paresthesias: No.       Test dose of 3 mL lidocaine 1.5% w/ 1:200,000 epinephrine at 09:34.         Test dose negative, 3 minutes after injection, for signs of intravascular, subdural, or intrathecal injection.       Insertion/Infusion Method: LORT saline       Aspiration negative for Heme or CSF via Epidural Catheter.

## 2021-04-20 ENCOUNTER — APPOINTMENT (OUTPATIENT)
Dept: GENERAL RADIOLOGY | Facility: CLINIC | Age: 51
DRG: 164 | End: 2021-04-20
Attending: THORACIC SURGERY (CARDIOTHORACIC VASCULAR SURGERY)
Payer: COMMERCIAL

## 2021-04-20 ENCOUNTER — APPOINTMENT (OUTPATIENT)
Dept: PHYSICAL THERAPY | Facility: CLINIC | Age: 51
DRG: 164 | End: 2021-04-20
Attending: THORACIC SURGERY (CARDIOTHORACIC VASCULAR SURGERY)
Payer: COMMERCIAL

## 2021-04-20 LAB
ANION GAP SERPL CALCULATED.3IONS-SCNC: 8 MMOL/L (ref 3–14)
BUN SERPL-MCNC: 19 MG/DL (ref 7–30)
CALCIUM SERPL-MCNC: 8.8 MG/DL (ref 8.5–10.1)
CHLORIDE SERPL-SCNC: 100 MMOL/L (ref 94–109)
CO2 SERPL-SCNC: 28 MMOL/L (ref 20–32)
CREAT SERPL-MCNC: 0.63 MG/DL (ref 0.52–1.04)
ERYTHROCYTE [DISTWIDTH] IN BLOOD BY AUTOMATED COUNT: 13.4 % (ref 10–15)
GFR SERPL CREATININE-BSD FRML MDRD: >90 ML/MIN/{1.73_M2}
GLUCOSE SERPL-MCNC: 119 MG/DL (ref 70–99)
HCT VFR BLD AUTO: 39.5 % (ref 35–47)
HGB BLD-MCNC: 12.2 G/DL (ref 11.7–15.7)
MCH RBC QN AUTO: 30.7 PG (ref 26.5–33)
MCHC RBC AUTO-ENTMCNC: 30.9 G/DL (ref 31.5–36.5)
MCV RBC AUTO: 99 FL (ref 78–100)
PLATELET # BLD AUTO: 274 10E9/L (ref 150–450)
POTASSIUM SERPL-SCNC: 4.2 MMOL/L (ref 3.4–5.3)
RBC # BLD AUTO: 3.98 10E12/L (ref 3.8–5.2)
SODIUM SERPL-SCNC: 136 MMOL/L (ref 133–144)
WBC # BLD AUTO: 11.7 10E9/L (ref 4–11)

## 2021-04-20 PROCEDURE — 97530 THERAPEUTIC ACTIVITIES: CPT | Mod: GP

## 2021-04-20 PROCEDURE — 97110 THERAPEUTIC EXERCISES: CPT | Mod: GP

## 2021-04-20 PROCEDURE — 71045 X-RAY EXAM CHEST 1 VIEW: CPT | Mod: 26 | Performed by: RADIOLOGY

## 2021-04-20 PROCEDURE — 97161 PT EVAL LOW COMPLEX 20 MIN: CPT | Mod: GP

## 2021-04-20 PROCEDURE — 250N000011 HC RX IP 250 OP 636: Performed by: STUDENT IN AN ORGANIZED HEALTH CARE EDUCATION/TRAINING PROGRAM

## 2021-04-20 PROCEDURE — 999N000157 HC STATISTIC RCP TIME EA 10 MIN

## 2021-04-20 PROCEDURE — 80048 BASIC METABOLIC PNL TOTAL CA: CPT | Performed by: STUDENT IN AN ORGANIZED HEALTH CARE EDUCATION/TRAINING PROGRAM

## 2021-04-20 PROCEDURE — 120N000002 HC R&B MED SURG/OB UMMC

## 2021-04-20 PROCEDURE — 97116 GAIT TRAINING THERAPY: CPT | Mod: GP

## 2021-04-20 PROCEDURE — 99233 SBSQ HOSP IP/OBS HIGH 50: CPT | Performed by: ANESTHESIOLOGY

## 2021-04-20 PROCEDURE — 71045 X-RAY EXAM CHEST 1 VIEW: CPT

## 2021-04-20 PROCEDURE — 94668 MNPJ CHEST WALL SBSQ: CPT

## 2021-04-20 PROCEDURE — 250N000013 HC RX MED GY IP 250 OP 250 PS 637: Performed by: STUDENT IN AN ORGANIZED HEALTH CARE EDUCATION/TRAINING PROGRAM

## 2021-04-20 PROCEDURE — 36415 COLL VENOUS BLD VENIPUNCTURE: CPT | Performed by: STUDENT IN AN ORGANIZED HEALTH CARE EDUCATION/TRAINING PROGRAM

## 2021-04-20 PROCEDURE — 258N000003 HC RX IP 258 OP 636: Performed by: STUDENT IN AN ORGANIZED HEALTH CARE EDUCATION/TRAINING PROGRAM

## 2021-04-20 PROCEDURE — 85027 COMPLETE CBC AUTOMATED: CPT | Performed by: STUDENT IN AN ORGANIZED HEALTH CARE EDUCATION/TRAINING PROGRAM

## 2021-04-20 RX ADMIN — ENOXAPARIN SODIUM 40 MG: 100 INJECTION SUBCUTANEOUS at 08:21

## 2021-04-20 RX ADMIN — GABAPENTIN 600 MG: 300 CAPSULE ORAL at 21:02

## 2021-04-20 RX ADMIN — DOCUSATE SODIUM 50 MG AND SENNOSIDES 8.6 MG 1 TABLET: 8.6; 5 TABLET, FILM COATED ORAL at 08:20

## 2021-04-20 RX ADMIN — BUPROPION HYDROCHLORIDE 300 MG: 150 TABLET, FILM COATED, EXTENDED RELEASE ORAL at 08:20

## 2021-04-20 RX ADMIN — PAROXETINE HYDROCHLORIDE 60 MG: 10 TABLET, FILM COATED ORAL at 08:21

## 2021-04-20 RX ADMIN — OXYCODONE HYDROCHLORIDE 5 MG: 5 TABLET ORAL at 16:49

## 2021-04-20 RX ADMIN — PANTOPRAZOLE SODIUM 40 MG: 40 TABLET, DELAYED RELEASE ORAL at 08:20

## 2021-04-20 RX ADMIN — PRAMIPEXOLE DIHYDROCHLORIDE 1 MG: 0.5 TABLET ORAL at 22:02

## 2021-04-20 RX ADMIN — BUPIVACAINE HYDROCHLORIDE: 7.5 INJECTION, SOLUTION EPIDURAL; RETROBULBAR at 15:57

## 2021-04-20 RX ADMIN — GABAPENTIN 600 MG: 300 CAPSULE ORAL at 08:20

## 2021-04-20 RX ADMIN — ACETAMINOPHEN 975 MG: 325 TABLET, FILM COATED ORAL at 02:01

## 2021-04-20 RX ADMIN — ACETAMINOPHEN 975 MG: 325 TABLET, FILM COATED ORAL at 08:19

## 2021-04-20 RX ADMIN — ACETAMINOPHEN 975 MG: 325 TABLET, FILM COATED ORAL at 16:49

## 2021-04-20 RX ADMIN — LIDOCAINE 1 PATCH: 560 PATCH PERCUTANEOUS; TOPICAL; TRANSDERMAL at 08:18

## 2021-04-20 RX ADMIN — POLYETHYLENE GLYCOL 3350 17 G: 17 POWDER, FOR SOLUTION ORAL at 08:21

## 2021-04-20 RX ADMIN — OXYCODONE HYDROCHLORIDE 5 MG: 5 TABLET ORAL at 21:02

## 2021-04-20 RX ADMIN — DOCUSATE SODIUM 50 MG AND SENNOSIDES 8.6 MG 1 TABLET: 8.6; 5 TABLET, FILM COATED ORAL at 21:03

## 2021-04-20 RX ADMIN — OXYCODONE HYDROCHLORIDE 10 MG: 10 TABLET ORAL at 12:43

## 2021-04-20 RX ADMIN — LEVOTHYROXINE SODIUM 150 MCG: 75 TABLET ORAL at 08:20

## 2021-04-20 RX ADMIN — GABAPENTIN 600 MG: 300 CAPSULE ORAL at 16:01

## 2021-04-20 ASSESSMENT — MIFFLIN-ST. JEOR: SCORE: 1547.44

## 2021-04-20 ASSESSMENT — ACTIVITIES OF DAILY LIVING (ADL)
ADLS_ACUITY_SCORE: 14

## 2021-04-20 NOTE — PLAN OF CARE
"BP 94/49 (BP Location: Right arm)   Pulse 68   Temp 97.3  F (36.3  C) (Oral)   Resp 16   Ht 1.537 m (5' 0.5\")   Wt 100.3 kg (221 lb 1.9 oz)   LMP 04/04/2021   SpO2 94%   BMI 42.47 kg/m      Neuro: A&Ox4; calls appropriately  Cardiac: blood pressures continue to be soft. Team aware. Anesthesia aware.        Respiratory: sats mid 90s on 1L NC. SOB upon exertion.   GI/: +faint BS, not passing flatus. Due to void.    Diet: Regular diet, tolerating well.   Pain: C/O incisional site pain and chest tube site pain. Anesthesia was going to give bolus this afternoon but d/t soft blood pressures; bolus was not given. PRN oral oxycodone given with some relief.   Lines: Epidural infusing PCEA infusing at continuous 6 mL/hr with bumps available 2 mL every 30 mins.   IV Access: (L) and (R) PIV.   Activity: Up with assist of 1.   Plan:   Per anesthesia; stop and power down epidural pump at 0400 tomorrow (4/21). if pain is well controlled, RAPS will remove epidural catheter.   "

## 2021-04-20 NOTE — PROGRESS NOTES
REGIONAL ANESTHESIA PAIN SERVICE EPIDURAL NOTE  Carol Guajardo is a 51 year old female with h/o anxiety, depression, HLD, hypothyroid, RLL s/p bx 2018 which showed solitary fibrous tumor who is now POD#1 s/p Right thoracotomy, excision of solitary fibrous tumor, intercostal nerve cryo ablation on 4/19/2021.  Prior to surgery RAPS placed epidural T5-6 catheter on 4/19/21 for analgesia.      Subjective and Interval History Overnight events: no acute. Patient seen at 0920, and at that time she reported  using PCEA with epidural infusion to control pain and pain intensity 2-3/10 at rest, 6/10 with movement. Denies LE weakness, paresthesias, circumoral numbness, metallic taste, tinnitus, nausea, pruritis. Initially our service talked with patient about changing epidural infusion rate and PCEA dose, then patient seen around 1200 after her transfer from  to  with her BPs 90s/40s, so discussed with her that no change in epidural rate due to soft BPs - she agrees with plan.      Antithrombotic/Thrombolytic Therapy ordered:    enoxaparin ANTICOAGULANT (LOVENOX) injection 40 mg, SC, Q24H         Analgesic Medications:  Medications related to Pain Management (From now, onward)    Start     Dose/Rate Route Frequency Ordered Stop    04/22/21 0000  acetaminophen (TYLENOL) tablet 650 mg      650 mg Oral EVERY 4 HOURS PRN 04/19/21 1633      04/20/21 0800  polyethylene glycol (MIRALAX) Packet 17 g      17 g Oral DAILY 04/19/21 1633      04/20/21 0800  Lidocaine (LIDOCARE) 4 % Patch 1 patch      1 patch  over 12 Hours Transdermal EVERY 24 HOURS 0800 04/19/21 1731      04/19/21 2000  gabapentin (NEURONTIN) capsule 600 mg      600 mg Oral 3 TIMES DAILY 04/19/21 1731      04/19/21 2000  senna-docusate (SENOKOT-S/PERICOLACE) 8.6-50 MG per tablet 1 tablet      1 tablet Oral 2 TIMES DAILY 04/19/21 1633      04/19/21 1800  lidocaine patch in PLACE       Transdermal EVERY 8 HOURS 04/19/21 1731      04/19/21 1700  acetaminophen (TYLENOL)  "tablet 975 mg      975 mg Oral EVERY 8 HOURS 04/19/21 1633 04/22/21 1659    04/19/21 1633  HYDROmorphone (DILAUDID) injection 0.2 mg      0.2 mg Intravenous EVERY 2 HOURS PRN 04/19/21 1633      04/19/21 1633  HYDROmorphone (PF) (DILAUDID) injection 0.4 mg      0.4 mg Intravenous EVERY 2 HOURS PRN 04/19/21 1633      04/19/21 1633  oxyCODONE (ROXICODONE) tablet 5 mg      5 mg Oral EVERY 4 HOURS PRN 04/19/21 1633      04/19/21 1633  oxyCODONE IR (ROXICODONE) tablet 10 mg      10 mg Oral EVERY 4 HOURS PRN 04/19/21 1633      04/19/21 1633  bisacodyl (DULCOLAX) Suppository 10 mg      10 mg Rectal DAILY PRN 04/19/21 1633      04/19/21 1000  HYDROmorphone (DILAUDID) 6 mcg/mL, bupivacaine (MARCAINE) 0.125 % in sodium chloride 0.9 % 250 mL EPIDURAL PCEA (patient controlled epidural)       EPIDURAL PCA 04/19/21 0947      04/19/21 0944  nalbuphine (NUBAIN) injection 2.5-5 mg      2.5-5 mg Intravenous EVERY 6 HOURS PRN 04/19/21 0947             Objective:  Lab Results:   Recent Labs   Lab Test 04/20/21  0459   WBC 11.7*   RBC 3.98   HGB 12.2   HCT 39.5   MCV 99   MCH 30.7   MCHC 30.9*   RDW 13.4          No results found for: INR    Vitals:    Temp:  [97.3  F (36.3  C)-97.9  F (36.6  C)] 97.3  F (36.3  C)  Pulse:  [63-74] 68  Resp:  [13-26] 16  BP: ()/(39-61) 94/49  MAP:  [66 mmHg] 66 mmHg  Arterial Line BP: (89)/(49) 89/49  SpO2:  [94 %-98 %] 94 %  BP 94/49 (BP Location: Right arm)   Pulse 68   Temp 97.3  F (36.3  C) (Oral)   Resp 16   Ht 1.537 m (5' 0.5\")   Wt 100.3 kg (221 lb 1.9 oz)   LMP 04/04/2021   SpO2 94%   BMI 42.47 kg/m         Exam:   GEN: alert and no distress  NEURO/MSK: Strength BLE 5/5  and overall symmetric  SKIN: Epidural catheter site with dressing c/d/i, no tenderness, erythema, heme, edema     Assessment and Plan:  ASSESSMENT  Patient is receiving moderate analgesia with current multimodal therapy including epidural Dilaudid 6 mcg/mL and Bupivacaine 0.125% at 6 mL/hr with PCEA 2 mL Q 30 " min as needed.  Motor function intact and meeting activity goals.  No evidence of adverse side effects related to local anesthetic. Adequate urine output.  Contacted by primary service at 1430. They would like epidural out tomorrow AM so they can evaluate pain control over at least 24 hours prior to discharge.    PLAN  Catheter Day # 1 epidural T5-6 catheter    Continue current infusion Dilaudid 6 mcg/mL and Bupivacaine 0.125% at 6 mL/hr with PCEA 2 mL Q 30 min as needed until tomorrow AM at the request of Thoracic Surgery Service. Plan for tomorrow AM:    o Bedside RN will stop and power down epidural pump at 0400 tomorrow 4/21/21.  If pain is well controlled at 0800, RAPS will remove epidural catheter.    o Hold tomorrow's enoxaparin dose until at least 4 hours after epidural catheter removal  o Patient aware of plan.    o Please contact RAPS #[phone] to resume infusion if patient experiences uncontrolled pain with oral meds.  o     Antithrombotic/thrombolytic therapy:    As ordered per primary service.     Please contact [facility] (#[phone]) prior to any medication changes    Follow up:      [facility]APS will continue to follow and adjust as needed    - discussed plan with attending anesthesiologist    ARNULFO Fulton [facility]  Regional Anesthesia Pain Service  4/20/2021 2:17 PM    RAPS Contact Info (24 hour job code pager is the last 4 digits) For in-house use only:   Job code ID: [id number]   [facility] 0599  Peds [phone]  [facility] phone: dial * * * [phone], enter jobcode ID, then enter call-back number.    Text: Use [employer] on the Intranet <Paging/Directory> tab and enter Jobcode ID.   If no call back at any time, contact the hospital  and ask for RAPS attending or backup

## 2021-04-20 NOTE — PROGRESS NOTES
04/20/21 1001   Quick Adds   Type of Visit Initial PT Evaluation   Living Environment   People in home spouse;child(farzana), adult   Current Living Arrangements house   Home Accessibility stairs to enter home;stairs within home   Number of Stairs, Main Entrance 5   Stair Railings, Main Entrance railings on both sides of stairs   Number of Stairs, Within Home, Primary 10   Stair Railings, Within Home, Primary railings on both sides of stairs   Transportation Anticipated car, drives self;family or friend will provide   Living Environment Comments Pt lives with  and adult children. 5 stairs to enter home, all needs met on main level though stairs present down to basement.    Self-Care   Usual Activity Tolerance good   Current Activity Tolerance moderate   Regular Exercise Yes   Activity/Exercise Type other (see comments)  (stretching)   Exercise Amount/Frequency 3-5 times/wk   Equipment Currently Used at Home none   Activity/Exercise/Self-Care Comment Pt independent with functional mobility and ADLs. 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 Active and on her feet at work all day, completes stretching/strengthening at work everyday.   Disability/Function   Hearing Difficulty or Deaf no   Wear Glasses or Blind yes   Vision Management glasses   Concentrating, Remembering or Making Decisions Difficulty no   Difficulty Communicating no   Difficulty Eating/Swallowing no   Walking or Climbing Stairs Difficulty no   Dressing/Bathing Difficulty no   Toileting issues no   Doing Errands Independently Difficulty (such as shopping) no   Fall history within last six months no   Change in Functional Status Since Onset of Current Illness/Injury yes   General Information   Onset of Illness/Injury or Date of Surgery 04/19/21   Referring Physician Thomas Thomas MD   Patient/Family Therapy Goals Statement (PT) to return home   Pertinent History of Current Problem (include personal factors and/or comorbidities that  impact the POC) Per chart, Carol Guajardo is a 51 year old female with h/o anxiety, depression, HLD, hypothyroid, RLL s/p bx 2018 which showed solitary fibrous tumor. She had R thoracotomy, excision of solitary fibrous tumor, intercostal nerve cryo ablation on 4/19/2021. Her pain is well controlled with current regimen.   Existing Precautions/Restrictions fall;thoracotomy  (R thoracotomy)   Weight-Bearing Status - RUE   (<10#)   General Observations Activity orders: ambulate with assist   Cognition   Orientation Status (Cognition) oriented x 4   Affect/Mental Status (Cognition) WFL   Follows Commands (Cognition) WFL   Integumentary/Edema   Integumentary/Edema no deficits were identifed   Posture    Posture Forward head position;Protracted shoulders   Range of Motion (ROM)   ROM Quick Adds ROM WFL   ROM Comment R UE limited 2/2 pain   Strength   Manual Muscle Testing Quick Adds Strength WFL   Bed Mobility   Comment (Bed Mobility) Rosalie supine>sit with HOB elevated   Transfers   Transfer Safety Comments SBA sit<>stand   Gait/Stairs (Locomotion)   Comment (Gait/Stairs) SBA with IV pole support   Balance   Balance no deficits were identified   Sensory Examination   Sensory Perception patient reports no sensory changes   Clinical Impression   Criteria for Skilled Therapeutic Intervention yes, treatment indicated   PT Diagnosis (PT) Impaired functional mobility   Influenced by the following impairments pain, R thoracotomy precautions, decreased activity tolerance   Functional limitations due to impairments Pt requires assist for functional mobility within post-op precautions   Clinical Presentation Stable/Uncomplicated   Clinical Presentation Rationale PMH and clinical judgment   Clinical Decision Making (Complexity) low complexity   Therapy Frequency (PT) 5x/week   Predicted Duration of Therapy Intervention (days/wks) 1 week   Planned Therapy Interventions (PT) balance training;bed mobility training;gait training;home  exercise program;ROM (range of motion);stair training;strengthening;transfer training   Anticipated Equipment Needs at Discharge (PT)   (TBD)   Risk & Benefits of therapy have been explained evaluation/treatment results reviewed;care plan/treatment goals reviewed;risks/benefits reviewed;current/potential barriers reviewed;participants voiced agreement with care plan;participants included;patient;spouse/significant other   PT Discharge Planning    PT Discharge Recommendation (DC Rec) home with assist   PT Rationale for DC Rec Pt mobilizing well POD1, anticipate she will be safe to return home when medically stable, has good support of family at home.   PT Brief overview of current status  SBA, encourage pt to ambulate at least 3-4x/day   Total Evaluation Time   Total Evaluation Time (Minutes) 10

## 2021-04-20 NOTE — PHARMACY-ADMISSION MEDICATION HISTORY
Admission Medication History Completed by Pharmacy    See [facility] Admission Navigator for allergy information, preferred outpatient pharmacy, prior to admission medications and immunization status.     Medication History Sources:     Pre-op med history completed by pharmacist in pre-op assessment center (PAC); see note 4/14/2021    Last doses documented by nurse prior to procedure    Changes made to PTA medication list (reason):    Added: None    Deleted: None    Changed: None    Additional Information:    No medication changes since seen in PAC     Prior to Admission medications    Medication Sig Last Dose Taking? Auth Provider   buPROPion (WELLBUTRIN XL) 300 MG 24 hr tablet Take 1 tablet (300 mg) by mouth every morning 4/18/2021 at [id number] time Yes Barbara Kirby APRN CNP   diazepam (VALIUM) 2 MG tablet Take 1 tablet (2 mg) by mouth every 8 hours as needed for anxiety 4/18/2021 at 0900 Yes Barbara Kirby APRN CNP   levothyroxine (SYNTHROID/LEVOTHROID) 150 MCG tablet Take 1 tablet (150 mcg) by mouth every morning 4/18/2021 at 0900time Yes Barbara Kirby APRN CNP   PARoxetine (PAXIL) 20 MG tablet Take 20 mg by mouth every morning Take with the 40 mg tablet for total daily dose of 60 mg in the morning. 4/18/2021 at 0900time Yes Reported, Patient   PARoxetine (PAXIL) 40 MG tablet Take 40 mg by mouth every morning Take with the 20 mg tablet for total daily dose of 60 mg in the morning. 4/18/2021 at [id number] time Yes Reported, Patient   pramipexole (MIRAPEX) 0.5 MG tablet Take 2 tablets (1 mg) by mouth At Bedtime 4/18/2021 at [id number] time Yes Barbara Kirby APRN CNP   propranolol (INDERAL LA) 60 MG 24 hr capsule Take 60 mg by mouth every morning  4/18/2021 at [id number] time Yes Reported, Patient   gabapentin (NEURONTIN) 300 MG capsule Take 2 capsules (600 mg) by mouth 3 times daily  Patient taking differently: Take 600 mg by mouth 2 times daily  4/18/2021 at 0900  Barbara Kirby APRN  CNP       Date completed: 04/20/21    Medication history completed by: JAZZY LIU [facility]

## 2021-04-20 NOTE — PROGRESS NOTES
"THORACIC SURGERY PROGRESS NOTE     S: still in pain but working on IS and will try to get out of bed today    O:   /59 (BP Location: Right arm)   Pulse 69   Temp 97.9  F (36.6  C) (Oral)   Resp 17   Ht 1.537 m (5' 0.5\")   Wt 100.3 kg (221 lb 1.9 oz)   LMP 04/04/2021   SpO2 95%   BMI 42.47 kg/m      GEN: NAD  CV: WWP, Non-cyanotic   RESP: Nonlabored breathing on 2L NC. CT to WS   ABD: soft, non-tender, without guarding or rebound tenderness, incisions c/d/i.   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 She had R thoracotomy, excision of solitary fibrous tumor, intercostal nerve cryo ablation on 4/19/2021. Her pain is well controlled with current regimen.       Neuro: dilaudid epidural PCEA, tylenol, krunal, lido patch, prn dilaudid and oxy  CV: no issues   Resp: 2L NC. CT to WS today  GI: miralax and senna. Regular diet  : Peguero out today  Fluids/Lytes: 100ml/h. Change to 50ml/h when taking PO (ordered)  ID: no abx  Endo: no ISS  PPx: lovenox start today  Dispo: 7B    Aishwarya Hannon MD  General Surgery PGY-1      "

## 2021-04-20 NOTE — PLAN OF CARE
Neuro: A&Ox4.   Cardiac: SR. VSS.    Respiratory: Sating >90 on 1-2L.  GI/: Adequate urine output via tobar. No BM.   Diet/appetite: Tolerating regular diet. Eating well.  Activity:  Assist of 1-2.  Pain: At acceptable level on current regimen. Bupivacaine/dilaudud PCA epidural.   Skin: No new deficits noted.  LDA's: R CT, -20 suc. 2 PIV. Tobar.     Plan: Continue with POC. Notify primary team with changes.

## 2021-04-20 NOTE — PLAN OF CARE
Admission          4/19/2021  7:24 AM  -----------------------------------------------------------  Reason for admission: Solitary fibrosis tumor, s/p R thoracotomy   Primary team notified of pt arrival.  Admitted from: PACU  Via: stretcher  Accompanied by: family  Belongings: Placed in closet; valuables sent home with family  Admission Profile: complete  Teaching: orientation to unit and call light- call light within reach, call don't fall, use of console, meal times, when to call for the RN, and enforced importance of safety   Access: PIV x2  Telemetry:Placed on pt  Ht./Wt.: complete  Code Status verified on armband: yes  2 RN Skin Assessment Completed By: Andie DOUGLAS  Med Rec completed: in progress  Bed surface reassessed with algorithm and charted: yes  New bed surface ordered: no  Suction/Ambu bag/Flowmeter at bedside: yes    Pt status:  VSS.   Temp:  [97.8  F (36.6  C)-98.2  F (36.8  C)] 97.9  F (36.6  C)  Pulse:  [61-74] 74  Resp:  [12-25] 25  BP: ()/(39-74) 100/47  MAP:  [0 mmHg-108 mmHg] 66 mmHg  Arterial Line BP: (0-135)/(0-86) 89/49  SpO2:  [95 %-100 %] 95 %

## 2021-04-20 NOTE — PHARMACY
Medication wasted: Hydromorphone (Dilaudid) 6 mcg/mL, bupivacaine (Marcaine) 0.125% in sodium chloride 0.9%  Amount wasted: 9.2 mL  Witness: Mishel Donovan RN      Waste slip sent down to pharmacy.

## 2021-04-20 NOTE — PLAN OF CARE
Neuro: A&Ox4.   Cardiac: SR. VSS.   Respiratory: Sating >92% on 1-2L.  GI/: Peguero out at 0730 - due to void. No BM yet.   Diet/appetite: Tolerating Regular diet. Eating well.  Activity:  Assist of 1, up to chair and in halls.  Pain: At acceptable level on current regimen. Epidural in place per MAR  Skin: No new deficits noted.  LDA's: Epidural, PIVx2    Plan: Continue with POC. Notify primary team with changes.    Transfer  Transferred to:   Via:wheelchair  Reason for transfer:Pt no longer appropriate for 6B- improved patient condition  Family: Aware of transfer  Belongings: Packed and sent with pt  Chart: Delivered with pt to next unit  Medications: Meds sent to new unit with pt  Report given to: BREANNE Pierce  Pt status: Pt alert and oriented. VSS. Epidural in place. Up with 1.  at bedside. All questions answered and concerns addressed.

## 2021-04-21 ENCOUNTER — PATIENT OUTREACH (OUTPATIENT)
Dept: CARE COORDINATION | Facility: CLINIC | Age: 51
End: 2021-04-21

## 2021-04-21 ENCOUNTER — APPOINTMENT (OUTPATIENT)
Dept: GENERAL RADIOLOGY | Facility: CLINIC | Age: 51
DRG: 164 | End: 2021-04-21
Attending: THORACIC SURGERY (CARDIOTHORACIC VASCULAR SURGERY)
Payer: COMMERCIAL

## 2021-04-21 ENCOUNTER — APPOINTMENT (OUTPATIENT)
Dept: PHYSICAL THERAPY | Facility: CLINIC | Age: 51
DRG: 164 | End: 2021-04-21
Attending: THORACIC SURGERY (CARDIOTHORACIC VASCULAR SURGERY)
Payer: COMMERCIAL

## 2021-04-21 VITALS
BODY MASS INDEX: 41.35 KG/M2 | RESPIRATION RATE: 18 BRPM | OXYGEN SATURATION: 93 % | HEIGHT: 61 IN | WEIGHT: 219 LBS | SYSTOLIC BLOOD PRESSURE: 130 MMHG | DIASTOLIC BLOOD PRESSURE: 57 MMHG | TEMPERATURE: 97.3 F | HEART RATE: 85 BPM

## 2021-04-21 PROCEDURE — 71045 X-RAY EXAM CHEST 1 VIEW: CPT

## 2021-04-21 PROCEDURE — 97530 THERAPEUTIC ACTIVITIES: CPT | Mod: GP | Performed by: PHYSICAL THERAPIST

## 2021-04-21 PROCEDURE — 71045 X-RAY EXAM CHEST 1 VIEW: CPT | Mod: 26 | Performed by: RADIOLOGY

## 2021-04-21 PROCEDURE — 31624 DX BRONCHOSCOPE/LAVAGE: CPT | Performed by: THORACIC SURGERY (CARDIOTHORACIC VASCULAR SURGERY)

## 2021-04-21 PROCEDURE — 97116 GAIT TRAINING THERAPY: CPT | Mod: GP | Performed by: PHYSICAL THERAPIST

## 2021-04-21 PROCEDURE — 250N000013 HC RX MED GY IP 250 OP 250 PS 637: Performed by: STUDENT IN AN ORGANIZED HEALTH CARE EDUCATION/TRAINING PROGRAM

## 2021-04-21 PROCEDURE — 250N000009 HC RX 250: Performed by: PHYSICIAN ASSISTANT

## 2021-04-21 PROCEDURE — 999N000099 HC STATISTIC MODERATE SEDATION < 10 MIN: Performed by: THORACIC SURGERY (CARDIOTHORACIC VASCULAR SURGERY)

## 2021-04-21 PROCEDURE — 0B978ZZ DRAINAGE OF LEFT MAIN BRONCHUS, VIA NATURAL OR ARTIFICIAL OPENING ENDOSCOPIC: ICD-10-PCS | Performed by: PHYSICIAN ASSISTANT

## 2021-04-21 PROCEDURE — 71045 X-RAY EXAM CHEST 1 VIEW: CPT | Mod: 77

## 2021-04-21 RX ORDER — OXYCODONE HYDROCHLORIDE 5 MG/1
5 TABLET ORAL EVERY 4 HOURS PRN
Qty: 40 TABLET | Refills: 0 | Status: SHIPPED | OUTPATIENT
Start: 2021-04-21 | End: 2021-04-26

## 2021-04-21 RX ORDER — LIDOCAINE HYDROCHLORIDE 40 MG/ML
INJECTION, SOLUTION RETROBULBAR PRN
Status: COMPLETED | OUTPATIENT
Start: 2021-04-21 | End: 2021-04-21

## 2021-04-21 RX ORDER — POLYETHYLENE GLYCOL 3350 17 G/17G
17 POWDER, FOR SOLUTION ORAL DAILY
Qty: 7 PACKET | Refills: 0 | Status: SHIPPED | OUTPATIENT
Start: 2021-04-21 | End: 2021-07-21

## 2021-04-21 RX ORDER — AMOXICILLIN 250 MG
1 CAPSULE ORAL 2 TIMES DAILY
Qty: 14 TABLET | Refills: 0 | Status: SHIPPED | OUTPATIENT
Start: 2021-04-21 | End: 2021-07-21

## 2021-04-21 RX ORDER — ACETAMINOPHEN 500 MG
1000 TABLET ORAL EVERY 6 HOURS
COMMUNITY
Start: 2021-04-21 | End: 2021-07-21

## 2021-04-21 RX ADMIN — OXYCODONE HYDROCHLORIDE 10 MG: 10 TABLET ORAL at 05:42

## 2021-04-21 RX ADMIN — LIDOCAINE HYDROCHLORIDE 9 ML: 40 INJECTION, SOLUTION RETROBULBAR; TOPICAL at 14:52

## 2021-04-21 RX ADMIN — PAROXETINE HYDROCHLORIDE 60 MG: 10 TABLET, FILM COATED ORAL at 08:02

## 2021-04-21 RX ADMIN — ACETAMINOPHEN 975 MG: 325 TABLET, FILM COATED ORAL at 00:27

## 2021-04-21 RX ADMIN — OXYCODONE HYDROCHLORIDE 10 MG: 10 TABLET ORAL at 15:09

## 2021-04-21 RX ADMIN — GABAPENTIN 600 MG: 300 CAPSULE ORAL at 08:02

## 2021-04-21 RX ADMIN — OXYCODONE HYDROCHLORIDE 10 MG: 10 TABLET ORAL at 10:18

## 2021-04-21 RX ADMIN — PANTOPRAZOLE SODIUM 40 MG: 40 TABLET, DELAYED RELEASE ORAL at 08:02

## 2021-04-21 RX ADMIN — BUPROPION HYDROCHLORIDE 300 MG: 150 TABLET, FILM COATED, EXTENDED RELEASE ORAL at 08:02

## 2021-04-21 RX ADMIN — POLYETHYLENE GLYCOL 3350 17 G: 17 POWDER, FOR SOLUTION ORAL at 08:02

## 2021-04-21 RX ADMIN — TOPICAL ANESTHETIC 1 SPRAY: 200 SPRAY DENTAL; PERIODONTAL at 14:49

## 2021-04-21 RX ADMIN — LIDOCAINE 1 PATCH: 560 PATCH PERCUTANEOUS; TOPICAL; TRANSDERMAL at 08:01

## 2021-04-21 RX ADMIN — Medication: at 04:00

## 2021-04-21 RX ADMIN — ACETAMINOPHEN 975 MG: 325 TABLET, FILM COATED ORAL at 16:34

## 2021-04-21 RX ADMIN — ACETAMINOPHEN 975 MG: 325 TABLET, FILM COATED ORAL at 08:02

## 2021-04-21 RX ADMIN — LEVOTHYROXINE SODIUM 150 MCG: 75 TABLET ORAL at 08:02

## 2021-04-21 RX ADMIN — DOCUSATE SODIUM 50 MG AND SENNOSIDES 8.6 MG 1 TABLET: 8.6; 5 TABLET, FILM COATED ORAL at 08:02

## 2021-04-21 RX ADMIN — LIDOCAINE HYDROCHLORIDE 5 ML: 40 INJECTION, SOLUTION RETROBULBAR; TOPICAL at 14:35

## 2021-04-21 ASSESSMENT — ACTIVITIES OF DAILY LIVING (ADL)
ADLS_ACUITY_SCORE: 14
ADLS_ACUITY_SCORE: 15
ADLS_ACUITY_SCORE: 15
ADLS_ACUITY_SCORE: 14
ADLS_ACUITY_SCORE: 14

## 2021-04-21 ASSESSMENT — MIFFLIN-ST. JEOR: SCORE: 1537.82

## 2021-04-21 NOTE — PLAN OF CARE
"BP (!) 104/32 (BP Location: Right arm)   Pulse 76   Temp 97.4  F (36.3  C) (Oral)   Resp 20   Ht 1.537 m (5' 0.5\")   Wt 100.3 kg (221 lb 1.9 oz)   LMP 04/04/2021   SpO2 97%   BMI 42.47 kg/m      Status: POD#2 s/p R thoracotomy  Activity: Ax1 + gb  Neuros: A&Ox4, no deficits noted.  Cardiac: WDL, denies chest pain.  Respiratory: WDL on 1L NC, denies SOB.  GI/: +BS, voids spont with AUOP.  Diet: Tolerating regular diet.  Skin/Incisions: WDL ex R chest incision derma-bonded, CDI/PREET.  Lines/Drains: R CT to ws, leaking at site, dressing changed x1. L PIV SL, R PIV running TKO. Epidural site CDI.  Pain/Nausea: Denies; epidural stopped and pump turned off at 0400, pain managed so far with PRN oxycodone. Denies nausea.  New Changes: Epidural turned off per orders, pain managed adequately so far.  Plan: Continue to monitor and follow POC.    "

## 2021-04-21 NOTE — PLAN OF CARE
"BP (!) 142/62 (BP Location: Right arm)   Pulse 86   Temp 97.1  F (36.2  C) (Oral)   Resp 18   Ht 1.537 m (5' 0.5\")   Wt 100.3 kg (221 lb 1.9 oz)   LMP 04/04/2021   SpO2 93%   BMI 42.47 kg/m      Neuro: A&Ox4; calls appropriately  Cardiac: blood pressures continue to be soft. Team aware. Anesthesia aware.        Respiratory: sats mid 90s on RA. SOB upon exertion.   GI/: +BS, +passing flatus. Voiding adequate amounts.   Diet: Regular diet, tolerating well.   Pain: C/O incisional site pain and chest tube site pain. Currently managed with PRN oxycodone.   IV Access: (L) PIV- saline locked .   Activity: Up with stand by assist.  New changes this shift: Epidural discontinued. Pt went down to endoscopy for bronchoscopy.    Plan: Possible discharge this evening.   "

## 2021-04-21 NOTE — PLAN OF CARE
Visited with pt per consult order  Breath sounds decreased=no wheezing, rhonchi  Pt given flutter valve with instructions  Will discontinue chest physiotherapy order  Bedside nurse aware of the order

## 2021-04-21 NOTE — DISCHARGE SUMMARY
THORACIC SURGERY BEDSIDE PROCEDURE   NAME: Carol Guajardo   MRN: [id number]  : 1970    Diagnosis:  Atelectasis    Procedure: Flexible bronchoscopy     Specimen: None sent    Premedication: Lidocaine nebulization, hurricaine spray to oropharynx   Procedure Meds:  4% topical lidocaine solution at the vocal folds, 1% topical lidocaine solution at the dianne    Procedure: A timeout was called to review the case and patient information. The patient was positioned supine. The vocal folds were passed without difficulty appeared to be achieving appropriate apposition.  Bilateral tracheobronchial trees were inspected closely to the level of the subsegmental bronchi.  Secretions were found to be minimal, with one non-obstructive and mobile mucus plug in the left mainstem.  This was lavaged and evacuated without difficulty. The samples were not sent for BAL. The procedure was completed and the patient tolerated the procedure well and without complications.      Plan: OK for dispo when criteria met, no plan to repeat bronch    Dr. Staley was available for assistance throughout the entire procedure.      Oscar Henry PA-C

## 2021-04-21 NOTE — PROGRESS NOTES
"THORACIC SURGERY PROGRESS NOTE     S: passing gas but no BM. Able to walk with PT. Back on o2 after going to bathroom this AM.    O:   BP (!) 104/32 (BP Location: Right arm)   Pulse 76   Temp 97.4  F (36.3  C) (Oral)   Resp 20   Ht 1.537 m (5' 0.5\")   Wt 100.3 kg (221 lb 1.9 oz)   LMP 04/04/2021   SpO2 97%   BMI 42.47 kg/m      GEN: NAD  CV: WWP, Non-cyanotic   RESP: Nonlabored breathing on 2L NC. CT to WS   ABD: soft, non-tender, without guarding or rebound tenderness, incisions c/d/i.   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 She had R thoracotomy, excision of solitary fibrous tumor, intercostal nerve cryo ablation on 4/19/2021. Her pain is well controlled with current regimen.       Neuro: dilaudid epidural PCEA DC, tylenol, krunal, lido patch, prn dilaudid and oxy  CV: no issues   Resp: 2L NC. CT to WS today  GI: miralax and senna. Regular diet  : Peguero out   Fluids/Lytes: 100ml/h. Change to 50ml/h when taking PO (ordered)  ID: no abx  Endo: no ISS  PPx: lovenox start today  Dispo: 7B, home tomorrow if off O2 and pain controlled    Aishwarya Hannon MD  General Surgery PGY-1      "

## 2021-04-21 NOTE — OP NOTE
Procedure Date: 04/19/2021    PREOPERATIVE DIAGNOSIS:  Right solitary fibrous tumor.    POSTOPERATIVE DIAGNOSIS:  Right solitary fibrous tumor.    PROCEDURES PERFORMED:    1.  Flexible bronchoscopy.  2.  Right posterolateral thoracotomy.  3.  Excision of right solitary fibrous tumor.  4.  Intercostal nerve cryoablation.    ANESTHESIA:  General endotracheal anesthesia with double-lumen endotracheal tube for single lung ventilation.      SURGEON:  Francesco Johnson MD  ASSISTING FOR BRONCHOSCOPY: Oscar Henry PA-C     ASSISTANTS:    1.  Nathen Thomas, Cardiothoracic Surgery Fellow.  2.  Amy Sanchez, General Surgery Resident.    COMPLICATIONS:  None.    ESTIMATED BLOOD LOSS:  60 mL     DRAINS/TUBES: A 28-[place] straight [place] tube to the right pleural space and directed posterior apical.    SPECIMENS:  Right solitary fibrous tumor    OPERATIVE FINDINGS:    1.  Bronchoscopy:  The patient had no evidence of endobronchial lesions.  She did have evidence of external compression.  2.  Thoracotomy:  No pleural effusion or mediastinal adenopathy.  The patient had a very large solitary fibrous tumor approximately measuring 12 to 15 cm.  This was a pedunculated tumor attached to the right lower lobe and there were minor adhesions to the right middle lobe as well.  We resected the tumor en bloc with a wedge of the right lower lobe.  The margins were frozen and negative for malignancy.    DESCRIPTION OF PROCEDURE IN DETAIL:  The patient was taken to the operating room and laid supine.  General anesthesia was induced.  She was positioned with a left lateral decubitus position with the right side up.  The right chest was prepped with ChloraPrep and draped in normal sterile fashion.  A formal timeout was carried out confirming the name of the patient and correct procedure.  She had SCDs in place and functioning prior to induction of anesthesia.  She received antibiotics within 30 minutes of the incision.    After the timeout was  completed, we made a right posterolateral thoracotomy.  We divided the latissimus and preserved the serratus and through the chest through the fifth intercostal space, we shingled the 5th and 6th ribs for wide operative exposure.  A Mamie retractor was then placed.  We had excellent lung isolation.  The patient did not have pleural effusion.  We clearly identified the very large solitary fibrous tumor, which was delivered through the wound.  It appeared to be attached to the right lower lobe through a stalk and this was wedged with the Covidien stapler.  There were minor adhesions to the right middle lobe, so we wedged a small piece of the right middle lobe as well.  Prior to dividing the stalk, the lung was partially inflated.  Hemostasis was then confirmed.  We then performed intercostal nerve cryoablation of intercostal spaces 3 through 9 for postoperative pain control.  We brought a right pleural tube through a separate stab incision and directed it posterior apical.  The lung was reinflated under direct vision.  Pericostal stitches were placed using #5 Ethibond.  The wound was closed with absorbable suture.  The patient tolerated the procedure well.  She was then awakened and extubated in the operating room and transferred to PACU for recovery.  All instrument and sponge counts were correct at the end of the case.    Keli Johnson MD        D: 2021   T: 2021   MT: [id number]    Name:     KASSIDY HENDERSON  MRN:      [id number]        Account:        [id number]   :      1970           Procedure Date: 2021     Document: [id number]

## 2021-04-21 NOTE — DISCHARGE SUMMARY
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 No further therapy required.    Ashish Acosta MD    NAME: Carol Guajardo   MRN: [id number]   : 1970     DATE OF ADMISSION: 2021     PRE/POSTOPERATIVE DIAGNOSES: Solitary fibrous tumor    PROCEDURES PERFORMED: Right thoracotomy, excisionof solitary fibrous tumor, intercostal nerve cryo ablation    PATHOLOGY RESULTS: Pending at time of discharge     CULTURE RESULTS: None     INTRAOPERATIVE COMPLICATIONS: None     POSTOPERATIVE COMPLICATIONS: None     DRAINS/TUBES PRESENT AT DISCHARGE: dressing changes    DATE OF DISCHARGE:  2021    HOSPITAL COURSE: Carol Guajardo is a 51 year old female who on 2021 underwent the above-named procedures. She tolerated the operation well and postoperatively was transferred to the general post-surgical unit.  The remainder of her course was essentially uncomplicated.  Prior to discharge, her pain was controlled well, she was able to perform ADLs and ambulate independently without difficulty.  On 21, she was discharged home in stable condition.    DISCHARGE EXAM:   A&O, NAD  Resp non-labored  Distal extremities warm    Incisions CDI     DISCHARGE INSTRUCTIONS:  Discharge Procedure Orders   X-ray Chest 2 vws*   Standing Status: Future Standing Exp. Date: 21     Order Specific Question Answer Comments   Priority Routine      Reason for your hospital stay   Order Comments: Surgery     Adult [facility] Follow-up and recommended labs and tests   Order Comments: 1.) Follow up with primary care physician, Barbara Kirby, in 1-2 weeks.  2.) Follow up with a thoracic surgery Clinical Nurse Specialist in Thoracic Surgery clinic in 1 month, prior to which a CXR should be performed.    Appointments on [facility] and/or [facility] (with [facility] or [facility] provider or service). Call [phone] if you haven't heard regarding these appointments within 7 days of discharge.  "    Discharge Instructions   Order Comments: THORACIC SURGERY DISCHARGE INSTRUCTIONS    DIET: Regular diet - as prior to admission     If your plans upon discharge include prolonged periods of sitting (i.e a lengthy car or plane ride), it is highly beneficial to get up and walk at least once per hour to help prevent swelling and blood clots.     You may remove chest tube dressing 48 hours after tube removal and bandage the site at your own discretion thereafter.  Small amounts of leakage are normal for 2-3 days after removal.  Feel free to call with questions.    You may get incision wet 2 days after operation. Do not submerge, soak, or scrub incision or swim until seen in follow-up.    Take incentive spirometer home for continued frequent use    Activity as tolerated, no strenous activity until seen in follow-up, no lifting greater than 20 pounds for the next 2 weeks.    Stay hydrated. Take over the counter fiber (metamucil or benefiber) and stool softeners (Miralax, docusate or senna) if becoming constipated.     Call for fever greater than 101.5, chills, increased size of incision, red skin around incision, vision changes, muscle strength changes, sensation changes, shortness of breath, or other concerns.    No driving while taking narcotic pain medication.    Transition to ibuprofen or tylenol/acetaminophen for pain control. Do not take tylenol/acetaminophen and acetaminophen containing narcotic (e.g., percocet or vicodin) at the same time. If you have known ulcer problems, or kidney trouble (elevated creatinine) do not take the ibuprofen.    In emergencies, call 911    For other Questions or Concerns;   A.) During weekday working hours (Monday through Friday 8am to 4:30pm)   call [phone] and ask to speak to a clinical nurse specialist.     B.) At nights (after 4:30pm), on weekends, or if urgent call [phone] and   tell the  \"I would like to page job code [phone], the thoracic " "surgery   fellow on call, please.\"       DISCHARGE MEDICATIONS:   Current Discharge Medication List      START taking these medications    Details   acetaminophen (TYLENOL) 500 MG tablet Take 2 tablets (1,000 mg) by mouth every 6 hours    Associated Diagnoses: Solitary fibrous tumor      enoxaparin ANTICOAGULANT (LOVENOX) 40 MG/0.4ML syringe Inject 0.4 mLs (40 mg) Subcutaneous every 24 hours for 5 days  Qty: 2 mL, Refills: 0    Associated Diagnoses: Solitary fibrous tumor      oxyCODONE (ROXICODONE) 5 MG tablet Take 1 tablet (5 mg) by mouth every 4 hours as needed for moderate to severe pain  Qty: 40 tablet, Refills: 0    Associated Diagnoses: Solitary fibrous tumor      polyethylene glycol (MIRALAX) 17 g packet Take 17 g by mouth daily  Qty: 7 packet, Refills: 0    Associated Diagnoses: Solitary fibrous tumor      senna-docusate (SENOKOT-S/PERICOLACE) 8.6-50 MG tablet Take 1 tablet by mouth 2 times daily  Qty: 14 tablet, Refills: 0    Associated Diagnoses: Solitary fibrous tumor         CONTINUE these medications which have NOT CHANGED    Details   buPROPion (WELLBUTRIN XL) 300 MG 24 hr tablet Take 1 tablet (300 mg) by mouth every morning  Qty: 90 tablet, Refills: 0    Associated Diagnoses: Anxiety state      diazepam (VALIUM) 2 MG tablet Take 1 tablet (2 mg) by mouth every 8 hours as needed for anxiety  Qty: 20 tablet, Refills: 0    Associated Diagnoses: Anxiety state      levothyroxine (SYNTHROID/LEVOTHROID) 150 MCG tablet Take 1 tablet (150 mcg) by mouth every morning  Qty: 90 tablet, Refills: 3    Associated Diagnoses: Hypothyroidism, unspecified type      !! PARoxetine (PAXIL) 20 MG tablet Take 20 mg by mouth every morning Take with the 40 mg tablet for total daily dose of 60 mg in the morning.      !! PARoxetine (PAXIL) 40 MG tablet Take 40 mg by mouth every morning Take with the 20 mg tablet for total daily dose of 60 mg in the morning.      pramipexole (MIRAPEX) 0.5 MG tablet Take 2 tablets (1 mg) by mouth " At Bedtime  Qty: 180 tablet, Refills: 1    Associated Diagnoses: Restless leg syndrome      propranolol (INDERAL LA) 60 MG 24 hr capsule Take 60 mg by mouth every morning       gabapentin (NEURONTIN) 300 MG capsule Take 2 capsules (600 mg) by mouth 3 times daily  Qty: 180 capsule, Refills: 5    Associated Diagnoses: Restless legs syndrome (RLS)       !! - Potential duplicate medications found. Please discuss with provider.

## 2021-04-21 NOTE — PHARMACY
Medication: hydromorphone (dilaudid) 6 mcg/mL, bupivacaine (Marcaine) 0.125% in sodium chloride 0.9% 250 mL epidural    Amount wasted: 85 mL    Witness: Xi Gutiérrez RN     Record of waste slip put in pharmacy bin.

## 2021-04-21 NOTE — PLAN OF CARE
Alert and oriented. Up to BR with standby assist. Lungs diminished in bases. CT site on R chest leaking pink/tan fluid around dressing. Dressing changed x1. CT drain 160 ml serosanguinous fluid this shift. Peguero removed this AM and patient voided 400cc around 1600 and another 200cc later in shift. Pain is near CT site. Oxycodone 5 mg given x2 in addition to using Epidural. Epidural is to be stopped at 0400 this AM (Leave in place and just turn pump off). Enoxaparin to be held in AM.

## 2021-04-21 NOTE — PROGRESS NOTES
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  Prior to surgery RAPS placed epidural T5-6 catheter on 4/19/21 for analgesia.    Subjective and Interval History Overnight events: none. Epidural stopped at 0400. Patient seen at 0740, reports good pain control with current oral analgesia per primary service (see below).  Deniees weakness, paresthesias, circumoral numbness, metallic taste or tinnitus.  Patient has been OOB, ambulated with standby assistance.  Currently tolerating a regular diet, denies nausea/vomiting, pruritis, voiding spontaneously.    Pain Intensity using Numerical Rating Scale (NRS):    3/10 at rest and 4/10 with activity    Antithrombotic/Thrombolytic Therapy ordered:  Last dose enoxaparin given yesterday 4/20/21 at 0821      Analgesic Medications:  Medications related to Pain Management (From now, onward)    Start     Dose/Rate Route Frequency Ordered Stop    04/22/21 0000  acetaminophen (TYLENOL) tablet 650 mg      650 mg Oral EVERY 4 HOURS PRN 04/19/21 1633      04/21/21 0000  acetaminophen (TYLENOL) 500 MG tablet      1,000 mg Oral EVERY 6 HOURS 04/21/21 0750      04/21/21 0000  oxyCODONE (ROXICODONE) 5 MG tablet      5 mg Oral EVERY 4 HOURS PRN 04/21/21 0750      04/21/21 0000  polyethylene glycol (MIRALAX) 17 g packet      17 g Oral DAILY 04/21/21 0750      04/21/21 0000  senna-docusate (SENOKOT-S/PERICOLACE) 8.6-50 MG tablet      1 tablet Oral 2 TIMES DAILY 04/21/21 0750      04/20/21 0800  polyethylene glycol (MIRALAX) Packet 17 g      17 g Oral DAILY 04/19/21 1633      04/20/21 0800  Lidocaine (LIDOCARE) 4 % Patch 1 patch      1 patch  over 12 Hours Transdermal EVERY 24 HOURS 0800 04/19/21 1731      04/19/21 2000  gabapentin  "(NEURONTIN) capsule 600 mg      600 mg Oral 3 TIMES DAILY 04/19/21 1731 04/19/21 2000  senna-docusate (SENOKOT-S/PERICOLACE) 8.6-50 MG per tablet 1 tablet      1 tablet Oral 2 TIMES DAILY 04/19/21 1633      04/19/21 1800  lidocaine patch in PLACE       Transdermal EVERY 8 HOURS 04/19/21 1731 04/19/21 1700  acetaminophen (TYLENOL) tablet 975 mg      975 mg Oral EVERY 8 HOURS 04/19/21 1633 04/22/21 1659    04/19/21 1633  HYDROmorphone (DILAUDID) injection 0.2 mg      0.2 mg Intravenous EVERY 2 HOURS PRN 04/19/21 1633      04/19/21 1633  HYDROmorphone (PF) (DILAUDID) injection 0.4 mg      0.4 mg Intravenous EVERY 2 HOURS PRN 04/19/21 1633      04/19/21 1633  oxyCODONE (ROXICODONE) tablet 5 mg      5 mg Oral EVERY 4 HOURS PRN 04/19/21 1633      04/19/21 1633  oxyCODONE IR (ROXICODONE) tablet 10 mg      10 mg Oral EVERY 4 HOURS PRN 04/19/21 1633      04/19/21 1633  bisacodyl (DULCOLAX) Suppository 10 mg      10 mg Rectal DAILY PRN 04/19/21 1633             Objective:  Lab Results:   Recent Labs   Lab Test 04/20/21  0459   WBC 11.7*   RBC 3.98   HGB 12.2   HCT 39.5   MCV 99   MCH 30.7   MCHC 30.9*   RDW 13.4          No results found for: INR    Vitals:    Temp:  [96.8  F (36  C)-97.4  F (36.3  C)] 97.3  F (36.3  C)  Pulse:  [65-79] 65  Resp:  [16-20] 16  BP: ()/(32-49) 94/33  SpO2:  [94 %-97 %] 94 %  BP (!) 94/33 (BP Location: Right arm)   Pulse 65   Temp 97.3  F (36.3  C) (Oral)   Resp 16   Ht 1.537 m (5' 0.5\")   Wt 100.3 kg (221 lb 1.9 oz)   LMP 04/04/2021   SpO2 94%   BMI 42.47 kg/m         Exam:   GEN: alert and no distress  NEURO/MSK: Full Strength BLE and overall symmetric  SKIN: Epidural catheter site with dressing c/d/i, no tenderness, erythema, heme, edema     Assessment and Plan:  ASSESSMENT  Patient is receiving adequate analgesia with current multimodal therapy and epidural off x 3.5 hrs. Motor function intact and meeting activity goals.  Adequate urine output.  "     PLAN  Catheter Day # 2 epidural T5-6 catheter removed without complication, dark tip intact. Insertion site c/d/i. Small bandage applied    Okay to resume Enoxaparin (Lovenox) SQ in 4 hours as ordered per primary service. Bedside RN aware    RAPS will sign off    - discussed plan with attending anesthesiologist    ARNULFO [facility]  Regional Anesthesia Pain Service  4/21/2021 9:07 AM    RAPS Contact Info (24 hour job code pager is the last 4 digits) For in-house use only:   Job code ID: [place] [id number]   [place] [address] [id number]  Peds [phone]  [employer] phone: dial * * * [phone], enter jobcode ID, then enter call-back number.    Text: Use "[employer] on the Intranet <Paging/Directory> tab and enter Jobcode ID.   If no call back at any time, contact the hospital  and ask for RAPS attending or backup

## 2021-04-23 NOTE — PROGRESS NOTES
[facility]: Post-Discharge Note  SITUATION                                                      Admission:    Admission Date: 04/19/21   Reason for Admission: Right thoracotomy, excisionof solitary fibrous tumor, intercostal nerve cryo ablation  Discharge:   Discharge Date: 04/21/21  Discharge Diagnosis: Right thoracotomy, excisionof solitary fibrous tumor, intercostal nerve cryo ablation    BACKGROUND                                                      HOSPITAL COURSE: Carol Guajardo is a 51 year old female who on 4/19/2021 underwent the above-named procedures. She tolerated the operation well and postoperatively was transferred to the general post-surgical unit.  The remainder of her course was essentially uncomplicated.  Prior to discharge, her pain was controlled well, she was able to perform ADLs and ambulate independently without difficulty.  On 4/21/21, she was discharged home in stable condition.    ASSESSMENT      Discharge Assessment  Patient reports symptoms are: Improved  Does the patient have all of their medications?: Yes  Does patient know what their new medications are for?: Yes  Does patient have a follow-up appointment scheduled?: Yes  Does patient have any other questions or concerns?: No    Post-op  Did the patient have surgery or a procedure: Yes  Drainage: No  Bleeding: none  Fever: No  Chills: No  Redness: No  Warmth: No  Swelling: No  Incision site pain: No  Eating & Drinking: eating and drinking without complaints/concerns  PO Intake: regular diet  Bowel Function: normal  Urinary Status: voiding without complaint/concerns        PLAN                                                      Outpatient Plan:  .) Follow up with primary care physician, Barbara Kirby, in 1-2 weeks.  2.) Follow up with a thoracic surgery Clinical Nurse Specialist in Thoracic Surgery clinic in 1 month, prior to which a CXR should be performed    Future Appointments   Date Time Provider Department Center    5/4/2021  9:45 AM [facility]   5/5/2021  3:30 PM Brigida Wayne APRN [facility]           Mishel Joseph, CMA

## 2021-04-26 ENCOUNTER — TELEPHONE (OUTPATIENT)
Dept: SURGERY | Facility: CLINIC | Age: 51
End: 2021-04-26

## 2021-04-26 DIAGNOSIS — D49.2 SOLITARY FIBROUS TUMOR: ICD-10-CM

## 2021-04-26 DIAGNOSIS — M62.838 MUSCLE SPASM: Primary | ICD-10-CM

## 2021-04-26 RX ORDER — METHOCARBAMOL 500 MG/1
500 TABLET, FILM COATED ORAL 4 TIMES DAILY PRN
Qty: 58 TABLET | Refills: 1 | Status: SHIPPED | OUTPATIENT
Start: 2021-04-26 | End: 2021-05-10

## 2021-04-26 RX ORDER — OXYCODONE HYDROCHLORIDE 5 MG/1
5 TABLET ORAL EVERY 6 HOURS PRN
Qty: 40 TABLET | Refills: 0 | Status: SHIPPED | OUTPATIENT
Start: 2021-04-26 | End: 2021-05-05

## 2021-04-26 NOTE — TELEPHONE ENCOUNTER
"I called Carol to discuss the pain issues she talked to triage nurses about earlier today.     She takes oxycodone 5 mg every 4-5 hours and alternates with acetaminophen.   She describes the pain as a \"tightness\" and when she moves, pain is sharp.  She denies hypersenitivity/neuropathic type pain.   Her appetite is fair and her bowel function is \"manageable, I went today and got things moving\".   She asked about icing the areas.    I told her ice or warmth can be tried to see which feels better.   I will order her methocarbamol (Robaxin) to try and refill her oxycodone.   I encouraged her to start alternating this with ibuprofen 600 mg every 6 hours and told her to take this with food.   I asked her to call again later in the week if she is not feeling better.  She appreciated my call.    "

## 2021-04-27 LAB — COPATH REPORT: NORMAL

## 2021-05-05 ENCOUNTER — VIRTUAL VISIT (OUTPATIENT)
Dept: SURGERY | Facility: CLINIC | Age: 51
End: 2021-05-05
Attending: THORACIC SURGERY (CARDIOTHORACIC VASCULAR SURGERY)
Payer: COMMERCIAL

## 2021-05-05 DIAGNOSIS — D49.2 SOLITARY FIBROUS TUMOR: ICD-10-CM

## 2021-05-05 DIAGNOSIS — D49.2 SOLITARY FIBROUS TUMOR: Primary | ICD-10-CM

## 2021-05-05 PROCEDURE — 99024 POSTOP FOLLOW-UP VISIT: CPT | Mod: 95 | Performed by: CLINICAL NURSE SPECIALIST

## 2021-05-05 PROCEDURE — 999N001193 HC VIDEO/TELEPHONE VISIT; NO CHARGE

## 2021-05-05 RX ORDER — IBUPROFEN 600 MG/1
600 TABLET, FILM COATED ORAL EVERY 6 HOURS PRN
Qty: 80 TABLET | Refills: 0 | Status: SHIPPED | OUTPATIENT
Start: 2021-05-05 | End: 2021-05-29

## 2021-05-05 RX ORDER — OXYCODONE HYDROCHLORIDE 5 MG/1
5 TABLET ORAL EVERY 6 HOURS PRN
Qty: 34 TABLET | Refills: 0 | Status: SHIPPED | OUTPATIENT
Start: 2021-05-05 | End: 2021-05-17

## 2021-05-05 NOTE — PROGRESS NOTES
"Carol is a 51 year old who is being evaluated via a billable telephone visit.      What phone number would you like to be contacted at? [phone]  How would you like to obtain your AVS? Deondreleslieamarilis   Vitals - Patient Reported  Weight (Patient Reported): 90.7 kg (200 lb)  Height (Patient Reported): 153.7 cm (5' 0.5\")  BMI (Based on Pt Reported Ht/Wt): 38.42  Pain Score: Moderate Pain (4)  Pain Loc: Other - see comment(INCISION)    Gabriela Robledo MA  ~~~~~~~~~~~~~~~~~~~~~~~~~~~~~~~~  THORACIC SURGERY FOLLOW UP VISIT    I saw Ms. Guajardo in follow-up today. 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   - The tumor involves special pleura and extends into lung parenchyma.   - Intravascular thrombosis and ischemic infarct are present within the   tumor.   - Parenchymal margin is negative for tumor (at least 2 cm from mass).   - No evidence of malignant transformation observed.    COMPLICATIONS  None    INTERVAL STUDIES  None    SUBJECTIVE   I'm getting there, a little better each day    From a personal perspective, she is hoping to return to work June 7th as an  which requires some lifting and a lot of movement.    IMPRESSION (D49.2) Solitary fibrous tumor      Carol is a 52 yo female who is about 3 weeks out from surgery.   She reports that her incisions are well healed with no drainage or erythema.  She has a \"fair\" appetite and normal bowel function.   She is alternating ibuprofen 600 mg with oxycodone every 6 hours.   She sleeps fair \"but most of the time I am in my recliner, it is hard to get comfortable\".  She denies any fevers, cough or shortness of breath.          We discussed the " pathology results.   I told her no additional treatment like chemotherapy or radiation is needed.   I will check with Dr. Maya to see if any imaging surveillance is indicated.           All questions were answered.    PLAN  I spent 25 min on the date of the encounter in chart review, patient visit, review of tests, documentation and/or discussion with other providers about the issues documented above. I reviewed the plan as follows:  Call or return with any new questions or concerns  1. Necessary Tests & Appointments: To be determined.   I will call patient when I learn whether surveillance scans are needed.  2. Pain Control Plan: Refilled oxycodone #24, ibuprofen 600 mg.    Instructed to wean down over the next 1-2 weeks.  3. Anticoagulation Plan: NA  4. Smoking Cessation: NA    All questions were answered and the patient and present family were in agreement with the plan.  I appreciate the opportunity to participate in the care of your patient and will keep you updated.  Sincerely,  ARNULFO Talbert, CNS    Addendum:   After discussing need for follow-up with Dr. Maya, he recommends her getting a repeat chest CT scan in 6 months.  Patient was called and schedulers will help arrange that.

## 2021-05-05 NOTE — LETTER
"    5/5/2021         RE: Carol Guajardo  [id number] [id number]        Dear Colleague,    Thank you for referring your patient, Carol Guajardo, to the [facility]. Please see a copy of my visit note below.    Carol is a 51 year old who is being evaluated via a billable telephone visit.      What phone number would you like to be contacted at? [phone]  How would you like to obtain your AVS? MyChart   Vitals - Patient Reported  Weight (Patient Reported): 90.7 kg (200 lb)  Height (Patient Reported): 153.7 cm (5' 0.5\")  BMI (Based on Pt Reported Ht/Wt): 38.42  Pain Score: Moderate Pain (4)  Pain Loc: Other - see comment(INCISION)    Gabriela Robledo MA  ~~~~~~~~~~~~~~~~~~~~~~~~~~~~~~~~  THORACIC SURGERY FOLLOW UP VISIT    I saw Ms. Guajardo in follow-up today. 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   - The tumor involves special pleura and extends into lung parenchyma.   - Intravascular thrombosis and ischemic infarct are present within the   tumor.   - Parenchymal margin is negative for tumor (at least 2 cm from mass).   - No evidence of malignant transformation observed.    COMPLICATIONS  None    INTERVAL STUDIES  None    SUBJECTIVE   I'm getting there, a little better each day    From a personal perspective, she is hoping to return to work June 7th as an  which requires some lifting and a lot of movement.    IMPRESSION (D49.2) Solitary fibrous tumor      Carol is a 50 yo female who is about 3 weeks out from surgery.   She reports that her incisions are well healed with no drainage or erythema.  She has a \"fair\" appetite and normal bowel " "function.   She is alternating ibuprofen 600 mg with oxycodone every 6 hours.   She sleeps fair \"but most of the time I am in my recliner, it is hard to get comfortable\".  She denies any fevers, cough or shortness of breath.          We discussed the pathology results.   I told her no additional treatment like chemotherapy or radiation is needed.   I will check with Dr. Maya to see if any imaging surveillance is indicated.           All questions were answered.    PLAN  I spent 25 min on the date of the encounter in chart review, patient visit, review of tests, documentation and/or discussion with other providers about the issues documented above. I reviewed the plan as follows:  Call or return with any new questions or concerns  1. Necessary Tests & Appointments: To be determined.   I will call patient when I learn whether surveillance scans are needed.  2. Pain Control Plan: Refilled oxycodone #24, ibuprofen 600 mg.    Instructed to wean down over the next 1-2 weeks.  3. Anticoagulation Plan: NA  4. Smoking Cessation: NA    All questions were answered and the patient and present family were in agreement with the plan.  I appreciate the opportunity to participate in the care of your patient and will keep you updated.  Sincerely,  ARNULFO Talbert, CNS    Addendum:   After discussing need for follow-up with Dr. Maya, he recommends her getting a repeat chest CT scan in 6 months.  Patient was called and schedulers will help arrange that.            Again, thank you for allowing me to participate in the care of your patient.        Sincerely,        ARNULFO Green CNS    "

## 2021-05-05 NOTE — PATIENT INSTRUCTIONS
Call or return with any new questions or concerns  1. Necessary Tests & Appointments: To be determined.   I will call patient when I learn whether surveillance scans are needed.  2. Pain Control Plan: Refilled oxycodone #24, ibuprofen 600 mg.    Instructed to wean down over the next 1-2 weeks.  3. Anticoagulation Plan: NA  4. Smoking Cessation: NA

## 2021-05-07 ENCOUNTER — TELEPHONE (OUTPATIENT)
Dept: SURGERY | Facility: CLINIC | Age: 51
End: 2021-05-07

## 2021-05-07 NOTE — TELEPHONE ENCOUNTER
I called Carol to let her know that Dr. Maya advises a repeat chest CT scan in 6 months.   I left her a VM and will order the CT and send a message to scheduling.

## 2021-05-17 DIAGNOSIS — D49.2 SOLITARY FIBROUS TUMOR: ICD-10-CM

## 2021-05-17 RX ORDER — OXYCODONE HYDROCHLORIDE 5 MG/1
5 TABLET ORAL EVERY 8 HOURS PRN
Qty: 30 TABLET | Refills: 0 | Status: SHIPPED | OUTPATIENT
Start: 2021-05-17 | End: 2021-07-21

## 2021-05-20 ENCOUNTER — MYC MEDICAL ADVICE (OUTPATIENT)
Dept: SURGERY | Facility: CLINIC | Age: 51
End: 2021-05-20

## 2021-05-21 ENCOUNTER — TELEPHONE (OUTPATIENT)
Dept: SURGERY | Facility: CLINIC | Age: 51
End: 2021-05-21

## 2021-05-21 NOTE — TELEPHONE ENCOUNTER
RN Esha Triage Note    Called patient in f/u to my chart message about pain in right breast area    Diagnosis: Solitary fibrous tumor   Surgeon: Francesco ANDRADE   Right thoracotomy, excision of solitary fibrous tumor, intercostal nerve cryo ablation     DATE OF PROCEDURE  4/19/21    Asked for call back.

## 2021-05-21 NOTE — TELEPHONE ENCOUNTER
I called Carol to discuss persistent pain at her breast since surgery.  She denies any reddness or heat or anything that appears worrisome.  What she describes sounds neuropathic in nature.   She has already been taking gabapentin (600mg bid) for restless leg syndrome.   I suggested she add a 300 mg tab at noon, use heating pad for 20-30 minutes off and on during the day to help with inflammation.   She is also using ibuprofen 600 mg q 6 hours.    She works in manufacturing in a warehouse and is worried that she is not able to return to work yet.   She will call Monday if she wants us to send a letter to her employer to extend her time off another few weeks.

## 2021-06-02 ENCOUNTER — RECORDS - HEALTHEAST (OUTPATIENT)
Dept: ADMINISTRATIVE | Facility: CLINIC | Age: 51
End: 2021-06-02

## 2021-06-03 DIAGNOSIS — G89.18 PAIN AT SURGICAL SITE: Primary | ICD-10-CM

## 2021-06-09 ENCOUNTER — VIRTUAL VISIT (OUTPATIENT)
Dept: SURGERY | Facility: CLINIC | Age: 51
End: 2021-06-09
Attending: THORACIC SURGERY (CARDIOTHORACIC VASCULAR SURGERY)
Payer: COMMERCIAL

## 2021-06-09 DIAGNOSIS — D49.2 SOLITARY FIBROUS TUMOR: Primary | ICD-10-CM

## 2021-06-09 DIAGNOSIS — G89.18 PAIN AT SURGICAL SITE: ICD-10-CM

## 2021-06-09 DIAGNOSIS — E66.01 MORBID OBESITY (H): ICD-10-CM

## 2021-06-09 PROCEDURE — 99024 POSTOP FOLLOW-UP VISIT: CPT | Mod: 95 | Performed by: THORACIC SURGERY (CARDIOTHORACIC VASCULAR SURGERY)

## 2021-06-09 PROCEDURE — 999N001193 HC VIDEO/TELEPHONE VISIT; NO CHARGE

## 2021-06-09 NOTE — PROGRESS NOTES
Carol is a 51 year old who is being evaluated via a billable telephone visit.      What phone number would you like to be contacted at? [phone]  How would you like to obtain your AVS? Deondreharamarilis   Vitals - Patient Reported  Weight (Patient Reported): 90.7 kg (200 lb)  Height (Patient Reported): 152.4 cm (5')  BMI (Based on Pt Reported Ht/Wt): 39.06  Pain Score: Mild Pain (2)  Pain Loc: Other - see comment(RIGHT ARMPIT)    Phone call duration: 30 minutes    Gabriela Robledo MA    THORACIC SURGERY FOLLOW UP VISIT    I saw Ms. Guajardo in follow-up today. The clinical summary follows:     DIAGNOSIS   Right solitary fibrous tumor.     PROCEDURE   4/21/21 Right thoracotomy, resection of SFT, intercostal nerve cryoablation.       HISTOPATHOLOGY   16.5 cm solitary fibrous tumor, negative margin (>2 cm). No malignant transformation.       COMPLICATIONS  Persistent neuropathic pain.     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, diagnosed as fibrosis     Past Surgical History:   Procedure Laterality Date     ARTHROPLASTY KNEE Left 9/30/2019    Procedure: Left Total Knee Arthroplasty;  Surgeon: Ion Bailey MD;  Location: [facility] OR     BRONCHOSCOPY (RIGID OR FLEXIBLE), DIAGNOSTIC N/A 4/21/2021    Procedure: BRONCHOSCOPY, WITH BRONCHOALVEOLAR LAVAGE;  Surgeon: Keli Webber MD;  Location: [facility] GI     CARPAL TUNNEL RELEASE RT/LT Bilateral      EXTERNAL EAR SURGERY       IR LUNG BIOPSY MEDIASTINUM RIGHT Right 08/2018    RLL mass, diagnosed as fibrosis per pt     LAPAROSCOPY DIAGNOSTIC (GENERAL)  02/2019     LAPAROSCOPY, SURGICAL; REPAIR UMBILICAL HERNIA  08/22/2018     THORACOTOMY, WEDGE RESECTION LUNG, COMBINED Right 4/19/2021    Procedure: Right Thoracotomy, excision of solitary fibrous tumor, intercostal nerve cryo ablation;   Surgeon: Keli Webber MD;  Location: [facility] OR        Allergies   Allergen Reactions     Amoxicillin-Pot Clavulanate Hives     Other reaction(s): Edema     Augmentin Hives and Itching     Ciprofloxacin      Penicillins      Current Outpatient Medications   Medication     buPROPion (WELLBUTRIN XL) 300 MG 24 hr tablet     diazepam (VALIUM) 2 MG tablet     gabapentin (NEURONTIN) 300 MG capsule     levothyroxine (SYNTHROID/LEVOTHROID) 150 MCG tablet     PARoxetine (PAXIL) 20 MG tablet     PARoxetine (PAXIL) 40 MG tablet     pramipexole (MIRAPEX) 0.5 MG tablet     propranolol (INDERAL LA) 60 MG 24 hr capsule     acetaminophen (TYLENOL) 500 MG tablet     oxyCODONE (ROXICODONE) 5 MG tablet     polyethylene glycol (MIRALAX) 17 g packet     senna-docusate (SENOKOT-S/PERICOLACE) 8.6-50 MG tablet     No current facility-administered medications for this visit.        ETOH None   TOB None   BMI 42    SUBJECTIVE   Carol is coming to clinic for a virtual follow up. She is still experiencing sharp stabbing pain under her right breast intermittently. Is affecting her almost on a daily basis. She is taking Gabapentin for restless leg syndrome (600mg TID). She denies exertional dyspnea. No fever or cough.     From a personal perspective, she is at home with her family.     IMPRESSION   51 year old female patient 2 months after right thoracotomy resection of SFT and cryoablation of ICN, now with persistent neuropathic pain.     PLAN  I spent 45 min on the date of the encounter in chart review, patient visit, review of tests, documentation and/or discussion with other providers about the issues documented above. I reviewed the plan as follows:  1. Necessary Tests & Appointments:    Referral to outpatient PT for stretching exercises.    F/u in 1 month with baseline non-contrast chest CT.   2. Pain Control Plan: Increase Gabapentin dose. Referral to pain clinic.   We will give her a letter for return to work with light duty for 2  more weeks, then return to normal activity without restrictions.   All questions were answered and the patient and present family were in agreement with the plan.  I appreciate the opportunity to participate in the care of your patient and will keep you updated.  Sincerely,  Keli Raya MD

## 2021-06-09 NOTE — LETTER
6/9/2021         RE: Carol Guajardo  [address] [facility] [address]        Dear Colleague,    Thank you for referring your patient, Carol Guajardo, to the [facility]. Please see a copy of my visit note below.    Carol is a 51 year old who is being evaluated via a billable telephone visit.      What phone number would you like to be contacted at? [phone]  How would you like to obtain your AVS? MyChart   Vitals - Patient Reported  Weight (Patient Reported): 90.7 kg (200 lb)  Height (Patient Reported): 152.4 cm (5')  BMI (Based on Pt Reported Ht/Wt): 39.06  Pain Score: Mild Pain (2)  Pain Loc: Other - see comment(RIGHT ARMPIT)    Phone call duration: 30 minutes    Gabriela Robledo MA    THORACIC SURGERY FOLLOW UP VISIT    I saw Ms. Guajardo in follow-up today. The clinical summary follows:     DIAGNOSIS   Right solitary fibrous tumor.     PROCEDURE   4/21/21 Right thoracotomy, resection of SFT, intercostal nerve cryoablation.       HISTOPATHOLOGY   16.5 cm solitary fibrous tumor, negative margin (>2 cm). No malignant transformation.       COMPLICATIONS  Persistent neuropathic pain.     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, diagnosed as fibrosis     Past Surgical History:   Procedure Laterality Date     ARTHROPLASTY KNEE Left 9/30/2019    Procedure: Left Total Knee Arthroplasty;  Surgeon: Ion Bailey MD;  Location: [facility]     BRONCHOSCOPY (RIGID OR FLEXIBLE), DIAGNOSTIC N/A 4/21/2021    Procedure: BRONCHOSCOPY, WITH BRONCHOALVEOLAR LAVAGE;  Surgeon: Keli Webber MD;  Location: [facility] GI     CARPAL TUNNEL RELEASE RT/LT Bilateral      EXTERNAL EAR SURGERY       IR LUNG BIOPSY MEDIASTINUM RIGHT Right 08/2018    RLL mass, diagnosed as fibrosis per pt     LAPAROSCOPY  DIAGNOSTIC (GENERAL)  02/2019     LAPAROSCOPY, SURGICAL; REPAIR UMBILICAL HERNIA  08/22/2018     THORACOTOMY, WEDGE RESECTION LUNG, COMBINED Right 4/19/2021    Procedure: Right Thoracotomy, excision of solitary fibrous tumor, intercostal nerve cryo ablation;  Surgeon: Keli Webber MD;  Location: [facility] OR        Allergies   Allergen Reactions     Amoxicillin-Pot Clavulanate Hives     Other reaction(s): Edema     Augmentin Hives and Itching     Ciprofloxacin      Penicillins      Current Outpatient Medications   Medication     buPROPion (WELLBUTRIN XL) 300 MG 24 hr tablet     diazepam (VALIUM) 2 MG tablet     gabapentin (NEURONTIN) 300 MG capsule     levothyroxine (SYNTHROID/LEVOTHROID) 150 MCG tablet     PARoxetine (PAXIL) 20 MG tablet     PARoxetine (PAXIL) 40 MG tablet     pramipexole (MIRAPEX) 0.5 MG tablet     propranolol (INDERAL LA) 60 MG 24 hr capsule     acetaminophen (TYLENOL) 500 MG tablet     oxyCODONE (ROXICODONE) 5 MG tablet     polyethylene glycol (MIRALAX) 17 g packet     senna-docusate (SENOKOT-S/PERICOLACE) 8.6-50 MG tablet     No current facility-administered medications for this visit.        ETOH None   TOB None   BMI 42    SUBJECTIVE   Carol is coming to clinic for a virtual follow up. She is still experiencing sharp stabbing pain under her right breast intermittently. Is affecting her almost on a daily basis. She is taking Gabapentin for restless leg syndrome (600mg TID). She denies exertional dyspnea. No fever or cough.     From a personal perspective, she is at home with her family.     IMPRESSION   51 year old female patient 2 months after right thoracotomy resection of SFT and cryoablation of ICN, now with persistent neuropathic pain.     PLAN  I spent 45 min on the date of the encounter in chart review, patient visit, review of tests, documentation and/or discussion with other providers about the issues documented above. I reviewed the plan as follows:  1. Necessary Tests &  Appointments:    Referral to outpatient PT for stretching exercises.    F/u in 1 month with baseline non-contrast chest CT.   2. Pain Control Plan: Increase Gabapentin dose. Referral to pain clinic.   We will give her a letter for return to work with light duty for 2 more weeks, then return to normal activity without restrictions.   All questions were answered and the patient and present family were in agreement with the plan.  I appreciate the opportunity to participate in the care of your patient and will keep you updated.  Sincerely,  Keli Raya MD          Again, thank you for allowing me to participate in the care of your patient.        Sincerely,        Alex Maya MD

## 2021-06-10 DIAGNOSIS — G25.81 RESTLESS LEGS SYNDROME (RLS): ICD-10-CM

## 2021-06-10 DIAGNOSIS — M79.2 NEUROPATHIC PAIN: Primary | ICD-10-CM

## 2021-06-10 RX ORDER — GABAPENTIN 300 MG/1
900 CAPSULE ORAL 2 TIMES DAILY
Qty: 180 CAPSULE | Refills: 5
Start: 2021-06-10 | End: 2021-10-25

## 2021-06-10 RX ORDER — GABAPENTIN 600 MG/1
TABLET ORAL
Qty: 60 TABLET | Refills: 1
Start: 2021-06-10 | End: 2021-08-07

## 2021-06-10 NOTE — PROGRESS NOTES
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  I also told her I had placed a PT and pain management referral so she can expect these to be scheduled.  I left my direct #  Brigida Wayne

## 2021-06-19 ENCOUNTER — MYC MEDICAL ADVICE (OUTPATIENT)
Dept: FAMILY MEDICINE | Facility: CLINIC | Age: 51
End: 2021-06-19

## 2021-06-19 DIAGNOSIS — F41.1 ANXIETY STATE: ICD-10-CM

## 2021-06-21 RX ORDER — BUPROPION HYDROCHLORIDE 300 MG/1
300 TABLET ORAL EVERY MORNING
Qty: 90 TABLET | Refills: 0 | Status: SHIPPED | OUTPATIENT
Start: 2021-06-21 | End: 2021-09-30

## 2021-06-21 RX ORDER — PROPRANOLOL HCL 60 MG
60 CAPSULE, EXTENDED RELEASE 24HR ORAL EVERY MORNING
Qty: 90 CAPSULE | Refills: 1 | Status: SHIPPED | OUTPATIENT
Start: 2021-06-21 | End: 2022-01-03

## 2021-06-21 RX ORDER — PAROXETINE 20 MG/1
20 TABLET, FILM COATED ORAL EVERY MORNING
Qty: 90 TABLET | Refills: 1 | Status: SHIPPED | OUTPATIENT
Start: 2021-06-21 | End: 2022-01-03

## 2021-06-21 RX ORDER — PAROXETINE 40 MG/1
40 TABLET, FILM COATED ORAL EVERY MORNING
Qty: 90 TABLET | Refills: 1 | Status: SHIPPED | OUTPATIENT
Start: 2021-06-21 | End: 2021-12-31

## 2021-06-28 ENCOUNTER — HOSPITAL ENCOUNTER (OUTPATIENT)
Dept: PHYSICAL THERAPY | Facility: CLINIC | Age: 51
Setting detail: THERAPIES SERIES
End: 2021-06-28
Attending: CLINICAL NURSE SPECIALIST
Payer: COMMERCIAL

## 2021-06-28 DIAGNOSIS — M79.2 NEUROPATHIC PAIN: ICD-10-CM

## 2021-06-28 PROCEDURE — 97110 THERAPEUTIC EXERCISES: CPT | Mod: GP | Performed by: PHYSICAL THERAPIST

## 2021-06-28 PROCEDURE — 97161 PT EVAL LOW COMPLEX 20 MIN: CPT | Mod: GP | Performed by: PHYSICAL THERAPIST

## 2021-07-06 ENCOUNTER — MYC MEDICAL ADVICE (OUTPATIENT)
Dept: FAMILY MEDICINE | Facility: CLINIC | Age: 51
End: 2021-07-06

## 2021-07-06 DIAGNOSIS — E03.9 HYPOTHYROIDISM, UNSPECIFIED TYPE: Primary | ICD-10-CM

## 2021-07-15 DIAGNOSIS — G89.18 PAIN AT SURGICAL SITE: Primary | ICD-10-CM

## 2021-07-16 ENCOUNTER — ANCILLARY PROCEDURE (OUTPATIENT)
Dept: GENERAL RADIOLOGY | Facility: CLINIC | Age: 51
End: 2021-07-16
Attending: CLINICAL NURSE SPECIALIST
Payer: COMMERCIAL

## 2021-07-16 DIAGNOSIS — G89.18 PAIN AT SURGICAL SITE: ICD-10-CM

## 2021-07-16 PROCEDURE — 71046 X-RAY EXAM CHEST 2 VIEWS: CPT | Performed by: RADIOLOGY

## 2021-07-21 ENCOUNTER — MYC MEDICAL ADVICE (OUTPATIENT)
Dept: FAMILY MEDICINE | Facility: CLINIC | Age: 51
End: 2021-07-21

## 2021-07-28 ENCOUNTER — OFFICE VISIT (OUTPATIENT)
Dept: FAMILY MEDICINE | Facility: CLINIC | Age: 51
End: 2021-07-28
Payer: COMMERCIAL

## 2021-07-28 VITALS
DIASTOLIC BLOOD PRESSURE: 64 MMHG | TEMPERATURE: 98.7 F | SYSTOLIC BLOOD PRESSURE: 130 MMHG | HEIGHT: 61 IN | BODY MASS INDEX: 42.07 KG/M2 | HEART RATE: 68 BPM | OXYGEN SATURATION: 100 %

## 2021-07-28 DIAGNOSIS — E03.9 HYPOTHYROIDISM, UNSPECIFIED TYPE: ICD-10-CM

## 2021-07-28 DIAGNOSIS — G25.81 RESTLESS LEGS SYNDROME (RLS): ICD-10-CM

## 2021-07-28 LAB
FERRITIN SERPL-MCNC: 35 NG/ML (ref 8–252)
HOLD SPECIMEN: NORMAL
MAGNESIUM SERPL-MCNC: 2.1 MG/DL (ref 1.6–2.3)
T4 FREE SERPL-MCNC: 1.04 NG/DL (ref 0.76–1.46)
TSH SERPL DL<=0.005 MIU/L-ACNC: 9.42 MU/L (ref 0.4–4)

## 2021-07-28 PROCEDURE — 82306 VITAMIN D 25 HYDROXY: CPT | Performed by: NURSE PRACTITIONER

## 2021-07-28 PROCEDURE — 82728 ASSAY OF FERRITIN: CPT | Performed by: NURSE PRACTITIONER

## 2021-07-28 PROCEDURE — 84443 ASSAY THYROID STIM HORMONE: CPT | Performed by: NURSE PRACTITIONER

## 2021-07-28 PROCEDURE — 36415 COLL VENOUS BLD VENIPUNCTURE: CPT | Performed by: NURSE PRACTITIONER

## 2021-07-28 PROCEDURE — 99213 OFFICE O/P EST LOW 20 MIN: CPT | Performed by: NURSE PRACTITIONER

## 2021-07-28 PROCEDURE — 83735 ASSAY OF MAGNESIUM: CPT | Performed by: NURSE PRACTITIONER

## 2021-07-28 PROCEDURE — 84439 ASSAY OF FREE THYROXINE: CPT | Performed by: NURSE PRACTITIONER

## 2021-07-28 ASSESSMENT — PATIENT HEALTH QUESTIONNAIRE - PHQ9
5. POOR APPETITE OR OVEREATING: MORE THAN HALF THE DAYS
SUM OF ALL RESPONSES TO PHQ QUESTIONS 1-9: 16

## 2021-07-28 ASSESSMENT — ANXIETY QUESTIONNAIRES
3. WORRYING TOO MUCH ABOUT DIFFERENT THINGS: MORE THAN HALF THE DAYS
1. FEELING NERVOUS, ANXIOUS, OR ON EDGE: MORE THAN HALF THE DAYS
6. BECOMING EASILY ANNOYED OR IRRITABLE: MORE THAN HALF THE DAYS
2. NOT BEING ABLE TO STOP OR CONTROL WORRYING: MORE THAN HALF THE DAYS
7. FEELING AFRAID AS IF SOMETHING AWFUL MIGHT HAPPEN: NOT AT ALL
5. BEING SO RESTLESS THAT IT IS HARD TO SIT STILL: MORE THAN HALF THE DAYS
GAD7 TOTAL SCORE: 12

## 2021-07-28 NOTE — PROGRESS NOTES
"    Assessment & Plan     Restless legs syndrome (RLS)  Chronic, significantly worsening in the last 2 weeks. No significant changes. Has decreased soda intake. On feet a lot during the day. Hurting both at night and during the day. Will get labs to rule out underlying deficiency - will notify patient of results and recommendations. Would also recommend picking up a pair of compression stockings and wearing during the day when on feet, off at night. If normal lab work would recommend seeing sleep specialist.   - Vitamin D Deficiency; Future  - Ferritin; Future  - Magnesium; Future    Hypothyroidism, unspecified type  Chronic, stable. Recheck.   - **TSH with free T4 reflex FUTURE anytime             BMI:   Estimated body mass index is 42.07 kg/m  as calculated from the following:    Height as of this encounter: 1.537 m (5' 0.5\").    Weight as of 4/21/21: 99.3 kg (219 lb).   Weight management plan: Discussed healthy diet and exercise guidelines    See Patient Instructions    Return in about 2 weeks (around 8/11/2021), or if symptoms worsen or fail to improve.    Barbara Kirby, ARNULFO CNP  [facility]    Jey Medina is a 51 year old who presents for the following health issues:    HPI     Concern - Restless Legs   Onset: ongoing  Description: restless legs worsening, constant all day and night   Intensity: moderate  Progression of Symptoms:  worsening  Accompanying Signs & Symptoms: none  Previous history of similar problem: yes   Therapies tried and outcome: on gabapentin and mirapex for restless legs     Can't sleep, gets up and walks around the house  Hurting during the day at work     Has decreased caffeine intake    - scheduled mammo   - declined colonoscopy     Review of Systems   Constitutional, HEENT, cardiovascular, pulmonary, gi and gu systems are negative, except as otherwise noted.      Objective    /64   Pulse 68   Temp 98.7  F (37.1  C)   Ht 1.537 m (5' 0.5\")   " SpO2 100%   BMI 42.07 kg/m    Body mass index is 42.07 kg/m .  Physical Exam   GENERAL APPEARANCE: healthy, alert and no distress  RESP: lungs clear to auscultation - no rales, rhonchi or wheezes  CV: regular rates and rhythm, normal S1 S2, no S3 or S4 and no murmur, click or rub  ABDOMEN: soft, nontender, without hepatosplenomegaly or masses and bowel sounds normal  MS: extremities normal- no gross deformities noted and peripheral pulses normal  SKIN: no suspicious lesions or rashes  NEURO: Normal strength and tone, mentation intact and speech normal  PSYCH: mentation appears normal and affect normal/bright    Results for orders placed or performed in visit on 07/28/21   **TSH with free T4 reflex FUTURE anytime     Status: Abnormal   Result Value Ref Range    TSH 9.42 (H) 0.40 - 4.00 mU/L   Magnesium     Status: Normal   Result Value Ref Range    Magnesium 2.1 1.6 - 2.3 mg/dL   Ferritin     Status: Normal   Result Value Ref Range    Ferritin 35 8 - 252 ng/mL   Vitamin D Deficiency     Status: Normal   Result Value Ref Range    Vitamin D, Total (25-Hydroxy) 24 20 - 75 ug/L    Narrative    Season, race, dietary intake, and treatment affect the concentration of 25-hydroxy-Vitamin D. Values may decrease during winter months and increase during summer months. Values 20-29 ug/L may indicate Vitamin D insufficiency and values <20 ug/L may indicate Vitamin D deficiency.    Vitamin D determination is routinely performed by an immunoassay specific for 25 hydroxyvitamin D3.  If an individual is on vitamin D2(ergocalciferol) supplementation, please specify 25 OH vitamin D2 and D3 level determination by LCMSMS test VITD23.     Extra Purple Top Tube     Status: None   Result Value Ref Range    Hold Specimen JIC    T4 free     Status: Normal   Result Value Ref Range    Free T4 1.04 0.76 - 1.46 ng/dL   Extra Tube     Status: None    Narrative    The following orders were created for panel order Extra Tube.  Procedure                                Abnormality         Status                     ---------                               -----------         ------                     Extra Purple Top Tube[[id number]]                            Final result                 Please view results for these tests on the individual orders.           Chart documentation with Dragon Voice recognition Software. Although reviewed after completion, some words and grammatical errors may remain.

## 2021-07-28 NOTE — PATIENT INSTRUCTIONS
Restless legs syndrome (RLS)  Chronic, significantly worsening in the last 2 weeks. No significant changes. Has decreased soda intake. On feet a lot during the day. Hurting both at night and during the day. Will get labs to rule out underlying deficiency - will notify patient of results and recommendations. Would also recommend picking up a pair of compression stockings and wearing during the day when on feet, off at night. If normal lab work would recommend seeing sleep specialist.   - Vitamin D Deficiency; Future  - Ferritin; Future  - Magnesium; Future    Hypothyroidism, unspecified type  Chronic, stable. Recheck.   - **TSH with free T4 reflex FUTURE anytime

## 2021-07-29 LAB — DEPRECATED CALCIDIOL+CALCIFEROL SERPL-MC: 24 UG/L (ref 20–75)

## 2021-07-29 ASSESSMENT — ANXIETY QUESTIONNAIRES: GAD7 TOTAL SCORE: 12

## 2021-07-30 DIAGNOSIS — E03.9 HYPOTHYROIDISM, UNSPECIFIED TYPE: ICD-10-CM

## 2021-07-30 RX ORDER — LEVOTHYROXINE SODIUM 175 UG/1
175 TABLET ORAL EVERY MORNING
Qty: 60 TABLET | Refills: 0 | Status: SHIPPED | OUTPATIENT
Start: 2021-07-30 | End: 2021-09-30

## 2021-08-02 ENCOUNTER — MYC MEDICAL ADVICE (OUTPATIENT)
Dept: FAMILY MEDICINE | Facility: CLINIC | Age: 51
End: 2021-08-02

## 2021-08-03 ENCOUNTER — OFFICE VISIT (OUTPATIENT)
Dept: URGENT CARE | Facility: URGENT CARE | Age: 51
End: 2021-08-03
Payer: COMMERCIAL

## 2021-08-03 VITALS
HEART RATE: 70 BPM | TEMPERATURE: 98.3 F | DIASTOLIC BLOOD PRESSURE: 70 MMHG | BODY MASS INDEX: 42.07 KG/M2 | OXYGEN SATURATION: 100 % | HEIGHT: 61 IN | SYSTOLIC BLOOD PRESSURE: 130 MMHG | RESPIRATION RATE: 18 BRPM

## 2021-08-03 DIAGNOSIS — H66.002 NON-RECURRENT ACUTE SUPPURATIVE OTITIS MEDIA OF LEFT EAR WITHOUT SPONTANEOUS RUPTURE OF TYMPANIC MEMBRANE: Primary | ICD-10-CM

## 2021-08-03 PROCEDURE — 99213 OFFICE O/P EST LOW 20 MIN: CPT | Performed by: PHYSICIAN ASSISTANT

## 2021-08-03 RX ORDER — CEFDINIR 300 MG/1
300 CAPSULE ORAL 2 TIMES DAILY
Qty: 20 CAPSULE | Refills: 0 | Status: SHIPPED | OUTPATIENT
Start: 2021-08-03 | End: 2021-08-09

## 2021-08-03 NOTE — LETTER
[facility] URGENT CARE [place] [id number] [address]  Phone: [phone]  Fax: [phone]    August 3, 2021        Carol Guajardo  [address] [facility] [address]          To whom it may concern:    RE: Carol Guajardo    Patient was seen and treated today at our clinic and missed work 08/02/21 through 08/05/21.    Please contact me for questions or concerns.      Sincerely,        Yecenia Granado PA-C

## 2021-08-03 NOTE — PROGRESS NOTES
"    Assessment & Plan     Non-recurrent acute suppurative otitis media of left ear without spontaneous rupture of tympanic membrane  Will treat with cefdinir (OMNICEF) 300 MG capsule; Take 1 capsule (300 mg) by mouth 2 times daily for 10 days. Continue with supportive care. Return to clinic if symptoms worsen or do not improve; otherwise follow up as needed                   Return in about 2 days (around 8/5/2021), or if symptoms worsen or fail to improve.    DENNIS [facility] URGENT [facility]    Subjective   Chief Complaint   Patient presents with     Otalgia     Last night 8/2/21 left ear pain started, had surgery on this ear before, pressure on that side of head, nauseous, and dizzy. Needs a note for work.     Dizziness         HPI     Ear problem   Onset of symptoms was 1 day(s) ago.  Course of illness is same.    Severity moderate  Current and Associated symptoms: left ear pain, dizzy  Treatment measures tried include None tried.  Predisposing factors include had surgery on ear in past              Review of Systems   Constitutional, HEENT, cardiovascular, pulmonary, gi and gu systems are negative, except as otherwise noted.      Objective    /70 (BP Location: Right arm, Patient Position: Sitting, Cuff Size: Adult Regular)   Pulse 70   Temp 98.3  F (36.8  C) (Tympanic)   Resp 18   Ht 1.537 m (5' 0.5\")   SpO2 100%   BMI 42.07 kg/m    Body mass index is 42.07 kg/m .  Physical Exam  Constitutional:       Appearance: She is well-developed.   HENT:      Head: Normocephalic.      Right Ear: Tympanic membrane and ear canal normal.      Left Ear: Ear canal normal. A middle ear effusion is present. Tympanic membrane is injected.   Eyes:      Conjunctiva/sclera: Conjunctivae normal.   Cardiovascular:      Rate and Rhythm: Normal rate.      Heart sounds: Normal heart sounds.   Pulmonary:      Effort: Pulmonary effort is normal.      Breath sounds: Normal breath sounds.   Skin:   "   General: Skin is warm and dry.      Findings: No rash.   Psychiatric:         Behavior: Behavior normal.

## 2021-08-09 ENCOUNTER — OFFICE VISIT (OUTPATIENT)
Dept: URGENT CARE | Facility: URGENT CARE | Age: 51
End: 2021-08-09
Payer: COMMERCIAL

## 2021-08-09 VITALS
RESPIRATION RATE: 16 BRPM | SYSTOLIC BLOOD PRESSURE: 130 MMHG | OXYGEN SATURATION: 100 % | TEMPERATURE: 98.1 F | HEART RATE: 65 BPM | DIASTOLIC BLOOD PRESSURE: 68 MMHG

## 2021-08-09 DIAGNOSIS — H66.005 RECURRENT ACUTE SUPPURATIVE OTITIS MEDIA WITHOUT SPONTANEOUS RUPTURE OF LEFT TYMPANIC MEMBRANE: Primary | ICD-10-CM

## 2021-08-09 PROCEDURE — 99213 OFFICE O/P EST LOW 20 MIN: CPT | Performed by: PHYSICIAN ASSISTANT

## 2021-08-09 RX ORDER — AZITHROMYCIN 250 MG/1
TABLET, FILM COATED ORAL
Qty: 6 TABLET | Refills: 0 | Status: SHIPPED | OUTPATIENT
Start: 2021-08-09 | End: 2021-10-25

## 2021-08-09 NOTE — LETTER
[facility] URGENT CARE [place] [id number] [address]  Phone: [phone]  Fax: [phone]    August 9, 2021        Carol Guajardo  [address] [facility] [address]          To whom it may concern:    RE: Carol Guajardo    Patient was seen and treated today at our clinic and missed work 08/09/21 through 08/11/21    Please contact me for questions or concerns.      Sincerely,        Yecenia Granado PA-C

## 2021-08-09 NOTE — PROGRESS NOTES
Assessment & Plan     Recurrent acute suppurative otitis media without spontaneous rupture of left tympanic membrane  Will stop cefdinir and start azithromycin (ZITHROMAX) 250 MG tablet; Two tablets first day, then one tablet daily for four days. Follow up with primary care provider in 3 days for recheck.                  Return in about 3 days (around 8/12/2021) for Follow up.    Yecenia Granado PA-C  [facility] CARE [place]    Subjective   Chief Complaint   Patient presents with     Otalgia     8/3/21 Patient was seen for left ear pain on medication, yesterday 8/8/21 felt off balance today is worse with dizziness.     Dizziness         HPI     Ear pain    Onset of symptoms was 1 week(s) ago.  Course of illness is worsening.    Severity moderate  Current and Associated symptoms: left ear pain and dizziness was improving now worsening again today  Treatment measures tried include cefdinir .  Predisposing factors include history of ear               Review of Systems   Constitutional, HEENT, cardiovascular, pulmonary, gi and gu systems are negative, except as otherwise noted.      Objective    /68 (BP Location: Right arm, Patient Position: Sitting, Cuff Size: Adult Large)   Pulse 65   Temp 98.1  F (36.7  C) (Tympanic)   Resp 16   SpO2 100%   There is no height or weight on file to calculate BMI.  Physical Exam  Constitutional:       Appearance: She is well-developed.   HENT:      Head: Normocephalic.      Right Ear: Tympanic membrane and ear canal normal.      Left Ear: Ear canal normal. A middle ear effusion is present. Tympanic membrane is injected (partially ).   Eyes:      Conjunctiva/sclera: Conjunctivae normal.   Cardiovascular:      Rate and Rhythm: Normal rate.      Heart sounds: Normal heart sounds.   Pulmonary:      Effort: Pulmonary effort is normal.      Breath sounds: Normal breath sounds.   Skin:     General: Skin is warm and dry.      Findings: No rash.   Psychiatric:          Behavior: Behavior normal.

## 2021-08-16 DIAGNOSIS — G25.81 RESTLESS LEG SYNDROME: ICD-10-CM

## 2021-08-19 ENCOUNTER — MYC MEDICAL ADVICE (OUTPATIENT)
Dept: FAMILY MEDICINE | Facility: CLINIC | Age: 51
End: 2021-08-19

## 2021-08-19 DIAGNOSIS — G25.81 RESTLESS LEGS SYNDROME (RLS): Primary | ICD-10-CM

## 2021-08-19 RX ORDER — PRAMIPEXOLE DIHYDROCHLORIDE 0.5 MG/1
TABLET ORAL
Qty: 180 TABLET | Refills: 1 | Status: SHIPPED | OUTPATIENT
Start: 2021-08-19 | End: 2022-02-08

## 2021-09-26 ENCOUNTER — HEALTH MAINTENANCE LETTER (OUTPATIENT)
Age: 51
End: 2021-09-26

## 2021-09-26 DIAGNOSIS — F41.1 ANXIETY STATE: ICD-10-CM

## 2021-09-26 DIAGNOSIS — E03.9 HYPOTHYROIDISM, UNSPECIFIED TYPE: ICD-10-CM

## 2021-09-28 ENCOUNTER — E-VISIT (OUTPATIENT)
Dept: URGENT CARE | Facility: CLINIC | Age: 51
End: 2021-09-28
Payer: COMMERCIAL

## 2021-09-28 ENCOUNTER — MYC MEDICAL ADVICE (OUTPATIENT)
Dept: FAMILY MEDICINE | Facility: CLINIC | Age: 51
End: 2021-09-28

## 2021-09-28 DIAGNOSIS — Z20.822 SUSPECTED COVID-19 VIRUS INFECTION: Primary | ICD-10-CM

## 2021-09-28 PROCEDURE — 99421 OL DIG E/M SVC 5-10 MIN: CPT

## 2021-09-28 NOTE — PATIENT INSTRUCTIONS
Dear Carol Guajardo,    Your symptoms show that you may have coronavirus (COVID-19). This illness can cause fever, cough and trouble breathing. Many people get a mild case and get better on their own. Some people can get very sick.    Will I be tested for COVID-19?  We would like to test you for Covid-19 virus. I have placed orders for this test.     To schedule: go to your [employer] home page and scroll down to the section that says  You have an appointment that needs to be scheduled  and click the large green button that says  Schedule Now  and follow the steps to find the next available openings.    If you are unable to complete these [employer] scheduling steps, please call [phone] to schedule your testing.     Return to work/school/ guidance:  Please let your workplace manager and staffing office know when your quarantine ends     We can t give you an exact date as it depends on the above. You can calculate this on your own or work with your manager/staffing office to calculate this. (For example if you were exposed on 10/4, you would have to quarantine for 14 full days. That would be through 10/18. You could return on 10/19.)      If you receive a positive COVID-19 test result, follow the guidance of the those who are giving you the results. Usually the return to work is 10 (or in some cases 20 days from symptom onset.) If you work at [facility], you must also be cleared by Employee Occupational Health and Safety to return to work.        If you receive a negative COVID-19 test result and did not have a high risk exposure to someone with a known positive COVID-19 test, you can return to work once you're free of fever for 24 hours without fever-reducing medication and your symptoms are improving or resolved.      If you receive a negative COVID-19 test and If you had a high risk exposure to someone who has tested positive for COVID-19 then you can return to work 14 days after your last contact  with the positive individual    Note: If you have ongoing exposure to the covid positive person, this quarantine period may be more than 14 days. (For example, if you are continued to be exposed to your child who tested positive and cannot isolate from them, then the quarantine of 7-14 days can't start until your child is no longer contagious. This is typically 10 days from onset of the child's symptoms. So the total duration may be 17-24 days in this case.)    Sign up for [employer].   We know it's scary to hear that you might have COVID-19. We want to track your symptoms to make sure you're okay over the next 2 weeks. Please look for an email from [employer]--this is a free, online program that we'll use to keep in touch. To sign up, follow the link in the email you will receive. Learn more at [email or web address]    How can I take care of myself?    Get lots of rest. Drink extra fluids (unless a doctor has told you not to)    Take Tylenol (acetaminophen) or ibuprofen for fever or pain. If you have liver or kidney problems, ask your family doctor if it's okay to take Tylenol o ibuprofen    If you have other health problems (like cancer, heart failure, an organ transplant or severe kidney disease): Call your specialty clinic if you don't feel better in the next 2 days.    Know when to call 911. Emergency warning signs include:  o Trouble breathing or shortness of breath  o Pain or pressure in the chest that doesn't go away  o Feeling confused like you haven't felt before, or not being able to wake up  o Bluish-colored lips or face    Where can I get more information?  [email or web address] [place] - About COVID-19:   [email or web address]    CDC - What to Do If You're Sick:   www.cdc.gov/coronavirus/2019-ncov/about/steps-when-sick.html

## 2021-09-28 NOTE — LETTER
[facility] URGENT CARE  [address] [address]  [phone]          September 28, 2021    Carol EARLY Casandra                                                                                                                     [address] [facility] [address]        To whom it may concern,    Carol will need to be excused from work due to concern of COVID-19 until results are back in 48 to 72 hours or until 10/6/2021.          Sincerely,       Barbara Koenig, DNP, APRN-CNP

## 2021-09-30 RX ORDER — BUPROPION HYDROCHLORIDE 300 MG/1
TABLET ORAL
Qty: 90 TABLET | Refills: 0 | Status: SHIPPED | OUTPATIENT
Start: 2021-09-30 | End: 2021-10-25

## 2021-09-30 RX ORDER — LEVOTHYROXINE SODIUM 175 UG/1
TABLET ORAL
Qty: 60 TABLET | Refills: 0 | Status: SHIPPED | OUTPATIENT
Start: 2021-09-30 | End: 2021-10-26

## 2021-09-30 NOTE — TELEPHONE ENCOUNTER
Medication is being filled for 1 time refill only due to:  Future labs ordered   TSH.     Has follow up appointment scheduled

## 2021-09-30 NOTE — TELEPHONE ENCOUNTER
Call placed to patient.    She will need to schedule lab draw. Message left for her to schedule on identified voice mail.

## 2021-10-01 ENCOUNTER — LAB (OUTPATIENT)
Dept: FAMILY MEDICINE | Facility: CLINIC | Age: 51
End: 2021-10-01
Attending: NURSE PRACTITIONER
Payer: COMMERCIAL

## 2021-10-01 DIAGNOSIS — Z20.822 SUSPECTED COVID-19 VIRUS INFECTION: ICD-10-CM

## 2021-10-01 LAB — SARS-COV-2 RNA RESP QL NAA+PROBE: POSITIVE

## 2021-10-01 PROCEDURE — U0005 INFEC AGEN DETEC AMPLI PROBE: HCPCS

## 2021-10-01 PROCEDURE — U0003 INFECTIOUS AGENT DETECTION BY NUCLEIC ACID (DNA OR RNA); SEVERE ACUTE RESPIRATORY SYNDROME CORONAVIRUS 2 (SARS-COV-2) (CORONAVIRUS DISEASE [COVID-19]), AMPLIFIED PROBE TECHNIQUE, MAKING USE OF HIGH THROUGHPUT TECHNOLOGIES AS DESCRIBED BY CMS-2020-01-R: HCPCS

## 2021-10-07 ENCOUNTER — TELEPHONE (OUTPATIENT)
Dept: SURGERY | Facility: CLINIC | Age: 51
End: 2021-10-07
Payer: COMMERCIAL

## 2021-10-07 NOTE — TELEPHONE ENCOUNTER
----- Message from ARNULFO Green CNS sent at 2021 12:39 PM CDT -----  Regardin month CT and f/up  I left her VM, she will need a repeat chest CT scan (at [facility]) then f/up with Delilah or me.   Surveillance for solitary fibrous tumor (I feel like this is duplicate message, sorry)    Brigida

## 2021-10-11 ENCOUNTER — MYC MEDICAL ADVICE (OUTPATIENT)
Dept: FAMILY MEDICINE | Facility: CLINIC | Age: 51
End: 2021-10-11

## 2021-10-14 ENCOUNTER — TELEPHONE (OUTPATIENT)
Dept: FAMILY MEDICINE | Facility: CLINIC | Age: 51
End: 2021-10-14

## 2021-10-14 NOTE — TELEPHONE ENCOUNTER
From: Carol Guajardo   Sent: 10/13/2021   3:27 PM CDT   To: [facility] Reception Pool   Subject: note for work                                      Topic: Procedural Question      Barbara, could you please send the letter that you wrote for me to my HR person. Her name is Jaclyn Burroughs. The fax number is . Thank you  Carol Guajardo        [facility]- please fax 10-11-21 letter as requested.  DAWNA Navarrete RN

## 2021-10-22 ENCOUNTER — APPOINTMENT (OUTPATIENT)
Dept: ULTRASOUND IMAGING | Facility: CLINIC | Age: 51
End: 2021-10-22
Attending: EMERGENCY MEDICINE
Payer: COMMERCIAL

## 2021-10-22 ENCOUNTER — HOSPITAL ENCOUNTER (EMERGENCY)
Facility: CLINIC | Age: 51
Discharge: HOME | End: 2021-10-22
Attending: EMERGENCY MEDICINE | Admitting: EMERGENCY MEDICINE
Payer: COMMERCIAL

## 2021-10-22 VITALS
BODY MASS INDEX: 38.42 KG/M2 | SYSTOLIC BLOOD PRESSURE: 126 MMHG | OXYGEN SATURATION: 97 % | RESPIRATION RATE: 18 BRPM | TEMPERATURE: 97.6 F | WEIGHT: 200 LBS | HEART RATE: 69 BPM | DIASTOLIC BLOOD PRESSURE: 82 MMHG

## 2021-10-22 DIAGNOSIS — M71.21 SYNOVIAL CYST OF RIGHT POPLITEAL SPACE: ICD-10-CM

## 2021-10-22 DIAGNOSIS — M71.21 POPLITEAL CYST, RIGHT: Primary | ICD-10-CM

## 2021-10-22 DIAGNOSIS — M79.661 PAIN OF RIGHT LOWER LEG: ICD-10-CM

## 2021-10-22 PROCEDURE — 99284 EMERGENCY DEPT VISIT MOD MDM: CPT | Mod: 25 | Performed by: EMERGENCY MEDICINE

## 2021-10-22 PROCEDURE — 99284 EMERGENCY DEPT VISIT MOD MDM: CPT | Performed by: EMERGENCY MEDICINE

## 2021-10-22 PROCEDURE — 250N000011 HC RX IP 250 OP 636: Performed by: EMERGENCY MEDICINE

## 2021-10-22 PROCEDURE — 93971 EXTREMITY STUDY: CPT | Mod: RT

## 2021-10-22 PROCEDURE — 96372 THER/PROPH/DIAG INJ SC/IM: CPT | Performed by: EMERGENCY MEDICINE

## 2021-10-22 PROCEDURE — 250N000013 HC RX MED GY IP 250 OP 250 PS 637: Performed by: EMERGENCY MEDICINE

## 2021-10-22 RX ORDER — KETOROLAC TROMETHAMINE 30 MG/ML
30 INJECTION, SOLUTION INTRAMUSCULAR; INTRAVENOUS ONCE
Status: COMPLETED | OUTPATIENT
Start: 2021-10-22 | End: 2021-10-22

## 2021-10-22 RX ORDER — HYDROCODONE BITARTRATE AND ACETAMINOPHEN 5; 325 MG/1; MG/1
1-2 TABLET ORAL EVERY 4 HOURS PRN
Qty: 10 TABLET | Refills: 0 | Status: SHIPPED | OUTPATIENT
Start: 2021-10-22 | End: 2021-10-25

## 2021-10-22 RX ORDER — HYDROCODONE BITARTRATE AND ACETAMINOPHEN 5; 325 MG/1; MG/1
2 TABLET ORAL ONCE
Status: COMPLETED | OUTPATIENT
Start: 2021-10-22 | End: 2021-10-22

## 2021-10-22 RX ADMIN — HYDROCODONE BITARTRATE AND ACETAMINOPHEN 2 TABLET: 5; 325 TABLET ORAL at 05:08

## 2021-10-22 RX ADMIN — KETOROLAC TROMETHAMINE 30 MG: 30 INJECTION, SOLUTION INTRAMUSCULAR; INTRAVENOUS at 05:08

## 2021-10-22 ASSESSMENT — ENCOUNTER SYMPTOMS
ABDOMINAL PAIN: 0
SHORTNESS OF BREATH: 0
FEVER: 0

## 2021-10-22 NOTE — ED PROVIDER NOTES
History     Chief Complaint   Patient presents with     Leg Pain     HPI  Carol Guajardo is a 51 year old female who has past medical history significant for restless legs, obesity, osteoarthritis, presenting the emergency department with concerns regarding right lower extremity pain.  She reports approximately 2-week history of insidious onset of right leg pain.  This begins primarily behind the right knee, extending down the leg to the foot.  Pain is primarily posterior in location over the calf, good most notably located behind the right knee.  Has had numbness, and tingling of the right foot.  No injury, fall, trauma.  No surgeries of the right lower extremity.  Has not noticed increased amounts of swelling.  No fever.  No redness.    Allergies:  Allergies   Allergen Reactions     Amoxicillin-Pot Clavulanate Hives     Other reaction(s): Edema     Augmentin Hives and Itching     Ciprofloxacin      Penicillins        Problem List:    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     Priority: Medium     Added automatically from request for surgery [id number]       Total knee replacement status, left 09/30/2019     Priority: Medium     Primary osteoarthritis of left knee 07/30/2018     Priority: Medium     Anxiety state 07/19/2017     Priority: Medium     Overview:   Created by Conversion    Replacement Utility updated for latest IMO load       Depression 07/19/2017     Priority: Medium     Overview:   Created by Conversion       Vitamin D deficiency 07/19/2017     Priority: Medium     Overview:   Created by Conversion    Replacement Utility updated for latest IMO load       Obesity 07/19/2017     Priority: Medium     Overview:   Created by Conversion       Nicotine dependence 07/19/2017     Priority: Medium     Overview:   Created by Conversion        Mass of right lower leg 06/05/2017     Priority: Medium     Overview:   MRI June 2017:  Tunneling synovial cyst right lower extremity.       HLD (hyperlipidemia) 08/28/2015     Priority: Medium     Overview:   Overview:   Created by Conversion       Thyroid activity decreased 05/08/2015     Priority: Medium        Past Medical History:    Past Medical History:   Diagnosis Date     Anxiety      Depression      HLD (hyperlipidemia)      Hypothyroidism      Osteoarthritis of both knees      Right lower lobe lung mass        Past Surgical History:    Past Surgical History:   Procedure Laterality Date     ARTHROPLASTY KNEE Left 9/30/2019    Procedure: Left Total Knee Arthroplasty;  Surgeon: Ion Bailey MD;  Location: [facility]     BRONCHOSCOPY (RIGID OR FLEXIBLE), DIAGNOSTIC N/A 4/21/2021    Procedure: BRONCHOSCOPY, WITH BRONCHOALVEOLAR LAVAGE;  Surgeon: Keli Webber MD;  Location: [facility]     C LIGATE FALLOPIAN TUBE      Description: Tubal Ligation;  Recorded: 04/09/2008;     CARPAL TUNNEL RELEASE RT/LT Bilateral      EXTERNAL EAR SURGERY       IR LUNG BIOPSY MEDIASTINUM RIGHT Right 08/2018    RLL mass, diagnosed as fibrosis per pt     LAPAROSCOPY DIAGNOSTIC (GENERAL)  02/2019     LAPAROSCOPY, SURGICAL; REPAIR UMBILICAL HERNIA  08/22/2018     THORACOTOMY, WEDGE RESECTION LUNG, COMBINED Right 4/19/2021    Procedure: Right Thoracotomy, excision of solitary fibrous tumor, intercostal nerve cryo ablation;  Surgeon: Keli Webber MD;  Location: [facility] OR       Family History:    Family History   Problem Relation Age of Onset     Anesthesia Reaction Mother         PONV     Hyperlipidemia Mother      Hypertension Mother      Diabetes Mother      Thyroid Disease Mother      CABG Father      Heart Failure Father      Coronary Stenting Father      Cardiovascular Father         ICD implant     Lung Cancer Father      Coronary Artery Disease Father      Kidney Disease Maternal Grandmother      Breast Cancer  Maternal Grandmother      Abdominal Aortic Aneurysm Maternal Grandmother      Abdominal Aortic Aneurysm Paternal Grandfather      Abdominal Aortic Aneurysm Paternal Uncle      Deep Vein Thrombosis (DVT) No family hx of        Social History:  Marital Status:   [2]  Social History     Tobacco Use     Smoking status: Former Smoker     Packs/day: 0.75     Years: 33.00     Pack years: 24.75     Types: Paan with tobacco, gutka, zarda, khaini     Start date: 1986     Smokeless tobacco: Former User     Quit date: 4/5/2021   Substance Use Topics     Alcohol use: Yes     Drug use: Not on file        Medications:    azithromycin (ZITHROMAX) 250 MG tablet  buPROPion (WELLBUTRIN XL) 300 MG 24 hr tablet  diazepam (VALIUM) 2 MG tablet  gabapentin (NEURONTIN) 300 MG capsule  levothyroxine (SYNTHROID/LEVOTHROID) 175 MCG tablet  PARoxetine (PAXIL) 20 MG tablet  PARoxetine (PAXIL) 40 MG tablet  pramipexole (MIRAPEX) 0.5 MG tablet  propranolol ER (INDERAL LA) 60 MG 24 hr capsule          Review of Systems   Constitutional: Negative for fever.   Respiratory: Negative for shortness of breath.    Cardiovascular: Negative for chest pain.   Gastrointestinal: Negative for abdominal pain.   Musculoskeletal:        Knee/leg pain   All other systems reviewed and are negative.      Physical Exam   BP: 126/82  Pulse: 69  Temp: 97.6  F (36.4  C)  Resp: 18  Weight: 90.7 kg (200 lb)  SpO2: 97 %      Physical Exam  /82   Pulse 69   Temp 97.6  F (36.4  C) (Oral)   Resp 18   Wt 90.7 kg (200 lb)   SpO2 97%   BMI 38.42 kg/m    General: alert and in no acute distress  Head: atraumatic, normocephalic  Abd: nondistended  Musculoskel/Extremities: Right lower extremity with no obvious swelling.  There is tenderness behind the right knee, and of the right calf to palpation.  No erythema.  Neuro: Patient awake, alert, oriented, speech is fluent,    Psychiatric: affect/mood normal, cooperative, normal judgement/insight and memory  intact      ED Course        Procedures              Critical Care time:  none               No results found for this or any previous visit (from the past 24 hour(s)).    Medications   ketorolac (TORADOL) injection 30 mg (has no administration in time range)   HYDROcodone-acetaminophen (NORCO) 5-325 MG per tablet 2 tablet (has no administration in time range)       Assessments & Plan (with Medical Decision Making)  51 year old female sending to the emergency department with approximately 2-week history of increased amounts of right lower extremity pain.  Also with 2-day history of increased amounts of worsening pain behind the right knee, extending to the right calf region.  No left lower extremity symptoms.  No redness of the right lower extremity.  No fever.  Differential does include DVT, Baker's cyst, restless legs, muscle strain.    Ultrasound will be performed.  Patient given Norco in addition to Toradol.  No indication for blood testing at this point.  Imaging studies, and pain control pending at this point.  Plan is for signout of the patient pending ultrasound results.     I have reviewed the nursing notes.    I have reviewed the findings, diagnosis, plan and need for follow up with the patient.       New Prescriptions    No medications on file       Final diagnoses:   Pain of right lower leg       10/22/2021   [facility] EMERGENCY DEPT     Benjamin Obregon MD  10/22/21 [id number]

## 2021-10-22 NOTE — ED TRIAGE NOTES
Right lower leg pain and numbness since yesterday.  Patient took Tylenol prior to arriving to ED.  Patient not on blood thinners.  Patient COVID positive on 10/1.  Patient denies shortness of breath.

## 2021-10-25 ENCOUNTER — OFFICE VISIT (OUTPATIENT)
Dept: FAMILY MEDICINE | Facility: CLINIC | Age: 51
End: 2021-10-25
Payer: COMMERCIAL

## 2021-10-25 ENCOUNTER — ANCILLARY PROCEDURE (OUTPATIENT)
Dept: MAMMOGRAPHY | Facility: CLINIC | Age: 51
End: 2021-10-25
Payer: COMMERCIAL

## 2021-10-25 VITALS
RESPIRATION RATE: 10 BRPM | OXYGEN SATURATION: 97 % | TEMPERATURE: 98.3 F | DIASTOLIC BLOOD PRESSURE: 88 MMHG | HEART RATE: 65 BPM | SYSTOLIC BLOOD PRESSURE: 110 MMHG

## 2021-10-25 DIAGNOSIS — M79.2 NEUROPATHIC PAIN: ICD-10-CM

## 2021-10-25 DIAGNOSIS — F41.1 ANXIETY STATE: ICD-10-CM

## 2021-10-25 DIAGNOSIS — E03.9 HYPOTHYROIDISM, UNSPECIFIED TYPE: ICD-10-CM

## 2021-10-25 DIAGNOSIS — M71.21 POPLITEAL CYST, RIGHT: Primary | ICD-10-CM

## 2021-10-25 DIAGNOSIS — G25.81 RESTLESS LEGS SYNDROME (RLS): ICD-10-CM

## 2021-10-25 DIAGNOSIS — Z12.31 ENCOUNTER FOR SCREENING MAMMOGRAM FOR BREAST CANCER: ICD-10-CM

## 2021-10-25 LAB — TSH SERPL DL<=0.005 MIU/L-ACNC: 1.04 MU/L (ref 0.4–4)

## 2021-10-25 PROCEDURE — 84443 ASSAY THYROID STIM HORMONE: CPT | Performed by: NURSE PRACTITIONER

## 2021-10-25 PROCEDURE — 99213 OFFICE O/P EST LOW 20 MIN: CPT | Performed by: NURSE PRACTITIONER

## 2021-10-25 PROCEDURE — 77067 SCR MAMMO BI INCL CAD: CPT | Mod: TC | Performed by: RADIOLOGY

## 2021-10-25 PROCEDURE — 36415 COLL VENOUS BLD VENIPUNCTURE: CPT | Performed by: NURSE PRACTITIONER

## 2021-10-25 RX ORDER — GABAPENTIN 300 MG/1
900 CAPSULE ORAL 2 TIMES DAILY
Qty: 540 CAPSULE | Refills: 3 | Status: SHIPPED | OUTPATIENT
Start: 2021-10-25 | End: 2022-12-06

## 2021-10-25 RX ORDER — PRAMIPEXOLE DIHYDROCHLORIDE 0.5 MG/1
TABLET ORAL
Qty: 180 TABLET | Refills: 1 | Status: CANCELLED | OUTPATIENT
Start: 2021-10-25

## 2021-10-25 RX ORDER — BUPROPION HYDROCHLORIDE 300 MG/1
300 TABLET ORAL EVERY MORNING
Qty: 90 TABLET | Refills: 1 | Status: SHIPPED | OUTPATIENT
Start: 2021-10-25 | End: 2022-07-06

## 2021-10-25 ASSESSMENT — PATIENT HEALTH QUESTIONNAIRE - PHQ9
SUM OF ALL RESPONSES TO PHQ QUESTIONS 1-9: 7
5. POOR APPETITE OR OVEREATING: SEVERAL DAYS

## 2021-10-25 ASSESSMENT — ANXIETY QUESTIONNAIRES
6. BECOMING EASILY ANNOYED OR IRRITABLE: SEVERAL DAYS
2. NOT BEING ABLE TO STOP OR CONTROL WORRYING: SEVERAL DAYS
GAD7 TOTAL SCORE: 7
5. BEING SO RESTLESS THAT IT IS HARD TO SIT STILL: SEVERAL DAYS
1. FEELING NERVOUS, ANXIOUS, OR ON EDGE: SEVERAL DAYS
3. WORRYING TOO MUCH ABOUT DIFFERENT THINGS: SEVERAL DAYS
7. FEELING AFRAID AS IF SOMETHING AWFUL MIGHT HAPPEN: SEVERAL DAYS

## 2021-10-25 ASSESSMENT — PAIN SCALES - GENERAL: PAINLEVEL: MILD PAIN (3)

## 2021-10-25 NOTE — LETTER
October 25, 2021      Caorl Guajardo  [address] [facility] [address]        To Whom It May Concern:    Carol Guajardo was seen at our facility on 10/22/2021.  She may return to {WORK OR SCHOOL OR :644539} without restrictions.      Sincerely,        ARNULFO Patel CNP

## 2021-10-25 NOTE — PATIENT INSTRUCTIONS
Popliteal cyst, right  Recent increased right knee pain, seen in the emergency room.  Ultrasound of the knee indicates complex popliteal cyst.  Patient continues to have significant pain.  Orthopedic referral placed on Friday, patient has not received call to schedule yet.  Recommend calling to schedule with orthopedics as soon as able and applying ice to back of the knee as needed.  Letter written for return to work.    Hypothyroidism, unspecified type  Chronic, last TSH elevated and increased dosage in July.  Due for recheck.  Will notify patient of results and recommendations.  - TSH with free T4 reflex; Future    Restless legs syndrome (RLS)  Chronic, stable.  No changes.  Prescription waiting at pharmacy when refill is needed.  - gabapentin (NEURONTIN) 300 MG capsule; Take 3 capsules (900 mg) by mouth 2 times daily    Anxiety state  Chronic, stable.  No changes.  Prescription waiting at pharmacy when refill is needed.  - buPROPion (WELLBUTRIN XL) 300 MG 24 hr tablet; Take 1 tablet (300 mg) by mouth every morning    Neuropathic pain  Chronic, stable.  No changes.  - gabapentin (NEURONTIN) 300 MG capsule; Take 3 capsules (900 mg) by mouth 2 times daily

## 2021-10-25 NOTE — LETTER
[facility]  [address] [place] [id number]  [phone]          October 25, 2021    Carol EARLY Casandra                                                                                                                     [address] [facility] [address]        To Whom it May Concern,    Carol is under my general care an will need to be excused from work 10/22/2021 - 10/25/2021.        Sincerely,       Barbara Hedrick, DNP, APRN-CNP

## 2021-10-25 NOTE — NURSING NOTE
"Chief Complaint   Patient presents with     Knee Pain     ER F/U     /88   Pulse 65   Temp 98.3  F (36.8  C) (Tympanic)   Resp 10   LMP 10/05/2021   SpO2 97%  Estimated body mass index is 38.42 kg/m  as calculated from the following:    Height as of 8/3/21: 1.537 m (5' 0.5\").    Weight as of 10/22/21: 90.7 kg (200 lb).  Patient presents to the clinic using No DME      Health Maintenance that is potentially due pending provider review:    Health Maintenance Due   Topic Date Due     ANNUAL REVIEW OF HM ORDERS  Never done     ADVANCE CARE PLANNING  Never done     MAMMO SCREENING  Never done     COLORECTAL CANCER SCREENING  Never done     COVID-19 Vaccine (1) Never done     HIV SCREENING  Never done     HEPATITIS C SCREENING  Never done     LIPID  02/20/2019     ZOSTER IMMUNIZATION (1 of 2) Never done     DTAP/TDAP/TD IMMUNIZATION (2 - Td or Tdap) 03/18/2021     INFLUENZA VACCINE (1) Never done        Declines flu shot.        "

## 2021-10-25 NOTE — PROGRESS NOTES
Assessment & Plan     Popliteal cyst, right  Recent increased right knee pain, seen in the emergency room.  Ultrasound of the knee indicates complex popliteal cyst.  Patient continues to have significant pain.  Orthopedic referral placed on Friday, patient has not received call to schedule yet.  Recommend calling to schedule with orthopedics as soon as able and applying ice to back of the knee as needed.  Letter written for return to work.    Hypothyroidism, unspecified type  Chronic, last TSH elevated and increased dosage in July.  Due for recheck.  Will notify patient of results and recommendations.  - TSH with free T4 reflex; Future    Restless legs syndrome (RLS)  Chronic, stable.  No changes.  Prescription waiting at pharmacy when refill is needed.  - gabapentin (NEURONTIN) 300 MG capsule; Take 3 capsules (900 mg) by mouth 2 times daily    Anxiety state  Chronic, stable.  No changes.  Prescription waiting at pharmacy when refill is needed.  - buPROPion (WELLBUTRIN XL) 300 MG 24 hr tablet; Take 1 tablet (300 mg) by mouth every morning    Neuropathic pain  Chronic, stable.  No changes.  - gabapentin (NEURONTIN) 300 MG capsule; Take 3 capsules (900 mg) by mouth 2 times daily             See Patient Instructions    Return in about 6 months (around 4/25/2022) for Recheck.    ARNULFO Patel CNP  [facility]    Jey Medina is a 51 year old who presents for the following health issues:    [facility]     ED/UC Followup:    Facility:  [facility]  Date of visit: 10/22/2021  Reason for visit: Knee pain Right  Current Status: Still having pain after walking on leg     Assessments & Plan (with Medical Decision Making)  51 year old female sending to the emergency department with approximately 2-week history of increased amounts of right lower extremity pain.  Also with 2-day history of increased amounts of worsening pain behind the right knee, extending to the right calf  region.  No left lower extremity symptoms.  No redness of the right lower extremity.  No fever.  Differential does include DVT, Baker's cyst, restless legs, muscle strain.     Ultrasound will be performed.  Patient given Norco in addition to Toradol.  No indication for blood testing at this point.  Imaging studies, and pain control pending at this point.  Plan is for signout of the patient pending ultrasound results.     EXAM: US LOWER EXTREMITY VENOUS DUPLEX RIGHT  LOCATION: [facility]  DATE/TIME: 10/22/2021 5:40 AM     INDICATION: right leg pain behind knee and to calf  COMPARISON: None.  TECHNIQUE: Venous Duplex ultrasound of the right lower extremity with and without compression, augmentation and duplex. Color flow and spectral Doppler with waveform analysis performed.     FINDINGS: Exam includes the common femoral, femoral, popliteal, and contralateral common femoral veins as well as segmentally visualized deep calf veins and greater saphenous vein.      RIGHT: No deep vein thrombosis. No superficial thrombophlebitis. 3.6 x 1.4 x 2.2 cm complex popliteal cyst.                                                                   IMPRESSION:  1.  No deep venous thrombosis in the right lower extremity.  2.  Complex popliteal cyst.    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      Objective    /88   Pulse 65   Temp 98.3  F (36.8  C) (Tympanic)   Resp 10   LMP 10/05/2021   SpO2 97%   There is no height or weight on file to calculate BMI.  Physical Exam   GENERAL APPEARANCE: healthy, alert and no distress  MS: extremities normal- no gross deformities noted and peripheral pulses normal,  tenderness of posterior right knee without significant swelling, erythema or ecchymosis   SKIN: no suspicious lesions or rashes  NEURO: Normal strength and tone, mentation intact and speech normal  PSYCH: mentation appears normal and affect normal/bright    Diagnostic Test Results:  none            Chart documentation with Dragon Voice recognition Software. Although reviewed after completion, some words and grammatical errors may remain.

## 2021-10-26 DIAGNOSIS — E03.9 HYPOTHYROIDISM, UNSPECIFIED TYPE: ICD-10-CM

## 2021-10-26 RX ORDER — LEVOTHYROXINE SODIUM 175 UG/1
175 TABLET ORAL EVERY MORNING
Qty: 90 TABLET | Refills: 3 | Status: SHIPPED | OUTPATIENT
Start: 2021-10-26 | End: 2022-12-27

## 2021-10-26 ASSESSMENT — ANXIETY QUESTIONNAIRES: GAD7 TOTAL SCORE: 7

## 2021-11-01 ENCOUNTER — OFFICE VISIT (OUTPATIENT)
Dept: ORTHOPEDICS | Facility: CLINIC | Age: 51
End: 2021-11-01
Attending: NURSE PRACTITIONER
Payer: COMMERCIAL

## 2021-11-01 ENCOUNTER — ANCILLARY PROCEDURE (OUTPATIENT)
Dept: GENERAL RADIOLOGY | Facility: CLINIC | Age: 51
End: 2021-11-01
Attending: FAMILY MEDICINE
Payer: COMMERCIAL

## 2021-11-01 VITALS
HEIGHT: 61 IN | BODY MASS INDEX: 42.1 KG/M2 | SYSTOLIC BLOOD PRESSURE: 130 MMHG | WEIGHT: 223 LBS | DIASTOLIC BLOOD PRESSURE: 84 MMHG

## 2021-11-01 DIAGNOSIS — M25.561 RIGHT KNEE PAIN: ICD-10-CM

## 2021-11-01 DIAGNOSIS — M71.21 POPLITEAL CYST, RIGHT: ICD-10-CM

## 2021-11-01 DIAGNOSIS — M17.11 ARTHRITIS OF RIGHT KNEE: Primary | ICD-10-CM

## 2021-11-01 PROCEDURE — 20611 DRAIN/INJ JOINT/BURSA W/US: CPT | Mod: RT | Performed by: FAMILY MEDICINE

## 2021-11-01 PROCEDURE — 99203 OFFICE O/P NEW LOW 30 MIN: CPT | Mod: 25 | Performed by: FAMILY MEDICINE

## 2021-11-01 PROCEDURE — 73562 X-RAY EXAM OF KNEE 3: CPT | Performed by: RADIOLOGY

## 2021-11-01 RX ORDER — NABUMETONE 500 MG/1
500 TABLET, FILM COATED ORAL 2 TIMES DAILY PRN
Qty: 20 TABLET | Refills: 0 | Status: SHIPPED | OUTPATIENT
Start: 2021-11-01 | End: 2022-01-04

## 2021-11-01 RX ADMIN — BETAMETHASONE SODIUM PHOSPHATE AND BETAMETHASONE ACETATE 6 MG: 3; 3 INJECTION, SUSPENSION INTRA-ARTICULAR; INTRALESIONAL; INTRAMUSCULAR; SOFT TISSUE at 16:15

## 2021-11-01 RX ADMIN — ROPIVACAINE HYDROCHLORIDE 3 ML: 5 INJECTION, SOLUTION EPIDURAL; INFILTRATION; PERINEURAL at 16:15

## 2021-11-01 ASSESSMENT — MIFFLIN-ST. JEOR: SCORE: 1555.96

## 2021-11-01 NOTE — PATIENT INSTRUCTIONS
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  She does have pain over the medial and lateral joint lines with notable knee effusion on examination.  X-ray showing significant medial knee arthritis with notable knee effusion.  She does have a history of left knee arthritis now with total knee replacement.  Likely cause her pain due to flare of osteoarthritis.  Reviewed previous ultrasound showing no DVT.  Counseled patient on nature of condition and treatment options.  Given this plan as below, follow-up as needed  Image Findings: moderate knee effusion, medial knee arthritis   Treatment: Activities as tolerated, home exercises given today  Job: as tolerated  Medications/Injections: Relafen ordered for pain, right knee aspiration/steroid injection  Follow-up: In 3 months if symptoms do not improve, sooner if worsening  Can consider repeat injection vs referral to PT    Please call [phone]   Ask for my team if you have any questions or concerns    If you have not yet received the influenza vaccine but would like to get one, please call  [phone] or you can schedule via [facility]    It was great seeing you today!    Srinath Castillo MD, CAM     [facility] Injection Discharge Instructions    Procedure: right knee aspiration/steroid injection       You may shower, however avoid swimming, tub baths or hot tubs for 24 hours following your procedure    You may have a mild to moderate increase in pain for several days following the injection.    It may take up to 14 days for the steroid medication to start working although you may feel the effect as early as a few days after the procedure.    You may use ice packs for 10-15 minutes, 3 to 4 times a day at the injection site for comfort    You may use anti-inflammatory medications (such as Ibuprofen or Aleve or Advil) or Tylenol for pain control if necessary    If you were fasting, you may resume your normal diet and  medications after the procedure    If you have diabetes, check your blood sugar more frequently than usual as your blood sugar may be higher than normal for 10-14 days following a steroid injection. Contact your doctor who manages your diabetes if your blood sugar is higher than usual      If you experience any of the following, call [facility] @ [phone] or [phone]  -Fever over 100 degree F  -Swelling, bleeding, redness, drainage, warmth at the injection site  - New or worsening pain

## 2021-11-01 NOTE — PROGRESS NOTES
ASSESSMENT & PLAN    Carol was seen today for pain.    Diagnoses and all orders for this visit:    Arthritis of right knee  -     nabumetone (RELAFEN) 500 MG tablet; Take 1 tablet (500 mg) by mouth 2 times daily as needed for moderate pain  -     Large Joint Injection/Arthocentesis: R knee joint    Popliteal cyst, right  -     Orthopedic  Referral    Other orders  -     XR Knee Standing AP Bilat [place] Bilat Lat Right; Future      This issue is acute on chronic and Worsening.    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  She does have pain over the medial and lateral joint lines with notable knee effusion on examination.  X-ray showing significant medial knee arthritis with notable knee effusion.  She does have a history of left knee arthritis now with total knee replacement.  Likely cause her pain due to flare of osteoarthritis.  Reviewed previous ultrasound showing no DVT.  Counseled patient on nature of condition and treatment options.  Given this plan as below, follow-up as needed  Image Findings: moderate knee effusion, medial knee arthritis   Treatment: Activities as tolerated, home exercises given today  Job: as tolerated  Medications/Injections: Relafen ordered for pain, right knee aspiration/steroid injection  Follow-up: In 3 months if symptoms do not improve, sooner if worsening  Can consider repeat injection vs referral to PT       Srinath Castillo MD  [facility]    -----  Chief Complaint   Patient presents with     Right Knee - Pain       SUBJECTIVE  Carol Guajardo is a/an 51 year old female who is seen in consultation at the request of  Barbara Hedrick C.N.P. for evaluation of right knee pain.     The patient is seen by themselves.       Onset: 1 month(s) ago. Reports insidious onset without acute precipitating event. Patient presented to ED 10/22/21 and US was performed.  Pt notes some catching  "when she is walking that can limit her ability to work.  She has a 7 yo grandson and likes to play with him but pay limits this.    Location of Pain: right knee-diffuse, radiating down the leg   Worsened by: walking, night pain    Better with: nothing  Treatments tried: ice  Associated symptoms: swelling and locking or catching    Orthopedic/Surgical history: YES -  Restless Legs, OA, LTKA 9/30/19 by Dr. 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      REVIEW OF SYSTEMS:  Review of Systems  Constitutional, HEENT, cardiovascular, pulmonary, GI, , musculoskeletal, neuro, skin, endocrine and psych systems are negative, except as otherwise noted.    OBJECTIVE:  /84   Ht 1.537 m (5' 0.5\")   Wt 101.2 kg (223 lb)   LMP 10/05/2021   BMI 42.83 kg/m     General: healthy, alert and in no distress  HEENT: no scleral icterus or conjunctival erythema  Skin: no suspicious lesions or rash. No jaundice.  CV: distal perfusion intact    Resp: normal respiratory effort without conversational dyspnea   Psych: normal mood and affect  Gait: normal steady gait with appropriate coordination and balance    Neuro: Normal light sensory exam of right lower extremity     RIGHT KNEE  Inspection:    Normal alignment; no edema, erythema, or ecchymosis present  Palpation:    Tender about the lateral joint line and medial joint line. Remainder of bony and ligamentous landmarks are nontender.    Moderate effusion is present    Patellofemoral crepitus is Present  Range of Motion:     00 extension to 1350 flexion  Strength:    Quadriceps 5/5, hamstrings 5/5, gastrocsoleus 5/5 and tibialis anterior 5/5    Extensor mechanism intact  Special Tests:    Positive: None    Negative: Patellar grind, MCL/valgus stress (0 & 30 deg), LCL/varus stress (0 & 30 deg), Lachman's, anterior drawer, posterior drawer      RADIOLOGY:  I independently, visualized and " reviewed these images with the patient  Right knee arthritis worse in the medial knee, moderate knee effusion.     EXAM: US LOWER EXTREMITY VENOUS DUPLEX RIGHT  LOCATION: [facility]  DATE/TIME: 10/22/2021 5:40 AM     INDICATION: right leg pain behind knee and to calf  COMPARISON: None.  TECHNIQUE: Venous Duplex ultrasound of the right lower extremity with and without compression, augmentation and duplex. Color flow and spectral Doppler with waveform analysis performed.     FINDINGS: Exam includes the common femoral, femoral, popliteal, and contralateral common femoral veins as well as segmentally visualized deep calf veins and greater saphenous vein.      RIGHT: No deep vein thrombosis. No superficial thrombophlebitis. 3.6 x 1.4 x 2.2 cm complex popliteal cyst.                                                                      IMPRESSION:  1.  No deep venous thrombosis in the right lower extremity.  2.  Complex popliteal cyst.    Review of external notes as documented elsewhere in note  Review of the result(s) of each unique test - right knee xray and ultrasound    Large Joint Injection/Arthocentesis: R knee joint    Date/Time: 11/1/2021 4:15 PM  Performed by: Srinath Castillo MD  Authorized by: Srinath Castillo MD     Indications:  Pain and osteoarthritis  Needle Size:  20 G  Guidance: ultrasound    Approach:  Superolateral  Location:  Knee      Medications:  6 mg betamethasone acet & sod phos 6 (3-3) MG/ML; 3 mL ropivacaine 5 MG/ML  Medications comment:  Actual amount of ropivacaine used 4 mL  Aspirate amount (mL):  42  Aspirate:  Yellow  Outcome:  Tolerated well, no immediate complications  Procedure discussed: discussed risks, benefits, and alternatives    Consent Given by:  Patient  Timeout: timeout called immediately prior to procedure    Prep: patient was prepped and draped in usual sterile fashion     Patient reported significant improvement of pain after the numbing  portion right knee steroid injection.  Aftercare instructions given to patient.  Plan to follow-up as discussed above.     Srinath Castillo MD [facility]

## 2021-11-01 NOTE — LETTER
11/1/2021         RE: Carol Guajardo  [address] [facility] [address]        Dear Colleague,    Thank you for referring your patient, Carol Guajardo, to the [facility]. Please see a copy of my visit note below.    ASSESSMENT & PLAN    Carol was seen today for pain.    Diagnoses and all orders for this visit:    Arthritis of right knee  -     nabumetone (RELAFEN) 500 MG tablet; Take 1 tablet (500 mg) by mouth 2 times daily as needed for moderate pain  -     Large Joint Injection/Arthocentesis: R knee joint    Popliteal cyst, right  -     Orthopedic  Referral    Other orders  -     XR Knee Standing AP Bilat [place] Bilat Lat Right; Future      This issue is acute on chronic and Worsening.    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  She does have pain over the medial and lateral joint lines with notable knee effusion on examination.  X-ray showing significant medial knee arthritis with notable knee effusion.  She does have a history of left knee arthritis now with total knee replacement.  Likely cause her pain due to flare of osteoarthritis.  Reviewed previous ultrasound showing no DVT.  Counseled patient on nature of condition and treatment options.  Given this plan as below, follow-up as needed  Image Findings: moderate knee effusion, medial knee arthritis   Treatment: Activities as tolerated, home exercises given today  Job: as tolerated  Medications/Injections: Relafen ordered for pain, right knee aspiration/steroid injection  Follow-up: In 3 months if symptoms do not improve, sooner if worsening  Can consider repeat injection vs referral to PT       Srinath Castillo MD  [facility]    -----  Chief Complaint   Patient presents with     Right Knee - Pain       SUBJECTIVE  Carol Guajardo is a/an 51 year old female who is seen in consultation at the request of  Barbara  "Becca Hedrick C.N.P. for evaluation of right knee pain.     The patient is seen by themselves.       Onset: 1 month(s) ago. Reports insidious onset without acute precipitating event. Patient presented to ED 10/22/21 and US was performed.  Pt notes some catching when she is walking that can limit her ability to work.  She has a 7 yo grandson and likes to play with him but pay limits this.    Location of Pain: right knee-diffuse, radiating down the leg   Worsened by: walking, night pain    Better with: nothing  Treatments tried: ice  Associated symptoms: swelling and locking or catching    Orthopedic/Surgical history: YES -  Restless Legs, OA, LTKA 9/30/19 by Dr. 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      REVIEW OF SYSTEMS:  Review of Systems  Constitutional, HEENT, cardiovascular, pulmonary, GI, , musculoskeletal, neuro, skin, endocrine and psych systems are negative, except as otherwise noted.    OBJECTIVE:  /84   Ht 1.537 m (5' 0.5\")   Wt 101.2 kg (223 lb)   LMP 10/05/2021   BMI 42.83 kg/m     General: healthy, alert and in no distress  HEENT: no scleral icterus or conjunctival erythema  Skin: no suspicious lesions or rash. No jaundice.  CV: distal perfusion intact    Resp: normal respiratory effort without conversational dyspnea   Psych: normal mood and affect  Gait: normal steady gait with appropriate coordination and balance    Neuro: Normal light sensory exam of right lower extremity     RIGHT KNEE  Inspection:    Normal alignment; no edema, erythema, or ecchymosis present  Palpation:    Tender about the lateral joint line and medial joint line. Remainder of bony and ligamentous landmarks are nontender.    Moderate effusion is present    Patellofemoral crepitus is Present  Range of Motion:     00 extension to 1350 flexion  Strength:    Quadriceps 5/5, hamstrings 5/5, gastrocsoleus 5/5 and tibialis " anterior 5/5    Extensor mechanism intact  Special Tests:    Positive: None    Negative: Patellar grind, MCL/valgus stress (0 & 30 deg), LCL/varus stress (0 & 30 deg), Lachman's, anterior drawer, posterior drawer      RADIOLOGY:  I independently, visualized and reviewed these images with the patient  Right knee arthritis worse in the medial knee, moderate knee effusion.     EXAM: US LOWER EXTREMITY VENOUS DUPLEX RIGHT  LOCATION: [facility]  DATE/TIME: 10/22/2021 5:40 AM     INDICATION: right leg pain behind knee and to calf  COMPARISON: None.  TECHNIQUE: Venous Duplex ultrasound of the right lower extremity with and without compression, augmentation and duplex. Color flow and spectral Doppler with waveform analysis performed.     FINDINGS: Exam includes the common femoral, femoral, popliteal, and contralateral common femoral veins as well as segmentally visualized deep calf veins and greater saphenous vein.      RIGHT: No deep vein thrombosis. No superficial thrombophlebitis. 3.6 x 1.4 x 2.2 cm complex popliteal cyst.                                                                      IMPRESSION:  1.  No deep venous thrombosis in the right lower extremity.  2.  Complex popliteal cyst.    Review of external notes as documented elsewhere in note  Review of the result(s) of each unique test - right knee xray and ultrasound    Large Joint Injection/Arthocentesis: R knee joint    Date/Time: 11/1/2021 4:15 PM  Performed by: Srinath Castillo MD  Authorized by: Srinath Castillo MD     Indications:  Pain and osteoarthritis  Needle Size:  20 G  Guidance: ultrasound    Approach:  Superolateral  Location:  Knee      Medications:  6 mg betamethasone acet & sod phos 6 (3-3) MG/ML; 3 mL ropivacaine 5 MG/ML  Medications comment:  Actual amount of ropivacaine used 4 mL  Aspirate amount (mL):  42  Aspirate:  Yellow  Outcome:  Tolerated well, no immediate complications  Procedure discussed: discussed  risks, benefits, and alternatives    Consent Given by:  Patient  Timeout: timeout called immediately prior to procedure    Prep: patient was prepped and draped in usual sterile fashion     Patient reported significant improvement of pain after the numbing portion right knee steroid injection.  Aftercare instructions given to patient.  Plan to follow-up as discussed above.     Srinath Castillo MD [facility]              Again, thank you for allowing me to participate in the care of your patient.        Sincerely,        Srinath Castillo MD

## 2021-11-02 ENCOUNTER — MYC MEDICAL ADVICE (OUTPATIENT)
Dept: FAMILY MEDICINE | Facility: CLINIC | Age: 51
End: 2021-11-02

## 2021-11-02 RX ORDER — BETAMETHASONE SODIUM PHOSPHATE AND BETAMETHASONE ACETATE 3; 3 MG/ML; MG/ML
6 INJECTION, SUSPENSION INTRA-ARTICULAR; INTRALESIONAL; INTRAMUSCULAR; SOFT TISSUE
Status: DISCONTINUED | OUTPATIENT
Start: 2021-11-01 | End: 2022-06-08

## 2021-11-02 RX ORDER — ROPIVACAINE HYDROCHLORIDE 5 MG/ML
3 INJECTION, SOLUTION EPIDURAL; INFILTRATION; PERINEURAL
Status: DISCONTINUED | OUTPATIENT
Start: 2021-11-01 | End: 2022-06-08

## 2021-11-12 ENCOUNTER — MYC MEDICAL ADVICE (OUTPATIENT)
Dept: ORTHOPEDICS | Facility: CLINIC | Age: 51
End: 2021-11-12
Payer: COMMERCIAL

## 2021-11-12 DIAGNOSIS — M17.11 ARTHRITIS OF RIGHT KNEE: Primary | ICD-10-CM

## 2021-11-12 RX ORDER — CELECOXIB 100 MG/1
100 CAPSULE ORAL 2 TIMES DAILY PRN
Qty: 30 CAPSULE | Refills: 0 | Status: SHIPPED | OUTPATIENT
Start: 2021-11-12 | End: 2022-01-04

## 2021-11-12 RX ORDER — OMEPRAZOLE 20 MG/1
20 TABLET, DELAYED RELEASE ORAL DAILY
Qty: 20 TABLET | Refills: 0 | Status: SHIPPED | OUTPATIENT
Start: 2021-11-12 | End: 2021-12-02

## 2021-11-12 NOTE — TELEPHONE ENCOUNTER
Patient scheduled for appointment on TBD for discussion of viscosupplementation injection vs steroid injection of right knee.      Steroid  injection last completed 11/1/21.       Prior authorization referral for SynviscOne injection pended.     Please advise.    Griselda High ATC

## 2021-11-12 NOTE — TELEPHONE ENCOUNTER
Signed Synvisc preauthorization.  Celebrex sent to the pharmacy will message patient.  Schedule follow-up appointment with me in 2 weeks to consider Synvisc injection.  Patient notified via [email or web address].    Srinath Castillo MD

## 2021-11-18 NOTE — PROGRESS NOTES
Outpatient Physical Therapy Discharge Note     Patient: Carol Guajardo  : 1970    Evaluation date:  21     Referring Provider: Rosana    Therapy Diagnosis: s/p thoracic surgery    Client Self Report:   Current status is unknown since patient did not return for further PT visits.    Objective Measurements:  Current objective status is unknown since patient failed to complete treatment     Goals:  Goal Identifier strength    Goal Description Pt will increase increase mid/lower trap strength to 4/5 in order to decrease forward shoulder posture    Target Date 21   Date Met      Progress (detail required for progress note):       Goal Identifier lifting   Goal Description pt will be able to lift 50 lbs with proper body mechanics and no pain in order to lift objects at work    Target Date 21   Date Met      Progress (detail required for progress note):       Goal Identifier HEP   Goal Description Pt will be compliant with HEP in order to improve mobilty and strength    Target Date 21   Date Met      Progress (detail required for progress note):       Goal Identifier SPADI    Goal Description pt will be able to score <10% on SPADI in order to improve quality of life   Target Date 21   Date Met      Progress (detail required for progress note):           Progress towards Goals:   Progress this reporting period: Pt has failed to schedule f/u visits within 30 days from last visit thus is being d/c from therapy at this time. Objective measures are all taken from last visit. Current status is unknown at this time.    Plan:  Discharge from therapy.    Discharge:    Reason for Discharge: Patient has failed to schedule further appointments.    Equipment Issued: none    Discharge Plan:  Not completed since patient failed to schedule further appointments.    Lara Navarrete  Physical Therapist  [address]  [address]  [email or web address]   [email or web address]   Office: [phone] Fax: [phone]

## 2021-12-03 ENCOUNTER — OFFICE VISIT (OUTPATIENT)
Dept: ORTHOPEDICS | Facility: CLINIC | Age: 51
End: 2021-12-03
Payer: COMMERCIAL

## 2021-12-03 VITALS
SYSTOLIC BLOOD PRESSURE: 124 MMHG | WEIGHT: 223 LBS | HEIGHT: 61 IN | DIASTOLIC BLOOD PRESSURE: 78 MMHG | BODY MASS INDEX: 42.1 KG/M2

## 2021-12-03 DIAGNOSIS — M17.11 ARTHRITIS OF RIGHT KNEE: Primary | ICD-10-CM

## 2021-12-03 PROCEDURE — 20611 DRAIN/INJ JOINT/BURSA W/US: CPT | Mod: RT | Performed by: FAMILY MEDICINE

## 2021-12-03 RX ORDER — OMEPRAZOLE 20 MG/1
20 TABLET, DELAYED RELEASE ORAL DAILY
Qty: 20 TABLET | Refills: 0 | Status: SHIPPED | OUTPATIENT
Start: 2021-12-03 | End: 2021-12-03

## 2021-12-03 RX ORDER — CELECOXIB 100 MG/1
100 CAPSULE ORAL 2 TIMES DAILY
Qty: 40 CAPSULE | Refills: 0 | Status: SHIPPED | OUTPATIENT
Start: 2021-12-03 | End: 2021-12-03

## 2021-12-03 ASSESSMENT — MIFFLIN-ST. JEOR: SCORE: 1555.96

## 2021-12-03 NOTE — PROGRESS NOTES
ASSESSMENT & PLAN    Carol was seen today for follow up.    Diagnoses and all orders for this visit:    Arthritis of right knee  -     Discontinue: celecoxib (CELEBREX) 100 MG capsule; Take 1 capsule (100 mg) by mouth 2 times daily for 20 days  -     Discontinue: omeprazole (PRILOSEC OTC) 20 MG EC tablet; Take 1 tablet (20 mg) by mouth daily  -     PHYSICAL THERAPY REFERRAL (Internal); Future    Other orders  -     Large Joint Injection/Arthocentesis: R knee joint      This issue is acute on chronic and Worsening.    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  She does have pain over the medial and lateral joint lines with notable knee effusion on examination.  X-ray showing significant medial knee arthritis with notable knee effusion.  She does have a history of left knee arthritis now with total knee replacement.  Likely cause her pain due to flare of osteoarthritis.    Previous steroid injection completed 1 month ago provided no significant relief.  Counseled patient on nature of condition and treatment options.  Given this plan as below, follow-up as needed  Image Findings: moderate knee effusion, medial knee arthritis   Treatment: Activities as tolerated, can continue home exercises, PT ordered  Job: as tolerated  Medications/Injections: Recommended starting Celebrex and Prilosec counseled pt on cardiac risk similar to ibuprofen and PPI to prevent ulcers, right knee aspiration/Synvisc (gel) injection  Follow-up: In 3 months if symptoms do not improve, sooner if worsening       Srinath Castillo MD  [facility] [place]    -----  Chief Complaint   Patient presents with     Right Knee - Follow Up       SUBJECTIVE  Carol Guajardo is a/an 51 year old female who is seen in consultation at the request of  Barbara Hedrick C.N.P. for evaluation of right knee pain.     The patient is seen by themselves.       Onset: 1 month(s)  "ago. Reports insidious onset without acute precipitating event. Patient presented to ED 10/22/21 and US was performed.  Pt notes some catching when she is walking that can limit her ability to work.  She has a 5 yo grandson and likes to play with him but pay limits this.    Location of Pain: right knee-diffuse, radiating down the leg   Worsened by: walking, night pain    Better with: nothing  Treatments tried: ice  Associated symptoms: swelling and locking or catching    Orthopedic/Surgical history: YES -  Restless Legs, OA, LTKA 9/30/19 by Dr. 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  Right knee steroid injection provided 1 day of relief. Here today to discuss possible Synvisc injection. No interim injury.   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  Symptoms also affect her ability to sleep too.  She is looking for something to help with her pain.      No family history pertinent to patient's problem today.      REVIEW OF SYSTEMS:  Review of Systems  Constitutional, HEENT, cardiovascular, pulmonary, GI, , musculoskeletal, neuro, skin, endocrine and psych systems are negative, except as otherwise noted.    OBJECTIVE:  /78   Ht 1.537 m (5' 0.5\")   Wt 101.2 kg (223 lb)   BMI 42.83 kg/m     General: healthy, alert and in no distress  HEENT: no scleral icterus or conjunctival erythema  Skin: no suspicious lesions or rash. No jaundice.  CV: distal perfusion intact    Resp: normal respiratory effort without conversational dyspnea   Psych: normal mood and affect  Gait: normal steady gait with appropriate coordination and balance    Neuro: Normal light sensory exam of right lower extremity     RIGHT KNEE  Inspection:    Normal alignment; no edema, erythema, or ecchymosis present  Palpation:    Tender about the lateral joint line and medial joint line. Remainder " of bony and ligamentous landmarks are nontender.    Moderate effusion is present    Patellofemoral crepitus is Present  Range of Motion:     00 extension to 1350 flexion  Strength:    Quadriceps 5/5, hamstrings 5/5, gastrocsoleus 5/5 and tibialis anterior 5/5    Extensor mechanism intact  Special Tests:    Positive: None    Negative: Patellar grind, MCL/valgus stress (0 & 30 deg), LCL/varus stress (0 & 30 deg), Lachman's, anterior drawer, posterior drawer      RADIOLOGY:  I independently, visualized and reviewed these images with the patient  Right knee arthritis worse in the medial knee, moderate knee effusion.     EXAM: US LOWER EXTREMITY VENOUS DUPLEX RIGHT  LOCATION: [facility]  DATE/TIME: 10/22/2021 5:40 AM     INDICATION: right leg pain behind knee and to calf  COMPARISON: None.  TECHNIQUE: Venous Duplex ultrasound of the right lower extremity with and without compression, augmentation and duplex. Color flow and spectral Doppler with waveform analysis performed.     FINDINGS: Exam includes the common femoral, femoral, popliteal, and contralateral common femoral veins as well as segmentally visualized deep calf veins and greater saphenous vein.      RIGHT: No deep vein thrombosis. No superficial thrombophlebitis. 3.6 x 1.4 x 2.2 cm complex popliteal cyst.                                                                      IMPRESSION:  1.  No deep venous thrombosis in the right lower extremity.  2.  Complex popliteal cyst.    Review of external notes as documented elsewhere in note  Review of the result(s) of each unique test - right knee xray and ultrasound    Large Joint Injection/Arthocentesis: R knee joint    Date/Time: 12/3/2021 2:33 PM  Performed by: Srinath Castillo MD  Authorized by: Srinath Castillo MD     Indications:  Pain and osteoarthritis  Needle Size:  20 G  Guidance: ultrasound    Approach:  Superolateral  Location:  Knee      Medications:  48 mg hylan 48  MG/6ML  Aspirate amount (mL):  30  Aspirate:  Serous and yellow  Outcome:  Tolerated well, no immediate complications  Procedure discussed: discussed risks, benefits, and alternatives    Consent Given by:  Patient  Timeout: timeout called immediately prior to procedure    Prep: patient was prepped and draped in usual sterile fashion     Patient tolerated left knee aspiration/hyaluronic acid injection today.  Aftercare instructions given to patient.  Plan to follow-up as previously discussed with referring provider.     Srinath Castillo MD [facility] [place]

## 2021-12-03 NOTE — PATIENT INSTRUCTIONS
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  She does have pain over the medial and lateral joint lines with notable knee effusion on examination.  X-ray showing significant medial knee arthritis with notable knee effusion.  She does have a history of left knee arthritis now with total knee replacement.  Likely cause her pain due to flare of osteoarthritis.    Previous steroid injection completed 1 month ago provided no significant relief.  Counseled patient on nature of condition and treatment options.  Given this plan as below, follow-up as needed  Image Findings: moderate knee effusion, medial knee arthritis   Treatment: Activities as tolerated, can continue home exercises, PT ordered  Job: as tolerated  Medications/Injections: Recommended starting Celebrex and Prilosec, right knee aspiration/Synvisc (gel) injection  Follow-up: In 3 months if symptoms do not improve, sooner if worsening        It was great seeing you again today!    Srinath Castillo

## 2021-12-03 NOTE — LETTER
12/3/2021         RE: Carol Guajardo  [address] [facility] [address]        Dear Colleague,    Thank you for referring your patient, Carol Guajardo, to the [facility]. Please see a copy of my visit note below.    ASSESSMENT & PLAN    Carol was seen today for follow up.    Diagnoses and all orders for this visit:    Arthritis of right knee  -     Discontinue: celecoxib (CELEBREX) 100 MG capsule; Take 1 capsule (100 mg) by mouth 2 times daily for 20 days  -     Discontinue: omeprazole (PRILOSEC OTC) 20 MG EC tablet; Take 1 tablet (20 mg) by mouth daily  -     PHYSICAL THERAPY REFERRAL (Internal); Future    Other orders  -     Large Joint Injection/Arthocentesis: R knee joint      This issue is acute on chronic and Worsening.    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  She does have pain over the medial and lateral joint lines with notable knee effusion on examination.  X-ray showing significant medial knee arthritis with notable knee effusion.  She does have a history of left knee arthritis now with total knee replacement.  Likely cause her pain due to flare of osteoarthritis.    Previous steroid injection completed 1 month ago provided no significant relief.  Counseled patient on nature of condition and treatment options.  Given this plan as below, follow-up as needed  Image Findings: moderate knee effusion, medial knee arthritis   Treatment: Activities as tolerated, can continue home exercises, PT ordered  Job: as tolerated  Medications/Injections: Recommended starting Celebrex and Prilosec counseled pt on cardiac risk similar to ibuprofen and PPI to prevent ulcers, right knee aspiration/Synvisc (gel) injection  Follow-up: In 3 months if symptoms do not improve, sooner if worsening       Srinath Castillo MD  [facility]    -----  Chief Complaint   Patient presents  "with     Right Knee - Follow Up       SUBJECTIVE  Carol Guajardo is a/an 51 year old female who is seen in consultation at the request of  Barbara Hedrick C.N.P. for evaluation of right knee pain.     The patient is seen by themselves.       Onset: 1 month(s) ago. Reports insidious onset without acute precipitating event. Patient presented to ED 10/22/21 and US was performed.  Pt notes some catching when she is walking that can limit her ability to work.  She has a 5 yo grandson and likes to play with him but pay limits this.    Location of Pain: right knee-diffuse, radiating down the leg   Worsened by: walking, night pain    Better with: nothing  Treatments tried: ice  Associated symptoms: swelling and locking or catching    Orthopedic/Surgical history: YES -  Restless Legs, OA, LTKA 9/30/19 by Dr. 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  Right knee steroid injection provided 1 day of relief. Here today to discuss possible Synvisc injection. No interim injury.   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  Symptoms also affect her ability to sleep too.  She is looking for something to help with her pain.      No family history pertinent to patient's problem today.      REVIEW OF SYSTEMS:  Review of Systems  Constitutional, HEENT, cardiovascular, pulmonary, GI, , musculoskeletal, neuro, skin, endocrine and psych systems are negative, except as otherwise noted.    OBJECTIVE:  /78   Ht 1.537 m (5' 0.5\")   Wt 101.2 kg (223 lb)   BMI 42.83 kg/m     General: healthy, alert and in no distress  HEENT: no scleral icterus or conjunctival erythema  Skin: no suspicious lesions or rash. No jaundice.  CV: distal perfusion intact    Resp: normal respiratory effort without conversational dyspnea   Psych: normal mood and affect  Gait: normal steady gait with " appropriate coordination and balance    Neuro: Normal light sensory exam of right lower extremity     RIGHT KNEE  Inspection:    Normal alignment; no edema, erythema, or ecchymosis present  Palpation:    Tender about the lateral joint line and medial joint line. Remainder of bony and ligamentous landmarks are nontender.    Moderate effusion is present    Patellofemoral crepitus is Present  Range of Motion:     00 extension to 1350 flexion  Strength:    Quadriceps 5/5, hamstrings 5/5, gastrocsoleus 5/5 and tibialis anterior 5/5    Extensor mechanism intact  Special Tests:    Positive: None    Negative: Patellar grind, MCL/valgus stress (0 & 30 deg), LCL/varus stress (0 & 30 deg), Lachman's, anterior drawer, posterior drawer      RADIOLOGY:  I independently, visualized and reviewed these images with the patient  Right knee arthritis worse in the medial knee, moderate knee effusion.     EXAM: US LOWER EXTREMITY VENOUS DUPLEX RIGHT  LOCATION: [facility]  DATE/TIME: 10/22/2021 5:40 AM     INDICATION: right leg pain behind knee and to calf  COMPARISON: None.  TECHNIQUE: Venous Duplex ultrasound of the right lower extremity with and without compression, augmentation and duplex. Color flow and spectral Doppler with waveform analysis performed.     FINDINGS: Exam includes the common femoral, femoral, popliteal, and contralateral common femoral veins as well as segmentally visualized deep calf veins and greater saphenous vein.      RIGHT: No deep vein thrombosis. No superficial thrombophlebitis. 3.6 x 1.4 x 2.2 cm complex popliteal cyst.                                                                      IMPRESSION:  1.  No deep venous thrombosis in the right lower extremity.  2.  Complex popliteal cyst.    Review of external notes as documented elsewhere in note  Review of the result(s) of each unique test - right knee xray and ultrasound    Large Joint Injection/Arthocentesis: R knee  joint    Date/Time: 12/3/2021 2:33 PM  Performed by: Srniath Castillo MD  Authorized by: Srinath Castillo MD     Indications:  Pain and osteoarthritis  Needle Size:  20 G  Guidance: ultrasound    Approach:  Superolateral  Location:  Knee      Medications:  48 mg hylan 48 MG/6ML  Aspirate amount (mL):  30  Aspirate:  Serous and yellow  Outcome:  Tolerated well, no immediate complications  Procedure discussed: discussed risks, benefits, and alternatives    Consent Given by:  Patient  Timeout: timeout called immediately prior to procedure    Prep: patient was prepped and draped in usual sterile fashion     Patient tolerated left knee aspiration/hyaluronic acid injection today.  Aftercare instructions given to patient.  Plan to follow-up as previously discussed with referring provider.     Srinath Castillo MD [facility]                Again, thank you for allowing me to participate in the care of your patient.        Sincerely,        Srinath Castillo MD

## 2021-12-20 ENCOUNTER — HOSPITAL ENCOUNTER (OUTPATIENT)
Dept: CT IMAGING | Facility: CLINIC | Age: 51
Discharge: HOME | End: 2021-12-20
Attending: CLINICAL NURSE SPECIALIST | Admitting: CLINICAL NURSE SPECIALIST
Payer: COMMERCIAL

## 2021-12-20 DIAGNOSIS — D49.2 SOLITARY FIBROUS TUMOR: ICD-10-CM

## 2021-12-20 PROCEDURE — 71260 CT THORAX DX C+: CPT

## 2021-12-20 PROCEDURE — 250N000009 HC RX 250: Performed by: RADIOLOGY

## 2021-12-20 PROCEDURE — 250N000011 HC RX IP 250 OP 636: Performed by: RADIOLOGY

## 2021-12-20 RX ORDER — IOPAMIDOL 755 MG/ML
100 INJECTION, SOLUTION INTRAVASCULAR ONCE
Status: COMPLETED | OUTPATIENT
Start: 2021-12-20 | End: 2021-12-20

## 2021-12-20 RX ADMIN — IOPAMIDOL 100 ML: 755 INJECTION, SOLUTION INTRAVENOUS at 14:58

## 2021-12-20 RX ADMIN — SODIUM CHLORIDE 67 ML: 9 INJECTION, SOLUTION INTRAVENOUS at 14:58

## 2021-12-22 ENCOUNTER — VIRTUAL VISIT (OUTPATIENT)
Dept: SURGERY | Facility: CLINIC | Age: 51
End: 2021-12-22
Attending: CLINICAL NURSE SPECIALIST
Payer: COMMERCIAL

## 2021-12-22 DIAGNOSIS — D49.2 SOLITARY FIBROUS TUMOR: Primary | ICD-10-CM

## 2021-12-22 PROCEDURE — 99212 OFFICE O/P EST SF 10 MIN: CPT | Mod: 95 | Performed by: CLINICAL NURSE SPECIALIST

## 2021-12-22 PROCEDURE — 999N001193 HC VIDEO/TELEPHONE VISIT; NO CHARGE

## 2021-12-22 NOTE — PROGRESS NOTES
"Carol is a 51 year old who is being evaluated via a billable telephone visit.      What phone number would you like to be contacted at? [phone]  How would you like to obtain your AVS? Jerriamarilis  Phone call duration: 15 minutes    Dana Mcintyre    REASON FOR VISIT:  6 month CT surveillance review/follow-up    PROCEDURES PERFORMED:    PROCEDURES PERFORMED:    1.  Flexible bronchoscopy.  2.  Right posterolateral thoracotomy.  3.  Excision of right solitary fibrous tumor.  4.  Intercostal nerve cryoablation.    DATE ABOVE PROCEDURES PERFORMED: 4/21/2021    SURGEON:  Dr. Keli Johnson    History of Present Illness:  Carol had a large solid RLL mass found in March 2021,  measuring 13 x 7 x 10 cm. No lymphadenopathy.       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   - The tumor involves special pleura and extends into lung parenchyma.   - Intravascular thrombosis and ischemic infarct are present within the   tumor.   - Parenchymal margin is negative for tumor (at least 2 cm from mass).   - No evidence of malignant transformation observed.     Interval imaging:  Chest CT scan 12/20/2021  IMPRESSION: No evidence or recurrent neoplasm.    Assessment:  I spoke by phone to Carol this morning.   She reports being in \"fairly good health\" since our last visit with no new respiratory concerns.   She does have some knee issues and may need a replacement soon.  We discussed her chest CT scan.   There is a diaphragmatic hernia that was present back in 2018 that I mentioned to her, with no need for intervention as it is not causing her any symptoms.   She is working full-time.    Plan:  Repeat chest CT and follow-up in 6 months.    Total time:  15 minutes  ARNULFO Talbert, CNS    "

## 2021-12-22 NOTE — LETTER
"    12/22/2021         RE: Carol Guajardo  [id number] [facility] [address]        Dear Colleague,    Thank you for referring your patient, Carol Guajardo, to the [facility]. Please see a copy of my visit note below.    REASON FOR VISIT:  6 month CT surveillance review/follow-up    PROCEDURES PERFORMED:    PROCEDURES PERFORMED:    1.  Flexible bronchoscopy.  2.  Right posterolateral thoracotomy.  3.  Excision of right solitary fibrous tumor.  4.  Intercostal nerve cryoablation.    DATE ABOVE PROCEDURES PERFORMED: 4/21/2021    SURGEON:  Dr. Keli Johnson    History of Present Illness:  Carol had a large solid RLL mass found in March 2021,  measuring 13 x 7 x 10 cm. No lymphadenopathy.       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   - The tumor involves special pleura and extends into lung parenchyma.   - Intravascular thrombosis and ischemic infarct are present within the   tumor.   - Parenchymal margin is negative for tumor (at least 2 cm from mass).   - No evidence of malignant transformation observed.     Interval imaging:  Chest CT scan 12/20/2021  IMPRESSION: No evidence or recurrent neoplasm.    Assessment:  I spoke by phone to Carol this morning.   She reports being in \"fairly good health\" since our last visit with no new respiratory concerns.   She does have some knee issues and may need a replacement soon.  We discussed her chest CT scan.   There is a diaphragmatic hernia that was present back in 2018 that I mentioned to her, with no need for intervention as it is not causing her any symptoms.   She is working full-time.    Plan:  Repeat chest CT and follow-up in 6 months.    Total time:  15 minutes  ARNULFO Talbert, CNS  "

## 2021-12-28 ENCOUNTER — MYC MEDICAL ADVICE (OUTPATIENT)
Dept: FAMILY MEDICINE | Facility: CLINIC | Age: 51
End: 2021-12-28
Payer: COMMERCIAL

## 2021-12-29 ENCOUNTER — PATIENT OUTREACH (OUTPATIENT)
Dept: CARE COORDINATION | Facility: CLINIC | Age: 51
End: 2021-12-29
Payer: COMMERCIAL

## 2021-12-29 NOTE — PROGRESS NOTES
Oncology Distress Screening Follow-up  Clinical Social Work  [facility]    Identified Concern and Score From Distress Screening:   . How concerned are you about feeling depressed or very sad?  7 Abnormal        4. How concerned are you about feeling anxious or very scared?  7 Abnormal        7. How concerned are you about knowing what resources are available to help you?  6 Abnormal                Date of Distress Screenin21      Data: Carol is a 51 year old who is being evaluated for a non malignant lsolitary fibrous tumor. Patient was inappropriately given the oncology distress screen. At time of last visit, Patient scored positive on distress screen.  called Patient today with intention of introducing them to psychosocial services and support, and following up on elevated distress.        Intervention/Education Provided: Phone call to patient about distress screening. No answer - message left. Provided contact information in order to reach writer.       Follow-up Required: Will remain available for support and await patient's return call.        Griselda DURÁN, BRITTANY  - Oncology  Phone : [phone]  Pager: [phone]

## 2021-12-30 ENCOUNTER — MYC MEDICAL ADVICE (OUTPATIENT)
Dept: ORTHOPEDICS | Facility: CLINIC | Age: 51
End: 2021-12-30
Payer: COMMERCIAL

## 2021-12-30 DIAGNOSIS — M17.11 ARTHRITIS OF RIGHT KNEE: Primary | ICD-10-CM

## 2022-01-03 DIAGNOSIS — F41.1 ANXIETY STATE: ICD-10-CM

## 2022-01-03 RX ORDER — PAROXETINE 20 MG/1
TABLET, FILM COATED ORAL
Qty: 90 TABLET | Refills: 0 | Status: SHIPPED | OUTPATIENT
Start: 2022-01-03 | End: 2022-04-04

## 2022-01-03 RX ORDER — PROPRANOLOL HCL 60 MG
CAPSULE, EXTENDED RELEASE 24HR ORAL
Qty: 90 CAPSULE | Refills: 0 | Status: SHIPPED | OUTPATIENT
Start: 2022-01-03 | End: 2022-04-04

## 2022-01-03 NOTE — CONFIDENTIAL NOTE
Messaged pt via [employer].  Patient has failed home PT, trial of steroid and viscosupplementation injections for her right knee arthritis.  Given this plan to order a medial offloader knee brace, plan for lifetime use unless she gets a right TKA.      Srinath Castillo MD

## 2022-01-04 ENCOUNTER — VIRTUAL VISIT (OUTPATIENT)
Dept: FAMILY MEDICINE | Facility: CLINIC | Age: 52
End: 2022-01-04
Payer: COMMERCIAL

## 2022-01-04 DIAGNOSIS — F41.1 ANXIETY STATE: Primary | ICD-10-CM

## 2022-01-04 PROCEDURE — 99213 OFFICE O/P EST LOW 20 MIN: CPT | Mod: 95 | Performed by: NURSE PRACTITIONER

## 2022-01-04 RX ORDER — BUPROPION HYDROCHLORIDE 150 MG/1
150 TABLET ORAL EVERY MORNING
Qty: 90 TABLET | Refills: 0 | Status: SHIPPED | OUTPATIENT
Start: 2022-01-04 | End: 2022-04-04

## 2022-01-04 NOTE — LETTER
[facility]  [address] [place] [id number]  [phone]          January 4, 2022    Carol EARLY Casandra                                                                                                                     [id number] [facility] [address]        To whom it may concern,    Carol is under my general care and will need to be excused from work on December 23 and December 28, 2021 due to illness.        Sincerely,     Barbara Hedrick, DNP, APRN-CNP
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
## 2022-01-12 ENCOUNTER — HOSPITAL ENCOUNTER (OUTPATIENT)
Dept: PHYSICAL THERAPY | Facility: CLINIC | Age: 52
Setting detail: THERAPIES SERIES
End: 2022-01-12
Attending: FAMILY MEDICINE
Payer: COMMERCIAL

## 2022-01-12 DIAGNOSIS — M17.11 ARTHRITIS OF RIGHT KNEE: ICD-10-CM

## 2022-01-12 PROCEDURE — 97161 PT EVAL LOW COMPLEX 20 MIN: CPT | Mod: GP | Performed by: PHYSICAL THERAPIST

## 2022-01-12 PROCEDURE — 97110 THERAPEUTIC EXERCISES: CPT | Mod: GP | Performed by: PHYSICAL THERAPIST

## 2022-01-12 NOTE — PROGRESS NOTES
01/12/22 0800   General Information   Type of Visit Initial OP Ortho PT Evaluation   Start of Care Date 01/12/22   Referring Physician Dr Castillo   Patient/Family Goals Statement less knee pain   Orders Evaluate and Treat   Date of Order 12/03/22   Medical Diagnosis Arthritis of right knee   Surgical/Medical history reviewed Yes   Precautions/Limitations no known precautions/limitations   Body Part(s)   Body Part(s) Knee   Presentation and Etiology   Pertinent history of current problem (include personal factors and/or comorbidities that impact the POC) Ongoing R knee pain secondary to arthritis, worsening over past 6 months. No relief from injections pt reports, last injection about 5 weeks ago. Will be fitted for offloader brace at end of this month. H/o L TKA. Notes that at work does stretch every morning.  Reports some popping and clicking, notes knee gave away 2 times yesterday.    Impairments A. Pain;B. Decreased WB tolerance;C. Swelling;E. Decreased flexibility;G. Impaired balance;K. Numbness;L. Tingling;M. Locking or catching   Functional Limitations perform desired leisure / sports activities;perform activities of daily living;perform required work activities   Symptom Location R knee   How/Where did it occur From insidious onset   Onset date of current episode/exacerbation 07/12/21   Chronicity Chronic   Pain rating (0-10 point scale) Best (/10);Worst (/10)   Best (/10) 2   Worst (/10) 10   Pain quality A. Sharp;C. Aching;D. Burning   Frequency of pain/symptoms A. Constant   Pain/symptoms exacerbated by A. Sitting;B. Walking;E. Rest;G. Certain positions;L. Work tasks   Pain/symptoms eased by D. Nothing   Progression of symptoms since onset: Worsened   Prior Level of Function   Prior Level of Function-Mobility Independent   Prior Level of Function-ADLs Independent   Current Level of Function   Patient role/employment history A. Employed   Employment Comments [employer], long hours on feet on concrete    Fall Risk Screen   Have you fallen 2 or more times in the past year? No   Have you fallen and had an injury in the past year? No   Is patient a fall risk? No   Abuse Screen (yes response referral indicated)   Feels Unsafe at Home or Work/School no   Feels Threatened by Someone no   Knee Objective Findings   Side (if bilateral, select both right and left) Right   Observation mild edema noted R knee   Gait/Locomotion antalgic gait   Varus Stress Test negative   Valgus Stress Test negative   David's Test negative   Palpation ttp medial joint line   Right Knee Extension AROM lacking 8* extension   Right Knee Flexion AROM 85   Right Knee Flexion PROM 90   Right Knee Flexion Strength 4/5, pain   Right Knee Extension Strength 4/5, pain   Right Hip Abduction Strength 4-/5   Right Quad Set Strength good   Right Gastrocnemius Flexibility mild tightness   Right Hamstring Flexibility lacking 20* from 0 in 90/90   Right Hip Flexor Flexibility mild tightness in supine   Planned Therapy Interventions   Planned Therapy Interventions balance training;gait training;joint mobilization;manual therapy;neuromuscular re-education;ROM;strengthening;stretching   Clinical Impression   Criteria for Skilled Therapeutic Interventions Met yes, treatment indicated   PT Diagnosis chronic right knee pain   Influenced by the following impairments pain, decreased strength, flexibility, impaired gait, balance   Functional limitations due to impairments decreased tolerance to activity, standing, sitting, walkng, stairs, work duties   Clinical Presentation Stable/Uncomplicated   Clinical Presentation Rationale chronic condition, comorbidities, clinical judgement   Clinical Decision Making (Complexity) Low complexity   Therapy Frequency 1 time/week   Predicted Duration of Therapy Intervention (days/wks) 8 weeks   Risk & Benefits of therapy have been explained Yes   Patient, Family & other staff in agreement with plan of care Yes   Education  Assessment   Preferred Learning Style Demonstration;Pictures/video   Barriers to Learning No barriers   ORTHO GOALS   PT Ortho Eval Goals 1;2;3   Ortho Goal 1   Goal Identifier 1   Goal Description Pt will improve LEFS to 55 for improved tolerance to functional activity   Target Date 03/09/22   Ortho Goal 2   Goal Identifier 2   Goal Description Pt will be able to demonstrate 4+/5 B hip and knee strength in order to decrease difficulty with ADLs.    Target Date 03/09/22   Ortho Goal 3   Goal Identifier 3   Goal Description Pt will be independent with HEP for ongoing self management of symptoms   Target Date 03/09/22   Total Evaluation Time   PT Fabien, Low Complexity Minutes (99491) 15

## 2022-01-19 ENCOUNTER — HOSPITAL ENCOUNTER (OUTPATIENT)
Dept: PHYSICAL THERAPY | Facility: CLINIC | Age: 52
Setting detail: THERAPIES SERIES
End: 2022-01-19
Attending: FAMILY MEDICINE
Payer: COMMERCIAL

## 2022-01-19 PROCEDURE — 97110 THERAPEUTIC EXERCISES: CPT | Mod: GP | Performed by: PHYSICAL THERAPIST

## 2022-01-26 ENCOUNTER — HOSPITAL ENCOUNTER (OUTPATIENT)
Dept: PHYSICAL THERAPY | Facility: CLINIC | Age: 52
Setting detail: THERAPIES SERIES
End: 2022-01-26
Attending: FAMILY MEDICINE
Payer: COMMERCIAL

## 2022-01-26 PROCEDURE — 97110 THERAPEUTIC EXERCISES: CPT | Mod: GP | Performed by: PHYSICAL THERAPIST

## 2022-01-26 NOTE — PROGRESS NOTES
Outpatient Physical Therapy Discharge Note     Patient: Carol Guajardo  : 1970    Beginning/End Dates of Reporting Period:  19 to 12/10/2019    Referring Provider: BASHIR Willett Diagnosis: decreased strength and ROM s/p TKA     Client Self Report: Pt notes having increased left anterior knee soreness yesterday and today.  FOllow up with MD Wednesday.    Objective Measurements:  Objective Measure: L TKA ROM  Details: 0-2-105        Goals:  Goal Identifier 1   Goal Description Patient will demonstrate >/= 110* knee flexion for squatting for work   Target Date 19   Date Met      Progress:     Goal Identifier 2   Goal Description Patient will demonstrate ambulation x 30 minutes wtih no AD for return to work duties   Target Date 19   Date Met      Progress:     Goal Identifier 3   Goal Description Patient will improve LEFS to > 60/80 for improved tolerance to functional activity   Target Date 19   Date Met      Progress:       Progress Toward Goals:   Progress this reporting period: Patient did not return for follow up treatments as directed.  Goal status and current objective information is therefore unknown.  Discharge from PT services at this time for this episode of treatment. Please see attached documentation under this episode of care for further information including dates of service, start of care date, referring physician, Dx, treatment plan, treatments, etc.    Please contact me with any questions or concerns.    Thank you for your referral.    Ashia Epperson, PT, DPT  Physical Therapist   [facility]  [phone]     Drysol Counseling:  I discussed with the patient the risks of drysol/aluminum chloride including but not limited to skin rash, itching, irritation, burning.

## 2022-01-28 ENCOUNTER — MYC MEDICAL ADVICE (OUTPATIENT)
Dept: ORTHOPEDICS | Facility: CLINIC | Age: 52
End: 2022-01-28
Payer: COMMERCIAL

## 2022-01-28 DIAGNOSIS — M17.11 ARTHRITIS OF RIGHT KNEE: Primary | ICD-10-CM

## 2022-01-28 NOTE — PROGRESS NOTES
Does Carol have a CPAP/Bipap?  No    [place] Sleep Scale: 7           Sleep Consultation:    Date on this visit: 2/1/2022    Carol Guajardo is sent by Barbara Hedrick for a sleep consultation regarding restless legs, snoring, witnessed apneas, poor quality of sleep.    Primary Physician: Barbara Hedrickcathleen Guajardo reports frequent snoring, kicking and poor quality of sleep and occasional snorting for 2 years. .     Carol goes to sleep at 8:00 PM during the week. She wakes up at 3:00 AM with an alarm. She falls asleep in 60 minutes.  Carol has difficulty falling asleep.  She wakes up 2-4 times a night for 60 minutes before falling back to sleep.  Carol wakes up to go to the bathroom and external stimuli.  On weekends, Carol goes to sleep at 9:00 PM.  She wakes up at 5:00 AM without an alarm. She falls asleep in 60 minutes.  Patient gets an average of 4-5 hours of sleep per night.     Patient does watch TV in bed.     Carol does not do shift work.  She works a early day shift, starts at 5 AM.      Carol does snore frequently. Patient does have a regular bed partner. There is report of snoring.  She does not have witnessed apneas. They never sleep separately.  Patient sleeps on her side. She has occasional sleep disturbance, snort arousals, morning dry mouth and restless legs,. Carol has occasional bruxism and sleep talking.    She confirms sleep talking as a child.  Carol has claustrophobia, depression and anxiety.      Carol has gained 5-10 pounds in the last year.  Patient describes themself as a night person.  She would prefer to go to sleep at 10:30 PM and wake up at 7:30 AM.  Patient's [place] Sleepiness score 7/24 inconsistent with excessive  daytime sleepiness.      Carol naps 0 times per week. She takes some inadvertant naps.  She denies closing eyes, dozing and falling asleep while driving.   Patient was counseled on the importance of driving while alert, to pull over if drowsy, or nap before  getting into the vehicle if sleepy.  She uses 2 sodas/day. Last caffeine intake is usually before noon.    Allergies:    Allergies   Allergen Reactions     Amoxicillin-Pot Clavulanate Hives     Other reaction(s): Edema     Augmentin Hives and Itching     Ciprofloxacin      Penicillins        Medications:    Current Outpatient Medications   Medication Sig Dispense Refill     buPROPion (WELLBUTRIN XL) 150 MG 24 hr tablet Take 1 tablet (150 mg) by mouth every morning In addition to 300 mg to = 450 mg daily 90 tablet 0     buPROPion (WELLBUTRIN XL) 300 MG 24 hr tablet Take 1 tablet (300 mg) by mouth every morning 90 tablet 1     diazepam (VALIUM) 2 MG tablet Take 1 tablet (2 mg) by mouth every 8 hours as needed for anxiety 20 tablet 0     gabapentin (NEURONTIN) 300 MG capsule Take 3 capsules (900 mg) by mouth 2 times daily 540 capsule 3     levothyroxine (SYNTHROID/LEVOTHROID) 175 MCG tablet Take 1 tablet (175 mcg) by mouth every morning 90 tablet 3     PARoxetine (PAXIL) 20 MG tablet TAKE 1 TABLET BY MOUTH EVERY MORNING WITH 40 MG TAB FOR TOTAL DAILY DOSE OF 60 MG. 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     Priority: Medium     Added automatically from request for surgery [id number]       Total knee replacement status, left 09/30/2019     Priority: Medium     Primary osteoarthritis of left knee 07/30/2018     Priority: Medium     Anxiety state 07/19/2017     Priority: Medium     Overview:   Created by  Conversion    Replacement Utility updated for latest IMO load       Depression 07/19/2017     Priority: Medium     Overview:   Created by Conversion       Vitamin D deficiency 07/19/2017     Priority: Medium     Overview:   Created by Conversion    Replacement Utility updated for latest IMO load       Obesity 07/19/2017     Priority: Medium     Overview:   Created by Conversion       Nicotine dependence 07/19/2017     Priority: Medium     Overview:   Created by Conversion       Mass of right lower leg 06/05/2017     Priority: Medium     Overview:   MRI June 2017:  Tunneling synovial cyst right lower extremity.       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, diagnosed as fibrosis     Past Surgical History:   Procedure Laterality Date     ARTHROPLASTY KNEE Left 9/30/2019    Procedure: Left Total Knee Arthroplasty;  Surgeon: Ion Bailey MD;  Location: [facility] OR     BRONCHOSCOPY (RIGID OR FLEXIBLE), DIAGNOSTIC N/A 4/21/2021    Procedure: BRONCHOSCOPY, WITH BRONCHOALVEOLAR LAVAGE;  Surgeon: Keli Webber MD;  Location: [facility] GI     CARPAL TUNNEL RELEASE RT/LT Bilateral      EXTERNAL EAR SURGERY       IR LUNG BIOPSY MEDIASTINUM RIGHT Right 08/2018    RLL mass, diagnosed as fibrosis per pt     LAPAROSCOPY DIAGNOSTIC (GENERAL)  02/2019     LAPAROSCOPY, SURGICAL; REPAIR UMBILICAL HERNIA  08/22/2018     THORACOTOMY, WEDGE RESECTION LUNG, COMBINED Right 4/19/2021    Procedure: Right Thoracotomy, excision of solitary fibrous tumor, intercostal nerve cryo ablation;  Surgeon: Keli Webber MD;  Location:  OR     [facility] LIGATE FALLOPIAN TUBE       Description: Tubal Ligation;  Recorded: 04/09/2008;       Social History:  Social History     Socioeconomic History     Marital status:      Spouse name: Not on file     Number of children: Not on file     Years of education: Not on file     Highest education level: Not on file   Occupational History     Not on file   Tobacco Use     Smoking status: Former Smoker     Packs/day: 0.75     Years: 33.00     Pack years: 24.75     Types: Paan with tobacco, gutka, zarda, khaini     Start date: 1986     Smokeless tobacco: Former User     Quit date: 4/5/2021   Substance and Sexual Activity     Alcohol use: Yes     Drug use: Not on file     Sexual activity: Not on file   Other Topics Concern     Not on file   Social History Narrative     Not on file     Social Determinants of Health     Financial Resource Strain: Not on file   Food Insecurity: Not on file   Transportation Needs: Not on file   Physical Activity: Not on file   Stress: Not on file   Social Connections: Not on file   Intimate Partner Violence: Not on file   Housing Stability: Not on file       Family History:  Family History   Problem Relation Age of Onset     Anesthesia Reaction Mother         PONV     Hyperlipidemia Mother      Hypertension Mother      Diabetes Mother      Thyroid Disease Mother      CABG Father      Heart Failure Father      Coronary Stenting Father      Cardiovascular Father         ICD implant     Lung Cancer Father      Coronary Artery Disease Father      Kidney Disease Maternal Grandmother      Breast Cancer Maternal Grandmother      Abdominal Aortic Aneurysm Maternal Grandmother      Abdominal Aortic Aneurysm Paternal Grandfather      Abdominal Aortic Aneurysm Paternal Uncle      Deep Vein Thrombosis (DVT) No family hx of        Review of Systems:  A complete review of systems reviewed by me is negative with the exeption of what has been mentioned in the history of present illness.  CONSTITUTIONAL:  POSITIVE for  weight gain,  chills and sweats  EYES: NEGATIVE for changes in vision, blind spots, double vision.  ENT: NEGATIVE for ear pain, sore throat, sinus pain, post-nasal drip, runny nose, bloody nose  CARDIAC:  POSITIVE for  swollen legs and swollen feet  NEUROLOGIC: NEGATIVE headaches, weakness or numbness in the arms or legs.  DERMATOLOGIC: NEGATIVE for rashes, new moles or change in mole(s)  PULMONARY:  POSITIVE for  SOB with activity  GASTROINTESTINAL: NEGATIVE for nausea or vomitting, loose or watery stools, fat or grease in stools, constipation, abdominal pain, bowel movements black in color or blood noted.  GENITOURINARY: NEGATIVE for pain during urination, blood in urine, urinating more frequently than usual, irregular menstrual periods.  MUSCULOSKELETAL: NEGATIVE for muscle pain, bone or joint pain, swollen joints.  ENDOCRINE: NEGATIVE for increased thirst or urination, diabetes.  LYMPHATIC: NEGATIVE for swollen lymph nodes, lumps or bumps in the breasts or nipple discharge.    Physical Examination:  Vitals: There were no vitals taken for this visit.  BMI= There is no height or weight on file to calculate BMI.         No flowsheet data found.         GENERAL APPEARANCE: healthy, alert and no distress  EYES: Eyes grossly normal to inspection, PERRL and conjunctivae and sclerae normal  HENT: nose and mouth without ulcers or lesions and oropharynx crowded  NECK: no adenopathy, no asymmetry, masses, or scars and trachea midline and normal to palpation  RESP: lungs clear to auscultation - no rales, rhonchi or wheezes  CV: regular rates and rhythm, normal S1 S2, no S3 or S4 and no murmur, click or rub  MS: extremities normal- no gross deformities noted  PSYCH: mentation appears normal and affect normal/bright  Mallampati Class: II.  Tonsillar Stage: 2  visible at pillars.    Impression/Plan:  Snoring, witnessed apneas in a patient with obesity, depression/anxiety. Significant restless legs while on gabapentin and pramipexole,  augmentation occurring( has progressed to all day symptoms). Will order a lab study for suspected sleep apnea, evaluation of refractory restless legs. Previous low ferratin level(< 50), will recheck today. She is c/o hair loss, synthroid dose was adjusted so I will recheck a TSH. Recommended Iron replacement 325 MG with Vitamin C daily.   Literature provided regarding sleep apnea and restless legs..      Treatment of sleep apnea if present, iron supplementation may improve restless legs, if no improvement will consider medication changes to improve control. Discussed avoiding caffeine, nicotine, and alcohol.     She will follow up with me in approximately two weeks after her sleep study has been competed to review the results and discuss plan of care.       Polysomnography reviewed.  Obstructive sleep apnea reviewed.  Complications of untreated sleep apnea were reviewed.        CC: Barbara Hedrick

## 2022-02-01 ENCOUNTER — OFFICE VISIT (OUTPATIENT)
Dept: SLEEP MEDICINE | Facility: CLINIC | Age: 52
End: 2022-02-01
Payer: COMMERCIAL

## 2022-02-01 VITALS
RESPIRATION RATE: 20 BRPM | BODY MASS INDEX: 42.1 KG/M2 | DIASTOLIC BLOOD PRESSURE: 64 MMHG | HEIGHT: 61 IN | TEMPERATURE: 98.3 F | WEIGHT: 223 LBS | SYSTOLIC BLOOD PRESSURE: 112 MMHG

## 2022-02-01 DIAGNOSIS — G25.81 RESTLESS LEGS SYNDROME (RLS): Primary | ICD-10-CM

## 2022-02-01 DIAGNOSIS — R29.818 SUSPECTED SLEEP APNEA: ICD-10-CM

## 2022-02-01 LAB
FERRITIN SERPL-MCNC: 34 NG/ML (ref 8–252)
IRON SATN MFR SERPL: 11 % (ref 15–46)
IRON SERPL-MCNC: 41 UG/DL (ref 35–180)
TIBC SERPL-MCNC: 384 UG/DL (ref 240–430)
TSH SERPL DL<=0.005 MIU/L-ACNC: 0.55 MU/L (ref 0.4–4)

## 2022-02-01 PROCEDURE — 36415 COLL VENOUS BLD VENIPUNCTURE: CPT | Performed by: PHYSICIAN ASSISTANT

## 2022-02-01 PROCEDURE — 82728 ASSAY OF FERRITIN: CPT | Performed by: PHYSICIAN ASSISTANT

## 2022-02-01 PROCEDURE — 99203 OFFICE O/P NEW LOW 30 MIN: CPT | Performed by: PHYSICIAN ASSISTANT

## 2022-02-01 PROCEDURE — 83550 IRON BINDING TEST: CPT | Performed by: PHYSICIAN ASSISTANT

## 2022-02-01 PROCEDURE — 84443 ASSAY THYROID STIM HORMONE: CPT | Performed by: PHYSICIAN ASSISTANT

## 2022-02-01 ASSESSMENT — MIFFLIN-ST. JEOR: SCORE: 1555.96

## 2022-02-01 NOTE — PATIENT INSTRUCTIONS
"Insomnia - Restless Leg Syndrome (RLS)  Based on the information that you provided today you are likely to have restless leg syndrome that may be interfering with your sleep.  Treatment of restless leg syndrome usually depends on how much it is bothering you.  If it is a major problem then you might want to do something about it.  Here are some treatment options that you might want to consider:       Behavior Changes:     - Reduce or eliminate caffeine and alcohol products     - Increase the amount of stretching that you do, particularly before bedtime     - Sometimes a leg message can be helpful     - Some people find that a warm bath or shower in the evening is helpful       Medications:     - Pramipexole (Mirapex)     - Ropinirole (Requip)     - Sinemet (Carbidopa / Levodopa)     - Neurontin         MY TREATMENT INFORMATION FOR SLEEP APNEA-  Carol Guajardo    DOCTOR : BASHIR Hayden-C    Am I having a sleep study at a sleep center?  --->Due to insurance clearance delays, you will be contacted within 2-4 weeks to schedule    Am I having a home sleep study?  --->Watch the video for the device you are using:    -/drop off device-   https://www.[employer].com/watch?v=yGGFBdELGhk    -Disposable device sent out require phone/computer application-   https://www.[employer].com/watch?v=BCce_vbiwxE      Frequently asked questions:  1. What is Obstructive Sleep Apnea (VALERIE)? VALERIE is the most common type of sleep apnea. Apnea means, \"without breath.\"  Apnea is most often caused by narrowing or collapse of the upper airway as muscles relax during sleep.   Almost everyone has occasional apneas. Most people with sleep apnea have had brief interruptions at night frequently for many years.  The severity of sleep apnea is related to how frequent and severe the events are.   2. What are the consequences of VALERIE? Symptoms include: feeling sleepy during the day, snoring loudly, gasping or stopping of breathing, trouble sleeping, " and occasionally morning headaches or heartburn at night.  Sleepiness can be serious and even increase the risk of falling asleep while driving. Other health consequences may include development of high blood pressure and other cardiovascular disease in persons who are susceptible. Untreated VALERIE  can contribute to heart disease, stroke and diabetes.   3. What are the treatment options? In most situations, sleep apnea is a lifelong disease that must be managed with daily therapy. Medications are not effective for sleep apnea and surgery is generally not considered until other therapies have been tried. Your treatment is your choice . Continuous Positive Airway (CPAP) works right away and is the therapy that is effective in nearly everyone. An oral device to hold your jaw forward is usually the next most reliable option. Other options include postioning devices (to keep you off your back), weight loss, and surgery including a tongue pacing device. There is more detail about some of these options below.  4. Are my sleep studies covered by insurance? Although we will request verification of coverage, we advise you also check in advance of the study to ensure there is coverage.    Important tips for those choosing CPAP and similar devices   Know your equipment:  CPAP is continuous positive airway pressure that prevents obstructive sleep apnea by keeping the throat from collapsing while you are sleeping. In most cases, the device is  smart  and can slowly self-adjusts if your throat collapses and keeps a record every day of how well you are treated-this information is available to you and your care team.  BPAP is bilevel positive airway pressure that keeps your throat open and also assists each breath with a pressure boost to maintain adequate breathing.  Special kinds of BPAP are used in patients who have inadequate breathing from lung or heart disease. In most cases, the device is  smart  and can slowly self-adjusts to  assist breathing. Like CPAP, the device keeps a record of how well you are treated.  Your mask is your connection to the device. You get to choose what feels most comfortable and the staff will help to make sure if fits. Here: are some examples of the different masks that are available:       Key points to remember on your journey with sleep apnea:  1. Sleep study.  PAP devices often need to be adjusted during a sleep study to show that they are effective and adjusted right.  2. Good tips to remember: Try wearing just the mask during a quiet time during the day so your body adapts to wearing it. A humidifier is recommended for comfort in most cases to prevent drying of your nose and throat. Allergy medication from your provider may help you if you are having nasal congestion.  3. Getting settled-in. It takes more than one night for most of us to get used to wearing a mask. Try wearing just the mask during a quiet time during the day so your body adapts to wearing it. A humidifier is recommended for comfort in most cases. Our team will work with you carefully on the first day and will be in contact within 4 days and again at 2 and 4 weeks for advice and remote device adjustments. Your therapy is evaluated by the device each day.   4. Use it every night. The more you are able to sleep naturally for 7-8 hours, the more likely you will have good sleep and to prevent health risks or symptoms from sleep apnea. Even if you use it 4 hours it helps. Occasionally all of us are unable to use a medical therapy, in sleep apnea, it is not dangerous to miss one night.   5. Communicate. Call our skilled team on the number provided on the first day if your visit for problems that make it difficult to wear the device. Over 2 out of 3 patients can learn to wear the device long-term with help from our team. Remember to call our team or your sleep providers if you are unable to wear the device as we may have other solutions for those  who cannot adapt to mask CPAP therapy. It is recommended that you sleep your sleep provider within the first 3 months and yearly after that if you are not having problems.   6. Use it for your health. We encourage use of CPAP masks during daytime quiet periods to allow your face and brain to adapt to the sensation of CPAP so that it will be a more natural sensation to awaken to at night or during naps. This can be very useful during the first few weeks or months of adapting to CPAP though it does not help medically to wear CPAP during wakefulness and  should not be used as a strategy just to meet guidelines.  7. Take care of your equipment. Make sure you clean your mask and tubing using directions every day and that your filter and mask are replaced as recommended or if they are not working.     BESIDES CPAP, WHAT OTHER THERAPIES ARE THERE?    Positioning Device  Positioning devices are generally used when sleep apnea is mild and only occurs on your back.This example shows a pillow that straps around the waist. It may be appropriate for those whose sleep study shows milder sleep apnea that occurs primarily when lying flat on one's back. Preliminary studies have shown benefit but effectiveness at home may need to be verified by a home sleep test. These devices are generally not covered by medical insurance.  Examples of devices that maintain sleeping on the back to prevent snoring and mild sleep apnea.    Belt type body positioner  http://[employer]/    Electronic reminder  http://nightshifttherapy.com/  http://www.[employer]au/      Oral Appliance  What is oral appliance therapy?  An oral appliance device fits on your teeth at night like a retainer used after having braces. The device is made by a specialized dentist and requires several visits over 1-2 months before a manufactured device is made to fit your teeth and is adjusted to prevent your sleep apnea. Once an oral device is working properly, snoring  should be improved. A home sleep test may be recommended at that time if to determine whether the sleep apnea is adequately treated.       Some things to remember:  -Oral devices are often, but not always, covered by your medical insurance. Be sure to check with your insurance provider.   -If you are referred for oral therapy, you will be given a list of specialized dentists to consider or you may choose to visit the Web site of the American Academy of Dental Sleep Medicine  -Oral devices are less likely to work if you have severe sleep apnea or are extremely overweight.     More detailed information  An oral appliance is a small acrylic device that fits over the upper and lower teeth  (similar to a retainer or a mouth guard). This device slightly moves jaw forward, which moves the base of the tongue forward, opens the airway, improves breathing for effective treat snoring and obstructive sleep apnea in perhaps 7 out of 10 people .  The best working devices are custom-made by a dental device  after a mold is made of the teeth 1, 2, 3.  When is an oral appliance indicated?  Oral appliance therapy is recommended as a first-line treatment for patients with primary snoring, mild sleep apnea, and for patients with moderate sleep apnea who prefer appliance therapy to use of CPAP4, 5. Severity of sleep apnea is determined by sleep testing and is based on the number of respiratory events per hour of sleep.   How successful is oral appliance therapy?  The success rate of oral appliance therapy in patients with mild sleep apnea is 75-80% while in patients with moderate sleep apnea it is 50-70%. The chance of success in patients with severe sleep apnea is 40-50%. The research also shows that oral appliances have a beneficial effect on the cardiovascular health of VALERIE patients at the same magnitude as CPAP therapy7.  Oral appliances should be a second-line treatment in cases of severe sleep apnea, but if not  completely successful then a combination therapy utilizing CPAP plus oral appliance therapy may be effective. Oral appliances tend to be effective in a broad range of patients although studies show that the patients who have the highest success are females, younger patients, those with milder disease, and less severe obesity. 3, 6.   Finding a dentist that practices dental sleep medicine  Specific training is available through the American Academy of Dental Sleep Medicine for dentists interested in working in the field of sleep. To find a dentist who is educated in the field of sleep and the use of oral appliances, near you, visit the Web site of the American Academy of Dental Sleep Medicine.    References  1. Swetha et al. Objectively measured vs self-reported compliance during oral appliance therapy for sleep-disordered breathing. Chest 2013; 144(5): 5209-5025.  2. Jessica et al. Objective measurement of compliance during oral appliance therapy for sleep-disordered breathing. Thorax 2013; 68(1): 91-96.  3. Nereida, et al. Mandibular advancement devices in 620 men and women with VALERIE and snoring: tolerability and predictors of treatment success. Chest 2004; 125: 4436-6259.  4. Godfrey, et al. Oral appliances for snoring and VALERIE: a review. Sleep 2006; 29: 244-262.  5. Jabari, et al. Oral appliance treatment for VALERIE: an update. J Clin Sleep Med 2014; 10(2): 215-227.  6. Jamie et al. Predictors of OSAH treatment outcome. J Dent Res 2007; 86: 9936-0925.      Weight Loss:    Weight loss is a long-term strategy that may improve sleep apnea in some patients.    Weight management is a personal decision and the decision should be based on your interest and the potential benefits.  If you are interested in exploring weight loss strategies, the following discussion covers the impact on weight loss on sleep apnea and the approaches that may be successful.    Being overweight does not necessarily mean you will  have health consequences.  Those who have BMI over 35 or over 27 with existing medical conditions carries greater risk.   Weight loss decreases severity of sleep apnea in most people with obesity. For those with mild obesity who have developed snoring with weight gain, even 15-30 pound weight loss can improve and occasionally eliminate sleep apnea.  Structured and life-long dietary and health habits are necessary to lose weight and keep healthier weight levels.     Though there may be significant health benefits from weight loss, long-term weight loss is very difficult to achieve- studies show success with dietary management in less than 10% of people. In addition, substantial weight loss may require years of dietary control and may be difficult if patients have severe obesity. In these cases, surgical management may be considered.  Finally, older individuals who have tolerated obesity without health complications may be less likely to benefit from weight loss strategies.        Your BMI is Body mass index is 42.83 kg/m .  Weight management is a personal decision.  If you are interested in exploring weight loss strategies, the following discussion covers the approaches that may be successful. Body mass index (BMI) is one way to tell whether you are at a healthy weight, overweight, or obese. It measures your weight in relation to your height.  A BMI of 18.5 to 24.9 is in the healthy range. A person with a BMI of 25 to 29.9 is considered overweight, and someone with a BMI of 30 or greater is considered obese. More than two-thirds of American adults are considered overweight or obese.  Being overweight or obese increases the risk for further weight gain. Excess weight may lead to heart disease and diabetes.  Creating and following plans for healthy eating and physical activity may help you improve your health.  Weight control is part of healthy lifestyle and includes exercise, emotional health, and healthy eating  habits. Careful eating habits lifelong are the mainstay of weight control. Though there are significant health benefits from weight loss, long-term weight loss with diet alone may be very difficult to achieve- studies show long-term success with dietary management in less than 10% of people. Attaining a healthy weight may be especially difficult to achieve in those with severe obesity. In some cases, medications, devices and surgical management might be considered.  What can you do?  If you are overweight or obese and are interested in methods for weight loss, you should discuss this with your provider.     Consider reducing daily calorie intake by 500 calories.     Keep a food journal.     Avoiding skipping meals, consider cutting portions instead.    Diet combined with exercise helps maintain muscle while optimizing fat loss. Strength training is particularly important for building and maintaining muscle mass. Exercise helps reduce stress, increase energy, and improves fitness. Increasing exercise without diet control, however, may not burn enough calories to loose weight.       Start walking three days a week 10-20 minutes at a time    Work towards walking thirty minutes five days a week     Eventually, increase the speed of your walking for 1-2 minutes at time    And look into health and wellness programs that may be available through your health insurance provider, employer, local community center, or praneeth club.          Surgery:    Surgery for obstructive sleep apnea is considered generally only when other therapies fail to work. Surgery may be discussed with you if you are having a difficult time tolerating CPAP and or when there is an abnormal structure that requires surgical correction.  Nose and throat surgeries often enlarge the airway to prevent collapse.  Most of these surgeries create pain for 1-2 weeks and up to half of the most common surgeries are not effective throughout life.  You should  carefully discuss the benefits and drawbacks to surgery with your sleep provider and surgeon to determine if it is the best solution for you.   More information  Surgery for VALERIE is directed at areas that are responsible for narrowing or complete obstruction of the airway during sleep.  There are a wide range of procedures available to enlarge and/or stabilize the airway to prevent blockage of breathing in the three major areas where it can occur: the palate, tongue, and nasal regions.  Successful surgical treatment depends on the accurate identification of the factors responsible for obstructive sleep apnea in each person.  A personalized approach is required because there is no single treatment that works well for everyone.  Because of anatomic variation, consultation with an examination by a sleep surgeon is a critical first step in determining what surgical options are best for each patient.  In some cases, examination during sedation may be recommended in order to guide the selection of procedures.  Patients will be counseled about risks and benefits as well as the typical recovery course after surgery. Surgery is typically not a cure for a person s VALERIE.  However, surgery will often significantly improve one s VALERIE severity (termed  success rate ).  Even in the absence of a cure, surgery will decrease the cardiovascular risk associated with OSA7; improve overall quality of life8 (sleepiness, functionality, sleep quality, etc).      Palate Procedures:  Patients with VALERIE often have narrowing of their airway in the region of their tonsils and uvula.  The goals of palate procedures are to widen the airway in this region as well as to help the tissues resist collapse.  Modern palate procedure techniques focus on tissue conservation and soft tissue rearrangement, rather than tissue removal.  Often the uvula is preserved in this procedure. Residual sleep apnea is common in patient after pharyngoplasty with an average  reduction in sleep apnea events of 33%2.      Tongue Procedures:  ExamWhile patients are awake, the muscles that surround the throat are active and keep this region open for breathing. These muscles relax during sleep, allowing the tongue and other structures to collapse and block breathing.  There are several different tongue procedures available.  Selection of a tongue base procedure depends on characteristics seen on physical exam.  Generally, procedures are aimed at removing bulky tissues in this area or preventing the back of the tongue from falling back during sleep.  Success rates for tongue surgery range from 50-62%3.    Hypoglossal Nerve Stimulation:  Hypoglossal nerve stimulation has recently received approval from the [place] Food and Drug Administration for the treatment of obstructive sleep apnea.  This is based on research showing that the system was safe and effective in treating sleep apnea6.  Results showed that the median AHI score decreased 68%, from 29.3 to 9.0. This therapy uses an implant system that senses breathing patterns and delivers mild stimulation to airway muscles, which keeps the airway open during sleep.  The system consists of three fully implanted components: a small generator (similar in size to a pacemaker), a breathing sensor, and a stimulation lead.  Using a small handheld remote, a patient turns the therapy on before bed and off upon awakening.    Candidates for this device must be greater than 22 years of age, have moderate to severe VALERIE (AHI between 20-65), BMI less than 32, have tried CPAP/oral appliance without success, and have appropriate upper airway anatomy (determined by a sleep endoscopy performed by Dr. Arceo).    Hypoglossal Nerve Stimulation Pathway:    The sleep surgeon s office will work with the patient through the insurance prior-authorization process (including communications and appeals).    Nasal Procedures:  Nasal obstruction can interfere with nasal  breathing during the day and night.  Studies have shown that relief of nasal obstruction can improve the ability of some patients to tolerate positive airway pressure therapy for obstructive sleep apnea1.  Treatment options include medications such as nasal saline, topical corticosteroid and antihistamine sprays, and oral medications such as antihistamines or decongestants. Non-surgical treatments can include external nasal dilators for selected patients. If these are not successful by themselves, surgery can improve the nasal airway either alone or in combination with these other options.      Combination Procedures:  Combination of surgical procedures and other treatments may be recommended, particularly if patients have more than one area of narrowing or persistent positional disease.  The success rate of combination surgery ranges from 66-80%2,3.    References  1. Adelaida CRUZ. The Role of the Nose in Snoring and Obstructive Sleep Apnoea: An Update.  Eur Arch Otorhinolaryngol. 2011; 268: 1365-73.  2.  Olya SM; Maricarmen JA; Aye JR; Pallanch JF; Eugene MB; Anita SG; Tommy CHI. Surgical modifications of the upper airway for obstructive sleep apnea in adults: a systematic review and meta-analysis. SLEEP 2010;33(10):5983-7161. Ashlie FOSTER. Hypopharyngeal surgery in obstructive sleep apnea: an evidence-based medicine review.  Arch Otolaryngol Head Neck Surg. 2006 Feb;132(2):206-13.  3. [facility] BARROWH1, Luli Y, Rico LUCINDA. The efficacy of anatomically based multilevel surgery for obstructive sleep apnea. Otolaryngol Head Neck Surg. 2003 Oct;129(4):327-35.  4. Ashlie FOSTER, Goldberg A. Hypopharyngeal Surgery in Obstructive Sleep Apnea: An Evidence-Based Medicine Review. Arch Otolaryngol Head Neck Surg. 2006 Feb;132(2):206-13.  5. Minal CARTER et al. Upper-Airway Stimulation for Obstructive Sleep Apnea.  N Engl J Med. 2014 Jan 9;370(2):139-49.  6. Jordana Y et al. Increased Incidence of Cardiovascular Disease in Middle-aged Men  with Obstructive Sleep Apnea. Am J Respir Crit Care Med; 2002 166: 159-165  7. Ríos EM et al. Studying Life Effects and Effectiveness of Palatopharyngoplasty (SLEEP) study: Subjective Outcomes of Isolated Uvulopalatopharyngoplasty. Otolaryngol Head Neck Surg. 2011; 144: 623-631.        WHAT IF I ONLY HAVE SNORING?      Mandibular advancement devices, lateral sleep positioning, long-term weight loss and treatment of nasal allergies have been shown to improve snoring.    Exercising tongue muscles with a game (https://www.ncbi.nlm.nih.gov/pubmed/00760557) or stimulating the tongue during the day with a device (https://doi.org/10.3390/gks40860167) have improved snoring in some individuals.    Remember to Drive Safe... Drive Alive     Sleep health profoundly affects your health, mood, and your safety.  Thirty three percent of the population (one in three of us) is not getting enough sleep and many have a sleep disorder. Not getting enough sleep or having an untreated / undertreated sleep condition may make us sleepy without even knowing it. In fact, our driving could be dramatically impaired due to our sleep health. As your provider, here are some things I would like you to know about driving:     Here are some warning signs for impairment and dangerous drowsy driving:              -Having been awake more than 16 hours               -Looking tired               -Eyelid drooping              -Head nodding (it could be too late at this point)              -Driving for more than 30 minutes     Some things you could do to make the driving safer if you are experiencing some drowsiness:              -Stop driving and rest              -Call for transportation              -Make sure your sleep disorder is adequately treated     Some things that have been shown NOT to work when experiencing drowsiness while driving:              -Turning on the radio              -Opening windows              -Eating any  distracting  /   entertaining  foods (e.g., sunflower seeds, candy, or any other)              -Talking on the phone      Your decision may not only impact your life, but also the life of others. Please, remember to drive safe for yourself and all of us.

## 2022-02-04 NOTE — PROGRESS NOTES
"CHIEF COMPLAINT:   Chief Complaint   Patient presents with     Knee right     Pt is here today for R knee pain. It staryed 6 mo ago with no injury. Symptoms are aching,burning clicking constant pain. She has been icing.      Knee Pain     Went to 3 sessions of PT and stopped because it was not helping   .  Carol Guajardo is seen today in the [facility] for evaluation of right knee pain at the request of Dr. Srinath Castillo         HISTORY OF PRESENT ILLNESS    Carol Guajardo is a 51 year old female seen for evaluation of ongoing right knee pain with no known injury.   Pain has been present for at least 6 months, probably longer. Pain is throughout the knee, burning. Pain is constant, all day every day.   Aggravated with stairs, walking, sit to stand especially after prolonged sitting, squatting. Hurts so bad she cries daily, stands on cement floors. Can't recall how bad her left knee was prior to surgery, thinks maybe right knee is worse.    Treatment has been cortisone injection (cortisone, visco) neither provided any relief more than a day, Physical Therapy, tylenol.    History of left total knee arthroplasty 9/2019 with Dr Bailey, [facility].    Denies hip or low back pain. Reports numbness and tingling in leg/foot once in a while, feels like that currently. Reports went to the ED one time for it and they saw no blood clots.     Present symptoms: pain diffuse, pain dull/achy, burning , moderate pain.    Pain severity: 5/10  Frequency of symptoms: are constant  Exacerbating Factors: weight bearing, stairs, [id number], prolonged standing  Relieving Factors: \"nothing\"  Night Pain: Yes  Pain while at rest: Yes   Numbness or tingling: Yes   Patient has tried:     NSAIDS: Yes      Physical Therapy: Yes      Activity modification: Yes      Bracing: No      Injections: Yes cortisone and visco without relief.     Ice: Yes      Assistive device:  No     Other: " tylenol    Orthopaedic PMH: left total knee arthroplasty 2019.    Other PMH:  has a past medical history of Anxiety, Depression, HLD (hyperlipidemia), Hypothyroidism, Osteoarthritis of both knees, and Right lower lobe lung mass.  Patient Active Problem List   Diagnosis     Anxiety state     Depression     HLD (hyperlipidemia)     Vitamin D deficiency     Thyroid activity decreased     Obesity     Nicotine dependence     Mass of right lower leg     Primary osteoarthritis of left knee     Total knee replacement status, left     Solitary fibrous tumor     Morbid obesity (H)     Restless legs syndrome (RLS)       Surgical Hx:  has a past surgical history that includes laparoscopy, surgical; repair umbilical hernia (08/22/2018); Laparoscopy diagnostic (general) (02/2019); External ear surgery; carpal tunnel release rt/lt (Bilateral); IR Lung Biopsy Mediastinum Right (Right, 08/2018); Arthroplasty knee (Left, 9/30/2019); Thoracotomy, wedge resection lung, combined (Right, 4/19/2021); Bronchoscopy (rigid or flexible), diagnostic (N/A, 4/21/2021); and LIGATE FALLOPIAN TUBE.    Medications:   Current Outpatient Medications:      buPROPion (WELLBUTRIN XL) 150 MG 24 hr tablet, Take 1 tablet (150 mg) by mouth every morning In addition to 300 mg to = 450 mg daily, Disp: 90 tablet, Rfl: 0     buPROPion (WELLBUTRIN XL) 300 MG 24 hr tablet, Take 1 tablet (300 mg) by mouth every morning, Disp: 90 tablet, Rfl: 1     diazepam (VALIUM) 2 MG tablet, Take 1 tablet (2 mg) by mouth every 8 hours as needed for anxiety, Disp: 20 tablet, Rfl: 0     gabapentin (NEURONTIN) 300 MG capsule, Take 3 capsules (900 mg) by mouth 2 times daily, Disp: 540 capsule, Rfl: 3     levothyroxine (SYNTHROID/LEVOTHROID) 175 MCG tablet, Take 1 tablet (175 mcg) by mouth every morning, Disp: 90 tablet, Rfl: 3     PARoxetine (PAXIL) 20 MG tablet, TAKE 1 TABLET BY MOUTH EVERY MORNING WITH 40 MG TAB FOR TOTAL DAILY DOSE OF 60 MG., Disp: 90 tablet, Rfl: 0      PARoxetine (PAXIL) 40 MG tablet, TAKE 1 TABLET BY MOUTH EVERY MORNING WITH 20 MG TAB FOR TOTAL DAILY DOSE OF 60 MG, Disp: 90 tablet, Rfl: 0     pramipexole (MIRAPEX) 0.5 MG tablet, TAKE 2 TABLETS BY MOUTH AT BEDTIME, Disp: 180 tablet, Rfl: 1     propranolol ER (INDERAL LA) 60 MG 24 hr capsule, TAKE 1 CAPSULE BY MOUTH EVERY MORNING, Disp: 90 capsule, Rfl: 0    Current Facility-Administered Medications:      betamethasone acet & sod phos (CELESTONE) injection 6 mg, 6 mg, , , Srinath Castillo MD, 6 mg at 11/01/21 1615     hylan (SYNVISC ONE) injection 48 mg, 48 mg, , , Srinath Castillo MD, 48 mg at 12/03/21 1433     ropivacaine (NAROPIN) injection 3 mL, 3 mL, , , Srinath Castillo MD, 3 mL at 11/01/21 1615    Allergies:   Allergies   Allergen Reactions     Amoxicillin-Pot Clavulanate Hives     Other reaction(s): Edema     Augmentin Hives and Itching     Ciprofloxacin      Penicillins        Social Hx: .   reports that she has quit smoking. Her smoking use included paan with tobacco, gutka, zarda, khaini. She started smoking about 36 years ago. She has a 24.75 pack-year smoking history. She quit smokeless tobacco use about 10 months ago. She reports current alcohol use.    Family Hx: family history includes Abdominal Aortic Aneurysm in her maternal grandmother, paternal grandfather, and paternal uncle; Anesthesia Reaction in her mother; Breast Cancer in her maternal grandmother; CABG in her father; Cardiovascular in her father; Coronary Artery Disease in her father; Coronary Stenting in her father; Diabetes in her mother; Heart Failure in her father; Hyperlipidemia in her mother; Hypertension in her mother; Kidney Disease in her maternal grandmother; Lung Cancer in her father; Thyroid Disease in her mother.    REVIEW OF SYSTEMS: 10 point ROS neg other than the symptoms noted above in the HPI and PMH. Notables include  CONSTITUTIONAL:NEGATIVE for fever, chills, change in weight  INTEGUMENTARY/SKIN:  NEGATIVE for worrisome rashes, moles or lesions  MUSCULOSKELETAL:See HPI above  NEURO: NEGATIVE for weakness, dizziness or paresthesias    PHYSICAL EXAM:  /64   Pulse 69   Wt 101.2 kg (223 lb)   SpO2 99%   BMI 42.83 kg/m     GENERAL APPEARANCE: healthy, alert, no distress  SKIN: no suspicious lesions or rashes  NEURO: Normal strength and tone, mentation intact and speech normal  PSYCH:  mentation appears normal and affect normal, not anxious  RESPIRATORY: No increased work of breathing.  HANDS: no clubbing, nail pitting  LYMPH: no palpable popliteal lymphadenopathy.    BILATERAL LOWER EXTREMITIES:  Gait: favors the right.  Alignment: varus  No gross deformities or masses.  No Quad atrophy, strength normal.  Intact sensation deep peroneal nerve, superficial peroneal nerve, med/lat tibial nerve, sural nerve, saphenous nerve  Intact EHL, EDL, TA, FHL, GS, quadriceps hamstrings and hip flexors  Toes warm and well perfused, brisk capillary refill. Palpable 2+ dp pulses.  Bilateral calf soft and nttp or squeeze.  DTRs: achilles 2+, patella 2+.  Edema: 1+    LEFT KNEE EXAM:    Skin: intact, no ecchymosis or erythema  Midline incisional scar.  ROM: full extension to 100 flexion      RIGHT KNEE EXAM:    Skin: intact, no ecchymosis or erythema; lateral leg tortoise tattoo.  Squat: limited by anterior pain.     ROM: full extension to 120 flexion  Tight hamstrings on straight leg raise.  Effusion: trace  Tender: anterior knee, medial joint line, pes  NTTP lateral joint line.  McMurrays: negative    MCL: stable, and non-painful at both 0 and 30 degrees knee flexion  Varus stress: stable, and non-painful at both 0 and 30 degrees knee flexion  Lachmans: neg, firm endpoint  Posterior Drawer stable  Patellofemoral joint:                Apprehension: negative              Crepitations: mild   Grind: positive.    X-RAY: bilateral seymour and sunrise views, lateral view right knee from 11/1/2021 were reviewed in clinic  "today. On my review, no obvious fractures or dislocations. Right knee nonspecific joint effusion. Right knee medial compartment moderate to severe joint space narrowing. Left total knee arthroplasty is noted.           ASSESSMENT/PLAN: Carol Guajardo is a 51 year old female with chronic right knee pain, primary osteoarthritis.     * reviewed imaging studies with patient, showing arthritic changes, or wearing of the cartilage in the knee. This can be caused by normal \"wear and tear\" over the years or following prior injury to the knee.    Treatment typically starts nonsurgically. Surgical indication for total knee arthroplasty  when nonsurgical management is no longer effective.    ** Risks of surgery include, but not limited to: bleeding (possibly requiring transfusion), infection, pain, scar, damage to adjacent structures (e.g. Nerves, blood vessels, bone, cartilage), temporary or permanent nerve damage, recurrence of symptoms, implant dislocation, instability, implant failure, implant infection, unequal limb lengths, stiffness, need for further surgery, blood clots, pulmonary embolism, risks of anesthesia, and death.  * the knee will not feel \"normal\" as it won't be \"normal\" after knee replacement. It may feel \"clunky\" due to the nature of the hard metal and plastic implant.  * the expectation is for improved pain, not necessarily complete pain relief.    ** understanding these risks, the patient would like to proceed with surgery: RIGHT total knee arthroplasty.    * will arrange RIGHT total knee arthroplasty at a mutual convenience in the near future  * discussed will plan hospitalization overnight,  daily Physical Therapy starting either the day of or day after surgery, with discharge to home or short term rehabilitation facility, depending on postoperative recovery and progress during hospitalization.  * will plan postoperative deep vein thrombosis prophylaxis x4 weeks. Additionally, graduated compression " stockings x4 weeks, and SCDs while in the hospital.  * postoperative pain control will be oral medications upon discharge from hospital  * postoperative Physical Therapy to start upon discharge from the hospital.  * patient will need preoperative H+P prior to surgery from PCP.  * will see patient 2 weeks postoperative, sooner as needed, for wound check, suture removal, and xrays. Will obtain AP, lateral and sunrise views of the knee at that time.    * patient encouraged to call in interim if any new questions or concerns arise.    * recommend no elective dental procedures for 4-6 months after surgery. Any emergency dental procedures, mouth infections, abscesses, etc must be taken care of immediately with preventive antibiotics.   * Patient will need longterm prophylactic antibiotics use with dental procedures or other invasive procedures (2 g amoxicilin or 450mg ([id number]) clindamycin) 1 hour prior to dental procedures for a minimum of 2 years postoperative.      * we had a thorough discussion that given recent bed shortages due to recent Covid-19 surge, surgeries requiring an overnight stay had been postponed the past several months, so those patients previously canceled/postponed are being rescheduled now and pushing new scheduled patients until Spring, assuming things continue to improve. Our schedulers will be in touch with them regarding scheduling in the future.    As surgery is likely going to be pushed back a number of months due to scheduling backup, I would like them to come and see me a week or two PRIOR to scheduled surgery for an updated visit and discussion, as well as to :  Preop information packet  Preop scrub/wipes  Covid testing order  Baseline measures (koos, promis for knees only)                Emilio Pino M.D., M.S.  Dept. of Orthopaedic Surgery  [facility]

## 2022-02-08 ASSESSMENT — ENCOUNTER SYMPTOMS
DEPRESSION: 1
NECK PAIN: 0
DECREASED CONCENTRATION: 1
FEVER: 0
NIGHT SWEATS: 0
ARTHRALGIAS: 1
JOINT SWELLING: 0
WEIGHT GAIN: 1
POLYPHAGIA: 0
HALLUCINATIONS: 0
CHILLS: 0
PANIC: 1
MUSCLE WEAKNESS: 0
MYALGIAS: 0
MUSCLE CRAMPS: 0
ALTERED TEMPERATURE REGULATION: 0
FATIGUE: 0
BACK PAIN: 0
POLYDIPSIA: 0
STIFFNESS: 1
INSOMNIA: 1
WEIGHT LOSS: 0
DECREASED APPETITE: 0
NERVOUS/ANXIOUS: 1
INCREASED ENERGY: 0

## 2022-02-08 ASSESSMENT — KOOS JR
TWISING OR PIVOTING ON KNEE: MODERATE
STANDING UPRIGHT: MODERATE
BENDING TO THE FLOOR TO PICK UP OBJECT: SEVERE
STRAIGHTENING KNEE FULLY: MODERATE
KOOS JR SCORING: 42.28
RISING FROM SITTING: SEVERE
GOING UP OR DOWN STAIRS: SEVERE
HOW SEVERE IS YOUR KNEE STIFFNESS AFTER FIRST WAKING IN MORNING: SEVERE

## 2022-02-09 ENCOUNTER — OFFICE VISIT (OUTPATIENT)
Dept: ORTHOPEDICS | Facility: CLINIC | Age: 52
End: 2022-02-09
Payer: COMMERCIAL

## 2022-02-09 VITALS
SYSTOLIC BLOOD PRESSURE: 125 MMHG | DIASTOLIC BLOOD PRESSURE: 64 MMHG | WEIGHT: 223 LBS | HEART RATE: 69 BPM | BODY MASS INDEX: 42.83 KG/M2 | OXYGEN SATURATION: 99 %

## 2022-02-09 DIAGNOSIS — G89.29 CHRONIC PAIN OF RIGHT KNEE: ICD-10-CM

## 2022-02-09 DIAGNOSIS — M25.561 CHRONIC PAIN OF RIGHT KNEE: ICD-10-CM

## 2022-02-09 DIAGNOSIS — M17.11 PRIMARY OSTEOARTHRITIS OF RIGHT KNEE: Primary | ICD-10-CM

## 2022-02-09 PROCEDURE — 99214 OFFICE O/P EST MOD 30 MIN: CPT | Performed by: ORTHOPAEDIC SURGERY

## 2022-02-09 ASSESSMENT — PAIN SCALES - GENERAL: PAINLEVEL: MODERATE PAIN (5)

## 2022-02-09 NOTE — LETTER
"    2/9/2022         RE: Carol Guajardo  [address] [id number]        Dear Colleague,    Thank you for referring your patient, Carol Guajardo, to the [facility] [place]. Please see a copy of my visit note below.    CHIEF COMPLAINT:   Chief Complaint   Patient presents with     Knee right     Pt is here today for R knee pain. It staryed 6 mo ago with no injury. Symptoms are aching,burning clicking constant pain. She has been icing.      Knee Pain     Went to 3 sessions of PT and stopped because it was not helping   .  Carol Guajardo is seen today in the [facility] for evaluation of right knee pain at the request of Dr. Srinath Castillo         HISTORY OF PRESENT ILLNESS    Carol Guajardo is a 51 year old female seen for evaluation of ongoing right knee pain with no known injury.   Pain has been present for at least 6 months, probably longer. Pain is throughout the knee, burning. Pain is constant, all day every day.   Aggravated with stairs, walking, sit to stand especially after prolonged sitting, squatting. Hurts so bad she cries daily, stands on cement floors. Can't recall how bad her left knee was prior to surgery, thinks maybe right knee is worse.    Treatment has been cortisone injection (cortisone, visco) neither provided any relief more than a day, Physical Therapy, tylenol.    History of left total knee arthroplasty 9/2019 with Dr Bailey, [facility].    Denies hip or low back pain. Reports numbness and tingling in leg/foot once in a while, feels like that currently. Reports went to the ED one time for it and they saw no blood clots.     Present symptoms: pain diffuse, pain dull/achy, burning , moderate pain.    Pain severity: 5/10  Frequency of symptoms: are constant  Exacerbating Factors: weight bearing, stairs, [id number], prolonged standing  Relieving Factors: \"nothing\"  Night Pain: Yes  Pain while at rest: Yes "   Numbness or tingling: Yes   Patient has tried:     NSAIDS: Yes      Physical Therapy: Yes      Activity modification: Yes      Bracing: No      Injections: Yes cortisone and visco without relief.     Ice: Yes      Assistive device:  No     Other: tylenol    Orthopaedic PMH: left total knee arthroplasty 2019.    Other PMH:  has a past medical history of Anxiety, Depression, HLD (hyperlipidemia), Hypothyroidism, Osteoarthritis of both knees, and Right lower lobe lung mass.  Patient Active Problem List   Diagnosis     Anxiety state     Depression     HLD (hyperlipidemia)     Vitamin D deficiency     Thyroid activity decreased     Obesity     Nicotine dependence     Mass of right lower leg     Primary osteoarthritis of left knee     Total knee replacement status, left     Solitary fibrous tumor     Morbid obesity (H)     Restless legs syndrome (RLS)       Surgical Hx:  has a past surgical history that includes laparoscopy, surgical; repair umbilical hernia (08/22/2018); Laparoscopy diagnostic (general) (02/2019); External ear surgery; carpal tunnel release rt/lt (Bilateral); IR Lung Biopsy Mediastinum Right (Right, 08/2018); Arthroplasty knee (Left, 9/30/2019); Thoracotomy, wedge resection lung, combined (Right, 4/19/2021); Bronchoscopy (rigid or flexible), diagnostic (N/A, 4/21/2021); and LIGATE FALLOPIAN TUBE.    Medications:   Current Outpatient Medications:      buPROPion (WELLBUTRIN XL) 150 MG 24 hr tablet, Take 1 tablet (150 mg) by mouth every morning In addition to 300 mg to = 450 mg daily, Disp: 90 tablet, Rfl: 0     buPROPion (WELLBUTRIN XL) 300 MG 24 hr tablet, Take 1 tablet (300 mg) by mouth every morning, Disp: 90 tablet, Rfl: 1     diazepam (VALIUM) 2 MG tablet, Take 1 tablet (2 mg) by mouth every 8 hours as needed for anxiety, Disp: 20 tablet, Rfl: 0     gabapentin (NEURONTIN) 300 MG capsule, Take 3 capsules (900 mg) by mouth 2 times daily, Disp: 540 capsule, Rfl: 3     levothyroxine  (SYNTHROID/LEVOTHROID) 175 MCG tablet, Take 1 tablet (175 mcg) by mouth every morning, Disp: 90 tablet, Rfl: 3     PARoxetine (PAXIL) 20 MG tablet, TAKE 1 TABLET BY MOUTH EVERY MORNING WITH 40 MG TAB FOR TOTAL DAILY DOSE OF 60 MG., Disp: 90 tablet, Rfl: 0     PARoxetine (PAXIL) 40 MG tablet, TAKE 1 TABLET BY MOUTH EVERY MORNING WITH 20 MG TAB FOR TOTAL DAILY DOSE OF 60 MG, Disp: 90 tablet, Rfl: 0     pramipexole (MIRAPEX) 0.5 MG tablet, TAKE 2 TABLETS BY MOUTH AT BEDTIME, Disp: 180 tablet, Rfl: 1     propranolol ER (INDERAL LA) 60 MG 24 hr capsule, TAKE 1 CAPSULE BY MOUTH EVERY MORNING, Disp: 90 capsule, Rfl: 0    Current Facility-Administered Medications:      betamethasone acet & sod phos (CELESTONE) injection 6 mg, 6 mg, , , Srinath Castillo MD, 6 mg at 11/01/21 1615     hylan (SYNVISC ONE) injection 48 mg, 48 mg, , , Srinath Castillo MD, 48 mg at 12/03/21 1433     ropivacaine (NAROPIN) injection 3 mL, 3 mL, , , Srinath Castillo MD, 3 mL at 11/01/21 1615    Allergies:   Allergies   Allergen Reactions     Amoxicillin-Pot Clavulanate Hives     Other reaction(s): Edema     Augmentin Hives and Itching     Ciprofloxacin      Penicillins        Social Hx: .   reports that she has quit smoking. Her smoking use included paan with tobacco, gutka, zarda, khaini. She started smoking about 36 years ago. She has a 24.75 pack-year smoking history. She quit smokeless tobacco use about 10 months ago. She reports current alcohol use.    Family Hx: family history includes Abdominal Aortic Aneurysm in her maternal grandmother, paternal grandfather, and paternal uncle; Anesthesia Reaction in her mother; Breast Cancer in her maternal grandmother; CABG in her father; Cardiovascular in her father; Coronary Artery Disease in her father; Coronary Stenting in her father; Diabetes in her mother; Heart Failure in her father; Hyperlipidemia in her mother; Hypertension in her mother; Kidney Disease in her maternal  grandmother; Lung Cancer in her father; Thyroid Disease in her mother.    REVIEW OF SYSTEMS: 10 point ROS neg other than the symptoms noted above in the HPI and PMH. Notables include  CONSTITUTIONAL:NEGATIVE for fever, chills, change in weight  INTEGUMENTARY/SKIN: NEGATIVE for worrisome rashes, moles or lesions  MUSCULOSKELETAL:See HPI above  NEURO: NEGATIVE for weakness, dizziness or paresthesias    PHYSICAL EXAM:  /64   Pulse 69   Wt 101.2 kg (223 lb)   SpO2 99%   BMI 42.83 kg/m     GENERAL APPEARANCE: healthy, alert, no distress  SKIN: no suspicious lesions or rashes  NEURO: Normal strength and tone, mentation intact and speech normal  PSYCH:  mentation appears normal and affect normal, not anxious  RESPIRATORY: No increased work of breathing.  HANDS: no clubbing, nail pitting  LYMPH: no palpable popliteal lymphadenopathy.    BILATERAL LOWER EXTREMITIES:  Gait: favors the right.  Alignment: varus  No gross deformities or masses.  No Quad atrophy, strength normal.  Intact sensation deep peroneal nerve, superficial peroneal nerve, med/lat tibial nerve, sural nerve, saphenous nerve  Intact EHL, EDL, TA, FHL, GS, quadriceps hamstrings and hip flexors  Toes warm and well perfused, brisk capillary refill. Palpable 2+ dp pulses.  Bilateral calf soft and nttp or squeeze.  DTRs: achilles 2+, patella 2+.  Edema: 1+    LEFT KNEE EXAM:    Skin: intact, no ecchymosis or erythema  Midline incisional scar.  ROM: full extension to 100 flexion      RIGHT KNEE EXAM:    Skin: intact, no ecchymosis or erythema; lateral leg tortoise tattoo.  Squat: limited by anterior pain.     ROM: full extension to 120 flexion  Tight hamstrings on straight leg raise.  Effusion: trace  Tender: anterior knee, medial joint line, pes  NTTP lateral joint line.  McMurrays: negative    MCL: stable, and non-painful at both 0 and 30 degrees knee flexion  Varus stress: stable, and non-painful at both 0 and 30 degrees knee flexion  Lachmans: neg,  "firm endpoint  Posterior Drawer stable  Patellofemoral joint:                Apprehension: negative              Crepitations: mild   Grind: positive.    X-RAY: bilateral seymour and sunrise views, lateral view right knee from 11/1/2021 were reviewed in clinic today. On my review, no obvious fractures or dislocations. Right knee nonspecific joint effusion. Right knee medial compartment moderate to severe joint space narrowing. Left total knee arthroplasty is noted.           ASSESSMENT/PLAN: Carol Guajardo is a 51 year old female with chronic right knee pain, primary osteoarthritis.     * reviewed imaging studies with patient, showing arthritic changes, or wearing of the cartilage in the knee. This can be caused by normal \"wear and tear\" over the years or following prior injury to the knee.    Treatment typically starts nonsurgically. Surgical indication for total knee arthroplasty  when nonsurgical management is no longer effective.    ** Risks of surgery include, but not limited to: bleeding (possibly requiring transfusion), infection, pain, scar, damage to adjacent structures (e.g. Nerves, blood vessels, bone, cartilage), temporary or permanent nerve damage, recurrence of symptoms, implant dislocation, instability, implant failure, implant infection, unequal limb lengths, stiffness, need for further surgery, blood clots, pulmonary embolism, risks of anesthesia, and death.  * the knee will not feel \"normal\" as it won't be \"normal\" after knee replacement. It may feel \"clunky\" due to the nature of the hard metal and plastic implant.  * the expectation is for improved pain, not necessarily complete pain relief.    ** understanding these risks, the patient would like to proceed with surgery: RIGHT total knee arthroplasty.    * will arrange RIGHT total knee arthroplasty at a mutual convenience in the near future  * discussed will plan hospitalization overnight,  daily Physical Therapy starting either the day of or " day after surgery, with discharge to home or short term rehabilitation facility, depending on postoperative recovery and progress during hospitalization.  * will plan postoperative deep vein thrombosis prophylaxis x4 weeks. Additionally, graduated compression stockings x4 weeks, and SCDs while in the hospital.  * postoperative pain control will be oral medications upon discharge from hospital  * postoperative Physical Therapy to start upon discharge from the hospital.  * patient will need preoperative H+P prior to surgery from PCP.  * will see patient 2 weeks postoperative, sooner as needed, for wound check, suture removal, and xrays. Will obtain AP, lateral and sunrise views of the knee at that time.    * patient encouraged to call in interim if any new questions or concerns arise.    * recommend no elective dental procedures for 4-6 months after surgery. Any emergency dental procedures, mouth infections, abscesses, etc must be taken care of immediately with preventive antibiotics.   * Patient will need longterm prophylactic antibiotics use with dental procedures or other invasive procedures (2 g amoxicilin or 450mg ([id number]) clindamycin) 1 hour prior to dental procedures for a minimum of 2 years postoperative.      * we had a thorough discussion that given recent bed shortages due to recent Covid-19 surge, surgeries requiring an overnight stay had been postponed the past several months, so those patients previously canceled/postponed are being rescheduled now and pushing new scheduled patients until Spring, assuming things continue to improve. Our schedulers will be in touch with them regarding scheduling in the future.    As surgery is likely going to be pushed back a number of months due to scheduling backup, I would like them to come and see me a week or two PRIOR to scheduled surgery for an updated visit and discussion, as well as to :  Preop information packet  Preop scrub/wipes  Covid testing  order  Baseline measures (koos, promis for knees only)                Emilio Pino M.D., M.S.  Dept. of Orthopaedic Surgery  [facility]          Again, thank you for allowing me to participate in the care of your patient.        Sincerely,        Emilio Pino MD

## 2022-02-10 ENCOUNTER — TELEPHONE (OUTPATIENT)
Dept: ORTHOPEDICS | Facility: CLINIC | Age: 52
End: 2022-02-10
Payer: COMMERCIAL

## 2022-02-10 NOTE — TELEPHONE ENCOUNTER
Patient aware of hold on scheduling admissions surgeries at this time. Will call once we get go ahead to schedule.

## 2022-02-16 ENCOUNTER — MYC MEDICAL ADVICE (OUTPATIENT)
Dept: FAMILY MEDICINE | Facility: CLINIC | Age: 52
End: 2022-02-16
Payer: COMMERCIAL

## 2022-02-21 NOTE — TELEPHONE ENCOUNTER
Type of surgery: right total knee arthoplasty  CPT 36963    Primary osteoarthritis of right knee M17.11    Chronic pain of right knee M25.561     Location of surgery: [facility]  Date and time of surgery: 6-14-22  11:30am  Surgeon: Dr Pino  Pre-Op Appt Date: 6-1-22 & 6-8-22  Post-Op Appt Date: 6-27-22   Packet sent out: Yes  Pre-cert/Authorization completed: No prior auth required per May at [facility]. Ref# [id number]    Date: 2-21-22    Lyn Augustine  Prior Authorization Dept  [phone]

## 2022-02-23 ENCOUNTER — THERAPY VISIT (OUTPATIENT)
Dept: SLEEP MEDICINE | Facility: CLINIC | Age: 52
End: 2022-02-23
Attending: PHYSICIAN ASSISTANT
Payer: COMMERCIAL

## 2022-02-23 DIAGNOSIS — R29.818 SUSPECTED SLEEP APNEA: ICD-10-CM

## 2022-02-23 DIAGNOSIS — G25.81 RESTLESS LEGS SYNDROME (RLS): ICD-10-CM

## 2022-02-23 PROCEDURE — 95810 POLYSOM 6/> YRS 4/> PARAM: CPT | Performed by: OTOLARYNGOLOGY

## 2022-03-04 ENCOUNTER — MYC MEDICAL ADVICE (OUTPATIENT)
Dept: FAMILY MEDICINE | Facility: CLINIC | Age: 52
End: 2022-03-04
Payer: COMMERCIAL

## 2022-03-07 NOTE — TELEPHONE ENCOUNTER
[facility] - FMLA should this be completed by Orthopedic?  Shayna [facility]

## 2022-03-07 NOTE — TELEPHONE ENCOUNTER
Patient  returned call. She states ortho told her this should come from PCP as the previous [place] paperwork came from her. Station  Shayna VELASQUEZ notified.   Radha LÓPEZ RN

## 2022-03-08 DIAGNOSIS — M17.11 PRIMARY OSTEOARTHRITIS OF RIGHT KNEE: Primary | ICD-10-CM

## 2022-03-08 RX ORDER — TRANEXAMIC ACID 650 MG/1
1950 TABLET ORAL ONCE
Status: CANCELLED | OUTPATIENT
Start: 2022-03-08 | End: 2022-03-08

## 2022-03-08 NOTE — TELEPHONE ENCOUNTER
Surgery has been canceled for [facility] and rescheduled to  [place] for 06/14/2022   Spoke with Zari [facility] to cancel surgery

## 2022-03-09 ENCOUNTER — MYC MEDICAL ADVICE (OUTPATIENT)
Dept: FAMILY MEDICINE | Facility: CLINIC | Age: 52
End: 2022-03-09
Payer: COMMERCIAL

## 2022-03-09 NOTE — PROGRESS NOTES
Does Carol have a CPAP/Bipap?  No     Benson Sleep Scale: 12      Sleep Study Follow-Up Visit:    Date on this visit: 3/14/2022    Carol Guajardo comes in today for follow-up of her sleep study done on 2/23/22 at the [facility] for possible sleep apnea.    Sleep latency 7 minutes without Ambien.  REM achieved.   REM latency 242.5 minutes.  Sleep efficiency 78.2%. Total sleep time 365 minutes.    Sleep architecture:  Stage 1, 13.4% (5%), stage 2, 37.3% (45-55%), stage 3, 40.3% (15-20%), stage REM, 9% (20-25%).  AHI was 8.7, without desaturations. RDI 13.5.  REM RDI 40, consistent with severe REM VALERIE.  Supine RDI 8.5, consistent with mild SUPINE VALERIE.  Periodic Limb Movement Index 7.7/hour.         These findings were reviewed with patient.     Past medical/surgical history, family history, social history, medications and allergies were reviewed.      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     Priority: Medium     Added automatically from request for surgery [id number]       Total knee replacement status, left 09/30/2019     Priority: Medium     Primary osteoarthritis of left knee 07/30/2018     Priority: Medium     Anxiety state 07/19/2017     Priority: Medium     Overview:   Created by Conversion    Replacement Utility updated for latest IMO load       Depression 07/19/2017     Priority: Medium     Overview:   Created by Conversion       Vitamin D deficiency 07/19/2017     Priority: Medium     Overview:   Created by Conversion    Replacement Utility updated for latest IMO load       Obesity 07/19/2017     Priority: Medium     Overview:   Created by Conversion       Nicotine dependence 07/19/2017     Priority: Medium     Overview:   Created by Conversion       Mass of right lower leg 06/05/2017     Priority:  Medium     Overview:   MRI June 2017:  Tunneling synovial cyst right lower extremity.       HLD (hyperlipidemia) 08/28/2015     Priority: Medium     Overview:   Overview:   Created by Conversion       Thyroid activity decreased 05/08/2015     Priority: Medium        Impression/Plan:    Mild sleep apnea- discussed weight loss, oral appliance, cpap. She will hold off on treatment at this time, will contact our office if she would like to move forward with treatment. RLS- ferratin levels< 75, encouraged Iron supplement 325 mg bid with Vitamin C.   She will follow up with me as needed.     Fifteen minutes spent with patient, all of which were spent face-to-face counseling, consulting, coordinating plan of care.          CC: Barbara Hedrick

## 2022-03-10 NOTE — TELEPHONE ENCOUNTER
I have left a voice message for patient to call and confirm surgery location change to [place]. [phone]

## 2022-03-13 ENCOUNTER — HEALTH MAINTENANCE LETTER (OUTPATIENT)
Age: 52
End: 2022-03-13

## 2022-03-14 ENCOUNTER — OFFICE VISIT (OUTPATIENT)
Dept: SLEEP MEDICINE | Facility: CLINIC | Age: 52
End: 2022-03-14
Payer: COMMERCIAL

## 2022-03-14 VITALS
WEIGHT: 246 LBS | HEART RATE: 67 BPM | BODY MASS INDEX: 48.29 KG/M2 | DIASTOLIC BLOOD PRESSURE: 63 MMHG | SYSTOLIC BLOOD PRESSURE: 110 MMHG | OXYGEN SATURATION: 97 % | HEIGHT: 60 IN

## 2022-03-14 DIAGNOSIS — G47.30 MILD SLEEP APNEA: ICD-10-CM

## 2022-03-14 DIAGNOSIS — G25.81 RESTLESS LEGS SYNDROME (RLS): Primary | ICD-10-CM

## 2022-03-14 PROCEDURE — 99213 OFFICE O/P EST LOW 20 MIN: CPT | Performed by: PHYSICIAN ASSISTANT

## 2022-03-14 NOTE — TELEPHONE ENCOUNTER
Forms completed and signed by Barbara Hedrick. Called and left message on pt's identified voicemail to call clinic back on how she wants these forms sent off. Waiting call back.    Bhumi Blake Patient

## 2022-03-14 NOTE — TELEPHONE ENCOUNTER
Pt called back. Forms faxed to [employer], and originals mailed back to pt per her request. Copies made and sent to scanning.    Bhumi Blake Patient

## 2022-03-22 ENCOUNTER — MYC MEDICAL ADVICE (OUTPATIENT)
Dept: FAMILY MEDICINE | Facility: CLINIC | Age: 52
End: 2022-03-22
Payer: COMMERCIAL

## 2022-04-03 DIAGNOSIS — F41.1 ANXIETY STATE: ICD-10-CM

## 2022-04-04 RX ORDER — PAROXETINE 20 MG/1
TABLET, FILM COATED ORAL
Qty: 90 TABLET | Refills: 0 | Status: SHIPPED | OUTPATIENT
Start: 2022-04-04 | End: 2022-07-20

## 2022-04-04 RX ORDER — PROPRANOLOL HCL 60 MG
CAPSULE, EXTENDED RELEASE 24HR ORAL
Qty: 90 CAPSULE | Refills: 0 | Status: SHIPPED | OUTPATIENT
Start: 2022-04-04 | End: 2022-07-20

## 2022-04-04 RX ORDER — BUPROPION HYDROCHLORIDE 150 MG/1
TABLET ORAL
Qty: 90 TABLET | Refills: 0 | Status: SHIPPED | OUTPATIENT
Start: 2022-04-04 | End: 2022-07-06

## 2022-04-04 RX ORDER — PAROXETINE 40 MG/1
TABLET, FILM COATED ORAL
Qty: 90 TABLET | Refills: 0 | Status: SHIPPED | OUTPATIENT
Start: 2022-04-04 | End: 2022-07-20

## 2022-05-16 ENCOUNTER — MYC MEDICAL ADVICE (OUTPATIENT)
Dept: ORTHOPEDICS | Facility: CLINIC | Age: 52
End: 2022-05-16
Payer: COMMERCIAL

## 2022-05-18 ENCOUNTER — OFFICE VISIT (OUTPATIENT)
Dept: FAMILY MEDICINE | Facility: CLINIC | Age: 52
End: 2022-05-18
Payer: COMMERCIAL

## 2022-05-18 VITALS
SYSTOLIC BLOOD PRESSURE: 122 MMHG | DIASTOLIC BLOOD PRESSURE: 70 MMHG | WEIGHT: 216 LBS | TEMPERATURE: 99.4 F | BODY MASS INDEX: 42.11 KG/M2 | RESPIRATION RATE: 24 BRPM | HEART RATE: 86 BPM

## 2022-05-18 DIAGNOSIS — M79.672 LEFT FOOT PAIN: Primary | ICD-10-CM

## 2022-05-18 DIAGNOSIS — R22.9 LOCALIZED SUPERFICIAL SWELLING, MASS, OR LUMP: ICD-10-CM

## 2022-05-18 DIAGNOSIS — M67.40 GANGLION CYST: ICD-10-CM

## 2022-05-18 PROCEDURE — 99213 OFFICE O/P EST LOW 20 MIN: CPT | Performed by: NURSE PRACTITIONER

## 2022-05-18 ASSESSMENT — PAIN SCALES - GENERAL: PAINLEVEL: MILD PAIN (3)

## 2022-05-18 NOTE — PROGRESS NOTES
Assessment & Plan     Left foot pain  1 month history of left foot pain with swelling and a lump without any known trauma or injury.  It is tender to touch.  X-ray obtained in office today, independently read by this provider, noting swelling and a bone spur at site of swelling, otherwise no overt fractures noted.  Would recommend follow-up with orthopedics for further evaluation for probable ganglion cyst.  Referral placed, patient should be receiving a phone call to schedule.  - XR Foot Left G/E 3 Views; Future  - Orthopedic  Referral; Future    Localized superficial swelling, mass, or lump  Lump on top of left foot as above.  - XR Foot Left G/E 3 Views; Future  - Orthopedic  Referral; Future    Ganglion cyst  - Orthopedic  Referral; Future             Tobacco Cessation:   reports that she quit smoking 12 days ago. Her smoking use included paan with tobacco, gutka, zarda, khaini. She started smoking about 36 years ago. She has a 24.75 pack-year smoking history. She quit smokeless tobacco use about 13 months ago.      See Patient Instructions    Return in about 2 weeks (around 6/1/2022), or if symptoms worsen or fail to improve.    Barbara Koenig, DNP, APRN-CNP   [facility]    Subjective     Carol Guajardo is a 52 year old female who presents today for the following   health issues:    HPI  Answers for HPI/ROS submitted by the patient on 5/18/2022  If you checked off any problems, how difficult have these problems made it for you to do your work, take care of things at home, or get along with other people?: Somewhat difficult  PHQ9 TOTAL SCORE: 8  What is the reason for your visit today? : Lump on top of left foot  How many servings of fruits and vegetables do you eat daily?: 0-1  On average, how many sweetened beverages do you drink each day (Examples: soda, juice, sweet tea, etc.  Do NOT count diet or artificially sweetened beverages)?: 1  How many minutes a  day do you exercise enough to make your heart beat faster?: 9 or less  How many days a week do you exercise enough to make your heart beat faster?: 4  How many days per week do you miss taking your medication?: 0    Noticed about a month ago because it was painful  No trauma or injury           Review of Systems    Constitutional, HEENT, cardiovascular, pulmonary, gi and gu systems are negative, except as otherwise noted.    Objective   /70   Pulse 86   Temp 99.4  F (37.4  C)   Resp 24   Wt 98 kg (216 lb)   LMP 05/18/2022   Breastfeeding No   BMI 42.11 kg/m    Body mass index is 42.11 kg/m .    Physical Exam  GENERAL APPEARANCE: healthy, alert and no distress  MS: extremities normal- no gross deformities noted, peripheral pulses normal and mildly tender lump on top of left foot without erythema or ecchymosis, normal range of motion of foot and toes with pain over lump on extension of foot  SKIN: no suspicious lesions or rashes  NEURO: Normal strength and tone, mentation intact and speech normal  PSYCH: mentation appears normal and affect normal/bright    Diagnostic Test Results:  Results for orders placed or performed in visit on 05/18/22   XR Foot Left G/E 3 Views     Status: None    Narrative    FOOT LEFT THREE OR MORE VIEWS   5/18/2022 4:03 PM     HISTORY: Left foot pain; Localized superficial swelling, mass, or  lump.    COMPARISON: None.      Impression    IMPRESSION: Small accessory navicular. Moderate-sized calcaneal  enthesophytes. Otherwise negative.    HÉCTOR ORDAZ MD         SYSTEM ID:  [id number]        Chart documentation with Dragon Voice recognition Software. Although reviewed after completion, some words and grammatical errors may remain.

## 2022-05-18 NOTE — PATIENT INSTRUCTIONS
Left foot pain  1 month history of left foot pain with swelling and a lump without any known trauma or injury.  It is tender to touch.  X-ray obtained in office today, independently read by this provider, noting swelling and a bone spur at site of swelling, otherwise no overt fractures noted.  Would recommend follow-up with orthopedics for further evaluation for probable ganglion cyst.  Referral placed, patient should be receiving a phone call to schedule.  - XR Foot Left G/E 3 Views; Future  - Orthopedic  Referral; Future    Localized superficial swelling, mass, or lump  Lump on top of left foot as above.  - XR Foot Left G/E 3 Views; Future  - Orthopedic  Referral; Future    Ganglion cyst  - Orthopedic  Referral; Future

## 2022-05-18 NOTE — NURSING NOTE
"Chief Complaint   Patient presents with     Mass       Initial /70   Pulse 86   Temp 99.4  F (37.4  C)   Resp 24   Wt 98 kg (216 lb)   LMP 05/18/2022   Breastfeeding No   BMI 42.11 kg/m   Estimated body mass index is 42.11 kg/m  as calculated from the following:    Height as of 3/14/22: 1.525 m (5' 0.05\").    Weight as of this encounter: 98 kg (216 lb).    Patient presents to the clinic using No DME    Health Maintenance that is potentially due pending provider review:  Colonoscopy/FIT    Pt declines to have colon cancer screening.    Is there anyone who you would like to be able to receive your results? No  If yes have patient fill out JH    "

## 2022-05-31 ASSESSMENT — PATIENT HEALTH QUESTIONNAIRE - PHQ9
SUM OF ALL RESPONSES TO PHQ QUESTIONS 1-9: 8
10. IF YOU CHECKED OFF ANY PROBLEMS, HOW DIFFICULT HAVE THESE PROBLEMS MADE IT FOR YOU TO DO YOUR WORK, TAKE CARE OF THINGS AT HOME, OR GET ALONG WITH OTHER PEOPLE: SOMEWHAT DIFFICULT
SUM OF ALL RESPONSES TO PHQ QUESTIONS 1-9: 8

## 2022-06-01 ENCOUNTER — OFFICE VISIT (OUTPATIENT)
Dept: FAMILY MEDICINE | Facility: CLINIC | Age: 52
End: 2022-06-01
Payer: COMMERCIAL

## 2022-06-01 VITALS
DIASTOLIC BLOOD PRESSURE: 60 MMHG | SYSTOLIC BLOOD PRESSURE: 108 MMHG | TEMPERATURE: 98.4 F | WEIGHT: 216 LBS | BODY MASS INDEX: 42.11 KG/M2 | HEART RATE: 65 BPM | RESPIRATION RATE: 18 BRPM

## 2022-06-01 DIAGNOSIS — E66.01 MORBID OBESITY (H): ICD-10-CM

## 2022-06-01 DIAGNOSIS — Z12.11 SCREEN FOR COLON CANCER: ICD-10-CM

## 2022-06-01 DIAGNOSIS — G89.29 CHRONIC PAIN OF RIGHT KNEE: ICD-10-CM

## 2022-06-01 DIAGNOSIS — M17.11 PRIMARY OSTEOARTHRITIS OF RIGHT KNEE: ICD-10-CM

## 2022-06-01 DIAGNOSIS — M25.561 CHRONIC PAIN OF RIGHT KNEE: ICD-10-CM

## 2022-06-01 DIAGNOSIS — Z01.818 PREOP GENERAL PHYSICAL EXAM: Primary | ICD-10-CM

## 2022-06-01 DIAGNOSIS — F17.211 CIGARETTE NICOTINE DEPENDENCE IN REMISSION: ICD-10-CM

## 2022-06-01 LAB
ERYTHROCYTE [DISTWIDTH] IN BLOOD BY AUTOMATED COUNT: 13 % (ref 10–15)
HCT VFR BLD AUTO: 36.9 % (ref 35–47)
HGB BLD-MCNC: 11.7 G/DL (ref 11.7–15.7)
MCH RBC QN AUTO: 31 PG (ref 26.5–33)
MCHC RBC AUTO-ENTMCNC: 31.7 G/DL (ref 31.5–36.5)
MCV RBC AUTO: 98 FL (ref 78–100)
PLATELET # BLD AUTO: 275 10E3/UL (ref 150–450)
RBC # BLD AUTO: 3.78 10E6/UL (ref 3.8–5.2)
WBC # BLD AUTO: 9.4 10E3/UL (ref 4–11)

## 2022-06-01 PROCEDURE — 99214 OFFICE O/P EST MOD 30 MIN: CPT | Performed by: NURSE PRACTITIONER

## 2022-06-01 PROCEDURE — 36415 COLL VENOUS BLD VENIPUNCTURE: CPT | Performed by: NURSE PRACTITIONER

## 2022-06-01 PROCEDURE — 85027 COMPLETE CBC AUTOMATED: CPT | Performed by: NURSE PRACTITIONER

## 2022-06-01 RX ORDER — ASCORBIC ACID 100 MG
1 TABLET,CHEWABLE ORAL DAILY
COMMUNITY
End: 2022-08-03

## 2022-06-01 RX ORDER — CALCIUM CARBONATE 500(1250)
1 TABLET ORAL 2 TIMES DAILY
COMMUNITY

## 2022-06-01 RX ORDER — FERROUS SULFATE 324(65)MG
324 TABLET, DELAYED RELEASE (ENTERIC COATED) ORAL 2 TIMES DAILY
COMMUNITY

## 2022-06-01 ASSESSMENT — PAIN SCALES - GENERAL: PAINLEVEL: MILD PAIN (2)

## 2022-06-01 NOTE — PATIENT INSTRUCTIONS
HOW TO QUIT SMOKING  Smoking is one of the hardest habits to break. About half of all those who have ever smoked have been able to quit, and most of those (about 70%) who still smoke want to quit. Here are some of the best ways to stop smoking.     KEEP TRYING:  It takes most smokers about 8 tries before they are finally able to fully quit. So, the more often you try and fail, the better your chance of quitting the next time! So, don't give up!    GO COLD TURKEY:  Most ex-smokers quit cold turkey. Trying to cut back gradually doesn't seem to work as well, perhaps because it continues the smoking habit. Also, it is possible to fool yourself by inhaling more while smoking fewer cigarettes. This results in the same amount of nicotine in your body!    GET SUPPORT:  Support programs can make an important difference, especially for the heavy smoker. These groups offer lectures, methods to change your behavior and peer support. Call the free national Quitline for more information. 800-QUIT-NOW (493-502-2338). Low-cost or free programs are offered by many hospitals, local chapters of the American Lung Association (825-594-3814) and the American Cancer Society (807-400-3979). Support at home is important too. Non-smokers can help by offering praise and encouragement. If the smoker fails to quit, encourage them to try again!    OVER-THE-COUNTER MEDICINES:  For those who can't quit on their own, Nicotine Replacement Therapy (NRT) may make quitting much easier. Certain aids such as the nicotine patch, gum and lozenge are available without a prescription. However, it is best to use these under the guidance of your doctor. The skin patch provides a steady supply of nicotine to the body. Nicotine gum and lozenge gives temporary bursts of low levels of nicotine. Both methods take the edge off the craving for cigarettes. WARNING: If you feel symptoms of nicotine overdose, such as nausea, vomiting, dizziness, weakness, or fast  heartbeat, stop using these and see your doctor.    PRESCRIPTION MEDICINES:  After evaluating your smoking patterns and prior attempts at quitting, your doctor may offer a prescription medicine such as bupropion (Zyban, Wellbutrin), varenicline (Chantix, Champix), a niocotine inhaler or nasal spray. Each has its unique advantage and side effects which your doctor can review with you.    HEALTH BENEFITS OF QUITTING:  The benefits of quitting start right away and keep improving the longer you go without smokin minutes: blood pressure and pulse return to normal  8 hours: oxygen levels return to normal  2 days: ability to smell and taste begins to improve as damaged nerves start to regrow  2-3 weeks: circulation and lung function improves  1-9 months: decreased cough, congestion and shortness of breath; less tired  1 year: risk of heart attack decreases by half  5 years: risk of lung cancer decreases by half; risk of stroke becomes the same as a non-smoker  For information about how to quit smoking, visit the following links:  National [employer] ,   Clearing the Air, Quit Smoking Today   - an online booklet. http://www.smokefree.gov/pubs/clearing_the_air.pdf  Smokefree.gov http://smokefree.gov/  QuitNet http://www.quitnet.com/    8037-3922 Richard Lange, [address] [place] [address], [place] [address]. All rights reserved. This information is not intended as a substitute for professional medical care. Always follow your healthcare professional's instructions.    Preparing for Your Surgery  Getting started  A nurse will call you to review your health history and instructions. They will give you an arrival time based on your scheduled surgery time. Please be ready to share:  Your doctor's clinic name and phone number  Your medical, surgical and anesthesia history  A list of allergies and sensitivities  A list of medicines, including herbal treatments and over-the-counter drugs  Whether the patient has a legal  guardian (ask how to send us the papers in advance)  Please tell us if you're pregnant--or if there's any chance you might be pregnant. Some surgeries may injure a fetus (unborn baby), so they require a pregnancy test. Surgeries that are safe for a fetus don't always need a test, and you can choose whether to have one.   If you have a child who's having surgery, please ask for a copy of Preparing for Your Child's Surgery.    Preparing for surgery  Within 30 days of surgery: Have a pre-op exam (sometimes called an H&P, or History and Physical). This can be done at a clinic or pre-operative center.  If you're having a , you may not need this exam. Talk to your care team.  At your pre-op exam, talk to your care team about all medicines you take. If you need to stop any medicines before surgery, ask when to start taking them again.  We do this for your safety. Many medicines can make you bleed too much during surgery. Some change how well surgery (anesthesia) drugs work.  Call your insurance company to let them know you're having surgery. (If you don't have insurance, call [phone].)  Call your clinic if there's any change in your health. This includes signs of a cold or flu (sore throat, runny nose, cough, rash, fever). It also includes a scrape or scratch near the surgery site.  If you have questions on the day of surgery, call your hospital or surgery center.  COVID testing  You may need to be tested for COVID-19 before having surgery. If so, we will give you instructions.  Eating and drinking guidelines  For your safety: Unless your surgeon tells you otherwise, follow the guidelines below.  Eat and drink as usual until 8 hours before surgery. After that, no food or milk.  Drink clear liquids until 2 hours before surgery. These are liquids you can see through, like water, Gatorade and Propel Water. You may also have black coffee and tea (no cream or milk).  Nothing by mouth within 2 hours of surgery.  This includes gum, candy and breath mints.  If you drink alcohol: Stop drinking it the night before surgery.  If your care team tells you to take medicine on the morning of surgery, it's okay to take it with a sip of water.  Preventing infection  Shower or bathe the night before and morning of your surgery. Follow the instructions your clinic gave you. (If no instructions, use regular soap.)  Don't shave or clip hair near your surgery site. We'll remove the hair if needed.  Don't smoke or vape the morning of surgery. You may chew nicotine gum up to 2 hours before surgery. A nicotine patch is okay.  Note: Some surgeries require you to completely quit smoking and nicotine. Check with your surgeon.  Your care team will make every effort to keep you safe from infection. We will:  Clean our hands often with soap and water (or an alcohol-based hand rub).  Clean the skin at your surgery site with a special soap that kills germs.  Give you a special gown to keep you warm. (Cold raises the risk of infection.)  Wear special hair covers, masks, gowns and gloves during surgery.  Give antibiotic medicine, if prescribed. Not all surgeries need antibiotics.  What to bring on the day of surgery  Photo ID and insurance card  Copy of your health care directive, if you have one  Glasses and hearing aides (bring cases)  You can't wear contacts during surgery  Inhaler and eye drops, if you use them (tell us about these when you arrive)  CPAP machine or breathing device, if you use them  A few personal items, if spending the night  If you have . . .  A pacemaker, ICD (cardiac defibrillator) or other implant: Bring the ID card.  An implanted stimulator: Bring the remote control.  A legal guardian: Bring a copy of the certified (court-stamped) guardianship papers.  Please remove any jewelry, including body piercings. Leave jewelry and other valuables at home.  If you're going home the day of surgery  You must have a responsible adult drive  you home. They should stay with you overnight as well.  If you don't have someone to stay with you, and you aren't safe to go home alone, we may keep you overnight. Insurance often won't pay for this.  After surgery  If it's hard to control your pain or you need more pain medicine, please call your surgeon's office.  Questions?   If you have any questions for your care team, list them here: _________________________________________________________________________________________________________________________________________________________________________ ____________________________________ ____________________________________ ____________________________________  For informational purposes only. Not to replace the advice of your health care provider. Copyright   2003, 2019 [employer]. All rights reserved. Clinically reviewed by Tanisha Dia MD. [employer] [id number] - REV 07/21.    How to Take Your Medication Before Surgery  - HOLD (do not take) all medications except Levothyroxine day of surgery  - HOLD NSAIDs Aleve, ibuprofen, motrin 3 days prior to surgery

## 2022-06-01 NOTE — PROGRESS NOTES
[facility]  [address] [place] [id number]  Phone: [phone]  Fax: [phone]  Primary Provider: Barbara Rossi  Pre-op Performing Provider: BARBARA ROSSI    {Provider  Link to PREOP SmartSet  Use this to apply standard patient instructions to AVS; includes medication directions, common orders, guidelines for anemia, warfarin, additional testing   :[id number]}  PREOPERATIVE EVALUATION:  Today's date: 6/1/2022    Carol Guajardo is a 52 year old female who presents for a preoperative evaluation.    Surgical Information:  Surgery/Procedure: ARTHROPLASTY, KNEE, TOTAL right  Surgery Location:   [facility] [facility] Room: OR        Surgeon: Emilio Pino MD  Surgery Date: 6/14/2022  Time of Surgery: 10:30 AM  Where patient plans to recover: At home with family  Fax number for surgical facility: Note does not need to be faxed, will be available electronically in Epic.    Type of Anesthesia Anticipated: Choice    Assessment & Plan     The proposed surgical procedure is considered INTERMEDIATE risk.    Preop general physical exam  - CBC with platelets; Future  - CBC with platelets    Primary osteoarthritis of right knee  Chronic, primary osteoarthritis of right knee with swelling and pain.  Proceeding with total knee arthroplasty.    Chronic pain of right knee  Chronic right knee pain as above.    Morbid obesity (H)  Chronic, stable.    Cigarette nicotine dependence in remission  Chronic, just recently quit on May 15.  Encouraged to continue to abstain from smoking.    Screen for colon cancer  Due for colon cancer screening, orders placed.  - Adult Gastro Ref - Procedure Only; Future    Possible Sleep Apnea: History; has CPAP          Risks and Recommendations:  The patient has the following additional risks and recommendations for perioperative complications:   - Morbid obesity (BMI >40)    Medication Instructions:  Patient is to take all scheduled medications on the day  of surgery EXCEPT for modifications listed below:   - pregabalin, gabapentin: Continue without modification.   - ibuprofen (Advil, Motrin): HOLD 1 day before surgery.    - SSRIs, SNRIs, TCAs, Antipsychotics: Continue without modification.     RECOMMENDATION:  APPROVAL GIVEN to proceed with proposed procedure, without further diagnostic evaluation.            Subjective     HPI related to upcoming procedure: History of chronic right knee pain with osteoarthritis.  Following orthopedics.  Proceeding with total knee arthroplasty.    Preop Questions 5/31/2022   1. Have you ever had a heart attack or stroke? No   2. Have you ever had surgery on your heart or blood vessels, such as a stent placement, a coronary artery bypass, or surgery on an artery in your head, neck, heart, or legs? No   3. Do you have chest pain with activity? No   4. Do you have a history of  heart failure? No   5. Do you currently have a cold, bronchitis or symptoms of other infection? No   6. Do you have a cough, shortness of breath, or wheezing? No   7. Do you or anyone in your family have previous history of blood clots? No   8. Do you or does anyone in your family have a serious bleeding problem such as prolonged bleeding following surgeries or cuts? No   9. Have you ever had problems with anemia or been told to take iron pills? YES - currently on iron for restless legs   10. Have you had any abnormal blood loss such as black, tarry or bloody stools, or abnormal vaginal bleeding? UNKNOWN    11. Have you ever had a blood transfusion? No   12. Are you willing to have a blood transfusion if it is medically needed before, during, or after your surgery? Yes   13. Have you or any of your relatives ever had problems with anesthesia? No   14. Do you have sleep apnea, excessive snoring or daytime drowsiness? YES - Has CPAP   14a. Do you have a CPAP machine? No   15. Do you have any artifical heart valves or other implanted medical devices like a  pacemaker, defibrillator, or continuous glucose monitor? No   16. Do you have artificial joints? YES - left knee   17. Are you allergic to latex? No   18. Is there any chance that you may be pregnant? No       Health Care Directive:  Patient does not have a Health Care Directive or Living Will: Discussed advance care planning with patient; however, patient declined at this time.    Preoperative Review of :   reviewed - controlled substances prescribed by other outside provider(s).      Status of Chronic Conditions:  DEPRESSION - Patient has a long history of Depression of moderate severity requiring medication for control with recent symptoms being stable..Current symptoms of depression include none.     SLEEP PROBLEM - Patient has a longstanding history of VALERIE, currently uses CPAP.        Review of Systems  Constitutional, neuro, ENT, endocrine, pulmonary, cardiac, gastrointestinal, genitourinary, musculoskeletal, integument and psychiatric systems are negative, except as otherwise noted.    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     Priority: Medium     Added automatically from request for surgery [id number]       Total knee replacement status, left 09/30/2019     Priority: Medium     Primary osteoarthritis of left knee 07/30/2018     Priority: Medium     Anxiety state 07/19/2017     Priority: Medium     Overview:   Created by Conversion    Replacement Utility updated for latest IMO load       Depression 07/19/2017     Priority: Medium     Overview:   Created by Conversion       Vitamin D deficiency 07/19/2017     Priority: Medium     Overview:   Created by Conversion    Replacement Utility updated for latest IMO load       Obesity 07/19/2017     Priority: Medium     Overview:   Created by Conversion       Nicotine dependence 07/19/2017      Priority: Medium     Overview:   Created by Conversion       Mass of right lower leg 06/05/2017     Priority: Medium     Overview:   MRI June 2017:  Tunneling synovial cyst right lower extremity.       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, diagnosed as fibrosis     Past Surgical History:   Procedure Laterality Date     ARTHROPLASTY KNEE Left 9/30/2019    Procedure: Left Total Knee Arthroplasty;  Surgeon: Ion Bailey MD;  Location: [facility] OR     BRONCHOSCOPY (RIGID OR FLEXIBLE), DIAGNOSTIC N/A 4/21/2021    Procedure: BRONCHOSCOPY, WITH BRONCHOALVEOLAR LAVAGE;  Surgeon: Keli Webber MD;  Location: [facility] GI     CARPAL TUNNEL RELEASE RT/LT Bilateral      EXTERNAL EAR SURGERY       IR LUNG BIOPSY MEDIASTINUM RIGHT Right 08/2018    RLL mass, diagnosed as fibrosis per pt     LAPAROSCOPY DIAGNOSTIC (GENERAL)  02/2019     LAPAROSCOPY, SURGICAL; REPAIR UMBILICAL HERNIA  08/22/2018     THORACOTOMY, WEDGE RESECTION LUNG, COMBINED Right 4/19/2021    Procedure: Right Thoracotomy, excision of solitary fibrous tumor, intercostal nerve cryo ablation;  Surgeon: Keli Webber MD;  Location: [facility] OR     ZZC LIGATE FALLOPIAN TUBE      Description: Tubal Ligation;  Recorded: 04/09/2008;     Current Outpatient Medications   Medication Sig Dispense Refill     Ascorbic Acid (VITAMIN C) 100 MG CHEW        buPROPion (WELLBUTRIN XL) 150 MG 24 hr tablet TAKE 1 TABLET BY MOUTH EVERY MORNING in addition to 300mg for a total of 450mg daily 90 tablet 0     buPROPion (WELLBUTRIN XL) 300 MG 24 hr tablet Take 1 tablet (300 mg) by mouth every morning 90 tablet 1     calcium carbonate (OS-BERTA) 500  MG tablet Take 1 tablet by mouth 2 times daily       Ferrous Sulfate 324 (65 Fe) MG TBEC        gabapentin (NEURONTIN) 300 MG capsule Take 3 capsules (900 mg) by mouth 2 times daily 540 capsule 3     levothyroxine (SYNTHROID/LEVOTHROID) 175 MCG tablet Take 1 tablet (175 mcg) by mouth every morning 90 tablet 3     PARoxetine (PAXIL) 20 MG tablet TAKE 1 TABLET BY MOUTH EVERY MORNING WITH 40MG TABLET FOR A TOTAL OF 60 MG/DAY 90 tablet 0     PARoxetine (PAXIL) 40 MG tablet TAKE 1 TABLET BY MOUTH EVERY MORNING WITH A 20 MG TABLET FOR A DAILY DOSE OF 60 MG 90 tablet 0     pramipexole (MIRAPEX) 0.5 MG tablet TAKE 2 TABLETS BY MOUTH AT BEDTIME 180 tablet 1     propranolol ER (INDERAL LA) 60 MG 24 hr capsule TAKE 1 CAPSULE BY MOUTH EVERY MORNING 90 capsule 0       Allergies   Allergen Reactions     Amoxicillin-Pot Clavulanate Hives     Other reaction(s): Edema     Augmentin Hives and Itching     Ciprofloxacin      Penicillins         Social History     Tobacco Use     Smoking status: Former Smoker     Packs/day: 0.75     Years: 33.00     Pack years: 24.75     Types: Paan with tobacco, gutka, zarda, khaini     Start date:      Quit date: 5/15/2022     Years since quittin.0     Smokeless tobacco: Former User     Quit date: 2021   Substance Use Topics     Alcohol use: Yes     Alcohol/week: 6.0 standard drinks     Types: 6 Cans of beer per week     Family History   Problem Relation Age of Onset     Anesthesia Reaction Mother         PONV     Hyperlipidemia Mother      Hypertension Mother      Diabetes Mother      Thyroid Disease Mother      CABG Father      Heart Failure Father      Coronary Stenting Father      Cardiovascular Father         ICD implant     Lung Cancer Father      Coronary Artery Disease Father      Kidney Disease Maternal Grandmother      Breast Cancer Maternal Grandmother      Abdominal Aortic Aneurysm Maternal Grandmother      Abdominal Aortic Aneurysm Paternal Grandfather      Abdominal Aortic  Aneurysm Paternal Uncle      Deep Vein Thrombosis (DVT) No family hx of      History   Drug Use Not on file         Objective     /60   Pulse 65   Temp 98.4  F (36.9  C)   Resp 18   Wt 98 kg (216 lb)   LMP 05/18/2022 (Approximate)   Breastfeeding No   BMI 42.11 kg/m      Physical Exam    GENERAL APPEARANCE: healthy, alert and no distress     EYES: EOMI, PERRL     HENT: ear canals and TM's normal and nose and mouth without ulcers or lesions     NECK: no adenopathy, no asymmetry, masses, or scars and thyroid normal to palpation     RESP: lungs clear to auscultation - no rales, rhonchi or wheezes     CV: regular rates and rhythm, normal S1 S2, no S3 or S4 and no murmur, click or rub     ABDOMEN:  soft, nontender, no HSM or masses and bowel sounds normal     MS: extremities normal- no gross deformities noted, no evidence of inflammation in joints, decreased range of motion of right knee with mild swelling present.      SKIN: no suspicious lesions or rashes     NEURO: Normal strength and tone, sensory exam grossly normal, mentation intact and speech normal     PSYCH: mentation appears normal. and affect normal/bright     LYMPHATICS: No cervical adenopathy    Recent Labs   Lab Test 04/20/21  0459 04/19/21  1817 04/19/21  1343   HGB 12.2  --  11.7     --  306     --  136   POTASSIUM 4.2  --  4.4   CR 0.63 0.58 0.58        Diagnostics:  Recent Results (from the past 168 hour(s))   CBC with platelets    Collection Time: 06/01/22  4:24 PM   Result Value Ref Range    WBC Count 9.4 4.0 - 11.0 10e3/uL    RBC Count 3.78 (L) 3.80 - 5.20 10e6/uL    Hemoglobin 11.7 11.7 - 15.7 g/dL    Hematocrit 36.9 35.0 - 47.0 %    MCV 98 78 - 100 fL    MCH 31.0 26.5 - 33.0 pg    MCHC 31.7 31.5 - 36.5 g/dL    RDW 13.0 10.0 - 15.0 %    Platelet Count 275 150 - 450 10e3/uL      No EKG required, no history of coronary heart disease, significant arrhythmia, peripheral arterial disease or other structural heart  disease.    Revised Cardiac Risk Index (RCRI):  The patient has the following serious cardiovascular risks for perioperative complications:   - No serious cardiac risks = 0 points     RCRI Interpretation: 0 points: Class I (very low risk - 0.4% complication rate)           Signed Electronically by: Barbara Hedrick DNP, APRN-CNP   Copy of this evaluation report is provided to requesting physician.        Chart documentation with Dragon Voice recognition Software. Although reviewed after completion, some words and grammatical errors may remain.

## 2022-06-03 ENCOUNTER — OFFICE VISIT (OUTPATIENT)
Dept: ORTHOPEDICS | Facility: CLINIC | Age: 52
End: 2022-06-03
Payer: COMMERCIAL

## 2022-06-03 VITALS
WEIGHT: 216 LBS | SYSTOLIC BLOOD PRESSURE: 118 MMHG | DIASTOLIC BLOOD PRESSURE: 74 MMHG | HEIGHT: 60 IN | BODY MASS INDEX: 42.41 KG/M2

## 2022-06-03 DIAGNOSIS — R22.9 LOCALIZED SUPERFICIAL SWELLING, MASS, OR LUMP: Primary | ICD-10-CM

## 2022-06-03 DIAGNOSIS — M79.672 LEFT FOOT PAIN: ICD-10-CM

## 2022-06-03 PROCEDURE — 99213 OFFICE O/P EST LOW 20 MIN: CPT | Performed by: FAMILY MEDICINE

## 2022-06-03 NOTE — PATIENT INSTRUCTIONS
# Left Dorsal Foot Pain: Symptoms noted over the past month over the dorsal foot without inciting injury. She does have swelling and tenderness to palpation over the midfoot at the base of the second metatarsal with associated tenderness to palpation. Reviewed previous x-rays showing spurring over this area with ultrasound showing no cyst aspirate. Likely cause of her pain due to friction on issues due to osteophytes (bone spurs). Counseled patient treatment options including patting and a steroid injection for pain not for the swelling. Given this plan to treat his will and follow-up if not improving.  Image Findings: dorsal foot spurs on x-rays confirmed on ultrasound  Treatment: Activities as tolerated, home exercises given today, gel pads  Job: as tolerated  Medications/Injections: Limited tylenol/ibuprofen for pain for 1-2 weeks, defer injections  Follow-up: In one month if symptoms do not improve, sooner if worsening  Can consider injections    Please call [phone]   Ask for my team if you have any questions or concerns    If you have not yet received the influenza vaccine but would like to get one, please call  [phone] or you can schedule via [employer]    It was great seeing you again today!    Srinath Castillo MD, CAQSM    Metatarsal Pads Ball of Foot Cushions 8 Pack Gel Ball Feet Pads Mortons Neuroma Callus Foot Pain Relief Bunion Forefoot Support for Women Men         ZenToes Metatarsal Pads for Women and Men - 4 Pack Ball of Foot Cushions

## 2022-06-03 NOTE — PROGRESS NOTES
ASSESSMENT & PLAN    Carol was seen today for pain.    Diagnoses and all orders for this visit:    Left foot pain  -     Orthopedic  Referral    Localized superficial swelling, mass, or lump  -     Orthopedic  Referral    Ganglion cyst  -     Orthopedic  Referral      This issue is acute and Worsening.    # Left Dorsal Foot Pain: Symptoms noted over the past month over the dorsal foot without inciting injury. She does have swelling and tenderness to palpation over the midfoot at the base of the second metatarsal with associated tenderness to palpation. Reviewed previous x-rays showing spurring over this area with ultrasound showing no cyst aspirate. Likely cause of her pain due to friction on issues due to osteophytes (bone spurs). Counseled patient treatment options including patting and a steroid injection for pain not for the swelling. Given this plan to treat his will and follow-up if not improving.  Image Findings: dorsal foot spurs on x-rays confirmed on ultrasound  Treatment: Activities as tolerated, home exercises given today, gel pads  Job: as tolerated  Medications/Injections: Limited tylenol/ibuprofen for pain for 1-2 weeks, defer injections  Follow-up: In one month if symptoms do not improve, sooner if worsening  Can consider injections     Srinath Castillo MD  [facility] [place]    Ultrasound Findings: Spurring over area of swelling with no cyst     Please do not bill    -----  Chief Complaint   Patient presents with     Left Foot - Pain       SUBJECTIVE  Carol Guajardo is a/an 52 year old female who is seen in consultation at the request of  Barbara Hedrikc C.N.P. for evaluation of left foot pain.     The patient is seen by themselves.       Onset: 1 month(s) ago. Reports insidious onset without acute precipitating event. Saw PCP 5/18/22 and xrays were performed.  Reviewed PCP note.  Pain limit  Location of Pain: left mid foot   Worsened  "by: walking, plantar flexion  Better with: ice  Treatments tried: ice  Associated symptoms: swelling and burning     Orthopedic/Surgical history: NO  Social History/Occupation: Assembly-standing on cement floors     No family history pertinent to patient's problem today.      REVIEW OF SYSTEMS:  Review of Systems  Constitutional, HEENT, cardiovascular, pulmonary, gi and gu systems are negative, except as otherwise noted.    OBJECTIVE:  Ht 1.525 m (5' 0.05\")   Wt 98 kg (216 lb)   LMP 05/18/2022 (Approximate)   BMI 42.11 kg/m     General: healthy, alert and in no distress  HEENT: no scleral icterus or conjunctival erythema  Skin: no suspicious lesions or rash. No jaundice.  CV: distal perfusion intact    Resp: normal respiratory effort without conversational dyspnea   Psych: normal mood and affect  Gait: normal steady gait with appropriate coordination and balance    Neuro: Normal light sensory exam of left lower extremity     LEFT FOOT  Inspection:  Mild swelling over base of 2nd metatarsal  Palpation:    Tender about the base of the 2nd MTP joints    No tenderness over the cuboid, calcaneus or plantar fascia origin  Range of Motion:     Active toe flexion and extension  - full    Active ankle range of motion - full    Passive ankle range of motion - full  Strength:    Intrinsic foot musculature strength - full    Ankle strength - full  Special Tests:    Positive: None    Negative: anterior drawer and calcaneal squeeze    RADIOLOGY:  I independently, visualized and reviewed these images with the patient     FOOT LEFT THREE OR MORE VIEWS   5/18/2022 4:03 PM      HISTORY: Left foot pain; Localized superficial swelling, mass, or  lump.     COMPARISON: None.                                                                      IMPRESSION: Small accessory navicular. Moderate-sized calcaneal  enthesophytes. Otherwise negative.     HÉCTOR ORDAZ MD     Review of external notes as documented elsewhere in note  Review of " the result(s) of each unique test - left foot x-rays       Disclaimer: This note consists of symbols derived from keyboarding, dictation and/or voice recognition software. As a result, there may be errors in the script that have gone undetected. Please consider this when interpreting information found in this chart.

## 2022-06-03 NOTE — LETTER
6/3/2022         RE: Carol Guajardo  [address] [address] [facility] [id number]        Dear Colleague,    Thank you for referring your patient, Carol Guajardo, to the [facility]. Please see a copy of my visit note below.    ASSESSMENT & PLAN    Carol was seen today for pain.    Diagnoses and all orders for this visit:    Left foot pain  -     Orthopedic  Referral    Localized superficial swelling, mass, or lump  -     Orthopedic  Referral    Ganglion cyst  -     Orthopedic  Referral      This issue is acute and Worsening.    # Left Dorsal Foot Pain: Symptoms noted over the past month over the dorsal foot without inciting injury. She does have swelling and tenderness to palpation over the midfoot at the base of the second metatarsal with associated tenderness to palpation. Reviewed previous x-rays showing spurring over this area with ultrasound showing no cyst aspirate. Likely cause of her pain due to friction on issues due to osteophytes (bone spurs). Counseled patient treatment options including patting and a steroid injection for pain not for the swelling. Given this plan to treat his will and follow-up if not improving.  Image Findings: dorsal foot spurs on x-rays confirmed on ultrasound  Treatment: Activities as tolerated, home exercises given today, gel pads  Job: as tolerated  Medications/Injections: Limited tylenol/ibuprofen for pain for 1-2 weeks, defer injections  Follow-up: In one month if symptoms do not improve, sooner if worsening  Can consider injections     Srinath Castillo MD  [facility]    Ultrasound Findings: Spurring over area of swelling with no cyst     Please do not bill    -----  Chief Complaint   Patient presents with     Left Foot - Pain       SUBJECTIVE  Carol Guajardo is a/an 52 year old female who is seen in consultation at the request of  Barbara Hedrick C.N.P. for evaluation  "of left foot pain.     The patient is seen by themselves.       Onset: 1 month(s) ago. Reports insidious onset without acute precipitating event. Saw PCP 5/18/22 and xrays were performed.  Reviewed PCP note.  Pain limit  Location of Pain: left mid foot   Worsened by: walking, plantar flexion  Better with: ice  Treatments tried: ice  Associated symptoms: swelling and burning     Orthopedic/Surgical history: NO  Social History/Occupation: Assembly-standing on cement floors     No family history pertinent to patient's problem today.      REVIEW OF SYSTEMS:  Review of Systems  Constitutional, HEENT, cardiovascular, pulmonary, gi and gu systems are negative, except as otherwise noted.    OBJECTIVE:  Ht 1.525 m (5' 0.05\")   Wt 98 kg (216 lb)   LMP 05/18/2022 (Approximate)   BMI 42.11 kg/m     General: healthy, alert and in no distress  HEENT: no scleral icterus or conjunctival erythema  Skin: no suspicious lesions or rash. No jaundice.  CV: distal perfusion intact    Resp: normal respiratory effort without conversational dyspnea   Psych: normal mood and affect  Gait: normal steady gait with appropriate coordination and balance    Neuro: Normal light sensory exam of left lower extremity     LEFT FOOT  Inspection:  Mild swelling over base of 2nd metatarsal  Palpation:    Tender about the base of the 2nd MTP joints    No tenderness over the cuboid, calcaneus or plantar fascia origin  Range of Motion:     Active toe flexion and extension  - full    Active ankle range of motion - full    Passive ankle range of motion - full  Strength:    Intrinsic foot musculature strength - full    Ankle strength - full  Special Tests:    Positive: None    Negative: anterior drawer and calcaneal squeeze    RADIOLOGY:  I independently, visualized and reviewed these images with the patient     FOOT LEFT THREE OR MORE VIEWS   5/18/2022 4:03 PM      HISTORY: Left foot pain; Localized superficial swelling, mass, or  lump.     COMPARISON: " None.                                                                      IMPRESSION: Small accessory navicular. Moderate-sized calcaneal  enthesophytes. Otherwise negative.     HÉCTOR ORDAZ MD     Review of external notes as documented elsewhere in note  Review of the result(s) of each unique test - left foot x-rays       Disclaimer: This note consists of symbols derived from keyboarding, dictation and/or voice recognition software. As a result, there may be errors in the script that have gone undetected. Please consider this when interpreting information found in this chart.        Again, thank you for allowing me to participate in the care of your patient.        Sincerely,        Srinath Castillo MD

## 2022-06-06 NOTE — PROGRESS NOTES
"CHIEF COMPLAINT:   Chief Complaint   Patient presents with     Right Knee - Pain     Patient is here for pre surgery xrays and Lluvia Colunga. Patient notes her knee is terrible.    .  HISTORY OF PRESENT ILLNESS    Carol Guajardo is a 52 year old female seen for presurgical evaluation of right total knee arthroplasty, scheduled for 5/14/2022, [facility]. She states her knee is doing terrible, though she's anxious for surgery.    Shes had ongoing right knee pain with no known injury.   Pain is throughout the knee, burning. Pain is constant, all day every day.   Aggravated with stairs, walking, sit to stand especially after prolonged sitting, squatting. Hurts so bad she cries daily, stands on cement floors. Can't recall how bad her left knee was prior to surgery, thinks maybe right knee is worse.    Treatment has been cortisone injection (cortisone, visco) neither provided any relief more than a day, Physical Therapy, tylenol.    History of left total knee arthroplasty 9/2019 with Dr Bailey, [facility].    Denies hip or low back pain. Reports numbness and tingling in leg/foot once in a while, feels like that currently. Reports went to the ED one time for it and they saw no blood clots.     Present symptoms: pain diffuse, pain dull/achy, burning , moderate pain.    Pain severity: 5/10  Frequency of symptoms: are constant  Exacerbating Factors: weight bearing, stairs, gown-ta-pelin, prolonged standing  Relieving Factors: \"nothing\"  Night Pain: Yes  Pain while at rest: Yes   Numbness or tingling: Yes   Patient has tried:     NSAIDS: Yes      Physical Therapy: Yes      Activity modification: Yes      Bracing: No      Injections: Yes cortisone and visco without relief.     Ice: Yes      Assistive device:  No     Other: tylenol    Orthopaedic PMH: left total knee arthroplasty 2019.    Other PMH:  has a past medical history of Anxiety, Depression, HLD (hyperlipidemia), Hypothyroidism, Osteoarthritis " of both knees, and Right lower lobe lung mass.  Patient Active Problem List   Diagnosis     Anxiety state     Depression     HLD (hyperlipidemia)     Vitamin D deficiency     Thyroid activity decreased     Obesity     Nicotine dependence     Mass of right lower leg     Primary osteoarthritis of left knee     Total knee replacement status, left     Solitary fibrous tumor     Morbid obesity (H)     Restless legs syndrome (RLS)       Surgical Hx:  has a past surgical history that includes laparoscopy, surgical; repair umbilical hernia (08/22/2018); Laparoscopy diagnostic (general) (02/2019); External ear surgery; carpal tunnel release rt/lt (Bilateral); IR Lung Biopsy Mediastinum Right (Right, 08/2018); Arthroplasty knee (Left, 9/30/2019); Thoracotomy, wedge resection lung, combined (Right, 4/19/2021); Bronchoscopy (rigid or flexible), diagnostic (N/A, 4/21/2021); and LIGATE FALLOPIAN TUBE.    Medications:   Current Outpatient Medications:      Ascorbic Acid (VITAMIN C) 100 MG CHEW, , Disp: , Rfl:      buPROPion (WELLBUTRIN XL) 150 MG 24 hr tablet, TAKE 1 TABLET BY MOUTH EVERY MORNING in addition to 300mg for a total of 450mg daily, Disp: 90 tablet, Rfl: 0     buPROPion (WELLBUTRIN XL) 300 MG 24 hr tablet, Take 1 tablet (300 mg) by mouth every morning, Disp: 90 tablet, Rfl: 1     calcium carbonate (OS-BERTA) 500 MG tablet, Take 1 tablet by mouth 2 times daily, Disp: , Rfl:      Ferrous Sulfate 324 (65 Fe) MG TBEC, , Disp: , Rfl:      gabapentin (NEURONTIN) 300 MG capsule, Take 3 capsules (900 mg) by mouth 2 times daily, Disp: 540 capsule, Rfl: 3     levothyroxine (SYNTHROID/LEVOTHROID) 175 MCG tablet, Take 1 tablet (175 mcg) by mouth every morning, Disp: 90 tablet, Rfl: 3     PARoxetine (PAXIL) 20 MG tablet, TAKE 1 TABLET BY MOUTH EVERY MORNING WITH 40MG TABLET FOR A TOTAL OF 60 MG/DAY, Disp: 90 tablet, Rfl: 0     PARoxetine (PAXIL) 40 MG tablet, TAKE 1 TABLET BY MOUTH EVERY MORNING WITH A 20 MG TABLET FOR A DAILY DOSE  OF 60 MG, Disp: 90 tablet, Rfl: 0     pramipexole (MIRAPEX) 0.5 MG tablet, TAKE 2 TABLETS BY MOUTH AT BEDTIME, Disp: 180 tablet, Rfl: 1     propranolol ER (INDERAL LA) 60 MG 24 hr capsule, TAKE 1 CAPSULE BY MOUTH EVERY MORNING, Disp: 90 capsule, Rfl: 0    Allergies:   Allergies   Allergen Reactions     Amoxicillin-Pot Clavulanate Hives     Other reaction(s): Edema     Augmentin Hives and Itching     Ciprofloxacin      Penicillins        Social Hx: .   reports that she quit smoking about 3 weeks ago. Her smoking use included paan with tobacco, gutka, zarda, khaini. She started smoking about 36 years ago. She has a 24.75 pack-year smoking history. She quit smokeless tobacco use about 14 months ago. She reports current alcohol use of about 6.0 standard drinks of alcohol per week.    Family Hx: family history includes Abdominal Aortic Aneurysm in her maternal grandmother, paternal grandfather, and paternal uncle; Anesthesia Reaction in her mother; Breast Cancer in her maternal grandmother; CABG in her father; Cardiovascular in her father; Coronary Artery Disease in her father; Coronary Stenting in her father; Diabetes in her mother; Heart Failure in her father; Hyperlipidemia in her mother; Hypertension in her mother; Kidney Disease in her maternal grandmother; Lung Cancer in her father; Thyroid Disease in her mother.    REVIEW OF SYSTEMS:  CONSTITUTIONAL:NEGATIVE for fever, chills, change in weight  INTEGUMENTARY/SKIN: NEGATIVE for worrisome rashes, moles or lesions  MUSCULOSKELETAL:See HPI above  NEURO: NEGATIVE for weakness, dizziness or paresthesias    PHYSICAL EXAM:  /76   Pulse 69   Ht 1.524 m (5')   Wt 100.1 kg (220 lb 11.2 oz)   LMP 05/18/2022 (Approximate)   BMI 43.10 kg/m     GENERAL APPEARANCE: healthy, alert, no distress  SKIN: no suspicious lesions or rashes  NEURO: Normal strength and tone, mentation intact and speech normal  PSYCH:  mentation appears normal and affect normal, not  anxious  RESPIRATORY: No increased work of breathing.    BILATERAL LOWER EXTREMITIES:  Gait: favors the right.  Alignment: varus  No Quad atrophy, strength normal.  Intact EHL, EDL, TA, FHL, GS, quadriceps hamstrings and hip flexors  Bilateral calf soft and nttp or squeeze.  Edema: 1+    LEFT KNEE EXAM:    Skin: intact, no ecchymosis or erythema  Midline incisional scar.  ROM: full extension to 100 flexion      RIGHT KNEE EXAM:    Skin: intact, no ecchymosis or erythema; lateral leg tortoise tattoo.  Squat: limited by anterior pain.     ROM: full extension to 120 flexion  Tight hamstrings on straight leg raise.  Effusion: trace  Tender: anterior knee, medial joint line, pes  NTTP lateral joint line.    MCL: stable, and non-painful at both 0 and 30 degrees knee flexion  Varus stress: stable, and non-painful at both 0 and 30 degrees knee flexion  Lachmans: neg, firm endpoint  Posterior Drawer stable  Patellofemoral joint:                Apprehension: negative              Crepitations: mild   Grind: positive.    X-RAY: 3 views right knee 6/8/2022 reviewed, with patient today, showing bone on bone loss of medial joint space with subchondral sclerosis. Patello-femoral marginal osteophytes.      bilateral seymour and sunrise views, lateral view right knee from 11/1/2021 -- no obvious fractures or dislocations. Right knee nonspecific joint effusion. Right knee medial compartment moderate to severe joint space narrowing. Left total knee arthroplasty is noted.           ASSESSMENT/PLAN: Carol Guajardo is a 52 year old female with chronic right knee pain, primary osteoarthritis.      Questions addressed and answered today for upcoming surgery    Baseline KOOS/PROMIS today.    todays xrays reviewed with patient.    ** Risks of surgery include, but not limited to: bleeding (possibly requiring transfusion), infection, pain, scar, damage to adjacent structures (e.g. Nerves, blood vessels, bone, cartilage), temporary or  "permanent nerve damage, recurrence of symptoms, implant dislocation, instability, implant failure, implant infection, unequal limb lengths, stiffness, need for further surgery, blood clots, pulmonary embolism, risks of anesthesia, and death.  * the knee will not feel \"normal\" as it won't be \"normal\" after knee replacement. It may feel \"clunky\" due to the nature of the hard metal and plastic implant.  * the expectation is for improved pain, not necessarily complete pain relief.    ** understanding these risks, the patient would like to proceed with surgery: RIGHT total knee arthroplasty.    * will proceed with RIGHT total knee arthroplasty 6/14/2022  * discussed will plan hospitalization overnight,  daily Physical Therapy starting either the day of or day after surgery, with discharge to home or short term rehabilitation facility, depending on postoperative recovery and progress during hospitalization.  * will plan postoperative deep vein thrombosis prophylaxis x4 weeks. Additionally, graduated compression stockings x4 weeks, and SCDs while in the hospital.  * postoperative pain control will be oral medications upon discharge from hospital  * postoperative Physical Therapy to start upon discharge from the hospital.  * patient will need preoperative H+P prior to surgery from PCP.  * will see patient 2 weeks postoperative, sooner as needed, for wound check, suture removal, and xrays. Will obtain AP, lateral and sunrise views of the knee at that time.    * patient encouraged to call in interim if any new questions or concerns arise.    * recommend no elective dental procedures for 4-6 months after surgery. Any emergency dental procedures, mouth infections, abscesses, etc must be taken care of immediately with preventive antibiotics.   * Patient will need longterm prophylactic antibiotics use with dental procedures or other invasive procedures (2 g amoxicilin or 450mg ([id number]) clindamycin) 1 hour prior to dental " procedures for a minimum of 2 years postoperative.              Emilio Pino M.D., M.S.  Dept. of Orthopaedic Surgery  [facility]

## 2022-06-07 ENCOUNTER — ANESTHESIA EVENT (OUTPATIENT)
Dept: SURGERY | Facility: CLINIC | Age: 52
End: 2022-06-07
Payer: COMMERCIAL

## 2022-06-07 ASSESSMENT — LIFESTYLE VARIABLES: TOBACCO_USE: 1

## 2022-06-07 NOTE — ANESTHESIA PREPROCEDURE EVALUATION
Anesthesia Pre-Procedure Evaluation    Patient: Carol Guajardo   MRN: [id number] : 1970        Procedure : Procedure(s):  ARTHROPLASTY, KNEE, TOTAL          Past Medical History:   Diagnosis Date     Anxiety      Depression      HLD (hyperlipidemia)      Hypothyroidism      Osteoarthritis of both knees      Right lower lobe lung mass     Biopsied 2018, diagnosed as fibrosis      Past Surgical History:   Procedure Laterality Date     ARTHROPLASTY KNEE Left 2019    Procedure: Left Total Knee Arthroplasty;  Surgeon: Ion Bailey MD;  Location: [facility] OR     BRONCHOSCOPY (RIGID OR FLEXIBLE), DIAGNOSTIC N/A 2021    Procedure: BRONCHOSCOPY, WITH BRONCHOALVEOLAR LAVAGE;  Surgeon: Keli Webber MD;  Location: [facility] GI     CARPAL TUNNEL RELEASE RT/LT Bilateral      EXTERNAL EAR SURGERY       IR LUNG BIOPSY MEDIASTINUM RIGHT Right 2018    RLL mass, diagnosed as fibrosis per pt     LAPAROSCOPY DIAGNOSTIC (GENERAL)  2019     LAPAROSCOPY, SURGICAL; REPAIR UMBILICAL HERNIA  2018     THORACOTOMY, WEDGE RESECTION LUNG, COMBINED Right 2021    Procedure: Right Thoracotomy, excision of solitary fibrous tumor, intercostal nerve cryo ablation;  Surgeon: Keli Webber MD;  Location:  OR     [facility] LIGATE FALLOPIAN TUBE      Description: Tubal Ligation;  Recorded: 2008;      Allergies   Allergen Reactions     Amoxicillin-Pot Clavulanate Hives     Other reaction(s): Edema     Augmentin Hives and Itching     Ciprofloxacin      Penicillins       Social History     Tobacco Use     Smoking status: Former Smoker     Packs/day: 0.75     Years: 33.00     Pack years: 24.75     Types: Paan with tobacco, gutka, zarda, khaini     Start date:      Quit date: 5/15/2022     Years since quittin.0     Smokeless tobacco: Former User     Quit date: 2021   Substance Use Topics     Alcohol use: Yes     Alcohol/week: 6.0 standard drinks     Types: 6 Cans of beer per week      Wt  Readings from Last 1 Encounters:   06/03/22 98 kg (216 lb)        Anesthesia Evaluation   Pt has had prior anesthetic. Type: General.    No history of anesthetic complications       ROS/MED HX  ENT/Pulmonary: Comment: S/P thoracotomy w/ wedge resection      (+) sleep apnea, tobacco use, Past use,     Neurologic:       Cardiovascular:     (+) Dyslipidemia -----    METS/Exercise Tolerance:     Hematologic:       Musculoskeletal: Comment: RLS    (+) arthritis,     GI/Hepatic:  - neg GI/hepatic ROS     Renal/Genitourinary:       Endo:     (+) thyroid problem, hypothyroidism, Obesity (Morbid),     Psychiatric/Substance Use:     (+) psychiatric history depression and anxiety alcohol abuse     Infectious Disease:       Malignancy:  - neg malignancy ROS     Other:            Physical Exam    Airway        Mallampati: II       Respiratory Devices and Support         Dental  no notable dental history         Cardiovascular   cardiovascular exam normal          Pulmonary   pulmonary exam normal                OUTSIDE LABS:  CBC:   Lab Results   Component Value Date    WBC 9.4 06/01/2022    WBC 11.7 (H) 04/20/2021    HGB 11.7 06/01/2022    HGB 12.2 04/20/2021    HCT 36.9 06/01/2022    HCT 39.5 04/20/2021     06/01/2022     04/20/2021     BMP:   Lab Results   Component Value Date     04/20/2021     04/19/2021    POTASSIUM 4.2 04/20/2021    POTASSIUM 4.4 04/19/2021    CHLORIDE 100 04/20/2021    CHLORIDE 101 04/19/2021    CO2 28 04/20/2021    CO2 27 04/19/2021    BUN 19 04/20/2021    BUN 16 04/19/2021    CR 0.63 04/20/2021    CR 0.58 04/19/2021     (H) 04/20/2021     (H) 04/19/2021     COAGS: No results found for: PTT, INR, FIBR  POC:   Lab Results   Component Value Date    BGM 96 04/19/2021    HCG Negative 04/19/2021     HEPATIC: No results found for: ALBUMIN, PROTTOTAL, ALT, AST, GGT, ALKPHOS, BILITOTAL, BILIDIRECT, SKYLA  OTHER:   Lab Results   Component Value Date    PH 7.32 (L)  04/19/2021    A1C 5.3 03/18/2011    BERTA 8.8 04/20/2021    MAG 2.1 07/28/2021    TSH 0.55 02/01/2022    T4 1.04 07/28/2021       Anesthesia Plan    ASA Status:  3   NPO Status:  NPO Appropriate    Anesthesia Type: Spinal.              Consents    Anesthesia Plan(s) and associated risks, benefits, and realistic alternatives discussed. Questions answered and patient/representative(s) expressed understanding.     - Discussed: Risks, Benefits and Alternatives for BOTH SEDATION and the PROCEDURE were discussed     - Discussed with:  Patient      - Extended Intubation/Ventilatory Support Discussed: No.      - Patient is DNR/DNI Status: No    Use of blood products discussed: No .     Postoperative Care    Pain management: IV analgesics, Oral pain medications, Neuraxial analgesia, Peripheral nerve block (Single Shot).   PONV prophylaxis: Ondansetron (or other 5HT-3), Dexamethasone or Solumedrol, Background Propofol Infusion     Comments:                ARNULFO Doyle CRNA

## 2022-06-08 ENCOUNTER — OFFICE VISIT (OUTPATIENT)
Dept: ORTHOPEDICS | Facility: CLINIC | Age: 52
End: 2022-06-08
Payer: COMMERCIAL

## 2022-06-08 VITALS
DIASTOLIC BLOOD PRESSURE: 76 MMHG | WEIGHT: 220.7 LBS | SYSTOLIC BLOOD PRESSURE: 120 MMHG | BODY MASS INDEX: 43.33 KG/M2 | HEIGHT: 60 IN | HEART RATE: 69 BPM

## 2022-06-08 DIAGNOSIS — G89.29 CHRONIC PAIN OF RIGHT KNEE: ICD-10-CM

## 2022-06-08 DIAGNOSIS — M17.11 PRIMARY OSTEOARTHRITIS OF RIGHT KNEE: Primary | ICD-10-CM

## 2022-06-08 DIAGNOSIS — M25.561 CHRONIC PAIN OF RIGHT KNEE: ICD-10-CM

## 2022-06-08 PROCEDURE — 99213 OFFICE O/P EST LOW 20 MIN: CPT | Performed by: ORTHOPAEDIC SURGERY

## 2022-06-08 ASSESSMENT — KOOS JR
TWISING OR PIVOTING ON KNEE: MODERATE
STRAIGHTENING KNEE FULLY: MODERATE
KOOS JR SCORING: 52.47
BENDING TO THE FLOOR TO PICK UP OBJECT: MODERATE
RISING FROM SITTING: MILD
GOING UP OR DOWN STAIRS: SEVERE
STANDING UPRIGHT: MODERATE
HOW SEVERE IS YOUR KNEE STIFFNESS AFTER FIRST WAKING IN MORNING: MODERATE

## 2022-06-08 ASSESSMENT — PAIN SCALES - GENERAL: PAINLEVEL: MODERATE PAIN (5)

## 2022-06-08 NOTE — LETTER
"    6/8/2022         RE: Carol Guajardo  [id number] [facility] [id number]        Dear Colleague,    Thank you for referring your patient, Carol Guajardo, to the [facility]. Please see a copy of my visit note below.    CHIEF COMPLAINT:   Chief Complaint   Patient presents with     Right Knee - Pain     Patient is here for pre surgery xrays and Koos Jr. Patient notes her knee is terrible.    .  HISTORY OF PRESENT ILLNESS    Carol Guajardo is a 52 year old female seen for presurgical evaluation of right total knee arthroplasty, scheduled for 5/14/2022, [facility]. She states her knee is doing terrible, though she's anxious for surgery.    Shes had ongoing right knee pain with no known injury.   Pain is throughout the knee, burning. Pain is constant, all day every day.   Aggravated with stairs, walking, sit to stand especially after prolonged sitting, squatting. Hurts so bad she cries daily, stands on cement floors. Can't recall how bad her left knee was prior to surgery, thinks maybe right knee is worse.    Treatment has been cortisone injection (cortisone, visco) neither provided any relief more than a day, Physical Therapy, tylenol.    History of left total knee arthroplasty 9/2019 with Dr Bailey, [facility].    Denies hip or low back pain. Reports numbness and tingling in leg/foot once in a while, feels like that currently. Reports went to the ED one time for it and they saw no blood clots.     Present symptoms: pain diffuse, pain dull/achy, burning , moderate pain.    Pain severity: 5/10  Frequency of symptoms: are constant  Exacerbating Factors: weight bearing, stairs, [id number], prolonged standing  Relieving Factors: \"nothing\"  Night Pain: Yes  Pain while at rest: Yes   Numbness or tingling: Yes   Patient has tried:     NSAIDS: Yes      Physical Therapy: Yes      Activity modification: Yes      Bracing: No      Injections: Yes cortisone " and visco without relief.     Ice: Yes      Assistive device:  No     Other: tylenol    Orthopaedic PMH: left total knee arthroplasty 2019.    Other PMH:  has a past medical history of Anxiety, Depression, HLD (hyperlipidemia), Hypothyroidism, Osteoarthritis of both knees, and Right lower lobe lung mass.  Patient Active Problem List   Diagnosis     Anxiety state     Depression     HLD (hyperlipidemia)     Vitamin D deficiency     Thyroid activity decreased     Obesity     Nicotine dependence     Mass of right lower leg     Primary osteoarthritis of left knee     Total knee replacement status, left     Solitary fibrous tumor     Morbid obesity (H)     Restless legs syndrome (RLS)       Surgical Hx:  has a past surgical history that includes laparoscopy, surgical; repair umbilical hernia (08/22/2018); Laparoscopy diagnostic (general) (02/2019); External ear surgery; carpal tunnel release rt/lt (Bilateral); IR Lung Biopsy Mediastinum Right (Right, 08/2018); Arthroplasty knee (Left, 9/30/2019); Thoracotomy, wedge resection lung, combined (Right, 4/19/2021); Bronchoscopy (rigid or flexible), diagnostic (N/A, 4/21/2021); and LIGATE FALLOPIAN TUBE.    Medications:   Current Outpatient Medications:      Ascorbic Acid (VITAMIN C) 100 MG CHEW, , Disp: , Rfl:      buPROPion (WELLBUTRIN XL) 150 MG 24 hr tablet, TAKE 1 TABLET BY MOUTH EVERY MORNING in addition to 300mg for a total of 450mg daily, Disp: 90 tablet, Rfl: 0     buPROPion (WELLBUTRIN XL) 300 MG 24 hr tablet, Take 1 tablet (300 mg) by mouth every morning, Disp: 90 tablet, Rfl: 1     calcium carbonate (OS-BERTA) 500 MG tablet, Take 1 tablet by mouth 2 times daily, Disp: , Rfl:      Ferrous Sulfate 324 (65 Fe) MG TBEC, , Disp: , Rfl:      gabapentin (NEURONTIN) 300 MG capsule, Take 3 capsules (900 mg) by mouth 2 times daily, Disp: 540 capsule, Rfl: 3     levothyroxine (SYNTHROID/LEVOTHROID) 175 MCG tablet, Take 1 tablet (175 mcg) by mouth every morning, Disp: 90 tablet,  Rfl: 3     PARoxetine (PAXIL) 20 MG tablet, TAKE 1 TABLET BY MOUTH EVERY MORNING WITH 40MG TABLET FOR A TOTAL OF 60 MG/DAY, Disp: 90 tablet, Rfl: 0     PARoxetine (PAXIL) 40 MG tablet, TAKE 1 TABLET BY MOUTH EVERY MORNING WITH A 20 MG TABLET FOR A DAILY DOSE OF 60 MG, Disp: 90 tablet, Rfl: 0     pramipexole (MIRAPEX) 0.5 MG tablet, TAKE 2 TABLETS BY MOUTH AT BEDTIME, Disp: 180 tablet, Rfl: 1     propranolol ER (INDERAL LA) 60 MG 24 hr capsule, TAKE 1 CAPSULE BY MOUTH EVERY MORNING, Disp: 90 capsule, Rfl: 0    Allergies:   Allergies   Allergen Reactions     Amoxicillin-Pot Clavulanate Hives     Other reaction(s): Edema     Augmentin Hives and Itching     Ciprofloxacin      Penicillins        Social Hx: .   reports that she quit smoking about 3 weeks ago. Her smoking use included paan with tobacco, gutka, zarda, khaini. She started smoking about 36 years ago. She has a 24.75 pack-year smoking history. She quit smokeless tobacco use about 14 months ago. She reports current alcohol use of about 6.0 standard drinks of alcohol per week.    Family Hx: family history includes Abdominal Aortic Aneurysm in her maternal grandmother, paternal grandfather, and paternal uncle; Anesthesia Reaction in her mother; Breast Cancer in her maternal grandmother; CABG in her father; Cardiovascular in her father; Coronary Artery Disease in her father; Coronary Stenting in her father; Diabetes in her mother; Heart Failure in her father; Hyperlipidemia in her mother; Hypertension in her mother; Kidney Disease in her maternal grandmother; Lung Cancer in her father; Thyroid Disease in her mother.    REVIEW OF SYSTEMS:  CONSTITUTIONAL:NEGATIVE for fever, chills, change in weight  INTEGUMENTARY/SKIN: NEGATIVE for worrisome rashes, moles or lesions  MUSCULOSKELETAL:See HPI above  NEURO: NEGATIVE for weakness, dizziness or paresthesias    PHYSICAL EXAM:  /76   Pulse 69   Ht 1.524 m (5')   Wt 100.1 kg (220 lb 11.2 oz)   LMP  05/18/2022 (Approximate)   BMI 43.10 kg/m     GENERAL APPEARANCE: healthy, alert, no distress  SKIN: no suspicious lesions or rashes  NEURO: Normal strength and tone, mentation intact and speech normal  PSYCH:  mentation appears normal and affect normal, not anxious  RESPIRATORY: No increased work of breathing.    BILATERAL LOWER EXTREMITIES:  Gait: favors the right.  Alignment: varus  No Quad atrophy, strength normal.  Intact EHL, EDL, TA, FHL, GS, quadriceps hamstrings and hip flexors  Bilateral calf soft and nttp or squeeze.  Edema: 1+    LEFT KNEE EXAM:    Skin: intact, no ecchymosis or erythema  Midline incisional scar.  ROM: full extension to 100 flexion      RIGHT KNEE EXAM:    Skin: intact, no ecchymosis or erythema; lateral leg tortoise tattoo.  Squat: limited by anterior pain.     ROM: full extension to 120 flexion  Tight hamstrings on straight leg raise.  Effusion: trace  Tender: anterior knee, medial joint line, pes  NTTP lateral joint line.    MCL: stable, and non-painful at both 0 and 30 degrees knee flexion  Varus stress: stable, and non-painful at both 0 and 30 degrees knee flexion  Lachmans: neg, firm endpoint  Posterior Drawer stable  Patellofemoral joint:                Apprehension: negative              Crepitations: mild   Grind: positive.    X-RAY: 3 views right knee 6/8/2022 reviewed, with patient today, showing bone on bone loss of medial joint space with subchondral sclerosis. Patello-femoral marginal osteophytes.      bilateral seymour and sunrise views, lateral view right knee from 11/1/2021 -- no obvious fractures or dislocations. Right knee nonspecific joint effusion. Right knee medial compartment moderate to severe joint space narrowing. Left total knee arthroplasty is noted.           ASSESSMENT/PLAN: Carol Guajardo is a 52 year old female with chronic right knee pain, primary osteoarthritis.      Questions addressed and answered today for upcoming surgery    Baseline KOOS/PROMIS  "today.    todays xrays reviewed with patient.    ** Risks of surgery include, but not limited to: bleeding (possibly requiring transfusion), infection, pain, scar, damage to adjacent structures (e.g. Nerves, blood vessels, bone, cartilage), temporary or permanent nerve damage, recurrence of symptoms, implant dislocation, instability, implant failure, implant infection, unequal limb lengths, stiffness, need for further surgery, blood clots, pulmonary embolism, risks of anesthesia, and death.  * the knee will not feel \"normal\" as it won't be \"normal\" after knee replacement. It may feel \"clunky\" due to the nature of the hard metal and plastic implant.  * the expectation is for improved pain, not necessarily complete pain relief.    ** understanding these risks, the patient would like to proceed with surgery: RIGHT total knee arthroplasty.    * will proceed with RIGHT total knee arthroplasty 6/14/2022  * discussed will plan hospitalization overnight,  daily Physical Therapy starting either the day of or day after surgery, with discharge to home or short term rehabilitation facility, depending on postoperative recovery and progress during hospitalization.  * will plan postoperative deep vein thrombosis prophylaxis x4 weeks. Additionally, graduated compression stockings x4 weeks, and SCDs while in the hospital.  * postoperative pain control will be oral medications upon discharge from hospital  * postoperative Physical Therapy to start upon discharge from the hospital.  * patient will need preoperative H+P prior to surgery from PCP.  * will see patient 2 weeks postoperative, sooner as needed, for wound check, suture removal, and xrays. Will obtain AP, lateral and sunrise views of the knee at that time.    * patient encouraged to call in interim if any new questions or concerns arise.    * recommend no elective dental procedures for 4-6 months after surgery. Any emergency dental procedures, mouth infections, abscesses, etc " must be taken care of immediately with preventive antibiotics.   * Patient will need longterm prophylactic antibiotics use with dental procedures or other invasive procedures (2 g amoxicilin or 450mg (5f240er) clindamycin) 1 hour prior to dental procedures for a minimum of 2 years postoperative.              Emilio Pino M.D., M.S.  Dept. of Orthopaedic Surgery  [facility]        Again, thank you for allowing me to participate in the care of your patient.        Sincerely,        Emilio Pino MD

## 2022-06-09 ENCOUNTER — DOCUMENTATION ONLY (OUTPATIENT)
Dept: LAB | Facility: CLINIC | Age: 52
End: 2022-06-09
Payer: COMMERCIAL

## 2022-06-09 DIAGNOSIS — M17.11 PRIMARY OSTEOARTHRITIS OF RIGHT KNEE: Primary | ICD-10-CM

## 2022-06-09 NOTE — PROGRESS NOTES
Your patient has a lab appointment on 6/11/22 for a pre-procedure Covid test. There are no orders placed at this time. Please place orders asap. Thank you.    Johanne Doe on 6/9/2022 at 1:56 PM

## 2022-06-11 ENCOUNTER — LAB (OUTPATIENT)
Dept: LAB | Facility: CLINIC | Age: 52
End: 2022-06-11
Payer: COMMERCIAL

## 2022-06-11 DIAGNOSIS — M17.11 PRIMARY OSTEOARTHRITIS OF RIGHT KNEE: ICD-10-CM

## 2022-06-11 PROCEDURE — U0005 INFEC AGEN DETEC AMPLI PROBE: HCPCS

## 2022-06-11 PROCEDURE — U0003 INFECTIOUS AGENT DETECTION BY NUCLEIC ACID (DNA OR RNA); SEVERE ACUTE RESPIRATORY SYNDROME CORONAVIRUS 2 (SARS-COV-2) (CORONAVIRUS DISEASE [COVID-19]), AMPLIFIED PROBE TECHNIQUE, MAKING USE OF HIGH THROUGHPUT TECHNOLOGIES AS DESCRIBED BY CMS-2020-01-R: HCPCS

## 2022-06-12 LAB — SARS-COV-2 RNA RESP QL NAA+PROBE: NEGATIVE

## 2022-06-14 ENCOUNTER — HOSPITAL ENCOUNTER (OUTPATIENT)
Facility: CLINIC | Age: 52
Discharge: HOME HEALTH | End: 2022-06-15
Attending: ORTHOPAEDIC SURGERY | Admitting: ORTHOPAEDIC SURGERY
Payer: COMMERCIAL

## 2022-06-14 ENCOUNTER — APPOINTMENT (OUTPATIENT)
Dept: GENERAL RADIOLOGY | Facility: CLINIC | Age: 52
End: 2022-06-14
Attending: PHYSICIAN ASSISTANT
Payer: COMMERCIAL

## 2022-06-14 ENCOUNTER — ANESTHESIA (OUTPATIENT)
Dept: SURGERY | Facility: CLINIC | Age: 52
End: 2022-06-14
Payer: COMMERCIAL

## 2022-06-14 DIAGNOSIS — Z96.651 S/P TOTAL KNEE ARTHROPLASTY, RIGHT: Primary | ICD-10-CM

## 2022-06-14 PROBLEM — G25.81 RESTLESS LEGS SYNDROME (RLS): Status: ACTIVE | Noted: 2021-07-28

## 2022-06-14 PROBLEM — M17.11 OSTEOARTHRITIS OF RIGHT KNEE: Status: ACTIVE | Noted: 2022-06-14

## 2022-06-14 PROBLEM — G47.33 OSA (OBSTRUCTIVE SLEEP APNEA): Status: ACTIVE | Noted: 2022-06-14

## 2022-06-14 PROBLEM — F32.A DEPRESSION: Status: ACTIVE | Noted: 2017-07-19

## 2022-06-14 PROBLEM — F41.1 ANXIETY STATE: Status: ACTIVE | Noted: 2017-07-19

## 2022-06-14 PROBLEM — F17.200 NICOTINE DEPENDENCE: Status: ACTIVE | Noted: 2017-07-19

## 2022-06-14 PROCEDURE — 258N000003 HC RX IP 258 OP 636: Performed by: NURSE ANESTHETIST, CERTIFIED REGISTERED

## 2022-06-14 PROCEDURE — 710N000009 HC RECOVERY PHASE 1, LEVEL 1, PER MIN: Performed by: ORTHOPAEDIC SURGERY

## 2022-06-14 PROCEDURE — 999N000065 XR KNEE PORT RIGHT 1/2 VIEWS

## 2022-06-14 PROCEDURE — 360N000077 HC SURGERY LEVEL 4, PER MIN: Performed by: ORTHOPAEDIC SURGERY

## 2022-06-14 PROCEDURE — 272N000001 HC OR GENERAL SUPPLY STERILE: Performed by: ORTHOPAEDIC SURGERY

## 2022-06-14 PROCEDURE — 250N000013 HC RX MED GY IP 250 OP 250 PS 637: Performed by: NURSE ANESTHETIST, CERTIFIED REGISTERED

## 2022-06-14 PROCEDURE — 250N000011 HC RX IP 250 OP 636: Performed by: NURSE ANESTHETIST, CERTIFIED REGISTERED

## 2022-06-14 PROCEDURE — 73560 X-RAY EXAM OF KNEE 1 OR 2: CPT | Mod: RT

## 2022-06-14 PROCEDURE — 27447 TOTAL KNEE ARTHROPLASTY: CPT | Mod: AS | Performed by: PHYSICIAN ASSISTANT

## 2022-06-14 PROCEDURE — 258N000001 HC RX 258: Performed by: ORTHOPAEDIC SURGERY

## 2022-06-14 PROCEDURE — C1776 JOINT DEVICE (IMPLANTABLE): HCPCS | Performed by: ORTHOPAEDIC SURGERY

## 2022-06-14 PROCEDURE — 250N000009 HC RX 250: Performed by: NURSE ANESTHETIST, CERTIFIED REGISTERED

## 2022-06-14 PROCEDURE — 370N000017 HC ANESTHESIA TECHNICAL FEE, PER MIN: Performed by: ORTHOPAEDIC SURGERY

## 2022-06-14 PROCEDURE — 258N000003 HC RX IP 258 OP 636: Performed by: PHYSICIAN ASSISTANT

## 2022-06-14 PROCEDURE — 250N000013 HC RX MED GY IP 250 OP 250 PS 637: Performed by: PHYSICIAN ASSISTANT

## 2022-06-14 PROCEDURE — 250N000011 HC RX IP 250 OP 636: Performed by: ORTHOPAEDIC SURGERY

## 2022-06-14 PROCEDURE — 999N000141 HC STATISTIC PRE-PROCEDURE NURSING ASSESSMENT: Performed by: ORTHOPAEDIC SURGERY

## 2022-06-14 PROCEDURE — 271N000001 HC OR GENERAL SUPPLY NON-STERILE: Performed by: ORTHOPAEDIC SURGERY

## 2022-06-14 PROCEDURE — 250N000011 HC RX IP 250 OP 636: Performed by: PHYSICIAN ASSISTANT

## 2022-06-14 PROCEDURE — 250N000009 HC RX 250

## 2022-06-14 PROCEDURE — 27447 TOTAL KNEE ARTHROPLASTY: CPT | Mod: RT | Performed by: ORTHOPAEDIC SURGERY

## 2022-06-14 PROCEDURE — 250N000011 HC RX IP 250 OP 636

## 2022-06-14 PROCEDURE — 250N000009 HC RX 250: Performed by: ORTHOPAEDIC SURGERY

## 2022-06-14 PROCEDURE — C1734 ORTH/DEVIC/DRUG BN/BN,TIS/BN: HCPCS | Performed by: ORTHOPAEDIC SURGERY

## 2022-06-14 PROCEDURE — 258N000003 HC RX IP 258 OP 636

## 2022-06-14 DEVICE — IMP COMP PATELLA HOWM TRI ASYM 29X09MM 555-L-299: Type: IMPLANTABLE DEVICE | Site: KNEE | Status: FUNCTIONAL

## 2022-06-14 DEVICE — BONE CEMENT SIMPLEX FULL DOSE 6191-1-001: Type: IMPLANTABLE DEVICE | Site: KNEE | Status: FUNCTIONAL

## 2022-06-14 DEVICE — IMPLANTABLE DEVICE: Type: IMPLANTABLE DEVICE | Site: KNEE | Status: FUNCTIONAL

## 2022-06-14 RX ORDER — CEFAZOLIN SODIUM 2 G/100ML
2 INJECTION, SOLUTION INTRAVENOUS EVERY 8 HOURS
Status: DISCONTINUED | OUTPATIENT
Start: 2022-06-14 | End: 2022-06-14

## 2022-06-14 RX ORDER — EPINEPHRINE 1 MG/ML
INJECTION, SOLUTION, CONCENTRATE INTRAVENOUS PRN
Status: DISCONTINUED | OUTPATIENT
Start: 2022-06-14 | End: 2022-06-14

## 2022-06-14 RX ORDER — KETOROLAC TROMETHAMINE 15 MG/ML
15 INJECTION, SOLUTION INTRAMUSCULAR; INTRAVENOUS
Status: DISCONTINUED | OUTPATIENT
Start: 2022-06-14 | End: 2022-06-14 | Stop reason: HOSPADM

## 2022-06-14 RX ORDER — PROCHLORPERAZINE MALEATE 5 MG
10 TABLET ORAL EVERY 6 HOURS PRN
Status: DISCONTINUED | OUTPATIENT
Start: 2022-06-14 | End: 2022-06-15 | Stop reason: HOSPADM

## 2022-06-14 RX ORDER — GABAPENTIN 300 MG/1
300 CAPSULE ORAL
Status: COMPLETED | OUTPATIENT
Start: 2022-06-14 | End: 2022-06-14

## 2022-06-14 RX ORDER — PROPOFOL 10 MG/ML
INJECTION, EMULSION INTRAVENOUS CONTINUOUS PRN
Status: DISCONTINUED | OUTPATIENT
Start: 2022-06-14 | End: 2022-06-14

## 2022-06-14 RX ORDER — KETOROLAC TROMETHAMINE 30 MG/ML
INJECTION, SOLUTION INTRAMUSCULAR; INTRAVENOUS PRN
Status: DISCONTINUED | OUTPATIENT
Start: 2022-06-14 | End: 2022-06-14

## 2022-06-14 RX ORDER — ONDANSETRON 4 MG/1
4 TABLET, ORALLY DISINTEGRATING ORAL EVERY 6 HOURS PRN
Status: DISCONTINUED | OUTPATIENT
Start: 2022-06-14 | End: 2022-06-15 | Stop reason: HOSPADM

## 2022-06-14 RX ORDER — NALOXONE HYDROCHLORIDE 0.4 MG/ML
0.2 INJECTION, SOLUTION INTRAMUSCULAR; INTRAVENOUS; SUBCUTANEOUS
Status: DISCONTINUED | OUTPATIENT
Start: 2022-06-14 | End: 2022-06-15 | Stop reason: HOSPADM

## 2022-06-14 RX ORDER — VANCOMYCIN HYDROCHLORIDE 1 G/20ML
INJECTION, POWDER, LYOPHILIZED, FOR SOLUTION INTRAVENOUS PRN
Status: DISCONTINUED | OUTPATIENT
Start: 2022-06-14 | End: 2022-06-14 | Stop reason: HOSPADM

## 2022-06-14 RX ORDER — OXYCODONE HYDROCHLORIDE 5 MG/1
5 TABLET ORAL EVERY 4 HOURS PRN
Status: DISCONTINUED | OUTPATIENT
Start: 2022-06-14 | End: 2022-06-15 | Stop reason: HOSPADM

## 2022-06-14 RX ORDER — FENTANYL CITRATE 50 UG/ML
INJECTION, SOLUTION INTRAMUSCULAR; INTRAVENOUS PRN
Status: DISCONTINUED | OUTPATIENT
Start: 2022-06-14 | End: 2022-06-14

## 2022-06-14 RX ORDER — ACETAMINOPHEN 325 MG/1
650 TABLET ORAL EVERY 4 HOURS PRN
Status: DISCONTINUED | OUTPATIENT
Start: 2022-06-17 | End: 2022-06-15 | Stop reason: HOSPADM

## 2022-06-14 RX ORDER — HYDROMORPHONE HCL IN WATER/PF 6 MG/30 ML
0.4 PATIENT CONTROLLED ANALGESIA SYRINGE INTRAVENOUS
Status: DISCONTINUED | OUTPATIENT
Start: 2022-06-14 | End: 2022-06-15 | Stop reason: HOSPADM

## 2022-06-14 RX ORDER — PAROXETINE 30 MG/1
60 TABLET, FILM COATED ORAL DAILY
Status: DISCONTINUED | OUTPATIENT
Start: 2022-06-15 | End: 2022-06-15 | Stop reason: HOSPADM

## 2022-06-14 RX ORDER — LIDOCAINE 40 MG/G
CREAM TOPICAL
Status: DISCONTINUED | OUTPATIENT
Start: 2022-06-14 | End: 2022-06-15 | Stop reason: HOSPADM

## 2022-06-14 RX ORDER — POLYETHYLENE GLYCOL 3350 17 G/17G
17 POWDER, FOR SOLUTION ORAL DAILY
Status: DISCONTINUED | OUTPATIENT
Start: 2022-06-15 | End: 2022-06-15 | Stop reason: HOSPADM

## 2022-06-14 RX ORDER — MAGNESIUM SULFATE HEPTAHYDRATE 40 MG/ML
2 INJECTION, SOLUTION INTRAVENOUS ONCE
Status: COMPLETED | OUTPATIENT
Start: 2022-06-14 | End: 2022-06-14

## 2022-06-14 RX ORDER — HYDROXYZINE HYDROCHLORIDE 50 MG/1
50 TABLET, FILM COATED ORAL EVERY 6 HOURS PRN
Status: DISCONTINUED | OUTPATIENT
Start: 2022-06-14 | End: 2022-06-15 | Stop reason: HOSPADM

## 2022-06-14 RX ORDER — FENTANYL CITRATE 50 UG/ML
25 INJECTION, SOLUTION INTRAMUSCULAR; INTRAVENOUS EVERY 5 MIN PRN
Status: DISCONTINUED | OUTPATIENT
Start: 2022-06-14 | End: 2022-06-14 | Stop reason: HOSPADM

## 2022-06-14 RX ORDER — BISACODYL 10 MG
10 SUPPOSITORY, RECTAL RECTAL DAILY PRN
Status: DISCONTINUED | OUTPATIENT
Start: 2022-06-14 | End: 2022-06-15 | Stop reason: HOSPADM

## 2022-06-14 RX ORDER — ROPIVACAINE HYDROCHLORIDE 5 MG/ML
INJECTION, SOLUTION EPIDURAL; INFILTRATION; PERINEURAL PRN
Status: DISCONTINUED | OUTPATIENT
Start: 2022-06-14 | End: 2022-06-14

## 2022-06-14 RX ORDER — NALOXONE HYDROCHLORIDE 0.4 MG/ML
0.4 INJECTION, SOLUTION INTRAMUSCULAR; INTRAVENOUS; SUBCUTANEOUS
Status: DISCONTINUED | OUTPATIENT
Start: 2022-06-14 | End: 2022-06-15 | Stop reason: HOSPADM

## 2022-06-14 RX ORDER — HYDROMORPHONE HCL IN WATER/PF 6 MG/30 ML
0.2 PATIENT CONTROLLED ANALGESIA SYRINGE INTRAVENOUS EVERY 5 MIN PRN
Status: DISCONTINUED | OUTPATIENT
Start: 2022-06-14 | End: 2022-06-14 | Stop reason: HOSPADM

## 2022-06-14 RX ORDER — BUPIVACAINE HYDROCHLORIDE 7.5 MG/ML
INJECTION, SOLUTION INTRASPINAL PRN
Status: DISCONTINUED | OUTPATIENT
Start: 2022-06-14 | End: 2022-06-14

## 2022-06-14 RX ORDER — DEXAMETHASONE SODIUM PHOSPHATE 4 MG/ML
INJECTION, SOLUTION INTRA-ARTICULAR; INTRALESIONAL; INTRAMUSCULAR; INTRAVENOUS; SOFT TISSUE PRN
Status: DISCONTINUED | OUTPATIENT
Start: 2022-06-14 | End: 2022-06-14

## 2022-06-14 RX ORDER — ACETAMINOPHEN 325 MG/1
975 TABLET ORAL EVERY 8 HOURS
Status: DISCONTINUED | OUTPATIENT
Start: 2022-06-14 | End: 2022-06-15 | Stop reason: HOSPADM

## 2022-06-14 RX ORDER — SODIUM CHLORIDE, SODIUM LACTATE, POTASSIUM CHLORIDE, CALCIUM CHLORIDE 600; 310; 30; 20 MG/100ML; MG/100ML; MG/100ML; MG/100ML
INJECTION, SOLUTION INTRAVENOUS CONTINUOUS
Status: DISCONTINUED | OUTPATIENT
Start: 2022-06-14 | End: 2022-06-14 | Stop reason: HOSPADM

## 2022-06-14 RX ORDER — SODIUM CHLORIDE, SODIUM LACTATE, POTASSIUM CHLORIDE, CALCIUM CHLORIDE 600; 310; 30; 20 MG/100ML; MG/100ML; MG/100ML; MG/100ML
INJECTION, SOLUTION INTRAVENOUS CONTINUOUS
Status: DISCONTINUED | OUTPATIENT
Start: 2022-06-14 | End: 2022-06-15 | Stop reason: HOSPADM

## 2022-06-14 RX ORDER — KETAMINE HYDROCHLORIDE 10 MG/ML
INJECTION INTRAMUSCULAR; INTRAVENOUS PRN
Status: DISCONTINUED | OUTPATIENT
Start: 2022-06-14 | End: 2022-06-14

## 2022-06-14 RX ORDER — CLINDAMYCIN PHOSPHATE 900 MG/50ML
900 INJECTION, SOLUTION INTRAVENOUS EVERY 8 HOURS
Status: COMPLETED | OUTPATIENT
Start: 2022-06-14 | End: 2022-06-15

## 2022-06-14 RX ORDER — AMOXICILLIN 250 MG
1 CAPSULE ORAL 2 TIMES DAILY
Status: DISCONTINUED | OUTPATIENT
Start: 2022-06-14 | End: 2022-06-15 | Stop reason: HOSPADM

## 2022-06-14 RX ORDER — ONDANSETRON 2 MG/ML
4 INJECTION INTRAMUSCULAR; INTRAVENOUS EVERY 6 HOURS PRN
Status: DISCONTINUED | OUTPATIENT
Start: 2022-06-14 | End: 2022-06-15 | Stop reason: HOSPADM

## 2022-06-14 RX ORDER — ONDANSETRON 2 MG/ML
4 INJECTION INTRAMUSCULAR; INTRAVENOUS EVERY 30 MIN PRN
Status: DISCONTINUED | OUTPATIENT
Start: 2022-06-14 | End: 2022-06-14 | Stop reason: HOSPADM

## 2022-06-14 RX ORDER — ONDANSETRON 2 MG/ML
INJECTION INTRAMUSCULAR; INTRAVENOUS PRN
Status: DISCONTINUED | OUTPATIENT
Start: 2022-06-14 | End: 2022-06-14

## 2022-06-14 RX ORDER — GABAPENTIN 300 MG/1
900 CAPSULE ORAL 2 TIMES DAILY
Status: DISCONTINUED | OUTPATIENT
Start: 2022-06-14 | End: 2022-06-15 | Stop reason: HOSPADM

## 2022-06-14 RX ORDER — LIDOCAINE 40 MG/G
CREAM TOPICAL
Status: DISCONTINUED | OUTPATIENT
Start: 2022-06-14 | End: 2022-06-14 | Stop reason: HOSPADM

## 2022-06-14 RX ORDER — ONDANSETRON 4 MG/1
4 TABLET, ORALLY DISINTEGRATING ORAL EVERY 30 MIN PRN
Status: DISCONTINUED | OUTPATIENT
Start: 2022-06-14 | End: 2022-06-14 | Stop reason: HOSPADM

## 2022-06-14 RX ORDER — PRAMIPEXOLE DIHYDROCHLORIDE 0.5 MG/1
1 TABLET ORAL AT BEDTIME
Status: DISCONTINUED | OUTPATIENT
Start: 2022-06-14 | End: 2022-06-15 | Stop reason: HOSPADM

## 2022-06-14 RX ORDER — HYDROMORPHONE HCL IN WATER/PF 6 MG/30 ML
0.2 PATIENT CONTROLLED ANALGESIA SYRINGE INTRAVENOUS
Status: DISCONTINUED | OUTPATIENT
Start: 2022-06-14 | End: 2022-06-15 | Stop reason: HOSPADM

## 2022-06-14 RX ORDER — CLINDAMYCIN PHOSPHATE 900 MG/50ML
900 INJECTION, SOLUTION INTRAVENOUS
Status: COMPLETED | OUTPATIENT
Start: 2022-06-14 | End: 2022-06-14

## 2022-06-14 RX ORDER — CLINDAMYCIN PHOSPHATE 900 MG/50ML
900 INJECTION, SOLUTION INTRAVENOUS SEE ADMIN INSTRUCTIONS
Status: DISCONTINUED | OUTPATIENT
Start: 2022-06-14 | End: 2022-06-14 | Stop reason: HOSPADM

## 2022-06-14 RX ORDER — MEPERIDINE HYDROCHLORIDE 25 MG/ML
12.5 INJECTION INTRAMUSCULAR; INTRAVENOUS; SUBCUTANEOUS EVERY 5 MIN PRN
Status: DISCONTINUED | OUTPATIENT
Start: 2022-06-14 | End: 2022-06-14 | Stop reason: HOSPADM

## 2022-06-14 RX ORDER — OXYCODONE HYDROCHLORIDE 5 MG/1
10 TABLET ORAL EVERY 4 HOURS PRN
Status: DISCONTINUED | OUTPATIENT
Start: 2022-06-14 | End: 2022-06-15 | Stop reason: HOSPADM

## 2022-06-14 RX ORDER — BUPIVACAINE HYDROCHLORIDE 5 MG/ML
INJECTION, SOLUTION PERINEURAL PRN
Status: DISCONTINUED | OUTPATIENT
Start: 2022-06-14 | End: 2022-06-14 | Stop reason: HOSPADM

## 2022-06-14 RX ORDER — PROPRANOLOL HCL 60 MG
60 CAPSULE, EXTENDED RELEASE 24HR ORAL EVERY MORNING
Status: DISCONTINUED | OUTPATIENT
Start: 2022-06-15 | End: 2022-06-15 | Stop reason: HOSPADM

## 2022-06-14 RX ORDER — TRANEXAMIC ACID 650 MG/1
1950 TABLET ORAL ONCE
Status: COMPLETED | OUTPATIENT
Start: 2022-06-14 | End: 2022-06-14

## 2022-06-14 RX ORDER — ACETAMINOPHEN 325 MG/1
975 TABLET ORAL ONCE
Status: COMPLETED | OUTPATIENT
Start: 2022-06-14 | End: 2022-06-14

## 2022-06-14 RX ORDER — OXYCODONE HYDROCHLORIDE 5 MG/1
5 TABLET ORAL EVERY 4 HOURS PRN
Status: DISCONTINUED | OUTPATIENT
Start: 2022-06-14 | End: 2022-06-14 | Stop reason: HOSPADM

## 2022-06-14 RX ADMIN — FENTANYL CITRATE 100 MCG: 50 INJECTION, SOLUTION INTRAMUSCULAR; INTRAVENOUS at 11:37

## 2022-06-14 RX ADMIN — ROPIVACAINE HYDROCHLORIDE 30 ML: 5 INJECTION, SOLUTION EPIDURAL; INFILTRATION; PERINEURAL at 14:30

## 2022-06-14 RX ADMIN — CLINDAMYCIN PHOSPHATE 900 MG: 900 INJECTION, SOLUTION INTRAVENOUS at 19:47

## 2022-06-14 RX ADMIN — OXYCODONE HYDROCHLORIDE 5 MG: 5 TABLET ORAL at 17:07

## 2022-06-14 RX ADMIN — EPINEPHRINE 0.2 MG: 1 INJECTION, SOLUTION INTRAMUSCULAR; SUBCUTANEOUS at 11:47

## 2022-06-14 RX ADMIN — HYDROMORPHONE HYDROCHLORIDE 0.2 MG: 0.2 INJECTION, SOLUTION INTRAMUSCULAR; INTRAVENOUS; SUBCUTANEOUS at 21:59

## 2022-06-14 RX ADMIN — DEXMEDETOMIDINE HYDROCHLORIDE 20 MCG: 100 INJECTION, SOLUTION INTRAVENOUS at 14:30

## 2022-06-14 RX ADMIN — FENTANYL CITRATE 25 MCG: 50 INJECTION, SOLUTION INTRAMUSCULAR; INTRAVENOUS at 14:37

## 2022-06-14 RX ADMIN — FENTANYL CITRATE 25 MCG: 50 INJECTION, SOLUTION INTRAMUSCULAR; INTRAVENOUS at 14:32

## 2022-06-14 RX ADMIN — GABAPENTIN 300 MG: 300 CAPSULE ORAL at 08:47

## 2022-06-14 RX ADMIN — ACETAMINOPHEN 975 MG: 325 TABLET, FILM COATED ORAL at 08:46

## 2022-06-14 RX ADMIN — ACETAMINOPHEN 975 MG: 325 TABLET, FILM COATED ORAL at 17:07

## 2022-06-14 RX ADMIN — TRANEXAMIC ACID 1950 MG: 650 TABLET ORAL at 08:45

## 2022-06-14 RX ADMIN — MAGNESIUM SULFATE HEPTAHYDRATE 2 G: 40 INJECTION, SOLUTION INTRAVENOUS at 11:35

## 2022-06-14 RX ADMIN — MIDAZOLAM 2 MG: 1 INJECTION INTRAMUSCULAR; INTRAVENOUS at 11:33

## 2022-06-14 RX ADMIN — PHENYLEPHRINE HYDROCHLORIDE 0.05 MCG/KG/MIN: 10 INJECTION INTRAVENOUS at 11:50

## 2022-06-14 RX ADMIN — MIDAZOLAM 2 MG: 1 INJECTION INTRAMUSCULAR; INTRAVENOUS at 11:43

## 2022-06-14 RX ADMIN — LIDOCAINE HYDROCHLORIDE 0.1 ML: 10 INJECTION, SOLUTION EPIDURAL; INFILTRATION; INTRACAUDAL; PERINEURAL at 08:48

## 2022-06-14 RX ADMIN — CLINDAMYCIN PHOSPHATE 900 MG: 900 INJECTION, SOLUTION INTRAVENOUS at 11:29

## 2022-06-14 RX ADMIN — KETAMINE HYDROCHLORIDE 20 MG: 10 INJECTION, SOLUTION INTRAMUSCULAR; INTRAVENOUS at 11:52

## 2022-06-14 RX ADMIN — ONDANSETRON 4 MG: 2 INJECTION INTRAMUSCULAR; INTRAVENOUS at 13:04

## 2022-06-14 RX ADMIN — SODIUM CHLORIDE, POTASSIUM CHLORIDE, SODIUM LACTATE AND CALCIUM CHLORIDE: 600; 310; 30; 20 INJECTION, SOLUTION INTRAVENOUS at 22:48

## 2022-06-14 RX ADMIN — FENTANYL CITRATE 25 MCG: 50 INJECTION, SOLUTION INTRAMUSCULAR; INTRAVENOUS at 14:42

## 2022-06-14 RX ADMIN — SODIUM CHLORIDE, POTASSIUM CHLORIDE, SODIUM LACTATE AND CALCIUM CHLORIDE: 600; 310; 30; 20 INJECTION, SOLUTION INTRAVENOUS at 16:57

## 2022-06-14 RX ADMIN — HYDROMORPHONE HYDROCHLORIDE 0.2 MG: 0.2 INJECTION, SOLUTION INTRAMUSCULAR; INTRAVENOUS; SUBCUTANEOUS at 17:56

## 2022-06-14 RX ADMIN — SODIUM CHLORIDE, POTASSIUM CHLORIDE, SODIUM LACTATE AND CALCIUM CHLORIDE: 600; 310; 30; 20 INJECTION, SOLUTION INTRAVENOUS at 08:48

## 2022-06-14 RX ADMIN — HYDROMORPHONE HYDROCHLORIDE 0.2 MG: 0.2 INJECTION, SOLUTION INTRAMUSCULAR; INTRAVENOUS; SUBCUTANEOUS at 22:48

## 2022-06-14 RX ADMIN — ASPIRIN 325 MG: 325 TABLET, COATED ORAL at 17:07

## 2022-06-14 RX ADMIN — SODIUM CHLORIDE, POTASSIUM CHLORIDE, SODIUM LACTATE AND CALCIUM CHLORIDE: 600; 310; 30; 20 INJECTION, SOLUTION INTRAVENOUS at 13:46

## 2022-06-14 RX ADMIN — DEXAMETHASONE SODIUM PHOSPHATE 10 MG: 4 INJECTION, SOLUTION INTRA-ARTICULAR; INTRALESIONAL; INTRAMUSCULAR; INTRAVENOUS; SOFT TISSUE at 11:48

## 2022-06-14 RX ADMIN — HYDROXYZINE HYDROCHLORIDE 50 MG: 50 TABLET, FILM COATED ORAL at 23:18

## 2022-06-14 RX ADMIN — PRAMIPEXOLE DIHYDROCHLORIDE 1 MG: 0.5 TABLET ORAL at 21:08

## 2022-06-14 RX ADMIN — GABAPENTIN 900 MG: 300 CAPSULE ORAL at 21:08

## 2022-06-14 RX ADMIN — BUPIVACAINE HYDROCHLORIDE IN DEXTROSE 1.6 ML: 7.5 INJECTION, SOLUTION SUBARACHNOID at 11:47

## 2022-06-14 RX ADMIN — DEXAMETHASONE SODIUM PHOSPHATE 4 MG: 4 INJECTION, SOLUTION INTRA-ARTICULAR; INTRALESIONAL; INTRAMUSCULAR; INTRAVENOUS; SOFT TISSUE at 14:30

## 2022-06-14 RX ADMIN — OXYCODONE HYDROCHLORIDE 10 MG: 5 TABLET ORAL at 21:08

## 2022-06-14 RX ADMIN — KETOROLAC TROMETHAMINE 30 MG: 30 INJECTION, SOLUTION INTRAMUSCULAR at 13:56

## 2022-06-14 RX ADMIN — PROPOFOL 50 MCG/KG/MIN: 10 INJECTION, EMULSION INTRAVENOUS at 11:48

## 2022-06-14 RX ADMIN — OXYCODONE HYDROCHLORIDE 5 MG: 5 TABLET ORAL at 15:44

## 2022-06-14 RX ADMIN — SENNOSIDES AND DOCUSATE SODIUM 1 TABLET: 50; 8.6 TABLET ORAL at 21:07

## 2022-06-14 ASSESSMENT — COLUMBIA-SUICIDE SEVERITY RATING SCALE - C-SSRS
1. IN THE PAST MONTH, HAVE YOU WISHED YOU WERE DEAD OR WISHED YOU COULD GO TO SLEEP AND NOT WAKE UP?: NO
4. HAVE YOU HAD THESE THOUGHTS AND HAD SOME INTENTION OF ACTING ON THEM?: NO
3. HAVE YOU BEEN THINKING ABOUT HOW YOU MIGHT KILL YOURSELF?: NO
6. HAVE YOU EVER DONE ANYTHING, STARTED TO DO ANYTHING, OR PREPARED TO DO ANYTHING TO END YOUR LIFE?: NO
5. HAVE YOU STARTED TO WORK OUT OR WORKED OUT THE DETAILS OF HOW TO KILL YOURSELF? DO YOU INTEND TO CARRY OUT THIS PLAN?: NO
2. HAVE YOU ACTUALLY HAD ANY THOUGHTS OF KILLING YOURSELF IN THE PAST MONTH?: NO

## 2022-06-14 NOTE — OP NOTE
DATE OF SERVICE:  6/14/2022    PREOPERATIVE DIAGNOSIS:  Primary osteoarthritis, right knee.    POSTOPERATIVE DIAGNOSIS: Primary osteoarthritis, right knee.    OPERATION PERFORMED: Hybrid tricompartmental total knee arthroplasty ( press-fit tibial and femoral components for the medial, lateral compartments, and cemented patellofemoral component), right knee.    ATTENDING SURGEON: Emilio Pino M.D., M.S.    ASSISTANT(S): Vitaly Joshua PA-C   The PA's assistance was medically necessary given the technical complexity of the case; with assistance with patient positioning, retraction and knee joint exposure, limb manipulation for femoral and tibial osteotomies, assistance with implant placement, trial and final arthroplasty knee implant reduction, and wound closure.      ANESTHESIA:  Spinal with MAC, in the supine position.    IV FLUIDS:  1000 mL LR.    EBL:  50mL .    URINE OUTPUT: no tobar    TOURNIQUET TIME: 63 minutes at 300mmHg.    IMPLANTS / GRAFTS:        #2 posterior stabilized [place] Triathlon press-fit beaded femur,      #1 [place] Triathlon Tritanium press-fit tibial tray     #29 [place] Triathlon X3 asymmetric medial off-set patella      9mm X3 PS polyethylene tibial liner    LATERAL RELEASE:   None required - good tracking.    APPROACH:   Medial Parapatellar    COMPLICATIONS:  NONE    ANTIBIOTICS:  Cefazolin 2 gm intravenously prior to tourniquet inflation and 2gm at release and closure.    Tranexamic Acid: 1950mg oral prior to procedure in preop.    SPECIMENS: none    DRAINS: none    FINDINGS: advanced tricompartmental degenerative osteoarthritis, eburnated bone on bone changes, with marginal osteophytes. Inflammed synovium, effusion. Varus deformity.    INDICATIONS FOR PROCEDURE: Carol Guajardo is a pleasant 52 year old female with ongoing knee pain.Xrays showed advanced degenerative arthrosis.The patient has unfortunately failed nonoperative attempts at knee pain relief with ongoing symptomatic  arthropathy, including physical therapy, activity modification, weight loss, NSAIDS, and injections (cortisone and viscosupplementation). Symptoms have been interfering with activities of daily living and quality of life.  The risk and benefit analysis of knee arthroplasty was explained to include but not be limited to wear, loosening, infection, bleeding, pain, scar, implant dislocation, fracture, failure to relieve symptoms, thromboembolic disease, neurovascular damage with possible temporary or permanent nerve damage, leg length inequality, need for further surgery, risks of anesthesia and death.  Despite these risks, as a quality of life decision, the patient elected to proceed.    And with informed and written consent the patient was taken to the operating room.    PROCEDURE AND FINDINGS:    The patient was identified in the preoperative holding area. The correct surgical procedure and site was confirmed with the patient. Again, the risks of surgery were reviewed. The patient elected to proceed understanding the risks. Written informed consent was obtained. The correct surgical site was marked with an indelible marker by myself, Emilio Pino MD, the surgeon.     The patient was then taken to the operating room and placed supine on the operating table. After adequate anesthesia was obtained, a non-sterile tourniquet was placed to the upper thigh. All bony prominences were well padded. The arms were well positioned and padded to be comfortable. The right knee and lower extremity was prepped and draped in the usual sterile fashion.     A time-out was completed confirming the correct patient, the correct surgery and surgical site, positioning, the availability of implants, administration of prophylactic antibiotics, and known allergies by all surgical staff.    A midline incision was made and carried down through the peritenon over the patella tendon.  A medial-based arthrotomy was extended proximally using a  medial parapatellar approach and distally using a subperiosteal medial collateral ligament elevation.  Accessible anterior menisci were incised.  The knee was flexed, and canal entry into both femur and tibia allowed a 5 degree 8 mm distal femoral cut across the distal aspect of the medial and lateral femoral condyles, and a 3 degree 4 mm medial-based tibial cut across the medial and lateral tibial plateau.    Minimally, or less,  invasive techniques with limited incisons and reduced sized cutting blocks and minimal patellar eversion were utilized.    Attention was then turned to the femur.  This was sized to prevent notching and externally rotated to the epicondylar axis.  Necessary anterior and posterior as well as Chamfer cuts were made medial and lateral.   A trial femoral component was placed after removing remaining menisci.  The flexion and extension gap balancing was assured using an 9mm poly as well as a 11 mm poly.  The tibial alignment pins were removed following flexion/extension balancing.  Tibial component size was selected and punched, externally rotating tibial component to the junction of the middle and medial thirds of the tibial tubercle.    Attention was then turned to the patella and the 20 mm patella was recessed to a 13 mm thick patella to accommodate a medial off-set component which was drilled.  A complete synovectomy was performed.    Patellar tracking was assured using a thicker poly insert. No lateral release was needed. After confirmation of such, 1 bag of cement was mixed at the back table.   Beginning with the tibia and then the femur, these components were press-fit into place. This was then followed by the patella, which was cemented in place.  Excess cement was removed from patella component.  The knee was taken to extension.  The tourniquet was released, and a second dose of Ancef was administered.    The wound was copiously irrigated during the cement cure with dilute betadine  solution as well as antibiotic saline. A local joint block was administered through the wound into the surrounding tissues. The actual polyethylene, liner which allowed full extension and good flexion, was inserted. The wound was irrigated again. One gram of vancomycin powder was sprinkled into the knee. The medial arthrotomy, medial collateral lateral elevation, and deep fascia of the vastus medialis obliquus was closed with 0 Ethibond.  0 Vicryl and 3-0 monocryl were used in the peritenon and subdermal tissue respectively.  A 3-0 monocryl subcuticular suture was used to maintain good skin eversion and apposition.  Dermabond was placed over the closed incision. A water-proof sterile dressing (Aquacel Ag)  was applied over adaptec gauze.    The patient was then taken to recovery in stable condition.    The postoperative plan will be weight bearing as tolerated, knee arthroplasty protocol with range of motion to full immediately, pharmacologic DVT prophylaxis for 4 weeks, and antibiotics for 23 hours.  We look forward to following the patient through the perioperative time period.    Emilio Pino M.D., M.S.  Dept. of Orthopaedic Surgery  [facility]

## 2022-06-14 NOTE — ANESTHESIA POSTPROCEDURE EVALUATION
Patient: Carol Guajardo    Procedure: Procedure(s):  ARTHROPLASTY, KNEE, TOTAL RIGHT       Anesthesia Type:  No value filed.    Note:  Disposition: Inpatient   Postop Pain Control: Uneventful            Sign Out: Well controlled pain   PONV: No   Neuro/Psych: Uneventful            Sign Out: Acceptable/Baseline neuro status   Airway/Respiratory: Uneventful            Sign Out: Acceptable/Baseline resp. status   CV/Hemodynamics: Uneventful            Sign Out: Acceptable CV status; No obvious hypovolemia; No obvious fluid overload   Other NRE: NONE   DID A NON-ROUTINE EVENT OCCUR? No           Last vitals:  Vitals Value Taken Time   /68 06/14/22 1600   Temp 36.7  C (98  F) 06/14/22 1500   Pulse 67 06/14/22 1600   Resp 16 06/14/22 1600   SpO2 94 % 06/14/22 1603   Vitals shown include unvalidated device data.    Electronically Signed By: ARNULFO Rosas CRNA  June 14, 2022  5:34 PM

## 2022-06-14 NOTE — ANESTHESIA PROCEDURE NOTES
Femoral Procedure Note    Pre-Procedure   Staff -        CRNA: Susan Martini APRN CRNA       Other Anesthesia Staff: Layla Kessler       Performed By: DARIO and CRNA       Location: post-op       Pre-Anesthestic Checklist: patient identified, IV checked, site marked, risks and benefits discussed, informed consent, monitors and equipment checked, pre-op evaluation, at physician/surgeon's request and post-op pain management  Timeout:       Correct Patient: Yes        Correct Procedure: Yes        Correct Site: Yes        Correct Position: Yes        Correct Laterality: Yes        Site Marked: Yes  Procedure Documentation  Procedure: Femoral       Laterality: right       Patient Position: supine       Skin prep: Chloraprep       Needle Type: insulated       Needle Gauge: 21.        Needle Length (millimeters): 100        Ultrasound guided       1. Ultrasound was used to identify targeted nerve, plexus, vascular marker, or fascial plane and place a needle adjacent to it in real-time.       2. Ultrasound was used to visualize the spread of anesthetic in close proximity to the above referenced structure.       3. A permanent image is entered into the patient's record.       4. The visualized anatomic structures appeared normal.       5. There were no apparent abnormal pathologic findings.    Assessment/Narrative         The placement was negative for: blood aspirated, painful injection and site bleeding       Paresthesias: No.       Test dose of 5 mL lidocaine 2% w/ 1:200,000 epinephrine at.         Test dose negative, 3 minutes after injection, for signs of intravascular, subdural, or intrathecal injection.       Bolus given via needle. no blood aspirated via catheter.        Secured via.        Insertion/Infusion Method: Single Shot       Complications: none       Injection made incrementally with aspirations every 5 mL.

## 2022-06-14 NOTE — PROVIDER NOTIFICATION
06/14/22 1534   Discharge/Handoff   Transport Nurse ALCIDES Modi   Handoff given to Gaudencio Valenzuela RN   Oxygen Therapy   SpO2 96 %   Device (Oxygen Therapy) none (room air)   Valuables   Patient Belongings remains with patient   Patient Belongings Remaining with Patient clothing;shoes   Number of Belongings Bags 1   Did you bring any home meds/supplements to the hospital?  No     Afebrile, vitals stable. Prior to transfer, patient reported that she felt that her pain might be starting to come back. 5 mg oxy given. Floor nurse Bianca notified of med administration and encouraged to continue to reassess pain per protocols.  Brant updated that we are currently waiting for a bed on the med surg floor and was updated again that patient has left the PACU and is en route to her room on med surg.

## 2022-06-14 NOTE — PROGRESS NOTES
[place] [facility] ADMISSION NOTE    Patient admitted to room 2400 at approximately 1610 via cart from surgery. Patient was accompanied by transport tech.     Verbal SBAR report received from BREANNE Westfall prior to patient arrival.     Patient trasferred to bed via air ramona. Patient alert and oriented X 4. Pain is not well controlled.  Medication(s) being used: narcotic analgesics including oxycodone (Oxycontin, Oxyir).  . Admission vital signs: Blood pressure 113/53, pulse 69, temperature 97.7  F (36.5  C), temperature source Oral, resp. rate 20, height 1.524 m (5'), weight 99.8 kg (220 lb), last menstrual period 05/18/2022, SpO2 95 %, not currently breastfeeding. Patient was oriented to plan of care, call light, bed controls, tv, telephone, bathroom and visiting hours.     Risk Assessment    The following safety risks were identified during admission: fall. Yellow risk band applied: YES.     Skin Initial Assessment    This writer admitted this patient and completed a full skin assessment and Mahamed score in the Adult PCS flowsheet. Appropriate interventions initiated as needed.     Secondary skin check completed by BREANNE Myers.    Mahamed Risk Assessment  Sensory Perception: 4-->no impairment  Moisture: 3-->occasionally moist  Activity: 3-->walks occasionally  Mobility: 3-->slightly limited  Nutrition: 4-->excellent  Friction and Shear: 3-->no apparent problem  Mahamed Score: 20  Moisture Interventions: Incontinence pad  Mattress: Standard Hospital Mattress (Foam)  Bed Frame: Standard width and length    Education    Patient has a Arp to Observation order: No  Observation education completed and documented: N/A      Bianca Orlando RN

## 2022-06-14 NOTE — ANESTHESIA PROCEDURE NOTES
Intrathecal injection Procedure Note    Pre-Procedure   Staff -        CRNA: Susan Martini APRN CRNA       Performed By: CRNA       Location: OR       Procedure Start/Stop Times: 6/14/2022 11:37 AM and 6/14/2022 11:47 AM       Pre-Anesthestic Checklist: patient identified, IV checked, risks and benefits discussed, informed consent, monitors and equipment checked, pre-op evaluation, at physician/surgeon's request and post-op pain management  Timeout:       Correct Patient: Yes        Correct Procedure: Yes        Correct Site: Yes        Correct Position: Yes   Procedure Documentation  Procedure: intrathecal injection       Patient Position: sitting       Patient Prep/Sterile Barriers: sterile gloves, mask, patient draped       Skin prep: Betadine       Insertion Site: L4-5. (midline approach).       Needle Gauge: 25.        Needle Length (Inches): 3.5        Spinal Needle Type: Pencan       Introducer used       # of attempts: 1 and  # of redirects:     Assessment/Narrative         Paresthesias: No.       Sensory Level: T8       CSF fluid: clear.    Medication(s) Administered   Medication Administration Time: 6/14/2022 11:37 AM

## 2022-06-14 NOTE — ANESTHESIA CARE TRANSFER NOTE
Patient: Carol Guajardo    Procedure: Procedure(s):  ARTHROPLASTY, KNEE, TOTAL RIGHT       Diagnosis: Primary osteoarthritis of right knee [M17.11]  Diagnosis Additional Information: No value filed.    Anesthesia Type:   No value filed.     Note:    Oropharynx: oropharynx clear of all foreign objects  Level of Consciousness: awake  Oxygen Supplementation: face mask        Vital Signs Stable: post-procedure vital signs reviewed and stable  Report to RN Given: handoff report given  Patient transferred to: PACU    Handoff Report: Identifed the Patient, Identified the Reponsible Provider, Reviewed the pertinent medical history, Discussed the surgical course, Reviewed Intra-OP anesthesia mangement and issues during anesthesia, Set expectations for post-procedure period and Allowed opportunity for questions and acknowledgement of understanding      Vitals:  Vitals Value Taken Time   BP     Temp     Pulse     Resp     SpO2 99 % 06/14/22 1403   Vitals shown include unvalidated device data.    Electronically Signed By: ARNULFO Alvarez CRNA  June 14, 2022  2:04 PM

## 2022-06-14 NOTE — H&P
"The History and Physical on patient's chart was personally reviewed today with the patient. there have been no interval changes in patient's history since H+P performed.    History:  Carol Guajardo is a 52 year old female with ongoing right knee pain with no known injury.   Pain is throughout the knee, burning. Pain is constant, all day every day.   Aggravated with stairs, walking, sit to stand especially after prolonged sitting, squatting. Hurts so bad she cries daily, stands on cement floors. Can't recall how bad her left knee was prior to surgery, thinks maybe right knee is worse.     Treatment has been cortisone injection (cortisone, visco) neither provided any relief more than a day, Physical Therapy, tylenol.     History of left total knee arthroplasty 9/2019 with Dr Bailey, [facility].     Denies hip or low back pain. Reports numbness and tingling in leg/foot once in a while, feels like that currently. Reports went to the ED one time for it and they saw no blood clots.     xrays with bone on bone osteoarthritis.      Treatment options discussed, continued nonsurgical versus surgical with total knee arthroplasty. As a quality of life decision, she elects for right total knee arthroplasty.     ** Risks of surgery include, but not limited to: bleeding (possibly requiring transfusion), infection, pain, scar, damage to adjacent structures (e.g. Nerves, blood vessels, bone, cartilage), temporary or permanent nerve damage, recurrence of symptoms, implant dislocation, instability, implant failure, implant infection, unequal limb lengths, stiffness, need for further surgery, blood clots, pulmonary embolism, risks of anesthesia, and death.  * the knee will not feel \"normal\" as it won't be \"normal\" after knee replacement. It may feel \"clunky\" due to the nature of the hard metal and plastic implant.  * the expectation is for improved pain, not necessarily complete pain relief.     ** understanding these " risks, the patient would like to proceed with surgery: RIGHT total knee arthroplasty.      Risks and perceived benefits of surgery again discussed with patient. Patient's questions addressed and answered. Written informed consent obtained and reviewed. Surgical site marked with indelible marker with patient's participation after confirming site with patient.      Emilio Pino M.D., M.S.  Dept. of Orthopaedic Surgery  [facility]

## 2022-06-15 ENCOUNTER — APPOINTMENT (OUTPATIENT)
Dept: PHYSICAL THERAPY | Facility: CLINIC | Age: 52
End: 2022-06-15
Attending: ORTHOPAEDIC SURGERY
Payer: COMMERCIAL

## 2022-06-15 VITALS
TEMPERATURE: 97.8 F | HEART RATE: 70 BPM | HEIGHT: 60 IN | WEIGHT: 226.41 LBS | DIASTOLIC BLOOD PRESSURE: 57 MMHG | BODY MASS INDEX: 44.45 KG/M2 | RESPIRATION RATE: 16 BRPM | OXYGEN SATURATION: 97 % | SYSTOLIC BLOOD PRESSURE: 109 MMHG

## 2022-06-15 DIAGNOSIS — Z96.651 S/P TOTAL KNEE ARTHROPLASTY, RIGHT: Primary | ICD-10-CM

## 2022-06-15 PROBLEM — R22.41 MASS OF RIGHT LOWER LEG: Status: RESOLVED | Noted: 2017-06-05 | Resolved: 2022-06-15

## 2022-06-15 PROBLEM — R22.41 MASS OF RIGHT LOWER LEG: Status: ACTIVE | Noted: 2017-06-05

## 2022-06-15 PROBLEM — R73.03 PREDIABETES: Status: ACTIVE | Noted: 2018-03-30

## 2022-06-15 PROBLEM — E55.9 VITAMIN D DEFICIENCY: Status: ACTIVE | Noted: 2017-07-19

## 2022-06-15 PROBLEM — M17.12 PRIMARY OSTEOARTHRITIS OF LEFT KNEE: Status: ACTIVE | Noted: 2018-07-30

## 2022-06-15 PROBLEM — R91.8 RIGHT LOWER LOBE LUNG MASS: Status: ACTIVE | Noted: 2018-08-22

## 2022-06-15 PROBLEM — E66.01 MORBID OBESITY (H): Chronic | Status: ACTIVE | Noted: 2021-04-16

## 2022-06-15 PROBLEM — Z96.652 TOTAL KNEE REPLACEMENT STATUS, LEFT: Status: RESOLVED | Noted: 2019-09-30 | Resolved: 2022-06-15

## 2022-06-15 PROBLEM — E66.9 OBESITY: Status: ACTIVE | Noted: 2017-07-19

## 2022-06-15 LAB
HGB BLD-MCNC: 11.5 G/DL (ref 11.7–15.7)
HOLD SPECIMEN: NORMAL

## 2022-06-15 PROCEDURE — 97161 PT EVAL LOW COMPLEX 20 MIN: CPT | Mod: GP

## 2022-06-15 PROCEDURE — 97116 GAIT TRAINING THERAPY: CPT | Mod: GP

## 2022-06-15 PROCEDURE — 250N000013 HC RX MED GY IP 250 OP 250 PS 637: Performed by: PHYSICIAN ASSISTANT

## 2022-06-15 PROCEDURE — 36415 COLL VENOUS BLD VENIPUNCTURE: CPT | Performed by: PHYSICIAN ASSISTANT

## 2022-06-15 PROCEDURE — 99214 OFFICE O/P EST MOD 30 MIN: CPT | Performed by: PHYSICIAN ASSISTANT

## 2022-06-15 PROCEDURE — 97110 THERAPEUTIC EXERCISES: CPT | Mod: GP

## 2022-06-15 PROCEDURE — 250N000011 HC RX IP 250 OP 636: Performed by: ORTHOPAEDIC SURGERY

## 2022-06-15 PROCEDURE — 85018 HEMOGLOBIN: CPT | Performed by: PHYSICIAN ASSISTANT

## 2022-06-15 RX ORDER — AMOXICILLIN 250 MG
1-2 CAPSULE ORAL 2 TIMES DAILY
Qty: 30 TABLET | Refills: 0 | Status: SHIPPED | OUTPATIENT
Start: 2022-06-15 | End: 2022-06-27

## 2022-06-15 RX ORDER — OXYCODONE HYDROCHLORIDE 5 MG/1
5-10 TABLET ORAL EVERY 4 HOURS PRN
Qty: 16 TABLET | Refills: 0 | Status: SHIPPED | OUTPATIENT
Start: 2022-06-15 | End: 2022-06-16

## 2022-06-15 RX ORDER — ACETAMINOPHEN 325 MG/1
650 TABLET ORAL EVERY 4 HOURS PRN
Qty: 100 TABLET | Refills: 0 | Status: SHIPPED | OUTPATIENT
Start: 2022-06-15 | End: 2022-08-03

## 2022-06-15 RX ADMIN — OXYCODONE HYDROCHLORIDE 10 MG: 5 TABLET ORAL at 06:22

## 2022-06-15 RX ADMIN — BUPROPION HYDROCHLORIDE 450 MG: 150 TABLET, EXTENDED RELEASE ORAL at 08:55

## 2022-06-15 RX ADMIN — POLYETHYLENE GLYCOL 3350 17 G: 17 POWDER, FOR SOLUTION ORAL at 08:56

## 2022-06-15 RX ADMIN — ASPIRIN 325 MG: 325 TABLET, COATED ORAL at 08:55

## 2022-06-15 RX ADMIN — LEVOTHYROXINE SODIUM 175 MCG: 0.15 TABLET ORAL at 06:23

## 2022-06-15 RX ADMIN — ACETAMINOPHEN 975 MG: 325 TABLET, FILM COATED ORAL at 01:42

## 2022-06-15 RX ADMIN — SENNOSIDES AND DOCUSATE SODIUM 1 TABLET: 50; 8.6 TABLET ORAL at 08:55

## 2022-06-15 RX ADMIN — GABAPENTIN 900 MG: 300 CAPSULE ORAL at 08:55

## 2022-06-15 RX ADMIN — OXYCODONE HYDROCHLORIDE 10 MG: 5 TABLET ORAL at 10:43

## 2022-06-15 RX ADMIN — PROPRANOLOL HYDROCHLORIDE 60 MG: 60 CAPSULE, EXTENDED RELEASE ORAL at 08:57

## 2022-06-15 RX ADMIN — CLINDAMYCIN PHOSPHATE 900 MG: 900 INJECTION, SOLUTION INTRAVENOUS at 03:14

## 2022-06-15 RX ADMIN — ACETAMINOPHEN 975 MG: 325 TABLET, FILM COATED ORAL at 08:55

## 2022-06-15 RX ADMIN — PAROXETINE HYDROCHLORIDE 60 MG: 30 TABLET, FILM COATED ORAL at 08:55

## 2022-06-15 RX ADMIN — OXYCODONE HYDROCHLORIDE 10 MG: 5 TABLET ORAL at 01:41

## 2022-06-15 NOTE — PROGRESS NOTES
[facility] Medicine Progress Note  Date of Service: 06/15/2022    Assessment & Plan   Carol Guajardo is a 52 year old female who presented on 6/14/2022 for scheduled Procedure(s):  ARTHROPLASTY, KNEE, TOTAL RIGHT by Emilio Pino MD and is being followed by the hospital medicine service for co-management of acute and/or chronic perioperative medical problems.    S/p Procedure(s):  ARTHROPLASTY, KNEE, TOTAL RIGHT   1 Day Post-Op   Hg 11.5.  Pain adequately managed at present.    - pain control, wound cares, physical therapy, occupational therapy and DVT prophylaxis per orthopedic surgery service    Hypothyroidism  Previously diagnosed  - continue home levothyroxine    Restless legs syndrome  Neuropathic Pain  Previously diagnosed  - continue home pramipexole, gabapentin    Anxiety and Depression  Previously diagnosed  - continue home bupropion, paroxetine, propranolol     Tobacco Use Disorder  Recently quit. Patch available. Encouraged continued abstinence.     Obstructive sleep apnea  Previously diagnosed. Does not use a CPAP.    Obesity  Body mass index is 44.22 kg/m . . Contributes to overall morbidity and mortality.    DVT Prophylaxis: as per orthopedic surgery service - aspirin 325 mg daily  Code Status: Full Code    Lines: peripheral   Peguero catheter: none    Discussion: Medically, the patient appears to be recovering appropriately since surgery. Vital signs stable. No medical barriers to discharge at this time. From an orthopedic/post-op standpoint mobility goals not yet met, awaiting PT    Disposition: Anticipate discharge today or tomorrow per ortho when pain and mobility goals are met     Attestation:  I have reviewed today's vital signs, notes, medications, labs and imaging.  Total time: 15 minutes    Kimi Angulo PA-C   [facility]    Supervising Physician Dr. Denis Sunshine.     Interval History   Patient was seen this morning. Pain adequately managed.  Has only dangled at side of bed, no other activity overnight. Good appetite. Unsure if passing gas, not monitoring. No BM. Voiding regularly.    Denies headache, lightheadedness, dizziness, fever, chills, chest pain, palpitations, shortness of breath, cough, abdominal pain, nausea, vomiting, numbness or tingling in bilateral lower extremities         Physical Exam   Temp:  [97.6  F (36.4  C)-98.4  F (36.9  C)] 97.8  F (36.6  C)  Pulse:  [63-82] 70  Resp:  [13-20] 16  BP: (100-140)/(46-82) 109/57  SpO2:  [93 %-98 %] 97 %    Weights:   Vitals:    06/14/22 0822 06/14/22 1950   Weight: 99.8 kg (220 lb) 102.7 kg (226 lb 6.6 oz)    Body mass index is 44.22 kg/m .    General: Appears well, laying down in bed. Alert and oriented. Pleasant and cooperative. No distress. Non-toxic. Appears stated age.   CV: Regular rate, normal rhythm. Pedal pulses are 2+ bilaterally. no lower extremity edema.  Respiratory: No accessory muscle usage. Clear to auscultation bilaterally. No wheezes, crackles or rhonchi.   GI: Bowel sounds normoactive in all quadrants. Soft, non-tender, non-distended.  Skin: Warm, dry, intact. Did not assess incision, ACE wrap in place.  Musculoskeletal: Muscle tone is appropriate. Moves all extremities freely with limited range of motion right lower extremity due to pain and recent surgery.  Neuro: Sensation to light touch of bilateral lower extremities is grossly intact.     Data   Recent Labs   Lab 06/15/22  0544   HGB 11.5*     No results for input(s): GLC, BGM in the last 168 hours.     Unresulted Labs Ordered in the Past 30 Days of this Admission     No orders found for last 31 day(s).        Imaging  Recent Results (from the past 24 hour(s))   XR Knee Port Right 1/2 Views    Narrative    KNEE PORTABLE RIGHT ONE - TWO VIEW  DATE/TIME: 6/14/2022 2:41 PM    INDICATION: Postop total knee.  COMPARISON: 6/8/2022      Impression    IMPRESSION: Postop recent placement right total knee arthroplasty with  patellar  resurfacing, new since prior. The components project in  expected position without acute displaced periprosthetic fracture.  Expected postoperative soft tissue and intra-articular gas right knee.  Anterior right knee soft tissue swelling.       CAIN GARCIA MD         SYSTEM ID:  [id number]      I reviewed all new labs and imaging results over the last 24 hours. I personally reviewed no images or EKG's today.    Medications     lactated ringers 100 mL/hr at 06/14/22 2248       acetaminophen  975 mg Oral Q8H     aspirin  325 mg Oral Daily     buPROPion  450 mg Oral Daily     gabapentin  900 mg Oral BID     levothyroxine  175 mcg Oral QAM     PARoxetine  60 mg Oral Daily     polyethylene glycol  17 g Oral Daily     pramipexole  1 mg Oral At Bedtime     propranolol ER  60 mg Oral QAM     senna-docusate  1 tablet Oral BID     sodium chloride (PF)  3 mL Intracatheter Q8H

## 2022-06-15 NOTE — PROGRESS NOTES
Patient vital signs are at baseline: Yes  Patient able to ambulate as they were prior to admission or with assist devices provided by therapies during their stay:  Yes  Patient MUST void prior to discharge:  Yes  Patient able to tolerate oral intake:  Yes  Pain has adequate pain control using Oral analgesics:  Yes  Does patient have an identified :  Yes  Has goal D/C date and time been discussed with patient:  Yes     Patient not using immobilizer as per PT already has good quad strength. Dressing to stay on until post-op appt with surgeon. Ace removed, dressing intact.

## 2022-06-15 NOTE — PROGRESS NOTES
[place] [facility] DISCHARGE NOTE    Patient discharged to home at 1:36 PM via wheel chair. Accompanied by spouse and staff. Discharge instructions reviewed with patient, opportunity offered to ask questions. Prescriptions sent to patients preferred pharmacy. All belongings sent with patient.    Taisha Monique RN

## 2022-06-15 NOTE — PROGRESS NOTES
[facility]      OUTPATIENT PHYSICAL THERAPY EVALUATION  PLAN OF TREATMENT FOR OUTPATIENT REHABILITATION  (COMPLETE FOR INITIAL CLAIMS ONLY)  Patient's Last Name, First Name, M.I.  YOB: 1970  Carol Guajardo                        Provider's Name  [facility] Medical Record No.  [id number]                               Onset Date:  06/14/22   Start of Care Date:  06/15/22      Type:     _X_PT   ___OT   ___SLP Medical Diagnosis:  s/p R TKA                        PT Diagnosis:  R TKA aftercares   Visits from SOC:  1   _________________________________________________________________________________  Plan of Treatment/Functional Goals    Planned Interventions: balance training, bed mobility training, cryotherapy, gait training, home exercise program, patient/family education, ROM (range of motion), stair training, strengthening, stretching, transfer training     Goals: See Physical Therapy Goals on Care Plan in [employer] electronic health record.    Therapy Frequency: One time eval and treatment only  Predicted Duration of Therapy Intervention: 06/15/22  _________________________________________________________________________________    I CERTIFY THE NEED FOR THESE SERVICES FURNISHED UNDER        THIS PLAN OF TREATMENT AND WHILE UNDER MY CARE     (Physician co-signature of this document indicates review and certification of the therapy plan).                Certification date from: 06/15/22, Certification date to: 06/15/22    Referring Physician: BASHIR Marquis            Initial Assessment        See Physical Therapy evaluation dated 06/15/22 in Epic electronic health record.

## 2022-06-15 NOTE — PLAN OF CARE
OT: Per discussion with physical therapy no IP OT needs identified. Will discontinue OT orders at this time.

## 2022-06-15 NOTE — PROGRESS NOTES
[facility] DAILY PROGRESS NOTE      History: Carol Guajardo is a 52 year old female with advanced knee arthritis now POD # 1 s/p Right total knee arthroplasty on 2022 with Dr. Ramon       SUBJECTIVE:  Previous Left total knee arthroplasty 2 1/2 years ago. States that this procedure hurts more than her last surgery. Unable to ambulate due to pain. PT unable to ambulate patient either. Pt intends to utilize home PT services.  is present and will be available to assist patient after discharge. Nurse is present and is removing the Ace wrap. Patient tolerated this well with minor grimacing.    OBJECTIVE:  /57 (BP Location: Right arm)   Pulse 70   Temp 97.8  F (36.6  C) (Tympanic)   Resp 16   Ht 1.524 m (5')   Wt 102.7 kg (226 lb 6.6 oz)   LMP 2022 (Approximate)   SpO2 97%   BMI 44.22 kg/m      Temp (24hrs), Av.9  F (36.6  C), Min:97.6  F (36.4  C), Max:98.3  F (36.8  C)      General: Pt is sitting comfortably in bed yet appears anxious.    Right Lower Extremity:    Incision is covered, aquacel in place. A single small spot of drainage is noted on Aquacel.  Mild swelling of knee and calf.    Intact sensation deep peroneal nerve, superficial peroneal nerve, medial and lateral plantar nerve, sural nerve, saphenous nerve.  Intact ehl, edl, ta, fhl, gs, quads, hamstrings, hip flexors  Toes warm and well perfused w/ brisk capillary refill, palpable dorsalis pedis pulse  Calves soft and nontender to squeeze.      No results for input(s): INR in the last 92639 hours.  Recent Labs   Lab Test 21  0459 21  1343 21  1158 04/15/21  1019   POTASSIUM 4.2 4.4 4.3 4.4   CHLORIDE 100 101  --  107   ANIONGAP 8 7  --  3         PT:  Pt is hesitant to actively ambulate due to fear of pain. Up with walker short distance. Recommending home PT. Questioning need for knee immobilizer.           ASSESSMENT: Carol Guajardo is a 52 year old female POD # 1 s/p Right total knee  arthroplasty on 6/14/2022, doing well.    PLAN:    Discharge to home today. Home PT for next week. Continue incentive spirometry 10x/hr. DVT prophylaxis to include daily aspirin and active exercises. Follow up with Dr Ramon in two weeks. Knee immobilizer to prevent flexion contraction. Work at keeping full extension of knee.

## 2022-06-15 NOTE — PROGRESS NOTES
Patient alert and oriented x 4. Vitals stable, on room air. Pain in right knee 3-7/10, partially relieved with PRN oxycodone, dilaudid. Pain has not been sufficiently controlled with oxycodone alone. Patient has voided. Tolerated regular diet well.    Patient has not been able to ambulate or dangle at the edge of the bed this evening due to pain in her right knee. When the writer attempted to help her sit at the edge of the bed, the patient became tearful and said the pain in her leg was too intense to move it. Pain is currently under control at rest with PRN oxycodone and dilaudid, but patient still felt unable to tolerate anything more than slight position changes during this shift. Purewick in place due to pain with movement.    Temp: 97.9  F (36.6  C) Temp src: Oral BP: 110/59 Pulse: 80   Resp: 16 SpO2: 96 % O2 Device: None (Room air)

## 2022-06-15 NOTE — PROGRESS NOTES
Physical Therapy: Patient mobilizing well despite increased pain. Demonstrated good quad function with completion of SLR on RLE x5 reps however discussed immobilizer use if this declines. Patient is safe to return home with spouse support and home care PT once medically stable.    06/15/22 1000   Quick Adds   Quick Adds Certification   Type of Visit Initial PT Evaluation   Living Environment   People in Home spouse   Current Living Arrangements house   Home Accessibility stairs to enter home   Number of Stairs, Main Entrance 5   Stair Railings, Main Entrance railings safe and in good condition   Number of Stairs, Within Home, Primary five   Stair Railings, Within Home, Primary railings safe and in good condition   Transportation Anticipated family or friend will provide   Self-Care   Usual Activity Tolerance good   Current Activity Tolerance good   Equipment Currently Used at Home cane, straight;walker, standard;shower chair   Fall history within last six months no   General Information   Onset of Illness/Injury or Date of Surgery 06/14/22   Referring Physician BASHIR Marquis   Patient/Family Therapy Goals Statement (PT) Return home today   Pertinent History of Current Problem (include personal factors and/or comorbidities that impact the POC) 52 y.o. female s/p R TKA performed 6/14/22   Cognition   Affect/Mental Status (Cognition) WFL   Orientation Status (Cognition) oriented x 4   Follows Commands (Cognition) WFL   Pain Assessment   Patient Currently in Pain Yes, see Vital Sign flowsheet   Range of Motion (ROM)   Range of Motion ROM deficits secondary to surgical procedure;ROM deficits secondary to pain   Strength (Manual Muscle Testing)   Strength (Manual Muscle Testing) Able to perform R SLR;Able to perform L SLR;Deficits observed during functional mobility   Bed Mobility   Comment, (Bed Mobility) supine<>sit with Marta and increased time due to pain in R knee   Transfers   Comment, (Transfers) sit<>stand from  EOB, toilet with SBA and 2WW   Gait/Stairs (Locomotion)   [place] Level (Gait) verbal cues   Assistive Device (Gait) walker, front-wheeled   Distance in Feet (Required for LE Total Joints) 100   Pattern (Gait) step-through   Deviations/Abnormal Patterns (Gait) antalgic;base of support, wide;gait speed decreased   Negotiation (Stairs) stairs independence;handrail location;number of steps;ascending technique;descending technique   [place] Level (Stairs) contact guard   Handrail Location (Stairs) both sides   Number of Steps (Stairs) 4   Ascending Technique (Stairs) step-to-step   Descending Technique (Stairs) step-to-step   Balance   Balance no deficits were identified   Clinical Impression   Criteria for Skilled Therapeutic Intervention Yes, treatment indicated   PT Diagnosis (PT) R TKA aftercares   Influenced by the following impairments pain, LE weakness, reduced R knee ROM   Functional limitations due to impairments impaired transfers, ambulation, and stairs performance   Clinical Presentation (PT Evaluation Complexity) Stable/Uncomplicated   Clinical Presentation Rationale clinical judgement and chart review   Clinical Decision Making (Complexity) low complexity   Planned Therapy Interventions (PT) balance training;bed mobility training;cryotherapy;gait training;home exercise program;patient/family education;ROM (range of motion);stair training;strengthening;stretching;transfer training   Anticipated Equipment Needs at Discharge (PT)   (has all equipment)   Risk & Benefits of therapy have been explained evaluation/treatment results reviewed;care plan/treatment goals reviewed;risks/benefits reviewed;current/potential barriers reviewed;participants voiced agreement with care plan;patient;participants included;spouse/significant other   PT Discharge Planning   PT Discharge Recommendation (DC Rec) home with assist;home with home care physical therapy   PT Rationale for DC Rec Patient will benefit from home  care PT initially to increase indep and safe in own environment   PT Brief overview of current status SBA transfers, amb 100 feet with 2WW and SBA, 4 stairs   Therapy Certification   Start of care date 06/15/22   Certification date from 06/15/22   Certification date to 06/15/22   Medical Diagnosis s/p R TKA   Total Evaluation Time   Total Evaluation Time (Minutes) 10   Physical Therapy Goals   PT Frequency One time eval and treatment only   PT Predicted Duration/Target Date for Goal Attainment 06/15/22   PT Goals Bed Mobility;Transfers;Gait;Stairs   PT: Bed Mobility Supervision/stand-by assist;Goal Met   PT: Transfers Supervision/stand-by assist;Goal Met   PT: Gait Supervision/stand-by assist;100 feet;Standard walker;Goal Met   PT: Stairs Supervision/stand-by assist;4 stairs;Goal Met

## 2022-06-15 NOTE — CONSULTS
Care Management Note:     Care Management team received referral from Ortho team to assist pt with Home Care services post surgical services.     Per IDT rounds, EMR review, and discussion with PT/OT staff, it has been determined that pt will discharge to home with Home Care- PT cares.     SW met at bedside with pt and spouse Brant to discuss home care services. Pt is in agreement with plan of care. Agency choice provided. Pt requested referral be placed to [facility] as she believes she has used their services after 1st TKA.     CM placed referral to [facility]. Unfortunately they were unable to accept referral-currently at capacity.     CM discussed with pt and spouse Brant. Agreed to send extended Home Care referrals in search of agency able to accept.      Extended Referral search:    [facility] (phone: [phone] Fax: [phone])--Declined-unable to accept [employer] insurance    [facility] (Phone: [phone] Fax: [phone])--Declined-at capacity. Out 1 week.    [facility] # [phone] Fax: [phone]--Declined-Out of service area.     [facility] # [phone] -Declined. Out of network for [employer].     [facility]  P: [phone] /F: [phone] -left message. Checking schedule     [facility] # [phone] Fax: [phone] Left message with Megan in intake     [email or web address] Phone: [phone] Fax: [phone]-  YES--Gabriela in intake verified [employer] insurance and reported they are able to accept referral. Intake will reach out to the pt to confirm services and discuss start of care within 48 hours.       Spouse available  to transport at time of discharge.     PLAN: URI informed Ortho PA to writer home care orders. CM placed call to the spouse's cell and left vm with accepting agency's name and contact #.     [facility] #: [phone] Fax: [phone]-PT services        Anitra Carr, LIONW

## 2022-06-17 ENCOUNTER — PATIENT OUTREACH (OUTPATIENT)
Dept: CARE COORDINATION | Facility: CLINIC | Age: 52
End: 2022-06-17
Payer: COMMERCIAL

## 2022-06-17 NOTE — LETTER
[facility] CARE COORDINATION  [facility]  [address] [place]  [address]    June 20, 2022    Carol Guajardo  [address] [address] [facility] [id number]      Dear Carol,    I am a clinic community health worker who works with ARNULFO Patel CNP with the [facility]. I wanted to introduce myself and provide you with my contact information so you can call me with any future resource or support needs.  Below is a description of clinic care coordination and how we can further assist you.       The clinic care coordination team is made up of a registered nurse, , financial resource worker and community health worker who understand the health care system. The goal of clinic care coordination is to help you manage your health and improve access to the health care system. Our team works alongside your provider to assist you in determining your health and social needs. We can help you obtain health care and community resources, providing you with necessary information and education. We can work with you through any barriers and develop a care plan that helps coordinate and strengthen the communication between you and your care team.    Please feel free to contact me with any questions or concerns regarding care coordination and what we can offer.      We are focused on providing you with the highest-quality healthcare experience possible.    Sincerely,       Bhakti CRUZ  Community Health Worker  [facility] Care Coordination  [facility]  [email or web address]  [email or web address]   Office: [phone]

## 2022-06-17 NOTE — PROGRESS NOTES
Clinic Care Coordination Contact  [facility]/Voicemail       Clinical Data: Care Coordinator Outreach  Outreach attempted x 1.  Left message on patient's voicemail with call back information and requested return call.  Plan: Care Coordinator will try to reach patient again in 1-2 business days.    TCM Referral  6/14/2022 - 6/15/2022 (28 hours)  [facility]    Notes   CHW knee replacement DC w/HC 6/15/22     Xiao Josue  [facility] Health Worker  [facility] Care Coordination   Office: [phone]

## 2022-06-20 NOTE — PROGRESS NOTES
Clinic Care Coordination Contact  [facility]/Voicemail     Clinical Data: Care Coordination Outreach  Outreach attempted x 2.  Left message on patient's voicemail with call back information and requested return call.  Plan: CHW sent care coordination introduction letter on 6/20 via [employer]. CHW will do no further outreaches at this time.    Bhakti CRUZ  Community Health Worker  [facility] Care Coordination  [facility]  [email or web address]   Office: [phone]

## 2022-06-26 NOTE — PROGRESS NOTES
Chief Complaint   Patient presents with     Right Knee - Total Joint Post-op     DOS 6/14/22, 2 wk s/p. Patient notes her knee is doing good. She is worried about the swelling. It will be worse throughout the day. She elevates and uses ice. She is getting around without a walker, has been without it at times. She has started physical therapy.          SURGERY: Total knee arthroplasty, right knee ([facility])  DATE OF SURGERY: 6/14/2022      HISTORY OF PRESENT ILLNESS: Carol Guajardo is a 52 year old female seen for postoperative evaluation of right total knee arthroplasty. It has been 2 weeks since surgery. Returns today stating she's doing well. Worried about the swelling, gets worse throughout the day. Has been icing and elevating. She's walking with a walker, sometimes walks without walker. She has started Physical Therapy, going well. Takes oxycodone after therapy as needed, otherwise tylenol and ibuprofen.     Denies any wound problems. Denies numbness, tingling, or weakness in the affected extremity. Denies fevers chills night sweats. Continues to take aspirin for anti-coagulation for deep vein thrombosis prophylaxis.  Has been doing Physical Therapy. Concerns today include: swelling.    Present symptoms: pain diffuse, pain dull/achy , mild pain, moderate swelling.    Pain severity: 4/10      Past medical history:   Past Medical History:   Diagnosis Date     Anxiety      Depression      HLD (hyperlipidemia)      Hypothyroidism      Mass of right lower leg 6/5/2017     Osteoarthritis of both knees      Right lower lobe lung mass     Biopsied 8/2018, diagnosed as fibrosis     Total knee replacement status, left 9/30/2019     Patient Active Problem List    Diagnosis Date Noted     S/P TKR (total knee replacement), right 06/14/2022     Priority: Medium     VALERIE (obstructive sleep apnea) 06/14/2022     Priority: Medium     Osteoarthritis of right knee 06/14/2022     Priority: Medium     Restless legs  syndrome (RLS) 07/28/2021     Priority: Medium     Morbid obesity (H) 04/16/2021     Priority: Medium     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 CT August 2018:  The mass measures approximately 9.8 cm x 5.3 cm x 7.4 cm.  Biopsied 8/2018, diagnosed as fibrosis       Primary osteoarthritis of left knee 07/30/2018     Priority: Medium     Prediabetes 03/30/2018     Priority: Medium     Anxiety state 07/19/2017     Priority: Medium     Depression 07/19/2017     Priority: Medium     Vitamin D deficiency 07/19/2017     Priority: Medium     Nicotine dependence 07/19/2017     Priority: Medium     HLD (hyperlipidemia) 08/28/2015     Priority: Medium     Hypothyroidism 05/08/2015     Priority: Medium       Past Surgical History:   Past Surgical History:   Procedure Laterality Date     ARTHROPLASTY KNEE Left 9/30/2019    Procedure: Left Total Knee Arthroplasty;  Surgeon: Ion Bailey MD;  [facility]     ARTHROPLASTY KNEE Right 6/14/2022    Procedure: ARTHROPLASTY, KNEE, TOTAL RIGHT;  Surgeon: Emilio Pino MD;  Location: [facility]     BRONCHOSCOPY (RIGID OR FLEXIBLE), DIAGNOSTIC N/A 4/21/2021    Procedure: BRONCHOSCOPY, WITH BRONCHOALVEOLAR LAVAGE;  Surgeon: Keli Webber MD;  [facility]     CARPAL TUNNEL RELEASE RT/LT Bilateral      EXTERNAL EAR SURGERY       IR LUNG BIOPSY MEDIASTINUM RIGHT Right 08/2018    RLL mass, diagnosed as fibrosis per pt     LAPAROSCOPY DIAGNOSTIC (GENERAL)  02/2019     LAPAROSCOPY, SURGICAL; REPAIR UMBILICAL HERNIA  08/22/2018     THORACOTOMY, WEDGE RESECTION LUNG, COMBINED Right 4/19/2021    Procedure: Right Thoracotomy, excision of solitary fibrous tumor, intercostal nerve cryo ablation;  Surgeon: Keli Webber MD;  Location:  OR     [facility] LIGATE FALLOPIAN TUBE      Description: Tubal Ligation;  Recorded: 04/09/2008;       Medications:   Current Outpatient Medications:      acetaminophen (TYLENOL) 325 MG tablet, Take 2  tablets (650 mg) by mouth every 4 hours as needed for other (mild pain), Disp: 100 tablet, Rfl: 0     Ascorbic Acid (VITAMIN C) 100 MG CHEW, Take 1 chew tab by mouth daily, Disp: , Rfl:      aspirin (ASA) 325 MG EC tablet, Take 1 tablet (325 mg) by mouth daily, Disp: 30 tablet, Rfl: 0     buPROPion (WELLBUTRIN XL) 150 MG 24 hr tablet, TAKE 1 TABLET BY MOUTH EVERY MORNING in addition to 300mg for a total of 450mg daily, Disp: 90 tablet, Rfl: 0     buPROPion (WELLBUTRIN XL) 300 MG 24 hr tablet, Take 1 tablet (300 mg) by mouth every morning, Disp: 90 tablet, Rfl: 1     calcium carbonate (OS-BERTA) 500 MG tablet, Take 1 tablet by mouth daily, Disp: , Rfl:      Ferrous Sulfate 324 (65 Fe) MG TBEC, Take 324 mg by mouth daily, Disp: , Rfl:      gabapentin (NEURONTIN) 300 MG capsule, Take 3 capsules (900 mg) by mouth 2 times daily, Disp: 540 capsule, Rfl: 3     levothyroxine (SYNTHROID/LEVOTHROID) 175 MCG tablet, Take 1 tablet (175 mcg) by mouth every morning, Disp: 90 tablet, Rfl: 3     oxyCODONE (ROXICODONE) 5 MG tablet, Take 1-2 tablets (5-10 mg) by mouth every 8 hours as needed for moderate to severe pain, Disp: 12 tablet, Rfl: 0     PARoxetine (PAXIL) 20 MG tablet, TAKE 1 TABLET BY MOUTH EVERY MORNING WITH 40MG TABLET FOR A TOTAL OF 60 MG/DAY, Disp: 90 tablet, Rfl: 0     PARoxetine (PAXIL) 40 MG tablet, TAKE 1 TABLET BY MOUTH EVERY MORNING WITH A 20 MG TABLET FOR A DAILY DOSE OF 60 MG, Disp: 90 tablet, Rfl: 0     pramipexole (MIRAPEX) 0.5 MG tablet, TAKE 2 TABLETS BY MOUTH AT BEDTIME (Patient taking differently: Take 1 mg by mouth At Bedtime), Disp: 180 tablet, Rfl: 1     propranolol ER (INDERAL LA) 60 MG 24 hr capsule, TAKE 1 CAPSULE BY MOUTH EVERY MORNING (Patient taking differently: Take 60 mg by mouth every morning), Disp: 90 capsule, Rfl: 0     senna-docusate (SENOKOT-S/PERICOLACE) 8.6-50 MG tablet, Take 1-2 tablets by mouth 2 times daily Take while on oral narcotics to prevent or treat constipation., Disp: 30  tablet, Rfl: 0    Allergies:   Allergies   Allergen Reactions     Amoxicillin-Pot Clavulanate Other (See Comments) and Hives     Edema     Augmentin Hives and Itching     Ciprofloxacin Hives and Itching     Penicillins Hives and Itching         REVIEW OF SYSTEMS:  CONSTITUTIONAL:NEGATIVE for fever, chills, night sweats, change in weight  INTEGUMENTARY/SKIN: NEGATIVE for worrisome rashes, moles or lesions  MUSCULOSKELETAL:See HPI above  NEURO: NEGATIVE for weakness, dizziness or paresthesias  CARDIOVASCULAR: negative for chest pain, shortness of breath    PHYSICAL EXAM:  BP (!) 150/82   Pulse 72   Ht 1.524 m (5')   Wt 103.2 kg (227 lb 9.6 oz)   LMP 05/18/2022 (Approximate)   BMI 44.45 kg/m     GENERAL APPEARANCE: healthy, alert, no distress  SKIN: no suspicious lesions or rashes  NEURO: Normal strength and tone, mentation intact and speech normal  PSYCH:  mentation appears normal and affect normal  RESPIRATORY: No increased work of breathing.    BILATERAL LOWER EXTREMITIES:  Gait: using walker for support, slight favors the right     Intact sensation deep peroneal nerve, superficial peroneal nerve, med/lat tibial nerve, sural nerve, saphenous nerve  Intact EHL, EDL, TA, FHL, GS, quadriceps hamstrings and hip flexors  Toes warm and well perfused, brisk capillary refill. Palpable 2+ dp pulses.  Bilateral calf soft and nttp or squeeze.  Edema: 1+    right  KNEE EXAM:    Incision: healing well.  Skin: intact, resolving knee and leg ecchymosis  And thigh ecchymosis, no erythema  ROM: near full extension to 95 flexion  Effusion: moderate   Tender: diffuse.      X-RAY:  3 views right knee from 6/27/2022 were reviewed in clinic today. No obvious fractures or dislocations. Total knee arthroplasty components in place without evidence of failure or loosening.      Impression: Carol Guajardo is a 52 year old female status post Total knee arthroplasty, right knee, doing well, 2 weeks out from surgery.      Plan:   * reviewed  xrays with patient, showing total knee arthroplasty implants.  * continue DVT prophylaxis for a total of 4 weeks postoperative  * continue with knee range of motion exercises, focusing on extension  * wean off all pain medications as tolerated  * xrays next visit: 2 views of the knee  * return to clinic 4 weeks   * Physical Therapy, home exercise program.  * all questions addressed and answered prior to discharge from clinic today to patient's satisfaction.  * patient will need longterm prophylactic antibiotics use with dental procedures or other invasive procedures (2 g amoxicilin or 450mg ([id number]) clindamycin) 1 hour prior to dental procedures.    * KOOS Jr/Promis Knee score: NOT to be done at next visit; NOT completed during todays visit; to be completed at 12 weeks and 12 months postoperative.      Emilio Pino M.D., M.S.  Dept. of Orthopaedic Surgery  [facility]

## 2022-06-27 ENCOUNTER — OFFICE VISIT (OUTPATIENT)
Dept: ORTHOPEDICS | Facility: CLINIC | Age: 52
End: 2022-06-27
Payer: COMMERCIAL

## 2022-06-27 ENCOUNTER — MYC REFILL (OUTPATIENT)
Dept: FAMILY MEDICINE | Facility: CLINIC | Age: 52
End: 2022-06-27

## 2022-06-27 VITALS
BODY MASS INDEX: 44.68 KG/M2 | HEIGHT: 60 IN | DIASTOLIC BLOOD PRESSURE: 82 MMHG | WEIGHT: 227.6 LBS | SYSTOLIC BLOOD PRESSURE: 150 MMHG | HEART RATE: 72 BPM

## 2022-06-27 DIAGNOSIS — Z96.651 S/P TOTAL KNEE ARTHROPLASTY, RIGHT: ICD-10-CM

## 2022-06-27 DIAGNOSIS — Z96.651 S/P TOTAL KNEE ARTHROPLASTY, RIGHT: Primary | ICD-10-CM

## 2022-06-27 PROCEDURE — 99024 POSTOP FOLLOW-UP VISIT: CPT | Performed by: ORTHOPAEDIC SURGERY

## 2022-06-27 RX ORDER — OXYCODONE HYDROCHLORIDE 5 MG/1
5-10 TABLET ORAL EVERY 8 HOURS PRN
Qty: 12 TABLET | Refills: 0 | Status: SHIPPED | OUTPATIENT
Start: 2022-06-27 | End: 2022-07-02

## 2022-06-27 ASSESSMENT — PAIN SCALES - GENERAL: PAINLEVEL: MODERATE PAIN (4)

## 2022-06-27 NOTE — LETTER
6/27/2022         RE: Carol Guajardo  [id number] [id number]        Dear Colleague,    Thank you for referring your patient, Carol Guajardo, to the [facility]. Please see a copy of my visit note below.    Chief Complaint   Patient presents with     Right Knee - Total Joint Post-op     DOS 6/14/22, 2 wk s/p. Patient notes her knee is doing good. She is worried about the swelling. It will be worse throughout the day. She elevates and uses ice. She is getting around without a walker, has been without it at times. She has started physical therapy.          SURGERY: Total knee arthroplasty, right knee ([facility])  DATE OF SURGERY: 6/14/2022      HISTORY OF PRESENT ILLNESS: Carol Guajardo is a 52 year old female seen for postoperative evaluation of right total knee arthroplasty. It has been 2 weeks since surgery. Returns today stating she's doing well. Worried about the swelling, gets worse throughout the day. Has been icing and elevating. She's walking with a walker, sometimes walks without walker. She has started Physical Therapy, going well. Takes oxycodone after therapy as needed, otherwise tylenol and ibuprofen.     Denies any wound problems. Denies numbness, tingling, or weakness in the affected extremity. Denies fevers chills night sweats. Continues to take aspirin for anti-coagulation for deep vein thrombosis prophylaxis.  Has been doing Physical Therapy. Concerns today include: swelling.    Present symptoms: pain diffuse, pain dull/achy , mild pain, moderate swelling.    Pain severity: 4/10      Past medical history:   Past Medical History:   Diagnosis Date     Anxiety      Depression      HLD (hyperlipidemia)      Hypothyroidism      Mass of right lower leg 6/5/2017     Osteoarthritis of both knees      Right lower lobe lung mass     Biopsied 8/2018, diagnosed as fibrosis     Total knee replacement status, left 9/30/2019     Patient Active  Problem List    Diagnosis Date Noted     S/P TKR (total knee replacement), right 06/14/2022     Priority: Medium     VALERIE (obstructive sleep apnea) 06/14/2022     Priority: Medium     Osteoarthritis of right knee 06/14/2022     Priority: Medium     Restless legs syndrome (RLS) 07/28/2021     Priority: Medium     Morbid obesity (H) 04/16/2021     Priority: Medium     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 CT August 2018:  The mass measures approximately 9.8 cm x 5.3 cm x 7.4 cm.  Biopsied 8/2018, diagnosed as fibrosis       Primary osteoarthritis of left knee 07/30/2018     Priority: Medium     Prediabetes 03/30/2018     Priority: Medium     Anxiety state 07/19/2017     Priority: Medium     Depression 07/19/2017     Priority: Medium     Vitamin D deficiency 07/19/2017     Priority: Medium     Nicotine dependence 07/19/2017     Priority: Medium     HLD (hyperlipidemia) 08/28/2015     Priority: Medium     Hypothyroidism 05/08/2015     Priority: Medium       Past Surgical History:   Past Surgical History:   Procedure Laterality Date     ARTHROPLASTY KNEE Left 9/30/2019    Procedure: Left Total Knee Arthroplasty;  Surgeon: Ion Bailey MD;  [facility]     ARTHROPLASTY KNEE Right 6/14/2022    Procedure: ARTHROPLASTY, KNEE, TOTAL RIGHT;  Surgeon: Emilio Pino MD;  Location: [facility]     BRONCHOSCOPY (RIGID OR FLEXIBLE), DIAGNOSTIC N/A 4/21/2021    Procedure: BRONCHOSCOPY, WITH BRONCHOALVEOLAR LAVAGE;  Surgeon: Keli Webber MD;  [facility]     CARPAL TUNNEL RELEASE RT/LT Bilateral      EXTERNAL EAR SURGERY       IR LUNG BIOPSY MEDIASTINUM RIGHT Right 08/2018    RLL mass, diagnosed as fibrosis per pt     LAPAROSCOPY DIAGNOSTIC (GENERAL)  02/2019     LAPAROSCOPY, SURGICAL; REPAIR UMBILICAL HERNIA  08/22/2018     THORACOTOMY, WEDGE RESECTION LUNG, COMBINED Right 4/19/2021    Procedure: Right Thoracotomy, excision of solitary fibrous tumor, intercostal nerve  cryo ablation;  Surgeon: Keli Webber MD;  Location:  OR     [facility] LIGATE FALLOPIAN TUBE      Description: Tubal Ligation;  Recorded: 04/09/2008;       Medications:   Current Outpatient Medications:      acetaminophen (TYLENOL) 325 MG tablet, Take 2 tablets (650 mg) by mouth every 4 hours as needed for other (mild pain), Disp: 100 tablet, Rfl: 0     Ascorbic Acid (VITAMIN C) 100 MG CHEW, Take 1 chew tab by mouth daily, Disp: , Rfl:      aspirin (ASA) 325 MG EC tablet, Take 1 tablet (325 mg) by mouth daily, Disp: 30 tablet, Rfl: 0     buPROPion (WELLBUTRIN XL) 150 MG 24 hr tablet, TAKE 1 TABLET BY MOUTH EVERY MORNING in addition to 300mg for a total of 450mg daily, Disp: 90 tablet, Rfl: 0     buPROPion (WELLBUTRIN XL) 300 MG 24 hr tablet, Take 1 tablet (300 mg) by mouth every morning, Disp: 90 tablet, Rfl: 1     calcium carbonate (OS-BERTA) 500 MG tablet, Take 1 tablet by mouth daily, Disp: , Rfl:      Ferrous Sulfate 324 (65 Fe) MG TBEC, Take 324 mg by mouth daily, Disp: , Rfl:      gabapentin (NEURONTIN) 300 MG capsule, Take 3 capsules (900 mg) by mouth 2 times daily, Disp: 540 capsule, Rfl: 3     levothyroxine (SYNTHROID/LEVOTHROID) 175 MCG tablet, Take 1 tablet (175 mcg) by mouth every morning, Disp: 90 tablet, Rfl: 3     oxyCODONE (ROXICODONE) 5 MG tablet, Take 1-2 tablets (5-10 mg) by mouth every 8 hours as needed for moderate to severe pain, Disp: 12 tablet, Rfl: 0     PARoxetine (PAXIL) 20 MG tablet, TAKE 1 TABLET BY MOUTH EVERY MORNING WITH 40MG TABLET FOR A TOTAL OF 60 MG/DAY, Disp: 90 tablet, Rfl: 0     PARoxetine (PAXIL) 40 MG tablet, TAKE 1 TABLET BY MOUTH EVERY MORNING WITH A 20 MG TABLET FOR A DAILY DOSE OF 60 MG, Disp: 90 tablet, Rfl: 0     pramipexole (MIRAPEX) 0.5 MG tablet, TAKE 2 TABLETS BY MOUTH AT BEDTIME (Patient taking differently: Take 1 mg by mouth At Bedtime), Disp: 180 tablet, Rfl: 1     propranolol ER (INDERAL LA) 60 MG 24 hr capsule, TAKE 1 CAPSULE BY MOUTH EVERY MORNING  (Patient taking differently: Take 60 mg by mouth every morning), Disp: 90 capsule, Rfl: 0     senna-docusate (SENOKOT-S/PERICOLACE) 8.6-50 MG tablet, Take 1-2 tablets by mouth 2 times daily Take while on oral narcotics to prevent or treat constipation., Disp: 30 tablet, Rfl: 0    Allergies:   Allergies   Allergen Reactions     Amoxicillin-Pot Clavulanate Other (See Comments) and Hives     Edema     Augmentin Hives and Itching     Ciprofloxacin Hives and Itching     Penicillins Hives and Itching         REVIEW OF SYSTEMS:  CONSTITUTIONAL:NEGATIVE for fever, chills, night sweats, change in weight  INTEGUMENTARY/SKIN: NEGATIVE for worrisome rashes, moles or lesions  MUSCULOSKELETAL:See HPI above  NEURO: NEGATIVE for weakness, dizziness or paresthesias  CARDIOVASCULAR: negative for chest pain, shortness of breath    PHYSICAL EXAM:  BP (!) 150/82   Pulse 72   Ht 1.524 m (5')   Wt 103.2 kg (227 lb 9.6 oz)   LMP 05/18/2022 (Approximate)   BMI 44.45 kg/m     GENERAL APPEARANCE: healthy, alert, no distress  SKIN: no suspicious lesions or rashes  NEURO: Normal strength and tone, mentation intact and speech normal  PSYCH:  mentation appears normal and affect normal  RESPIRATORY: No increased work of breathing.    BILATERAL LOWER EXTREMITIES:  Gait: using walker for support, slight favors the right     Intact sensation deep peroneal nerve, superficial peroneal nerve, med/lat tibial nerve, sural nerve, saphenous nerve  Intact EHL, EDL, TA, FHL, GS, quadriceps hamstrings and hip flexors  Toes warm and well perfused, brisk capillary refill. Palpable 2+ dp pulses.  Bilateral calf soft and nttp or squeeze.  Edema: 1+    right  KNEE EXAM:    Incision: healing well.  Skin: intact, resolving knee and leg ecchymosis  And thigh ecchymosis, no erythema  ROM: near full extension to 95 flexion  Effusion: moderate   Tender: diffuse.      X-RAY:  3 views right knee from 6/27/2022 were reviewed in clinic today. No obvious fractures or  dislocations. Total knee arthroplasty components in place without evidence of failure or loosening.      Impression: Carol Guajardo is a 52 year old female status post Total knee arthroplasty, right knee, doing well, 2 weeks out from surgery.      Plan:   * reviewed xrays with patient, showing total knee arthroplasty implants.  * continue DVT prophylaxis for a total of 4 weeks postoperative  * continue with knee range of motion exercises, focusing on extension  * wean off all pain medications as tolerated  * xrays next visit: 2 views of the knee  * return to clinic 4 weeks   * Physical Therapy, home exercise program.  * all questions addressed and answered prior to discharge from clinic today to patient's satisfaction.  * patient will need longterm prophylactic antibiotics use with dental procedures or other invasive procedures (2 g amoxicilin or 450mg ([id number]) clindamycin) 1 hour prior to dental procedures.    * KOOS Jr/Promis Knee score: NOT to be done at next visit; NOT completed during todays visit; to be completed at 12 weeks and 12 months postoperative.      Emilio Pino M.D., M.S.  Dept. of Orthopaedic Surgery  [facility]            Again, thank you for allowing me to participate in the care of your patient.        Sincerely,        Emilio Pino MD

## 2022-06-29 ENCOUNTER — MYC MEDICAL ADVICE (OUTPATIENT)
Dept: FAMILY MEDICINE | Facility: CLINIC | Age: 52
End: 2022-06-29

## 2022-06-29 ENCOUNTER — TELEPHONE (OUTPATIENT)
Dept: FAMILY MEDICINE | Facility: CLINIC | Age: 52
End: 2022-06-29

## 2022-06-29 PROBLEM — F11.90 CHRONIC, CONTINUOUS USE OF OPIOIDS: Status: ACTIVE | Noted: 2022-06-29

## 2022-07-02 ENCOUNTER — MYC REFILL (OUTPATIENT)
Dept: FAMILY MEDICINE | Facility: CLINIC | Age: 52
End: 2022-07-02

## 2022-07-02 DIAGNOSIS — Z96.651 S/P TOTAL KNEE ARTHROPLASTY, RIGHT: ICD-10-CM

## 2022-07-05 DIAGNOSIS — F41.1 ANXIETY STATE: ICD-10-CM

## 2022-07-05 RX ORDER — OXYCODONE HYDROCHLORIDE 5 MG/1
5-10 TABLET ORAL EVERY 8 HOURS PRN
Qty: 12 TABLET | Refills: 0 | Status: SHIPPED | OUTPATIENT
Start: 2022-07-05 | End: 2022-07-11

## 2022-07-06 RX ORDER — BUPROPION HYDROCHLORIDE 150 MG/1
TABLET ORAL
Qty: 90 TABLET | Refills: 1 | Status: SHIPPED | OUTPATIENT
Start: 2022-07-06 | End: 2023-01-09

## 2022-07-06 RX ORDER — BUPROPION HYDROCHLORIDE 300 MG/1
TABLET ORAL
Qty: 90 TABLET | Refills: 1 | Status: SHIPPED | OUTPATIENT
Start: 2022-07-06 | End: 2023-01-09

## 2022-07-11 ENCOUNTER — MYC REFILL (OUTPATIENT)
Dept: FAMILY MEDICINE | Facility: CLINIC | Age: 52
End: 2022-07-11

## 2022-07-11 DIAGNOSIS — Z96.651 S/P TOTAL KNEE ARTHROPLASTY, RIGHT: ICD-10-CM

## 2022-07-12 RX ORDER — OXYCODONE HYDROCHLORIDE 5 MG/1
5-10 TABLET ORAL EVERY 6 HOURS PRN
Qty: 12 TABLET | Refills: 0 | Status: SHIPPED | OUTPATIENT
Start: 2022-07-12 | End: 2022-07-18

## 2022-07-12 NOTE — TELEPHONE ENCOUNTER
Pending Prescriptions:                       Disp   Refills    oxyCODONE (ROXICODONE) 5 MG tablet        12 tab*0            Sig: Take 1-2 tablets (5-10 mg) by mouth every 8 hours           as needed for moderate to severe pain    Routing refill request to provider for review/approval because:  Drug not on the [facility] refill protocol     Last seen by Dr. Pino 6/27/22 and had a procedure 6/14/22. Will forward to Dr. Pino as this appears to be the provider they are seeing.     Almita WATERS RN, [facility] 07/12/22 10:50 AM

## 2022-07-18 ENCOUNTER — MYC REFILL (OUTPATIENT)
Dept: FAMILY MEDICINE | Facility: CLINIC | Age: 52
End: 2022-07-18

## 2022-07-18 ENCOUNTER — MYC MEDICAL ADVICE (OUTPATIENT)
Dept: FAMILY MEDICINE | Facility: CLINIC | Age: 52
End: 2022-07-18

## 2022-07-18 DIAGNOSIS — Z96.651 S/P TOTAL KNEE ARTHROPLASTY, RIGHT: ICD-10-CM

## 2022-07-18 RX ORDER — OXYCODONE HYDROCHLORIDE 5 MG/1
5 TABLET ORAL EVERY 6 HOURS PRN
Qty: 12 TABLET | Refills: 0 | Status: SHIPPED | OUTPATIENT
Start: 2022-07-18 | End: 2022-07-25

## 2022-07-19 DIAGNOSIS — F41.1 ANXIETY STATE: ICD-10-CM

## 2022-07-20 RX ORDER — PROPRANOLOL HCL 60 MG
CAPSULE, EXTENDED RELEASE 24HR ORAL
Qty: 30 CAPSULE | Refills: 0 | Status: SHIPPED | OUTPATIENT
Start: 2022-07-20 | End: 2022-08-22

## 2022-07-20 RX ORDER — PAROXETINE 40 MG/1
TABLET, FILM COATED ORAL
Qty: 30 TABLET | Refills: 0 | Status: SHIPPED | OUTPATIENT
Start: 2022-07-20 | End: 2022-08-16

## 2022-07-20 RX ORDER — PAROXETINE 20 MG/1
TABLET, FILM COATED ORAL
Qty: 30 TABLET | Refills: 0 | Status: SHIPPED | OUTPATIENT
Start: 2022-07-20 | End: 2022-08-16

## 2022-07-20 NOTE — TELEPHONE ENCOUNTER
"Has appointment scheduled for 8/3/22.      Requested Prescriptions   Pending Prescriptions Disp Refills     propranolol ER (INDERAL LA) 60 MG 24 hr capsule [Pharmacy Med Name: Propranolol HCl ER Oral Capsule Extended Release 24 Hour 60 MG] 90 capsule 0     Sig: TAKE 1 CAPSULE BY MOUTH EVERY MORNING       Beta-Blockers Protocol Failed - 7/19/2022  8:43 AM        Failed - Blood pressure under 140/90 in past 12 months     BP Readings from Last 3 Encounters:   06/27/22 (!) 150/82   06/15/22 109/57   06/08/22 120/76                 Passed - Patient is age 6 or older        Passed - Recent (12 mo) or future (30 days) visit within the authorizing provider's specialty     Patient has had an office visit with the authorizing provider or a provider within the authorizing providers department within the previous 12 mos or has a future within next 30 days. See \"Patient Info\" tab in inbasket, or \"Choose Columns\" in Meds & Orders section of the refill encounter.              Passed - Medication is active on med list           PARoxetine (PAXIL) 20 MG tablet [Pharmacy Med Name: PARoxetine HCl Oral Tablet 20 MG] 90 tablet 0     Sig: TAKE 1 TABLET BY MOUTH EVERY MORNING WITH PAROXETINE 40 MG FOR A TOTAL DAILY DOSE OF 60 MG       SSRIs Protocol Passed - 7/19/2022  8:43 AM        Passed - Recent (12 mo) or future (30 days) visit within the authorizing provider's specialty     Patient has had an office visit with the authorizing provider or a provider within the authorizing providers department within the previous 12 mos or has a future within next 30 days. See \"Patient Info\" tab in in[employer], or \"Choose Columns\" in Meds & Orders section of the refill encounter.              Passed - Medication is active on med list        Passed - Patient is age 18 or older        Passed - No active pregnancy on record        Passed - No positive pregnancy test in last 12 months           PARoxetine (PAXIL) 40 MG tablet [Pharmacy Med Name: PARoxetine " "HCl Oral Tablet 40 MG] 90 tablet 0     Sig: TAKE 1 TABLET BY MOUTH EVERY MORNING WITH PAROXETINE 20 MD FOR A TOTAL DAILY DOSE OF 60 MG       SSRIs Protocol Passed - 7/19/2022  8:43 AM        Passed - Recent (12 mo) or future (30 days) visit within the authorizing provider's specialty     Patient has had an office visit with the authorizing provider or a provider within the authorizing providers department within the previous 12 mos or has a future within next 30 days. See \"Patient Info\" tab in inbasket, or \"Choose Columns\" in Meds & Orders section of the refill encounter.              Passed - Medication is active on med list        Passed - Patient is age 18 or older        Passed - No active pregnancy on record        Passed - No positive pregnancy test in last 12 months             "

## 2022-07-21 NOTE — PROGRESS NOTES
Chief Complaint   Patient presents with     Right Knee - Total Joint Post-op     DOS 6/14/22, 6 wk s/p. Patient notes her knee is doing good. She is had her last in home therapy and needs a referral for external physical therapy in [place]. No issues or concerns.         SURGERY: Total knee arthroplasty, right knee ([facility])  DATE OF SURGERY: 6/14/2022      HISTORY OF PRESENT ILLNESS: Carol Guajardo is a 52 year old female seen for postoperative evaluation of right total knee arthroplasty. It has been 6 weeks since surgery. Returns today stating she's doing well. Finished in home therapy. No issues or concerns.    She's noted her restless leg syndrome seems more bothersome on the right side since surgery, given her sleeping difficulties. She's meeting with her primary care provider to discuss.    Denies any wound problems. Denies numbness, tingling, or weakness in the affected extremity. Denies fevers chills night sweats.    Pain 2/10.    Past medical history:   Past Medical History:   Diagnosis Date     Anxiety      Depression      HLD (hyperlipidemia)      Hypothyroidism      Mass of right lower leg 6/5/2017     Osteoarthritis of both knees      Right lower lobe lung mass     Biopsied 8/2018, diagnosed as fibrosis     Total knee replacement status, left 9/30/2019     Patient Active Problem List    Diagnosis Date Noted     Chronic, continuous use of opioids 06/29/2022     Priority: Medium     S/P TKR (total knee replacement), right 06/14/2022     Priority: Medium     VALERIE (obstructive sleep apnea) 06/14/2022     Priority: Medium     Osteoarthritis of right knee 06/14/2022     Priority: Medium     Restless legs syndrome (RLS) 07/28/2021     Priority: Medium     Morbid obesity (H) 04/16/2021     Priority: Medium     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 CT August 2018:  The mass measures approximately 9.8 cm x 5.3 cm x 7.4 cm.  Biopsied 8/2018, diagnosed as  fibrosis       Primary osteoarthritis of left knee 07/30/2018     Priority: Medium     Prediabetes 03/30/2018     Priority: Medium     Anxiety state 07/19/2017     Priority: Medium     Depression 07/19/2017     Priority: Medium     Vitamin D deficiency 07/19/2017     Priority: Medium     Nicotine dependence 07/19/2017     Priority: Medium     HLD (hyperlipidemia) 08/28/2015     Priority: Medium     Hypothyroidism 05/08/2015     Priority: Medium       Past Surgical History:   Past Surgical History:   Procedure Laterality Date     ARTHROPLASTY KNEE Left 9/30/2019    Procedure: Left Total Knee Arthroplasty;  Surgeon: Ion Bailey MD;  Location: [facility]     ARTHROPLASTY KNEE Right 6/14/2022    Procedure: ARTHROPLASTY, KNEE, TOTAL RIGHT;  Surgeon: Emilio Pino MD;  Location: [facility]     BRONCHOSCOPY (RIGID OR FLEXIBLE), DIAGNOSTIC N/A 4/21/2021    Procedure: BRONCHOSCOPY, WITH BRONCHOALVEOLAR LAVAGE;  Surgeon: Keli Webber MD;  Location:  GI     CARPAL TUNNEL RELEASE RT/LT Bilateral      EXTERNAL EAR SURGERY       IR LUNG BIOPSY MEDIASTINUM RIGHT Right 08/2018    RLL mass, diagnosed as fibrosis per pt     LAPAROSCOPY DIAGNOSTIC (GENERAL)  02/2019     LAPAROSCOPY, SURGICAL; REPAIR UMBILICAL HERNIA  08/22/2018     THORACOTOMY, WEDGE RESECTION LUNG, COMBINED Right 4/19/2021    Procedure: Right Thoracotomy, excision of solitary fibrous tumor, intercostal nerve cryo ablation;  Surgeon: Keli Webber MD;  Location:  OR     [facility] LIGATE FALLOPIAN TUBE      Description: Tubal Ligation;  Recorded: 04/09/2008;       Medications:   Current Outpatient Medications:      acetaminophen (TYLENOL) 325 MG tablet, Take 2 tablets (650 mg) by mouth every 4 hours as needed for other (mild pain), Disp: 100 tablet, Rfl: 0     buPROPion (WELLBUTRIN XL) 150 MG 24 hr tablet, TAKE 1 TABLET BY MOUTH EVERY MORNING IN ADDITINO TO THE 300MG TABLET FOR A TOTAL OF 450MG DAILY, Disp: 90 tablet, Rfl: 1     buPROPion  (WELLBUTRIN XL) 300 MG 24 hr tablet, TAKE 1 TABLET BY MOUTH EVERY MORNING, Disp: 90 tablet, Rfl: 1     calcium carbonate (OS-BERTA) 500 MG tablet, Take 1 tablet by mouth daily, Disp: , Rfl:      Ferrous Sulfate 324 (65 Fe) MG TBEC, Take 324 mg by mouth daily, Disp: , Rfl:      gabapentin (NEURONTIN) 300 MG capsule, Take 3 capsules (900 mg) by mouth 2 times daily, Disp: 540 capsule, Rfl: 3     levothyroxine (SYNTHROID/LEVOTHROID) 175 MCG tablet, Take 1 tablet (175 mcg) by mouth every morning, Disp: 90 tablet, Rfl: 3     PARoxetine (PAXIL) 20 MG tablet, TAKE 1 TABLET BY MOUTH EVERY MORNING WITH PAROXETINE 40 MG FOR A TOTAL DAILY DOSE OF 60 MG, Disp: 30 tablet, Rfl: 0     PARoxetine (PAXIL) 40 MG tablet, TAKE 1 TABLET BY MOUTH EVERY MORNING WITH PAROXETINE 20 MD FOR A TOTAL DAILY DOSE OF 60 MG, Disp: 30 tablet, Rfl: 0     pramipexole (MIRAPEX) 0.5 MG tablet, TAKE 2 TABLETS BY MOUTH AT BEDTIME (Patient taking differently: Take 1 mg by mouth At Bedtime), Disp: 180 tablet, Rfl: 1     propranolol ER (INDERAL LA) 60 MG 24 hr capsule, TAKE 1 CAPSULE BY MOUTH EVERY MORNING, Disp: 30 capsule, Rfl: 0     Ascorbic Acid (VITAMIN C) 100 MG CHEW, Take 1 chew tab by mouth daily, Disp: , Rfl:     Allergies:   Allergies   Allergen Reactions     Amoxicillin-Pot Clavulanate Other (See Comments) and Hives     Edema     Augmentin Hives and Itching     Ciprofloxacin Hives and Itching     Penicillins Hives and Itching         REVIEW OF SYSTEMS:  CONSTITUTIONAL:NEGATIVE for fever, chills, night sweats, change in weight  INTEGUMENTARY/SKIN: NEGATIVE for worrisome rashes, moles or lesions  MUSCULOSKELETAL:See HPI above  NEURO: NEGATIVE for weakness, dizziness or paresthesias  CARDIOVASCULAR: negative for chest pain, shortness of breath    PHYSICAL EXAM:  /80   Pulse 69   Ht 1.524 m (5')   Wt 106.4 kg (234 lb 8 oz)   BMI 45.80 kg/m     GENERAL APPEARANCE: healthy, alert, no distress  SKIN: no suspicious lesions or rashes  NEURO: Normal  strength and tone, mentation intact and speech normal  PSYCH:  mentation appears normal and affect normal  RESPIRATORY: No increased work of breathing.    BILATERAL LOWER EXTREMITIES:  Gait: slight favors the right , unassisted.    Intact sensation deep peroneal nerve, superficial peroneal nerve, med/lat tibial nerve, sural nerve, saphenous nerve  Intact EHL, EDL, TA, FHL, GS, quadriceps hamstrings and hip flexors  Toes warm and well perfused, brisk capillary refill. Palpable 2+ dp pulses.  Bilateral calf soft and nttp or squeeze.  Edema: trace    right  KNEE EXAM:    Incision: healed well.  Skin: intact, no ecchymosis   ROM: near full extension to 100 flexion  Effusion: small  Tender: minimal.      X-RAY:  2 views right knee from 7/25/2022 were reviewed in clinic today. No obvious fractures or dislocations. Total knee arthroplasty components in place without evidence of failure or loosening.      Impression: Carol Guajardo is a 52 year old female status post Total knee arthroplasty, right knee, doing well, 6 weeks out from surgery.      Plan:   * reviewed xrays with patient, showing total knee arthroplasty implants.  * continue with knee range of motion exercises   * wean off all pain medications as tolerated  * xrays next visit: 2 views of the knee  * return to clinic 6 weeks   * Physical Therapy, home exercise program. Outpatient referral placed today.  * all questions addressed and answered prior to discharge from clinic today to patient's satisfaction.  * patient will need longterm prophylactic antibiotics use with dental procedures or other invasive procedures (2 g amoxicilin or 450mg ([id number]) clindamycin) 1 hour prior to dental procedures.  * plan return to work after next visit, likely a gradual return starting 4 hours and gradually working back to her 10 hour days. She will let us know when and how she wants to proceed with this.    * KOOS Jr/Promis Knee score: IS to be done at next visit (12 weeks); NOT  completed during todays visit; to be completed at 12 weeks and 12 months postoperative.      Emilio Pino M.D., M.S.  Dept. of Orthopaedic Surgery  [facility]

## 2022-07-25 ENCOUNTER — ANCILLARY PROCEDURE (OUTPATIENT)
Dept: GENERAL RADIOLOGY | Facility: CLINIC | Age: 52
End: 2022-07-25
Attending: ORTHOPAEDIC SURGERY
Payer: COMMERCIAL

## 2022-07-25 ENCOUNTER — OFFICE VISIT (OUTPATIENT)
Dept: ORTHOPEDICS | Facility: CLINIC | Age: 52
End: 2022-07-25

## 2022-07-25 VITALS
DIASTOLIC BLOOD PRESSURE: 80 MMHG | HEART RATE: 69 BPM | WEIGHT: 234.5 LBS | SYSTOLIC BLOOD PRESSURE: 117 MMHG | BODY MASS INDEX: 46.04 KG/M2 | HEIGHT: 60 IN

## 2022-07-25 DIAGNOSIS — Z96.651 S/P TOTAL KNEE ARTHROPLASTY, RIGHT: ICD-10-CM

## 2022-07-25 DIAGNOSIS — Z96.651 S/P TOTAL KNEE ARTHROPLASTY, RIGHT: Primary | ICD-10-CM

## 2022-07-25 PROCEDURE — 99024 POSTOP FOLLOW-UP VISIT: CPT | Performed by: ORTHOPAEDIC SURGERY

## 2022-07-25 PROCEDURE — 73560 X-RAY EXAM OF KNEE 1 OR 2: CPT | Mod: TC | Performed by: RADIOLOGY

## 2022-07-25 ASSESSMENT — PAIN SCALES - GENERAL: PAINLEVEL: MILD PAIN (2)

## 2022-07-25 NOTE — LETTER
7/25/2022         RE: Carol Guajardo  [id number] [address] [id number]        Dear Colleague,    Thank you for referring your patient, Carol Guajardo, to the [facility]. Please see a copy of my visit note below.    Chief Complaint   Patient presents with     Right Knee - Total Joint Post-op     DOS 6/14/22, 6 wk s/p. Patient notes her knee is doing good. She is had her last in home therapy and needs a referral for external physical therapy in [place]. No issues or concerns.         SURGERY: Total knee arthroplasty, right knee ([facility])  DATE OF SURGERY: 6/14/2022      HISTORY OF PRESENT ILLNESS: Carol Guajardo is a 52 year old female seen for postoperative evaluation of right total knee arthroplasty. It has been 6 weeks since surgery. Returns today stating she's doing well. Finished in home therapy. No issues or concerns.    She's noted her restless leg syndrome seems more bothersome on the right side since surgery, given her sleeping difficulties. She's meeting with her primary care provider to discuss.    Denies any wound problems. Denies numbness, tingling, or weakness in the affected extremity. Denies fevers chills night sweats.    Pain 2/10.    Past medical history:   Past Medical History:   Diagnosis Date     Anxiety      Depression      HLD (hyperlipidemia)      Hypothyroidism      Mass of right lower leg 6/5/2017     Osteoarthritis of both knees      Right lower lobe lung mass     Biopsied 8/2018, diagnosed as fibrosis     Total knee replacement status, left 9/30/2019     Patient Active Problem List    Diagnosis Date Noted     Chronic, continuous use of opioids 06/29/2022     Priority: Medium     S/P TKR (total knee replacement), right 06/14/2022     Priority: Medium     VALERIE (obstructive sleep apnea) 06/14/2022     Priority: Medium     Osteoarthritis of right knee 06/14/2022     Priority: Medium     Restless legs syndrome (RLS) 07/28/2021      Priority: Medium     Morbid obesity (H) 04/16/2021     Priority: Medium     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 CT August 2018:  The mass measures approximately 9.8 cm x 5.3 cm x 7.4 cm.  Biopsied 8/2018, diagnosed as fibrosis       Primary osteoarthritis of left knee 07/30/2018     Priority: Medium     Prediabetes 03/30/2018     Priority: Medium     Anxiety state 07/19/2017     Priority: Medium     Depression 07/19/2017     Priority: Medium     Vitamin D deficiency 07/19/2017     Priority: Medium     Nicotine dependence 07/19/2017     Priority: Medium     HLD (hyperlipidemia) 08/28/2015     Priority: Medium     Hypothyroidism 05/08/2015     Priority: Medium       Past Surgical History:   Past Surgical History:   Procedure Laterality Date     ARTHROPLASTY KNEE Left 9/30/2019    Procedure: Left Total Knee Arthroplasty;  Surgeon: Ion Bailey MD;  Location: [facility] OR     ARTHROPLASTY KNEE Right 6/14/2022    Procedure: ARTHROPLASTY, KNEE, TOTAL RIGHT;  Surgeon: Emilio Pino MD;  Location: [facility]     BRONCHOSCOPY (RIGID OR FLEXIBLE), DIAGNOSTIC N/A 4/21/2021    Procedure: BRONCHOSCOPY, WITH BRONCHOALVEOLAR LAVAGE;  Surgeon: Keli Webber MD;  Location: [facility] GI     CARPAL TUNNEL RELEASE RT/LT Bilateral      EXTERNAL EAR SURGERY       IR LUNG BIOPSY MEDIASTINUM RIGHT Right 08/2018    RLL mass, diagnosed as fibrosis per pt     LAPAROSCOPY DIAGNOSTIC (GENERAL)  02/2019     LAPAROSCOPY, SURGICAL; REPAIR UMBILICAL HERNIA  08/22/2018     THORACOTOMY, WEDGE RESECTION LUNG, COMBINED Right 4/19/2021    Procedure: Right Thoracotomy, excision of solitary fibrous tumor, intercostal nerve cryo ablation;  Surgeon: Keli Webber MD;  Location:  OR     [facility] LIGATE FALLOPIAN TUBE      Description: Tubal Ligation;  Recorded: 04/09/2008;       Medications:   Current Outpatient Medications:      acetaminophen (TYLENOL) 325 MG tablet, Take 2 tablets (650 mg) by mouth  every 4 hours as needed for other (mild pain), Disp: 100 tablet, Rfl: 0     buPROPion (WELLBUTRIN XL) 150 MG 24 hr tablet, TAKE 1 TABLET BY MOUTH EVERY MORNING IN ADDITINO TO THE 300MG TABLET FOR A TOTAL OF 450MG DAILY, Disp: 90 tablet, Rfl: 1     buPROPion (WELLBUTRIN XL) 300 MG 24 hr tablet, TAKE 1 TABLET BY MOUTH EVERY MORNING, Disp: 90 tablet, Rfl: 1     calcium carbonate (OS-BERTA) 500 MG tablet, Take 1 tablet by mouth daily, Disp: , Rfl:      Ferrous Sulfate 324 (65 Fe) MG TBEC, Take 324 mg by mouth daily, Disp: , Rfl:      gabapentin (NEURONTIN) 300 MG capsule, Take 3 capsules (900 mg) by mouth 2 times daily, Disp: 540 capsule, Rfl: 3     levothyroxine (SYNTHROID/LEVOTHROID) 175 MCG tablet, Take 1 tablet (175 mcg) by mouth every morning, Disp: 90 tablet, Rfl: 3     PARoxetine (PAXIL) 20 MG tablet, TAKE 1 TABLET BY MOUTH EVERY MORNING WITH PAROXETINE 40 MG FOR A TOTAL DAILY DOSE OF 60 MG, Disp: 30 tablet, Rfl: 0     PARoxetine (PAXIL) 40 MG tablet, TAKE 1 TABLET BY MOUTH EVERY MORNING WITH PAROXETINE 20 MD FOR A TOTAL DAILY DOSE OF 60 MG, Disp: 30 tablet, Rfl: 0     pramipexole (MIRAPEX) 0.5 MG tablet, TAKE 2 TABLETS BY MOUTH AT BEDTIME (Patient taking differently: Take 1 mg by mouth At Bedtime), Disp: 180 tablet, Rfl: 1     propranolol ER (INDERAL LA) 60 MG 24 hr capsule, TAKE 1 CAPSULE BY MOUTH EVERY MORNING, Disp: 30 capsule, Rfl: 0     Ascorbic Acid (VITAMIN C) 100 MG CHEW, Take 1 chew tab by mouth daily, Disp: , Rfl:     Allergies:   Allergies   Allergen Reactions     Amoxicillin-Pot Clavulanate Other (See Comments) and Hives     Edema     Augmentin Hives and Itching     Ciprofloxacin Hives and Itching     Penicillins Hives and Itching         REVIEW OF SYSTEMS:  CONSTITUTIONAL:NEGATIVE for fever, chills, night sweats, change in weight  INTEGUMENTARY/SKIN: NEGATIVE for worrisome rashes, moles or lesions  MUSCULOSKELETAL:See HPI above  NEURO: NEGATIVE for weakness, dizziness or paresthesias  CARDIOVASCULAR:  negative for chest pain, shortness of breath    PHYSICAL EXAM:  /80   Pulse 69   Ht 1.524 m (5')   Wt 106.4 kg (234 lb 8 oz)   BMI 45.80 kg/m     GENERAL APPEARANCE: healthy, alert, no distress  SKIN: no suspicious lesions or rashes  NEURO: Normal strength and tone, mentation intact and speech normal  PSYCH:  mentation appears normal and affect normal  RESPIRATORY: No increased work of breathing.    BILATERAL LOWER EXTREMITIES:  Gait: slight favors the right , unassisted.    Intact sensation deep peroneal nerve, superficial peroneal nerve, med/lat tibial nerve, sural nerve, saphenous nerve  Intact EHL, EDL, TA, FHL, GS, quadriceps hamstrings and hip flexors  Toes warm and well perfused, brisk capillary refill. Palpable 2+ dp pulses.  Bilateral calf soft and nttp or squeeze.  Edema: trace    right  KNEE EXAM:    Incision: healed well.  Skin: intact, no ecchymosis   ROM: near full extension to 100 flexion  Effusion: small  Tender: minimal.      X-RAY:  2 views right knee from 7/25/2022 were reviewed in clinic today. No obvious fractures or dislocations. Total knee arthroplasty components in place without evidence of failure or loosening.      Impression: Carol Guajardo is a 52 year old female status post Total knee arthroplasty, right knee, doing well, 6 weeks out from surgery.      Plan:   * reviewed xrays with patient, showing total knee arthroplasty implants.  * continue with knee range of motion exercises   * wean off all pain medications as tolerated  * xrays next visit: 2 views of the knee  * return to clinic 6 weeks   * Physical Therapy, home exercise program. Outpatient referral placed today.  * all questions addressed and answered prior to discharge from clinic today to patient's satisfaction.  * patient will need longterm prophylactic antibiotics use with dental procedures or other invasive procedures (2 g amoxicilin or 450mg (6d621by) clindamycin) 1 hour prior to dental procedures.  * plan return  to work after next visit, likely a gradual return starting 4 hours and gradually working back to her 10 hour days. She will let us know when and how she wants to proceed with this.    * KOOS Jr/Promis Knee score: IS to be done at next visit (12 weeks); NOT completed during todays visit; to be completed at 12 weeks and 12 months postoperative.      Emilio Pino M.D., M.S.  Dept. of Orthopaedic Surgery  [facility]            Again, thank you for allowing me to participate in the care of your patient.        Sincerely,        Emilio Pino MD

## 2022-08-02 ASSESSMENT — ANXIETY QUESTIONNAIRES
5. BEING SO RESTLESS THAT IT IS HARD TO SIT STILL: MORE THAN HALF THE DAYS
GAD7 TOTAL SCORE: 11
6. BECOMING EASILY ANNOYED OR IRRITABLE: MORE THAN HALF THE DAYS
8. IF YOU CHECKED OFF ANY PROBLEMS, HOW DIFFICULT HAVE THESE MADE IT FOR YOU TO DO YOUR WORK, TAKE CARE OF THINGS AT HOME, OR GET ALONG WITH OTHER PEOPLE?: VERY DIFFICULT
7. FEELING AFRAID AS IF SOMETHING AWFUL MIGHT HAPPEN: SEVERAL DAYS
7. FEELING AFRAID AS IF SOMETHING AWFUL MIGHT HAPPEN: SEVERAL DAYS
1. FEELING NERVOUS, ANXIOUS, OR ON EDGE: MORE THAN HALF THE DAYS
4. TROUBLE RELAXING: MORE THAN HALF THE DAYS
3. WORRYING TOO MUCH ABOUT DIFFERENT THINGS: SEVERAL DAYS
IF YOU CHECKED OFF ANY PROBLEMS ON THIS QUESTIONNAIRE, HOW DIFFICULT HAVE THESE PROBLEMS MADE IT FOR YOU TO DO YOUR WORK, TAKE CARE OF THINGS AT HOME, OR GET ALONG WITH OTHER PEOPLE: VERY DIFFICULT
GAD7 TOTAL SCORE: 11
2. NOT BEING ABLE TO STOP OR CONTROL WORRYING: SEVERAL DAYS

## 2022-08-02 ASSESSMENT — PATIENT HEALTH QUESTIONNAIRE - PHQ9
SUM OF ALL RESPONSES TO PHQ QUESTIONS 1-9: 15
10. IF YOU CHECKED OFF ANY PROBLEMS, HOW DIFFICULT HAVE THESE PROBLEMS MADE IT FOR YOU TO DO YOUR WORK, TAKE CARE OF THINGS AT HOME, OR GET ALONG WITH OTHER PEOPLE: VERY DIFFICULT
SUM OF ALL RESPONSES TO PHQ QUESTIONS 1-9: 15

## 2022-08-03 ENCOUNTER — OFFICE VISIT (OUTPATIENT)
Dept: FAMILY MEDICINE | Facility: CLINIC | Age: 52
End: 2022-08-03
Payer: COMMERCIAL

## 2022-08-03 VITALS
TEMPERATURE: 98 F | DIASTOLIC BLOOD PRESSURE: 73 MMHG | RESPIRATION RATE: 18 BRPM | BODY MASS INDEX: 45.7 KG/M2 | HEART RATE: 71 BPM | WEIGHT: 234 LBS | OXYGEN SATURATION: 96 % | SYSTOLIC BLOOD PRESSURE: 124 MMHG

## 2022-08-03 DIAGNOSIS — F41.1 ANXIETY STATE: Primary | ICD-10-CM

## 2022-08-03 DIAGNOSIS — G47.00 INSOMNIA, UNSPECIFIED TYPE: ICD-10-CM

## 2022-08-03 PROCEDURE — 99214 OFFICE O/P EST MOD 30 MIN: CPT | Performed by: NURSE PRACTITIONER

## 2022-08-03 RX ORDER — HYDROXYZINE HYDROCHLORIDE 25 MG/1
25-50 TABLET, FILM COATED ORAL 3 TIMES DAILY PRN
Qty: 30 TABLET | Refills: 0 | Status: SHIPPED | OUTPATIENT
Start: 2022-08-03 | End: 2023-10-04

## 2022-08-03 ASSESSMENT — PATIENT HEALTH QUESTIONNAIRE - PHQ9
10. IF YOU CHECKED OFF ANY PROBLEMS, HOW DIFFICULT HAVE THESE PROBLEMS MADE IT FOR YOU TO DO YOUR WORK, TAKE CARE OF THINGS AT HOME, OR GET ALONG WITH OTHER PEOPLE: VERY DIFFICULT
SUM OF ALL RESPONSES TO PHQ QUESTIONS 1-9: 15

## 2022-08-03 ASSESSMENT — PAIN SCALES - GENERAL: PAINLEVEL: MILD PAIN (2)

## 2022-08-03 ASSESSMENT — ANXIETY QUESTIONNAIRES: GAD7 TOTAL SCORE: 11

## 2022-08-03 NOTE — PROGRESS NOTES
Assessment & Plan     Anxiety state  Patient with history of anxiety, currently on 60 mg of Paxil.  Manages fairly well with intermittent anxiety/panic type attacks.  Patient has had Valium in the past as needed.  Will trial hydroxyzine as needed as we will be trialing for sleep as below.  - hydrOXYzine (ATARAX) 25 MG tablet; Take 1-2 tablets (25-50 mg) by mouth 3 times daily as needed for anxiety (and insomnia)    Insomnia, unspecified type  Difficulty with increased restlessness and not sleeping over the last month.  Patient relates to likely being off of work since knee surgery in June as well as some anxiety.  Does have some mind racing before bed.  Has had sleep study earlier this year, noting mild sleep apnea without the need for a CPAP.  Patient taking Mirapex for restless legs which helps majority of the time.  Discussed good bedtime hygiene measures as per patient instructions as well as trial of hydroxyzine at night.  Patient to notify provider next week if not helping patient sleep.  - hydrOXYzine (ATARAX) 25 MG tablet; Take 1-2 tablets (25-50 mg) by mouth 3 times daily as needed for anxiety (and insomnia)             Depression Screening Follow Up    PHQ 8/2/2022   PHQ-9 Total Score 15   Q9: Thoughts of better off dead/self-harm past 2 weeks Several days   F/U: Thoughts of suicide or self-harm No   F/U: Safety concerns No     Denies any specific plans of suicide, having passive thoughts which have been chronic for patient    See Patient Instructions    Return in about 1 month (around 9/3/2022), or if symptoms worsen or fail to improve.    Barbara Hedrick, PANCHITO, APRN-CNP   [facility]    Jey Medina is a 52 year old, presenting for the following health issues:  Sleep Problem      History of Present Illness       Reason for visit:  Restlessness and not sleeping    She eats 0-1 servings of fruits and vegetables daily.She consumes 1 sweetened beverage(s) daily.She exercises  with enough effort to increase her heart rate 9 or less minutes per day.  She exercises with enough effort to increase her heart rate 3 or less days per week. She is missing 1 dose(s) of medications per week.  She is not taking prescribed medications regularly due to remembering to take.    Today's PHQ-9         PHQ-9 Total Score: 15    PHQ-9 Q9 Thoughts of better off dead/self-harm past 2 weeks :   Several days  Thoughts of suicide or self harm: (P) No  Self-harm Plan:     Self-harm Action:       Safety concerns for self or others: (P) No    How difficult have these problems made it for you to do your work, take care of things at home, or get along with other people: Very difficult  Today's SHEFALI-7 Score: 11         Insomnia      Duration: 1 month    Description  Frequency of insomnia:  nightly  Time to fall asleep: 2 hours  Middle of night awakening:  nightly  Early morning awakening:  nightly    Accompanying signs and symptoms:  restless legs    History  Similar episodes in past:  YES  Previous evaluation/sleep study:  YES    Precipitating or alleviating factors:  New stressful situation: YES- surgery 1 month ago  Caffeine intake after lunchtime: no   OTC decongestants: no   Any new medications: no     Therapies tried and outcome: iron and calcium from sleep doctor. Has not tried otc medication. Mirapex every day along with gabapentin     Has been home since June 10th after Surgery   The restless legs do not bother patient every day   Anxiety has been a little higher because she's home   Not napping during the day   Gets maybe 2 hours a sleep at night   Per sleep medicine - moderate sleep apnea, no need for CPAP at this time   Cut down caffeine as well - nothing after 12:00        Review of Systems   Constitutional, HEENT, cardiovascular, pulmonary, gi and gu systems are negative, except as otherwise noted.      Objective    /73   Pulse 71   Temp 98  F (36.7  C)   Resp 18   Wt 106.1 kg (234 lb)   LMP  07/17/2022 (Approximate)   SpO2 96%   Breastfeeding No   BMI 45.70 kg/m    Body mass index is 45.7 kg/m .  Physical Exam   GENERAL APPEARANCE: healthy, alert and no distress  NECK: no adenopathy, no asymmetry, masses, or scars and thyroid normal to palpation  RESP: lungs clear to auscultation - no rales, rhonchi or wheezes  CV: regular rates and rhythm, normal S1 S2, no S3 or S4 and no murmur, click or rub  ABDOMEN: soft, nontender, without hepatosplenomegaly or masses, bowel sounds normal and obese  MS: extremities normal- no gross deformities noted  SKIN: no suspicious lesions or rashes  NEURO: Normal strength and tone, mentation intact and speech normal  PSYCH: mentation appears normal and affect normal/bright    Diagnostic Test Results:  none                .  ..   Chart documentation with Dragon Voice recognition Software. Although reviewed after completion, some words and grammatical errors may remain.

## 2022-08-03 NOTE — PATIENT INSTRUCTIONS
Anxiety state  Patient with history of anxiety, currently on 60 mg of Paxil.  Manages fairly well with intermittent anxiety/panic type attacks.  Patient has had Valium in the past as needed.  Will trial hydroxyzine as needed as we will be trialing for sleep as below.  - hydrOXYzine (ATARAX) 25 MG tablet; Take 1-2 tablets (25-50 mg) by mouth 3 times daily as needed for anxiety (and insomnia)    Insomnia, unspecified type  Difficulty with increased restlessness and not sleeping over the last month.  Patient relates to likely being off of work since knee surgery in June as well as some anxiety.  Does have some mind racing before bed.  Has had sleep study earlier this year, noting mild sleep apnea without the need for a CPAP.  Patient taking Mirapex for restless legs which helps majority of the time.  Discussed good bedtime hygiene measures as per patient instructions as well as trial of hydroxyzine at night.  Patient to notify provider next week if not helping patient sleep.  - hydrOXYzine (ATARAX) 25 MG tablet; Take 1-2 tablets (25-50 mg) by mouth 3 times daily as needed for anxiety (and insomnia)      Would like you to work on bedtime hygiene    - Shutting screens off 1 hour or 1/2 hour prior to bed  - journal 3 positive things from your day  - meditation or yoga  - deep breathing exercises   - reading a light book   - sleepy time tea  - sleep only as much as you need to feel rested and then get out of bed  - keep a regular sleep schedule  - avoid forcing sleep  - avoid caffeinated beverages after lunch  - avoid alcohol near bedtime  - avoid smoking, especially in the evening  - do not go to bed hungry  - adjust bedroom environment

## 2022-08-09 ENCOUNTER — MYC MEDICAL ADVICE (OUTPATIENT)
Dept: FAMILY MEDICINE | Facility: CLINIC | Age: 52
End: 2022-08-09

## 2022-08-09 ENCOUNTER — HOSPITAL ENCOUNTER (OUTPATIENT)
Dept: PHYSICAL THERAPY | Facility: CLINIC | Age: 52
Setting detail: THERAPIES SERIES
Discharge: HOME | End: 2022-08-09
Attending: ORTHOPAEDIC SURGERY
Payer: COMMERCIAL

## 2022-08-09 DIAGNOSIS — Z96.651 S/P TOTAL KNEE ARTHROPLASTY, RIGHT: ICD-10-CM

## 2022-08-09 PROCEDURE — 97110 THERAPEUTIC EXERCISES: CPT | Mod: GP | Performed by: PHYSICAL THERAPIST

## 2022-08-09 PROCEDURE — 97161 PT EVAL LOW COMPLEX 20 MIN: CPT | Mod: GP | Performed by: PHYSICAL THERAPIST

## 2022-08-09 NOTE — PROGRESS NOTES
08/09/22 1100   General Information   Type of Visit Initial OP Ortho PT Evaluation   Start of Care Date 08/09/22   Referring Physician Emilio Pino   Patient/Family Goals Statement work, walk, stand for 10 + hours   Orders Evaluate and Treat   Date of Order 07/25/22   Medical Diagnosis S/P total knee arthroplasty, right   Surgical/Medical history reviewed Yes   Precautions/Limitations no known precautions/limitations   General Information Comments PMH: depression,   Body Part(s)   Body Part(s) Knee   Presentation and Etiology   Pertinent history of current problem (include personal factors and/or comorbidities that impact the POC) Patient had TKA on right June 14th 2022. did home care PT for 6 weeks. 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  Knee is still sore and achy. Home care told her bending was about 110 degrees. Feels she pulled a muscle in the calf, no fever/night sweats/no severe pain or redness. Stairs going up is okay but going down there is still some pull. Hasn't needed a cane for awhile now. Sleeping is still terrible and waking every couple hours due to pain.   Impairments A. Pain;E. Decreased flexibility;F. Decreased strength and endurance   Functional Limitations perform activities of daily living   Symptom Location right knee   How/Where did it occur Other  (surgery)   Onset date of current episode/exacerbation 06/14/22   Chronicity New   Pain rating (0-10 point scale) Best (/10);Worst (/10)   Best (/10) 2   Worst (/10) 10   Pain quality B. Dull;C. Aching   Frequency of pain/symptoms A. Constant   Pain/symptoms are: Worse in the morning   Pain/symptoms exacerbated by G. Certain positions;L. Work tasks;M. Other  (steps squatting)   Pain/symptoms eased by A. Sitting;C. Rest;F. Certain positions;I. OTC medication(s)   Progression of symptoms since onset: Improved   Current Level of Function   Patient role/employment history A. Employed   Employment Comments  (10+ hours  of standing on concrete)   Fall Risk Screen   Fall screen completed by PT   Have you fallen 2 or more times in the past year? No   Have you fallen and had an injury in the past year? No   Is patient a fall risk? No   Abuse Screen (yes response referral indicated)   Feels Unsafe at Home or Work/School no   Feels Threatened by Someone no   Does Anyone Try to Keep You From Having Contact with Others or Doing Things Outside Your Home? no   Physical Signs of Abuse Present no   Knee Objective Findings   Gait/Locomotion antalgic gait favoring the right LE, decreased push of.   Palpation non tender to palpation   Accessory Motion/Joint Mobility AP and PA glide of tibiofemoral joints mod hypomobilty, patellar mobility   Observation 51.5 cm around knee mid line joint   Integumentary  mild swelling around the knee still,  incision healing well no scabs, mild adhesions at base of incision   Side (if bilateral, select both right and left) Right   Knee Special Test Comments 30 sec sit <> stand: 13 no hands   Right Knee Extension AROM 0   Right Knee Flexion AROM 100  (pulling in the front of the knee)   Right Knee Flexion Strength 4+   Right Knee Extension Strength 4+   R VMO Strength good, able to complete 10 SLR no lag   Right Gastrocnemius Flexibility mild tightness   Planned Therapy Interventions   Planned Therapy Interventions balance training;gait training;joint mobilization;manual therapy;neuromuscular re-education;ROM;strengthening;stretching;transfer training   Clinical Impression   Criteria for Skilled Therapeutic Interventions Met yes, treatment indicated   PT Diagnosis decreased right knee ROM   Influenced by the following impairments decreased right knee flexion ROM, decreased right LE strength, pain, swelling   Functional limitations due to impairments walking, stairs, standing, squatting   Clinical Presentation Stable/Uncomplicated   Clinical Presentation Rationale clinical decision making and chart review  "  Clinical Decision Making (Complexity) Low complexity   Therapy Frequency 1 time/week   Predicted Duration of Therapy Intervention (days/wks) 6 weeks   Risk & Benefits of therapy have been explained Yes   Patient, Family & other staff in agreement with plan of care Yes   Clinical Impression Comments Carol is a 52 year old female s/p 6/14/22 R TKA. Completed home care PT and now starting OP PT. primary impairment is decreased knee flexion ROM and strength.   Education Assessment   Preferred Learning Style Listening;Demonstration;Pictures/video   Barriers to Learning No barriers   ORTHO GOALS   PT Ortho Eval Goals 1;2;3   Ortho Goal 1   Goal Identifier 1   Goal Description Patient will improve right knee flexion to 115 degrees in order to improve ease of stairs and squatting.   Target Date 08/30/22   Ortho Goal 2   Goal Identifier 2   Goal Description Patient will be able to descend a 8\" step with no UE Support in order to improve ease of navigating stairs in her house.   Target Date 09/20/22   Ortho Goal 3   Goal Identifier 3   Goal Description patient will be able to return to work and stand for 4+ hours with knee pain staying below a 4/10.   Target Date 09/20/22   Total Evaluation Time   PT Eval, Low Complexity Minutes (81045) 12       Shelly Saravia  PT, DPT       8/9/2022   [place] [id number] [facility]   [address] [place] [id number]  [email or web address]  Voicemail: [phone]       "

## 2022-08-09 NOTE — TELEPHONE ENCOUNTER
S-(situation): Patient reporting facial pain and swelling    B-(background): Patient is a smoker.     A-(assessment): No injury. Symptoms started today. No fever. Tender to touch below her ear along her jaw. The swelling is still present but increases when she eats. The skin is not red or hot to touch. She has had intermittent sharp pains throughout the day.     R-(recommendations): Advised to try applying warm moist heat for 10-15 minutes several times throughout the day. Tylenol or ibuprofen as needed. Suck on hard tart candy such as lemon drop. Try to hold off on smoking. Schedule appt if symptoms worsen, fever develops or symptoms do not go away. Patient in agreement of plan.  Radha LÓPEZ RN

## 2022-08-16 ENCOUNTER — MYC MEDICAL ADVICE (OUTPATIENT)
Dept: GASTROENTEROLOGY | Facility: CLINIC | Age: 52
End: 2022-08-16

## 2022-08-16 DIAGNOSIS — F41.1 ANXIETY STATE: ICD-10-CM

## 2022-08-16 RX ORDER — PAROXETINE 20 MG/1
TABLET, FILM COATED ORAL
Qty: 90 TABLET | Refills: 0 | Status: SHIPPED | OUTPATIENT
Start: 2022-08-16 | End: 2022-11-22

## 2022-08-16 RX ORDER — PAROXETINE 40 MG/1
TABLET, FILM COATED ORAL
Qty: 90 TABLET | Refills: 0 | Status: SHIPPED | OUTPATIENT
Start: 2022-08-16 | End: 2022-11-22

## 2022-08-17 ENCOUNTER — HOSPITAL ENCOUNTER (OUTPATIENT)
Dept: PHYSICAL THERAPY | Facility: CLINIC | Age: 52
Setting detail: THERAPIES SERIES
Discharge: HOME | End: 2022-08-17
Attending: ORTHOPAEDIC SURGERY
Payer: COMMERCIAL

## 2022-08-17 PROCEDURE — 97140 MANUAL THERAPY 1/> REGIONS: CPT | Mod: GP | Performed by: PHYSICAL THERAPIST

## 2022-08-17 PROCEDURE — 97110 THERAPEUTIC EXERCISES: CPT | Mod: GP | Performed by: PHYSICAL THERAPIST

## 2022-08-18 ENCOUNTER — MYC MEDICAL ADVICE (OUTPATIENT)
Dept: ORTHOPEDICS | Facility: CLINIC | Age: 52
End: 2022-08-18

## 2022-08-20 DIAGNOSIS — F41.1 ANXIETY STATE: ICD-10-CM

## 2022-08-21 ENCOUNTER — MYC REFILL (OUTPATIENT)
Dept: FAMILY MEDICINE | Facility: CLINIC | Age: 52
End: 2022-08-21

## 2022-08-21 ENCOUNTER — MYC MEDICAL ADVICE (OUTPATIENT)
Dept: FAMILY MEDICINE | Facility: CLINIC | Age: 52
End: 2022-08-21

## 2022-08-21 DIAGNOSIS — F41.1 ANXIETY STATE: ICD-10-CM

## 2022-08-21 DIAGNOSIS — G25.81 RESTLESS LEG SYNDROME: ICD-10-CM

## 2022-08-21 RX ORDER — PROPRANOLOL HCL 60 MG
CAPSULE, EXTENDED RELEASE 24HR ORAL
Qty: 30 CAPSULE | Refills: 0 | Status: CANCELLED | OUTPATIENT
Start: 2022-08-21

## 2022-08-22 ENCOUNTER — MYC MEDICAL ADVICE (OUTPATIENT)
Dept: FAMILY MEDICINE | Facility: CLINIC | Age: 52
End: 2022-08-22

## 2022-08-22 DIAGNOSIS — F41.1 ANXIETY STATE: ICD-10-CM

## 2022-08-22 RX ORDER — PRAMIPEXOLE DIHYDROCHLORIDE 0.5 MG/1
TABLET ORAL
Qty: 180 TABLET | Refills: 1 | Status: SHIPPED | OUTPATIENT
Start: 2022-08-22 | End: 2023-02-14

## 2022-08-22 RX ORDER — PROPRANOLOL HCL 60 MG
CAPSULE, EXTENDED RELEASE 24HR ORAL
Qty: 90 CAPSULE | Refills: 1 | Status: SHIPPED | OUTPATIENT
Start: 2022-08-22 | End: 2023-02-14

## 2022-08-22 NOTE — TELEPHONE ENCOUNTER
"Requested Prescriptions   Pending Prescriptions Disp Refills     propranolol ER (INDERAL LA) 60 MG 24 hr capsule [Pharmacy Med Name: Propranolol HCl ER Oral Capsule Extended Release 24 Hour 60 MG] 30 capsule 0     Sig: TAKE 1 CAPSULE BY MOUTH EVERY MORNING       Beta-Blockers Protocol Passed - 8/20/2022  8:53 AM        Passed - Blood pressure under 140/90 in past 12 months     BP Readings from Last 3 Encounters:   08/03/22 124/73   07/25/22 117/80   06/27/22 (!) 150/82                 Passed - Patient is age 6 or older        Passed - Recent (12 mo) or future (30 days) visit within the authorizing provider's specialty     Patient has had an office visit with the authorizing provider or a provider within the authorizing providers department within the previous 12 mos or has a future within next 30 days. See \"Patient Info\" tab in inbasket, or \"Choose Columns\" in Meds & Orders section of the refill encounter.              Passed - Medication is active on med list             "

## 2022-08-30 ENCOUNTER — HOSPITAL ENCOUNTER (OUTPATIENT)
Dept: PHYSICAL THERAPY | Facility: CLINIC | Age: 52
Setting detail: THERAPIES SERIES
Discharge: HOME | End: 2022-08-30
Attending: ORTHOPAEDIC SURGERY
Payer: COMMERCIAL

## 2022-08-30 PROCEDURE — 97110 THERAPEUTIC EXERCISES: CPT | Mod: GP | Performed by: PHYSICAL THERAPIST

## 2022-08-30 PROCEDURE — 97140 MANUAL THERAPY 1/> REGIONS: CPT | Mod: GP | Performed by: PHYSICAL THERAPIST

## 2022-09-01 NOTE — PROGRESS NOTES
Chief Complaint   Patient presents with     Right Knee - Total Joint Post-op     DOS 6/14/22, 12 wk s/p. Patient notes her knee could be better. She went back to work yesterday but today she could hardly walk when she was done with break. 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 She needs a note today saying nothing has changed. Pain radiates down the anterior/medial lower leg.         SURGERY: Total knee arthroplasty, right knee ([facility])  DATE OF SURGERY: 6/14/2022      HISTORY OF PRESENT ILLNESS: Carol Guajardo is a 52 year old female seen for postoperative evaluation of right total knee arthroplasty. It has been 12 weeks since surgery. Returns today stating she's doing ok, could be better. Was doing quite well until she Returned to work yesterday, but today could hardly walk after her break due to pain , stiffness. She works on cement floors all day. She's working 4 hours per day currently. Pain will radiate down the leg. She figures its from return to work and increased activities. Taking over the counter pain medications.    She's noted her restless leg syndrome seems more bothersome on the right side since surgery, given her sleeping difficulties.     Denies any wound problems. Denies numbness, tingling, or weakness in the affected extremity. Denies fevers chills night sweats.    Pain 3/10.    Past medical history:   Past Medical History:   Diagnosis Date     Anxiety      Depression      HLD (hyperlipidemia)      Hypothyroidism      Mass of right lower leg 6/5/2017     Osteoarthritis of both knees      Right lower lobe lung mass     Biopsied 8/2018, diagnosed as fibrosis     Total knee replacement status, left 9/30/2019     Patient Active Problem List    Diagnosis Date Noted     Chronic, continuous use of opioids 06/29/2022     Priority: Medium     S/P TKR (total knee replacement), right 06/14/2022     Priority: Medium     VALERIE (obstructive sleep apnea) 06/14/2022     Priority:  Medium     Osteoarthritis of right knee 06/14/2022     Priority: Medium     Restless legs syndrome (RLS) 07/28/2021     Priority: Medium     Morbid obesity (H) 04/16/2021     Priority: Medium     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 CT August 2018:  The mass measures approximately 9.8 cm x 5.3 cm x 7.4 cm.  Biopsied 8/2018, diagnosed as fibrosis       Primary osteoarthritis of left knee 07/30/2018     Priority: Medium     Prediabetes 03/30/2018     Priority: Medium     Anxiety state 07/19/2017     Priority: Medium     Depression 07/19/2017     Priority: Medium     Vitamin D deficiency 07/19/2017     Priority: Medium     Nicotine dependence 07/19/2017     Priority: Medium     HLD (hyperlipidemia) 08/28/2015     Priority: Medium     Hypothyroidism 05/08/2015     Priority: Medium       Past Surgical History:   Past Surgical History:   Procedure Laterality Date     ARTHROPLASTY KNEE Left 9/30/2019    Procedure: Left Total Knee Arthroplasty;  Surgeon: Ion Bailey MD;  Location: [facility] OR     ARTHROPLASTY KNEE Right 6/14/2022    Procedure: ARTHROPLASTY, KNEE, TOTAL RIGHT;  Surgeon: Emilio Pino MD;  Location: [facility]     BRONCHOSCOPY (RIGID OR FLEXIBLE), DIAGNOSTIC N/A 4/21/2021    Procedure: BRONCHOSCOPY, WITH BRONCHOALVEOLAR LAVAGE;  Surgeon: Keli Webber MD;  Location: [facility]     CARPAL TUNNEL RELEASE RT/LT Bilateral      EXTERNAL EAR SURGERY       IR LUNG BIOPSY MEDIASTINUM RIGHT Right 08/2018    RLL mass, diagnosed as fibrosis per pt     LAPAROSCOPY DIAGNOSTIC (GENERAL)  02/2019     LAPAROSCOPY, SURGICAL; REPAIR UMBILICAL HERNIA  08/22/2018     THORACOTOMY, WEDGE RESECTION LUNG, COMBINED Right 4/19/2021    Procedure: Right Thoracotomy, excision of solitary fibrous tumor, intercostal nerve cryo ablation;  Surgeon: Keli Webber MD;  Location:  OR     [facility] LIGATE FALLOPIAN TUBE      Description: Tubal Ligation;  Recorded: 04/09/2008;        Medications:   Current Outpatient Medications:      buPROPion (WELLBUTRIN XL) 150 MG 24 hr tablet, TAKE 1 TABLET BY MOUTH EVERY MORNING IN ADDITINO TO THE 300MG TABLET FOR A TOTAL OF 450MG DAILY, Disp: 90 tablet, Rfl: 1     buPROPion (WELLBUTRIN XL) 300 MG 24 hr tablet, TAKE 1 TABLET BY MOUTH EVERY MORNING, Disp: 90 tablet, Rfl: 1     calcium carbonate (OS-BERTA) 500 MG tablet, Take 1 tablet by mouth daily, Disp: , Rfl:      Ferrous Sulfate 324 (65 Fe) MG TBEC, Take 324 mg by mouth daily, Disp: , Rfl:      gabapentin (NEURONTIN) 300 MG capsule, Take 3 capsules (900 mg) by mouth 2 times daily, Disp: 540 capsule, Rfl: 3     hydrOXYzine (ATARAX) 25 MG tablet, Take 1-2 tablets (25-50 mg) by mouth 3 times daily as needed for anxiety (and insomnia), Disp: 30 tablet, Rfl: 0     levothyroxine (SYNTHROID/LEVOTHROID) 175 MCG tablet, Take 1 tablet (175 mcg) by mouth every morning, Disp: 90 tablet, Rfl: 3     PARoxetine (PAXIL) 20 MG tablet, TAKE 1 TABLET BY MOUTH EVERY MORNING WITH PAROXETINE 40 MG FOR A TOTAL DAILY DOSE OF 60 MG, Disp: 90 tablet, Rfl: 0     PARoxetine (PAXIL) 40 MG tablet, TAKE 1 TABLET BY MOUTH EVERY MORNING WITH PAROXETINE 20 MD FOR A TOTAL DAILY DOSE OF 60 MG, Disp: 90 tablet, Rfl: 0     pramipexole (MIRAPEX) 0.5 MG tablet, TAKE 2 TABLETS BY MOUTH AT BEDTIME, Disp: 180 tablet, Rfl: 1     propranolol ER (INDERAL LA) 60 MG 24 hr capsule, TAKE 1 CAPSULE BY MOUTH EVERY MORNING, Disp: 90 capsule, Rfl: 1    Allergies:   Allergies   Allergen Reactions     Amoxicillin-Pot Clavulanate Other (See Comments) and Hives     Edema     Augmentin Hives and Itching     Ciprofloxacin Hives and Itching     Penicillins Hives and Itching         REVIEW OF SYSTEMS:  CONSTITUTIONAL:NEGATIVE for fever, chills, night sweats, change in weight  INTEGUMENTARY/SKIN: NEGATIVE for worrisome rashes, moles or lesions  MUSCULOSKELETAL:See HPI above  NEURO: NEGATIVE for weakness, dizziness or paresthesias  CARDIOVASCULAR: negative for  chest pain, shortness of breath    PHYSICAL EXAM:  BP (!) 160/95   Pulse 93   Ht 1.524 m (5')   LMP 07/17/2022 (Approximate)   BMI 45.70 kg/m     GENERAL APPEARANCE: healthy, alert, no distress  SKIN: no suspicious lesions or rashes  NEURO: Normal strength and tone, mentation intact and speech normal  PSYCH:  mentation appears normal and affect normal  RESPIRATORY: No increased work of breathing.    BILATERAL LOWER EXTREMITIES:  Gait: slight favors the right , unassisted.    Intact sensation deep peroneal nerve, superficial peroneal nerve, med/lat tibial nerve, sural nerve, saphenous nerve  Intact EHL, EDL, TA, FHL, GS, quadriceps hamstrings and hip flexors  Toes warm and well perfused, brisk capillary refill. Palpable 2+ dp pulses.  Bilateral calf soft and nttp or squeeze.  Edema: trace    right  KNEE EXAM:    Incision: healed well.  Skin: intact, no ecchymosis   ROM: full extension to 120 flexion  Effusion: trace  Tender: medial hamstrings up the inner thigh, pes and medial gastroc.      X-RAY:  2 views right knee from 9/7/2022 were reviewed in clinic today. No obvious fractures or dislocations. Total knee arthroplasty components in place without evidence of failure or loosening.      Impression: Carol Guajardo is a 52 year old female status post Total knee arthroplasty, right knee, doing well, 12 weeks out from surgery.      Plan:   * reviewed xrays with patient, showing total knee arthroplasty implants.  * not surprising more sore after returning to work, given on feet all day. This will improve with time. Appears more muscular with hamstrings and calf muscles.  * I asked if she felt she needed more time off work, but she states she'll do ok.    * continue with knee range of motion exercises   * wean off all pain medications as tolerated  * xrays next visit: 2 views of the knee  * return to clinic 9 months  * Physical Therapy, home exercise program. Outpatient referral placed today.  * all questions  addressed and answered prior to discharge from clinic today to patient's satisfaction.  * patient will need longterm prophylactic antibiotics use with dental procedures or other invasive procedures (2 g amoxicilin or 450mg ([id number]) clindamycin) 1 hour prior to dental procedures.  * plan continued work, 4 hours and gradually working back to her 10 hour days.     * KOOS Jr/Promis Knee score: IS to be done at next visit (12 months); WAS completed during todays visit; to be completed at 12 weeks and 12 months postoperative.      Emilio Pino M.D., M.S.  Dept. of Orthopaedic Surgery  [facility]

## 2022-09-06 ENCOUNTER — HOSPITAL ENCOUNTER (OUTPATIENT)
Dept: PHYSICAL THERAPY | Facility: CLINIC | Age: 52
Setting detail: THERAPIES SERIES
Discharge: HOME | End: 2022-09-06
Attending: ORTHOPAEDIC SURGERY
Payer: COMMERCIAL

## 2022-09-06 PROCEDURE — 97110 THERAPEUTIC EXERCISES: CPT | Mod: GP | Performed by: PHYSICAL THERAPIST

## 2022-09-06 NOTE — PROGRESS NOTES
"Addendum: 10/26/2022   This note acts as a discharge note as patient did not need to return for continued therapy. At last attended visit when progress note was written, patient was meeting all their goals and the plan was to discharge if no new symptoms arose within 30 days.                                                                                [facility] Service    Outpatient Physical Therapy Progress Note  Patient: Carol Guajardo  : 1970    Beginning/End Dates of Reporting Period:  22 to 22    Referring Provider: Emilio Quiñonez Diagnosis: decreased right knee ROM     Client Self Report: Knee has really loosened up this past week and feels great.  Went back to work today for 4 hours and the knee held up good.  No concerns or issues with the knee in relation to her job duties or household chores.    Objective Measurements:  Objective Measure: right knee ROM  Details: 0-116  Objective Measure: steps  Details: 8\" ascend/descend with no railing     TU.5 seconds   30 sec sit to stand: 12 no hands       Goals:  Goal Identifier 1   Goal Description Patient will improve right knee flexion to 115 degrees in order to improve ease of stairs and squatting.   Target Date 22   Date Met  22   Progress (detail required for progress note): flexion 116 post stretching     Goal Identifier 2   Goal Description Patient will be able to descend a 8\" step with no UE Support in order to improve ease of navigating stairs in her house.   Target Date 22   Date Met  22   Progress (detail required for progress note): able to complete 8\" step up/down no railing     Goal Identifier 3   Goal Description patient will be able to return to work and stand for 4+ hours with knee pain staying below a 4/10.   Target Date 22   Date Met  22   Progress (detail required for progress note): returned to work today and stood for 4 hours         Plan:  Discharge from " therapy    Discharge:    Reason for Discharge: Patient has met all goals.    Equipment Issued: none    Discharge Plan: Patient to continue home program.

## 2022-09-07 ENCOUNTER — OFFICE VISIT (OUTPATIENT)
Dept: ORTHOPEDICS | Facility: CLINIC | Age: 52
End: 2022-09-07

## 2022-09-07 ENCOUNTER — ANCILLARY PROCEDURE (OUTPATIENT)
Dept: GENERAL RADIOLOGY | Facility: CLINIC | Age: 52
End: 2022-09-07
Attending: ORTHOPAEDIC SURGERY
Payer: COMMERCIAL

## 2022-09-07 VITALS
DIASTOLIC BLOOD PRESSURE: 95 MMHG | HEART RATE: 93 BPM | BODY MASS INDEX: 45.7 KG/M2 | HEIGHT: 60 IN | SYSTOLIC BLOOD PRESSURE: 160 MMHG

## 2022-09-07 DIAGNOSIS — Z96.651 S/P TOTAL KNEE ARTHROPLASTY, RIGHT: ICD-10-CM

## 2022-09-07 DIAGNOSIS — Z96.651 S/P TOTAL KNEE ARTHROPLASTY, RIGHT: Primary | ICD-10-CM

## 2022-09-07 PROCEDURE — 73560 X-RAY EXAM OF KNEE 1 OR 2: CPT | Mod: TC | Performed by: RADIOLOGY

## 2022-09-07 PROCEDURE — 99024 POSTOP FOLLOW-UP VISIT: CPT | Performed by: ORTHOPAEDIC SURGERY

## 2022-09-07 RX ORDER — ACETAMINOPHEN 325 MG/1
325-650 TABLET ORAL EVERY 6 HOURS PRN
COMMUNITY

## 2022-09-07 ASSESSMENT — KOOS JR
GOING UP OR DOWN STAIRS: MODERATE
HOW SEVERE IS YOUR KNEE STIFFNESS AFTER FIRST WAKING IN MORNING: MODERATE
TWISING OR PIVOTING ON KNEE: MODERATE
KOOS JR SCORING: 70.7

## 2022-09-07 ASSESSMENT — PAIN SCALES - GENERAL: PAINLEVEL: MILD PAIN (3)

## 2022-09-07 NOTE — LETTER
September 7, 2022      RE: Carol Guajardo  [id number] [facility] [id number]        Carol Guajardo is under my care for s/p right total knee arthroplasty.    Date of surgery: 6/14/22    Return to work date: 9/7/22     ** WITH RESTRICTIONS? Yes, with work restrictions: * Restricted work hours: may work 4 hours per day the first week, then 6 hours a day starting 9/13, then 8 hours a day starting 9/20, then regular 10 hours a day starting 9/27. No kneeling. Please contact my office with any questions.     Next appointment: 6/2023        Electronically Signed (as below)  Dr. Emilio Pino M.D.

## 2022-09-07 NOTE — LETTER
9/7/2022         RE: Carol Guajardo  [id number] [address] [address]        Dear Colleague,    Thank you for referring your patient, Carol Guajardo, to the [facility]. Please see a copy of my visit note below.    Chief Complaint   Patient presents with     Right Knee - Total Joint Post-op     DOS 6/14/22, 12 wk s/p. Patient notes her knee could be better. She went back to work yesterday but today she could hardly walk when she was done with break. 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 She needs a note today saying nothing has changed. Pain radiates down the anterior/medial lower leg.         SURGERY: Total knee arthroplasty, right knee ([facility])  DATE OF SURGERY: 6/14/2022      HISTORY OF PRESENT ILLNESS: Carol Guajardo is a 52 year old female seen for postoperative evaluation of right total knee arthroplasty. It has been 12 weeks since surgery. Returns today stating she's doing ok, could be better. Was doing quite well until she Returned to work yesterday, but today could hardly walk after her break due to pain , stiffness. She works on cement floors all day. She's working 4 hours per day currently. Pain will radiate down the leg. She figures its from return to work and increased activities. Taking over the counter pain medications.    She's noted her restless leg syndrome seems more bothersome on the right side since surgery, given her sleeping difficulties.     Denies any wound problems. Denies numbness, tingling, or weakness in the affected extremity. Denies fevers chills night sweats.    Pain 3/10.    Past medical history:   Past Medical History:   Diagnosis Date     Anxiety      Depression      HLD (hyperlipidemia)      Hypothyroidism      Mass of right lower leg 6/5/2017     Osteoarthritis of both knees      Right lower lobe lung mass     Biopsied 8/2018, diagnosed as fibrosis     Total knee replacement status, left 9/30/2019      Patient Active Problem List    Diagnosis Date Noted     Chronic, continuous use of opioids 06/29/2022     Priority: Medium     S/P TKR (total knee replacement), right 06/14/2022     Priority: Medium     VALERIE (obstructive sleep apnea) 06/14/2022     Priority: Medium     Osteoarthritis of right knee 06/14/2022     Priority: Medium     Restless legs syndrome (RLS) 07/28/2021     Priority: Medium     Morbid obesity (H) 04/16/2021     Priority: Medium     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 CT August 2018:  The mass measures approximately 9.8 cm x 5.3 cm x 7.4 cm.  Biopsied 8/2018, diagnosed as fibrosis       Primary osteoarthritis of left knee 07/30/2018     Priority: Medium     Prediabetes 03/30/2018     Priority: Medium     Anxiety state 07/19/2017     Priority: Medium     Depression 07/19/2017     Priority: Medium     Vitamin D deficiency 07/19/2017     Priority: Medium     Nicotine dependence 07/19/2017     Priority: Medium     HLD (hyperlipidemia) 08/28/2015     Priority: Medium     Hypothyroidism 05/08/2015     Priority: Medium       Past Surgical History:   Past Surgical History:   Procedure Laterality Date     ARTHROPLASTY KNEE Left 9/30/2019    Procedure: Left Total Knee Arthroplasty;  Surgeon: Ion Bailey MD;  Location: [facility] OR     ARTHROPLASTY KNEE Right 6/14/2022    Procedure: ARTHROPLASTY, KNEE, TOTAL RIGHT;  Surgeon: Emilio Pino MD;  Location: [facility]     BRONCHOSCOPY (RIGID OR FLEXIBLE), DIAGNOSTIC N/A 4/21/2021    Procedure: BRONCHOSCOPY, WITH BRONCHOALVEOLAR LAVAGE;  Surgeon: Keli Webber MD;  Location: [facility] GI     CARPAL TUNNEL RELEASE RT/LT Bilateral      EXTERNAL EAR SURGERY       IR LUNG BIOPSY MEDIASTINUM RIGHT Right 08/2018    RLL mass, diagnosed as fibrosis per pt     LAPAROSCOPY DIAGNOSTIC (GENERAL)  02/2019     LAPAROSCOPY, SURGICAL; REPAIR UMBILICAL HERNIA  08/22/2018     THORACOTOMY, WEDGE RESECTION LUNG, COMBINED Right  4/19/2021    Procedure: Right Thoracotomy, excision of solitary fibrous tumor, intercostal nerve cryo ablation;  Surgeon: Keli Webber MD;  Location: [facility] OR     [facility] LIGATE FALLOPIAN TUBE      Description: Tubal Ligation;  Recorded: 04/09/2008;       Medications:   Current Outpatient Medications:      buPROPion (WELLBUTRIN XL) 150 MG 24 hr tablet, TAKE 1 TABLET BY MOUTH EVERY MORNING IN ADDITINO TO THE 300MG TABLET FOR A TOTAL OF 450MG DAILY, Disp: 90 tablet, Rfl: 1     buPROPion (WELLBUTRIN XL) 300 MG 24 hr tablet, TAKE 1 TABLET BY MOUTH EVERY MORNING, Disp: 90 tablet, Rfl: 1     calcium carbonate (OS-BERTA) 500 MG tablet, Take 1 tablet by mouth daily, Disp: , Rfl:      Ferrous Sulfate 324 (65 Fe) MG TBEC, Take 324 mg by mouth daily, Disp: , Rfl:      gabapentin (NEURONTIN) 300 MG capsule, Take 3 capsules (900 mg) by mouth 2 times daily, Disp: 540 capsule, Rfl: 3     hydrOXYzine (ATARAX) 25 MG tablet, Take 1-2 tablets (25-50 mg) by mouth 3 times daily as needed for anxiety (and insomnia), Disp: 30 tablet, Rfl: 0     levothyroxine (SYNTHROID/LEVOTHROID) 175 MCG tablet, Take 1 tablet (175 mcg) by mouth every morning, Disp: 90 tablet, Rfl: 3     PARoxetine (PAXIL) 20 MG tablet, TAKE 1 TABLET BY MOUTH EVERY MORNING WITH PAROXETINE 40 MG FOR A TOTAL DAILY DOSE OF 60 MG, Disp: 90 tablet, Rfl: 0     PARoxetine (PAXIL) 40 MG tablet, TAKE 1 TABLET BY MOUTH EVERY MORNING WITH PAROXETINE 20 MD FOR A TOTAL DAILY DOSE OF 60 MG, Disp: 90 tablet, Rfl: 0     pramipexole (MIRAPEX) 0.5 MG tablet, TAKE 2 TABLETS BY MOUTH AT BEDTIME, Disp: 180 tablet, Rfl: 1     propranolol ER (INDERAL LA) 60 MG 24 hr capsule, TAKE 1 CAPSULE BY MOUTH EVERY MORNING, Disp: 90 capsule, Rfl: 1    Allergies:   Allergies   Allergen Reactions     Amoxicillin-Pot Clavulanate Other (See Comments) and Hives     Edema     Augmentin Hives and Itching     Ciprofloxacin Hives and Itching     Penicillins Hives and Itching         REVIEW OF  SYSTEMS:  CONSTITUTIONAL:NEGATIVE for fever, chills, night sweats, change in weight  INTEGUMENTARY/SKIN: NEGATIVE for worrisome rashes, moles or lesions  MUSCULOSKELETAL:See HPI above  NEURO: NEGATIVE for weakness, dizziness or paresthesias  CARDIOVASCULAR: negative for chest pain, shortness of breath    PHYSICAL EXAM:  BP (!) 160/95   Pulse 93   Ht 1.524 m (5')   LMP 07/17/2022 (Approximate)   BMI 45.70 kg/m     GENERAL APPEARANCE: healthy, alert, no distress  SKIN: no suspicious lesions or rashes  NEURO: Normal strength and tone, mentation intact and speech normal  PSYCH:  mentation appears normal and affect normal  RESPIRATORY: No increased work of breathing.    BILATERAL LOWER EXTREMITIES:  Gait: slight favors the right , unassisted.    Intact sensation deep peroneal nerve, superficial peroneal nerve, med/lat tibial nerve, sural nerve, saphenous nerve  Intact EHL, EDL, TA, FHL, GS, quadriceps hamstrings and hip flexors  Toes warm and well perfused, brisk capillary refill. Palpable 2+ dp pulses.  Bilateral calf soft and nttp or squeeze.  Edema: trace    right  KNEE EXAM:    Incision: healed well.  Skin: intact, no ecchymosis   ROM: full extension to 120 flexion  Effusion: trace  Tender: medial hamstrings up the inner thigh, pes and medial gastroc.      X-RAY:  2 views right knee from 9/7/2022 were reviewed in clinic today. No obvious fractures or dislocations. Total knee arthroplasty components in place without evidence of failure or loosening.      Impression: Carol Guajardo is a 52 year old female status post Total knee arthroplasty, right knee, doing well, 12 weeks out from surgery.      Plan:   * reviewed xrays with patient, showing total knee arthroplasty implants.  * not surprising more sore after returning to work, given on feet all day. This will improve with time. Appears more muscular with hamstrings and calf muscles.  * I asked if she felt she needed more time off work, but she states she'll do  ok.    * continue with knee range of motion exercises   * wean off all pain medications as tolerated  * xrays next visit: 2 views of the knee  * return to clinic 9 months  * Physical Therapy, home exercise program. Outpatient referral placed today.  * all questions addressed and answered prior to discharge from clinic today to patient's satisfaction.  * patient will need longterm prophylactic antibiotics use with dental procedures or other invasive procedures (2 g amoxicilin or 450mg ([id number]) clindamycin) 1 hour prior to dental procedures.  * plan continued work, 4 hours and gradually working back to her 10 hour days.     * KOOS Jr/Promis Knee score: IS to be done at next visit (12 months); WAS completed during todays visit; to be completed at 12 weeks and 12 months postoperative.      Emilio Pino M.D., M.S.  Dept. of Orthopaedic Surgery  [facility]            Again, thank you for allowing me to participate in the care of your patient.        Sincerely,        Emilio Pino MD

## 2022-09-12 NOTE — PROGRESS NOTES
[facility] Rehabilitation Service    Outpatient Physical Therapy Discharge Note  Patient: Carol Guajardo  : 1970    Beginning/End Dates of Reporting Period:  22 to 22    Referring Provider: Jonathan Quiñonez Diagnosis: R knee pain     Client Self Report: Pt relates knee has been really sore this week, maybe weather. Pt relates knee is staying the same. Pt relates she is just miserable at work and super stiff after car ride.     Objective Measurements:  Objective Measure: R knee ROM  Details: 0-117  Objective Measure: R knee MMT  Details: flex: 4/5, ext: 4/5   Objective Measure: Ambulation  Details: decrasd stride length, increase latera sway                     Goals:  Goal Identifier 1   Goal Description Pt will improve LEFS to 55 for improved tolerance to functional activity   Target Date 22   Date Met      Progress (detail required for progress note):  not assessed at last visit     Goal Identifier 2   Goal Description Pt will be able to demonstrate 4+/5 B hip and knee strength in order to decrease difficulty with ADLs.    Target Date 22   Date Met      Progress (detail required for progress note):  not met     Goal Identifier 3   Goal Description Pt will be independent with HEP for ongoing self management of symptoms   Target Date 22   Date Met      Progress (detail required for progress note):  compliant for current HEP           Progress this reporting period: Pt was seen for 3 visits in physical therapy over this POC. Objective measures are all taken from last visit. At time of last visit Progressed HEP but pt is also encoruaged to return to MD if pain is unmanagable. Plan to continue weekly w strengthening. Pt has failed to schedule f/u visits within 30 days from last visit as instructed thus is being d/c from therapy at this time. Current status is unknown at this time.             Plan:  Discharge from therapy.    Discharge:    Reason for Discharge: Patient has failed to schedule further appointments.    Equipment Issued: NA    Discharge Plan: Patient to continue home program.

## 2022-09-15 ENCOUNTER — MYC MEDICAL ADVICE (OUTPATIENT)
Dept: FAMILY MEDICINE | Facility: CLINIC | Age: 52
End: 2022-09-15

## 2022-09-15 DIAGNOSIS — G25.81 RESTLESS LEGS SYNDROME (RLS): Primary | ICD-10-CM

## 2022-10-03 ENCOUNTER — MYC MEDICAL ADVICE (OUTPATIENT)
Dept: FAMILY MEDICINE | Facility: CLINIC | Age: 52
End: 2022-10-03

## 2022-10-14 ENCOUNTER — OFFICE VISIT (OUTPATIENT)
Dept: FAMILY MEDICINE | Facility: CLINIC | Age: 52
End: 2022-10-14
Payer: COMMERCIAL

## 2022-10-14 VITALS
HEART RATE: 63 BPM | BODY MASS INDEX: 45.7 KG/M2 | DIASTOLIC BLOOD PRESSURE: 64 MMHG | TEMPERATURE: 97.3 F | OXYGEN SATURATION: 97 % | WEIGHT: 234 LBS | SYSTOLIC BLOOD PRESSURE: 122 MMHG | RESPIRATION RATE: 30 BRPM

## 2022-10-14 DIAGNOSIS — M25.661 JOINT STIFFNESS OF RIGHT LOWER LEG: ICD-10-CM

## 2022-10-14 DIAGNOSIS — M79.605 BILATERAL LEG PAIN: Primary | ICD-10-CM

## 2022-10-14 DIAGNOSIS — M79.604 BILATERAL LEG PAIN: Primary | ICD-10-CM

## 2022-10-14 DIAGNOSIS — Z12.31 VISIT FOR SCREENING MAMMOGRAM: ICD-10-CM

## 2022-10-14 DIAGNOSIS — G25.81 RESTLESS LEGS SYNDROME (RLS): ICD-10-CM

## 2022-10-14 DIAGNOSIS — M25.662 JOINT STIFFNESS OF LEFT LOWER LEG: ICD-10-CM

## 2022-10-14 DIAGNOSIS — F41.1 ANXIETY STATE: ICD-10-CM

## 2022-10-14 LAB — FERRITIN SERPL-MCNC: 185 NG/ML (ref 11–328)

## 2022-10-14 PROCEDURE — 82728 ASSAY OF FERRITIN: CPT | Performed by: NURSE PRACTITIONER

## 2022-10-14 PROCEDURE — 99214 OFFICE O/P EST MOD 30 MIN: CPT | Mod: 25 | Performed by: NURSE PRACTITIONER

## 2022-10-14 PROCEDURE — 36415 COLL VENOUS BLD VENIPUNCTURE: CPT | Performed by: NURSE PRACTITIONER

## 2022-10-14 PROCEDURE — 90471 IMMUNIZATION ADMIN: CPT | Performed by: NURSE PRACTITIONER

## 2022-10-14 PROCEDURE — 90715 TDAP VACCINE 7 YRS/> IM: CPT | Performed by: NURSE PRACTITIONER

## 2022-10-14 ASSESSMENT — ANXIETY QUESTIONNAIRES
5. BEING SO RESTLESS THAT IT IS HARD TO SIT STILL: SEVERAL DAYS
GAD7 TOTAL SCORE: 8
4. TROUBLE RELAXING: SEVERAL DAYS
6. BECOMING EASILY ANNOYED OR IRRITABLE: SEVERAL DAYS
8. IF YOU CHECKED OFF ANY PROBLEMS, HOW DIFFICULT HAVE THESE MADE IT FOR YOU TO DO YOUR WORK, TAKE CARE OF THINGS AT HOME, OR GET ALONG WITH OTHER PEOPLE?: VERY DIFFICULT
7. FEELING AFRAID AS IF SOMETHING AWFUL MIGHT HAPPEN: SEVERAL DAYS
GAD7 TOTAL SCORE: 8
IF YOU CHECKED OFF ANY PROBLEMS ON THIS QUESTIONNAIRE, HOW DIFFICULT HAVE THESE PROBLEMS MADE IT FOR YOU TO DO YOUR WORK, TAKE CARE OF THINGS AT HOME, OR GET ALONG WITH OTHER PEOPLE: VERY DIFFICULT
3. WORRYING TOO MUCH ABOUT DIFFERENT THINGS: SEVERAL DAYS
1. FEELING NERVOUS, ANXIOUS, OR ON EDGE: MORE THAN HALF THE DAYS
7. FEELING AFRAID AS IF SOMETHING AWFUL MIGHT HAPPEN: SEVERAL DAYS
2. NOT BEING ABLE TO STOP OR CONTROL WORRYING: SEVERAL DAYS

## 2022-10-14 ASSESSMENT — PATIENT HEALTH QUESTIONNAIRE - PHQ9
SUM OF ALL RESPONSES TO PHQ QUESTIONS 1-9: 9
SUM OF ALL RESPONSES TO PHQ QUESTIONS 1-9: 9
10. IF YOU CHECKED OFF ANY PROBLEMS, HOW DIFFICULT HAVE THESE PROBLEMS MADE IT FOR YOU TO DO YOUR WORK, TAKE CARE OF THINGS AT HOME, OR GET ALONG WITH OTHER PEOPLE: VERY DIFFICULT

## 2022-10-14 ASSESSMENT — PAIN SCALES - GENERAL: PAINLEVEL: MILD PAIN (3)

## 2022-10-14 NOTE — PATIENT INSTRUCTIONS
Bilateral leg pain  Continues to have ongoing bilateral leg pain, difficulty standing for long periods of time.  Also has difficulty sitting for periods of time, has to get up often due to pain.  Does admit to some intermittent swelling of ankles.  Complains of stiffness, no numbness or tingling.  Has seen sleep medicine, ferritin levels were low <50.  Was advised to start an iron replacement which patient is currently taking twice daily.  Patient is also currently taking gabapentin and Mirapex without much improvement.  Patient states she has fallen a couple times after getting up and going to step due to this.  We will recheck iron levels today as well as have patient schedule ultrasound of bilateral legs to rule out intermittent claudication and/or PVD.  Did advise patient to follow-up with sleep medicine as well.  Patient can schedule ultrasound at , will call with results and any further recommendations.  Encouraged to continue iron supplement as well as calcium with vitamin D.  - US SHRAVAN Doppler with Exercise Bilateral; Future  - Ferritin; Future  - Ferritin    Restless legs syndrome (RLS)  Restless legs above.  Continue medications without any changes.  Recommend follow-up with sleep medicine.    Joint stiffness of right lower leg  With increased joint stiffness of.  We will have patient complete ultrasound as above.  - US SHRAVAN Doppler with Exercise Bilateral; Future    Joint stiffness of left lower leg  With increased joint stiffness of.  We will have patient complete ultrasound as above.  - US SHRAVAN Doppler with Exercise Bilateral; Future    Anxiety state  Chronic, stable this time.  Manages well on Wellbutrin and Paxil.  Continue without any changes.    Visit for screening mammogram  Due for screening.  Patient to receive a phone call to schedule can call [phone] to schedule.    [phone]

## 2022-10-14 NOTE — PROGRESS NOTES
Assessment & Plan     Bilateral leg pain  Continues to have ongoing bilateral leg pain, difficulty standing for long periods of time.  Also has difficulty sitting for periods of time, has to get up often due to pain.  Does admit to some intermittent swelling of ankles.  Complains of stiffness, no numbness or tingling.  Has seen sleep medicine, ferritin levels were low <50.  Was advised to start an iron replacement which patient is currently taking twice daily.  Patient is also currently taking gabapentin and Mirapex without much improvement.  Patient states she has fallen a couple times after getting up and going to step due to this.  We will recheck iron levels today as well as have patient schedule ultrasound of bilateral legs to rule out intermittent claudication and/or PVD.  Did advise patient to follow-up with sleep medicine as well.  Patient can schedule ultrasound at , will call with results and any further recommendations.  Encouraged to continue iron supplement as well as calcium with vitamin D.  - US SHRAVAN Doppler with Exercise Bilateral; Future  - Ferritin; Future  - Ferritin    Restless legs syndrome (RLS)  Restless legs above.  Continue medications without any changes.  Recommend follow-up with sleep medicine.    Joint stiffness of right lower leg  With increased joint stiffness of.  We will have patient complete ultrasound as above.  - US SHRAVAN Doppler with Exercise Bilateral; Future    Joint stiffness of left lower leg  With increased joint stiffness of.  We will have patient complete ultrasound as above.  - US SHRAVAN Doppler with Exercise Bilateral; Future    Anxiety state  Chronic, stable this time.  Manages well on Wellbutrin and Paxil.  Continue without any changes.    Visit for screening mammogram  Due for screening.  Patient to receive a phone call to schedule can call [phone] to schedule.  - MA SCREENING DIGITAL BILAT - Future  (s+30); Future              BMI:   Estimated body mass  index is 45.7 kg/m  as calculated from the following:    Height as of 9/7/22: 1.524 m (5').    Weight as of this encounter: 106.1 kg (234 lb).   Weight management plan: Discussed healthy diet and exercise guidelines    See Patient Instructions After discussion with patient, patient verbalizes and agreeable to receive AVS instructions via My Chart, not printed today     No follow-ups on file.    Barbara Koenig, DNP, APRN-CNP   [facility]     Carol Guajardo is a 52 year old female who presents today for the following   health issues:    HPI  Chief Complaint   Patient presents with     Leg Problem     Health Maintenance     Declined hm except mammo       Answers for HPI/ROS submitted by the patient on 10/14/2022  If you checked off any problems, how difficult have these problems made it for you to do your work, take care of things at home, or get along with other people?: Very difficult  PHQ9 TOTAL SCORE: 9  SHEFALI 7 TOTAL SCORE: 8  Depression/Anxiety: Depression & Anxiety  Status since last visit:: medium  Anxiety since last: : worse  Other associated symptoms of depression:: No  Other associated symotome: : No  Significant life event: : relationship concerns, job concerns, health concerns  Anxious:: Yes  Current substance use:: No  What is the reason for your visit today? : restless legs  How many servings of fruits and vegetables do you eat daily?: 0-1  On average, how many sweetened beverages do you drink each day (Examples: soda, juice, sweet tea, etc.  Do NOT count diet or artificially sweetened beverages)?: 1  How many minutes a day do you exercise enough to make your heart beat faster?: 9 or less  How many days a week do you exercise enough to make your heart beat faster?: 4  How many days per week do you miss taking your medication?: 0        Knee since surgery is okay - still healing   Stiffness even after 10 minutes - no numbness/tingling   Everything from the knees down  on both sides - joints and muscles  Some swelling of ankles   Difficulty driving without stopping to walk around   Has fallen a couple of times after getting up and going to step  No back pain or injury  Still taking iron with calcium two times daily         Review of Systems    Constitutional, HEENT, cardiovascular, pulmonary, gi and gu systems are negative, except as otherwise noted.    Objective   /64   Pulse 63   Temp 97.3  F (36.3  C)   Resp 30   Wt 106.1 kg (234 lb)   SpO2 97%   BMI 45.70 kg/m    Body mass index is 45.7 kg/m .    Physical Exam  GENERAL APPEARANCE: healthy, alert and no distress  RESP: lungs clear to auscultation - no rales, rhonchi or wheezes  CV: regular rates and rhythm, normal S1 S2, no S3 or S4 and no murmur, click or rub  MS: extremities normal- no gross deformities noted, peripheral pulses normal and no swelling or tenderness of bilateral lower extremities   SKIN: no suspicious lesions or rashes  NEURO: Normal strength and tone, mentation intact and speech normal  PSYCH: mentation appears normal and affect normal/bright    Diagnostic Test Results:  Pending      Chart documentation with Dragon Voice recognition Software. Although reviewed after completion, some words and grammatical errors may remain.

## 2022-10-24 ENCOUNTER — TELEPHONE (OUTPATIENT)
Dept: ORTHOPEDICS | Facility: CLINIC | Age: 52
End: 2022-10-24

## 2022-10-24 NOTE — TELEPHONE ENCOUNTER
Faxed Insurance forms to [employer] at [phone]. Fax confirmed and sent to abstracting. Called and left a vm for patient letting her know this was completed. Left a call back number for questions.  Bhumi Chung Certified Medical Assistant

## 2022-11-02 ENCOUNTER — MYC MEDICAL ADVICE (OUTPATIENT)
Dept: FAMILY MEDICINE | Facility: CLINIC | Age: 52
End: 2022-11-02

## 2022-11-03 ENCOUNTER — OFFICE VISIT (OUTPATIENT)
Dept: URGENT CARE | Facility: URGENT CARE | Age: 52
End: 2022-11-03
Payer: COMMERCIAL

## 2022-11-03 VITALS
TEMPERATURE: 98.4 F | OXYGEN SATURATION: 98 % | HEART RATE: 65 BPM | BODY MASS INDEX: 45.31 KG/M2 | WEIGHT: 232 LBS | SYSTOLIC BLOOD PRESSURE: 128 MMHG | DIASTOLIC BLOOD PRESSURE: 76 MMHG

## 2022-11-03 DIAGNOSIS — J98.8 RESPIRATORY INFECTION: Primary | ICD-10-CM

## 2022-11-03 DIAGNOSIS — F17.200 TOBACCO USE DISORDER: ICD-10-CM

## 2022-11-03 PROBLEM — J06.9 UPPER RESPIRATORY TRACT INFECTION, UNSPECIFIED TYPE: Status: ACTIVE | Noted: 2022-11-03

## 2022-11-03 PROBLEM — J06.9 UPPER RESPIRATORY TRACT INFECTION, UNSPECIFIED TYPE: Status: RESOLVED | Noted: 2022-11-03 | Resolved: 2022-11-03

## 2022-11-03 PROCEDURE — 99213 OFFICE O/P EST LOW 20 MIN: CPT | Performed by: FAMILY MEDICINE

## 2022-11-03 RX ORDER — DOXYCYCLINE HYCLATE 100 MG
100 TABLET ORAL 2 TIMES DAILY
Qty: 14 TABLET | Refills: 0 | Status: SHIPPED | OUTPATIENT
Start: 2022-11-03 | End: 2023-09-08

## 2022-11-03 NOTE — LETTER
November 3, 2022        Carol Guajardo        Unable to work Nov 1 through 4 due to illness.        Syd Merino MD on 11/3/2022 at 11:15 AM

## 2022-11-03 NOTE — PROGRESS NOTES
(J98.8) Respiratory infection  (primary encounter diagnosis)  Comment:   Plan: doxycycline hyclate (VIBRA-TABS) 100 MG tablet            (F17.200) Tobacco use disorder  Comment:   Plan:     This may have started as influenza.  Possible bronchitis.  Tobacco use noted.  Treat as above.  Follow-up for worsening or persistent symptoms.      CHIEF COMPLAINT    Congestion and cough for 4 days.      HISTORY    Patient has had congestion and cough.  She is getting some sputum production.  She initially had some body aches which have improved.    Patient is a smoker.    Nonvaccinated for COVID.      REVIEW OF SYSTEMS    No fever currently.  No sore throat.  No loss of taste or smell.  No SOB.  No chest pain.    EXAM  /76   Pulse 65   Temp 98.4  F (36.9  C) (Tympanic)   Wt 105.2 kg (232 lb)   SpO2 98%   BMI 45.31 kg/m      NAD.  Pharynx mildly red.  No cervical adenopathy.  Few basilar rales bilateral, no wheezes.  No consolidation.  Extremities without edema.

## 2022-11-20 DIAGNOSIS — F41.1 ANXIETY STATE: ICD-10-CM

## 2022-11-22 RX ORDER — PAROXETINE 20 MG/1
TABLET, FILM COATED ORAL
Qty: 90 TABLET | Refills: 3 | Status: SHIPPED | OUTPATIENT
Start: 2022-11-22 | End: 2023-11-10 | Stop reason: ALTCHOICE

## 2022-11-22 RX ORDER — PAROXETINE 40 MG/1
TABLET, FILM COATED ORAL
Qty: 90 TABLET | Refills: 3 | Status: SHIPPED | OUTPATIENT
Start: 2022-11-22 | End: 2023-11-10 | Stop reason: ALTCHOICE

## 2022-11-29 ENCOUNTER — HOSPITAL ENCOUNTER (OUTPATIENT)
Dept: CT IMAGING | Facility: CLINIC | Age: 52
Discharge: HOME | End: 2022-11-29
Attending: CLINICAL NURSE SPECIALIST
Payer: COMMERCIAL

## 2022-11-29 ENCOUNTER — HOSPITAL ENCOUNTER (OUTPATIENT)
Dept: ULTRASOUND IMAGING | Facility: CLINIC | Age: 52
Discharge: HOME | End: 2022-11-29
Attending: NURSE PRACTITIONER
Payer: COMMERCIAL

## 2022-11-29 DIAGNOSIS — D49.2 SOLITARY FIBROUS TUMOR: ICD-10-CM

## 2022-11-29 DIAGNOSIS — M25.661 JOINT STIFFNESS OF RIGHT LOWER LEG: ICD-10-CM

## 2022-11-29 DIAGNOSIS — M25.662 JOINT STIFFNESS OF LEFT LOWER LEG: ICD-10-CM

## 2022-11-29 DIAGNOSIS — M79.604 BILATERAL LEG PAIN: ICD-10-CM

## 2022-11-29 DIAGNOSIS — M79.605 BILATERAL LEG PAIN: ICD-10-CM

## 2022-11-29 PROCEDURE — 93924 LWR XTR VASC STDY BILAT: CPT

## 2022-11-29 PROCEDURE — 250N000011 HC RX IP 250 OP 636: Performed by: RADIOLOGY

## 2022-11-29 PROCEDURE — 250N000009 HC RX 250: Performed by: RADIOLOGY

## 2022-11-29 PROCEDURE — 71260 CT THORAX DX C+: CPT

## 2022-11-29 RX ORDER — IOPAMIDOL 755 MG/ML
90 INJECTION, SOLUTION INTRAVASCULAR ONCE
Status: COMPLETED | OUTPATIENT
Start: 2022-11-29 | End: 2022-11-29

## 2022-11-29 RX ADMIN — SODIUM CHLORIDE 68 ML: 9 INJECTION, SOLUTION INTRAVENOUS at 14:23

## 2022-11-29 RX ADMIN — IOPAMIDOL 90 ML: 755 INJECTION, SOLUTION INTRAVENOUS at 14:23

## 2022-11-30 ENCOUNTER — MYC MEDICAL ADVICE (OUTPATIENT)
Dept: SLEEP MEDICINE | Facility: CLINIC | Age: 52
End: 2022-11-30

## 2022-12-01 DIAGNOSIS — D49.2 SOLITARY FIBROUS TUMOR: Primary | ICD-10-CM

## 2022-12-06 DIAGNOSIS — M79.2 NEUROPATHIC PAIN: ICD-10-CM

## 2022-12-06 DIAGNOSIS — G25.81 RESTLESS LEGS SYNDROME (RLS): ICD-10-CM

## 2022-12-06 RX ORDER — GABAPENTIN 300 MG/1
CAPSULE ORAL
Qty: 540 CAPSULE | Refills: 0 | Status: SHIPPED | OUTPATIENT
Start: 2022-12-06 | End: 2023-03-07

## 2022-12-06 NOTE — TELEPHONE ENCOUNTER
Routing to ordering provider for consideration, not on refill protocol.           Carmen Radford     RN MSN

## 2022-12-27 DIAGNOSIS — E03.9 HYPOTHYROIDISM, UNSPECIFIED TYPE: ICD-10-CM

## 2022-12-27 RX ORDER — LEVOTHYROXINE SODIUM 175 UG/1
TABLET ORAL
Qty: 90 TABLET | Refills: 0 | Status: SHIPPED | OUTPATIENT
Start: 2022-12-27 | End: 2023-03-06

## 2023-01-07 DIAGNOSIS — F41.1 ANXIETY STATE: ICD-10-CM

## 2023-01-09 RX ORDER — BUPROPION HYDROCHLORIDE 300 MG/1
TABLET ORAL
Qty: 90 TABLET | Refills: 0 | Status: SHIPPED | OUTPATIENT
Start: 2023-01-09 | End: 2023-04-10

## 2023-01-09 RX ORDER — BUPROPION HYDROCHLORIDE 150 MG/1
TABLET ORAL
Qty: 90 TABLET | Refills: 0 | Status: SHIPPED | OUTPATIENT
Start: 2023-01-09 | End: 2023-04-10

## 2023-01-17 NOTE — PROGRESS NOTES
Chief Complaint   Patient presents with     Right Knee - Total Joint Post-op     DOS 6/14/22, 7 month s/p. Patient notes she has been having a really bad sore spot on the medial knee. She can tell when she walks. NKI. She is still on her feet 10 hours a day. The knee itself is really tight, feels like she has a tight sleeve on all the time. She has iced and taking ibuprofen or tylenol for pain.         SURGERY: Total knee arthroplasty, right knee ([facility])  DATE OF SURGERY: 6/14/2022      HISTORY OF PRESENT ILLNESS: Carol Guajardo is a 52 year old female seen for postoperative evaluation of right total knee arthroplasty. It has been 7 months since surgery. Returns today stating she's been having a really sore spot along the inner aspect of the knee, especially when she walks. She's back to work, 10h/day on her feet on concrete, working 50-58 hours per week. Will have swelling, tightness. She's been icing and taking ibuprofen, tylenol for pain.    She figures its from return to work and increased activities.    She's noted her restless leg syndrome seems more bothersome on the right side since surgery, given her sleeping difficulties.     Denies any wound problems. Denies numbness, tingling, or weakness in the affected extremity. Denies fevers chills night sweats.    Pain 2/10.    Past medical history:   Past Medical History:   Diagnosis Date     Anxiety      Depression      HLD (hyperlipidemia)      Hypothyroidism      Mass of right lower leg 6/5/2017     Osteoarthritis of both knees      Right lower lobe lung mass     Biopsied 8/2018, diagnosed as fibrosis     Total knee replacement status, left 9/30/2019     Patient Active Problem List    Diagnosis Date Noted     Chronic, continuous use of opioids 06/29/2022     Priority: Medium     S/P TKR (total knee replacement), right 06/14/2022     Priority: Medium     VALERIE (obstructive sleep apnea) 06/14/2022     Priority: Medium     Osteoarthritis of right  knee 06/14/2022     Priority: Medium     Restless legs syndrome (RLS) 07/28/2021     Priority: Medium     Morbid obesity (H) 04/16/2021     Priority: Medium     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 CT August 2018:  The mass measures approximately 9.8 cm x 5.3 cm x 7.4 cm.  Biopsied 8/2018, diagnosed as fibrosis       Primary osteoarthritis of left knee 07/30/2018     Priority: Medium     Prediabetes 03/30/2018     Priority: Medium     Anxiety state 07/19/2017     Priority: Medium     Depression 07/19/2017     Priority: Medium     Vitamin D deficiency 07/19/2017     Priority: Medium     Nicotine dependence 07/19/2017     Priority: Medium     HLD (hyperlipidemia) 08/28/2015     Priority: Medium     Hypothyroidism 05/08/2015     Priority: Medium       Past Surgical History:   Past Surgical History:   Procedure Laterality Date     ARTHROPLASTY KNEE Left 9/30/2019    Procedure: Left Total Knee Arthroplasty;  Surgeon: Ion Bailye MD;  Location: [facility]     ARTHROPLASTY KNEE Right 6/14/2022    Procedure: ARTHROPLASTY, KNEE, TOTAL RIGHT;  Surgeon: Emilio Pino MD;  Location: [facility]     BRONCHOSCOPY (RIGID OR FLEXIBLE), DIAGNOSTIC N/A 4/21/2021    Procedure: BRONCHOSCOPY, WITH BRONCHOALVEOLAR LAVAGE;  Surgeon: Keli Webber MD;  Location: [facility]     CARPAL TUNNEL RELEASE RT/LT Bilateral      EXTERNAL EAR SURGERY       IR LUNG BIOPSY MEDIASTINUM RIGHT Right 08/2018    RLL mass, diagnosed as fibrosis per pt     LAPAROSCOPY DIAGNOSTIC (GENERAL)  02/2019     LAPAROSCOPY, SURGICAL; REPAIR UMBILICAL HERNIA  08/22/2018     THORACOTOMY, WEDGE RESECTION LUNG, COMBINED Right 4/19/2021    Procedure: Right Thoracotomy, excision of solitary fibrous tumor, intercostal nerve cryo ablation;  Surgeon: Keli Webber MD;  Location:  OR     [facility] LIGATE FALLOPIAN TUBE      Description: Tubal Ligation;  Recorded: 04/09/2008;       Medications:   Current Outpatient  Medications:      acetaminophen (TYLENOL) 325 MG tablet, Take 325-650 mg by mouth every 6 hours as needed for mild pain, Disp: , Rfl:      buPROPion (WELLBUTRIN XL) 150 MG 24 hr tablet, TAKE 1 TABLET BY MOUTH EVERY MORNING IN ADDITION TO THE 300MG TABLET FOR A TOTAL DAILY DOSE OF 450MG DAILY, Disp: 90 tablet, Rfl: 0     buPROPion (WELLBUTRIN XL) 300 MG 24 hr tablet, TAKE 1 TABLET BY MOUTH EVERY MORNING, Disp: 90 tablet, Rfl: 0     calcium carbonate (OS-BERTA) 500 MG tablet, Take 1 tablet by mouth 2 times daily, Disp: , Rfl:      doxycycline hyclate (VIBRA-TABS) 100 MG tablet, Take 1 tablet (100 mg) by mouth 2 times daily, Disp: 14 tablet, Rfl: 0     Ferrous Sulfate 324 (65 Fe) MG TBEC, Take 324 mg by mouth 2 times daily, Disp: , Rfl:      gabapentin (NEURONTIN) 300 MG capsule, TAKE THREE CAPSULES BY MOUTH TWO TIMES A DAY, Disp: 540 capsule, Rfl: 0     hydrOXYzine (ATARAX) 25 MG tablet, Take 1-2 tablets (25-50 mg) by mouth 3 times daily as needed for anxiety (and insomnia), Disp: 30 tablet, Rfl: 0     levothyroxine (SYNTHROID/LEVOTHROID) 175 MCG tablet, TAKE 1 TABLET BY MOUTH EVERY MORNING, Disp: 90 tablet, Rfl: 0     PARoxetine (PAXIL) 20 MG tablet, TAKE 1 TABLET BY MOUTH EVERY MORNING WITH 40 MG TABLET FOR TOTAL DAILY DOSE OF 60 MG., Disp: 90 tablet, Rfl: 3     PARoxetine (PAXIL) 40 MG tablet, TAKE 1 TABLET BY MOUTH EVERY MORNING WITH 20 MG TABLET FOR TOTAL DAILY DOSE OF 60 MG., Disp: 90 tablet, Rfl: 3     pramipexole (MIRAPEX) 0.5 MG tablet, TAKE 2 TABLETS BY MOUTH AT BEDTIME, Disp: 180 tablet, Rfl: 1     propranolol ER (INDERAL LA) 60 MG 24 hr capsule, TAKE 1 CAPSULE BY MOUTH EVERY MORNING, Disp: 90 capsule, Rfl: 1    Allergies:   Allergies   Allergen Reactions     Amoxicillin-Pot Clavulanate Other (See Comments) and Hives     Edema     Augmentin Hives and Itching     Ciprofloxacin Hives and Itching     Penicillins Hives and Itching         REVIEW OF SYSTEMS:  CONSTITUTIONAL:NEGATIVE for fever, chills, night  sweats, change in weight  INTEGUMENTARY/SKIN: NEGATIVE for worrisome rashes, moles or lesions  MUSCULOSKELETAL:See HPI above  NEURO: NEGATIVE for weakness, dizziness or paresthesias  CARDIOVASCULAR: negative for chest pain, shortness of breath    PHYSICAL EXAM:  /83   Pulse 62   Ht 1.524 m (5')   BMI 45.31 kg/m     GENERAL APPEARANCE: healthy, alert, no distress  SKIN: no suspicious lesions or rashes  NEURO: Normal strength and tone, mentation intact and speech normal  PSYCH:  mentation appears normal and affect normal  RESPIRATORY: No increased work of breathing.    BILATERAL LOWER EXTREMITIES:  Gait: slight favors the right , unassisted.    Intact sensation deep peroneal nerve, superficial peroneal nerve, med/lat tibial nerve, sural nerve, saphenous nerve  Intact EHL, EDL, TA, FHL, GS, quadriceps hamstrings and hip flexors  Toes warm and well perfused, brisk capillary refill. Palpable 2+ dp pulses.  Bilateral calf soft and nttp or squeeze.  Edema: trace    right  KNEE EXAM:    Incision: healed well.  Skin: intact, no ecchymosis   ROM: full extension to 120 flexion  Effusion: trace  Tender: moderate medial hamstrings up the inner thigh to the groin, pes  Minimal tender to palpation along the joint lines.      X-RAY:  2 views right knee from 1/23/2023 were reviewed in clinic today. No obvious fractures or dislocations. Total knee arthroplasty components in place without evidence of failure or loosening. Some increased medial tibial lucency.      Impression: Carol Guajardo is a 52 year old female status post Total knee arthroplasty, right knee, doing well, 7 months out from surgery with hamstrings tendinitis and pes bursitis..      Plan:   * reviewed xrays with patient, showing total knee arthroplasty implants.  * I suspect due to increased activities, standing on concrete for 10h/day.  Appears more muscular with hamstrings and pes insertion rather than the knee itself.  * I recommended cutting back perhaps  on her hours, she states they will be going down to 40h/week    * continue with knee range of motion exercises   * wean off all pain medications as tolerated  * xrays next visit: 2 views of the knee  * return to clinic 5 months for 1 year followup visit.  * consider return to therapy in future as needed.  * all questions addressed and answered prior to discharge from clinic today to patient's satisfaction.  * patient will need longterm prophylactic antibiotics use with dental procedures or other invasive procedures (2 g amoxicilin or 450mg ([id number]) clindamycin) 1 hour prior to dental procedures.    * KOOS Jr/Promis Knee score: IS to be done at next visit (12 months);  to be completed at 12 weeks and 12 months postoperative.      Emilio Pino M.D., M.S.  Dept. of Orthopaedic Surgery  [facility]

## 2023-01-23 ENCOUNTER — OFFICE VISIT (OUTPATIENT)
Dept: ORTHOPEDICS | Facility: CLINIC | Age: 53
End: 2023-01-23
Payer: COMMERCIAL

## 2023-01-23 ENCOUNTER — ANCILLARY PROCEDURE (OUTPATIENT)
Dept: GENERAL RADIOLOGY | Facility: CLINIC | Age: 53
End: 2023-01-23
Attending: ORTHOPAEDIC SURGERY
Payer: COMMERCIAL

## 2023-01-23 VITALS
BODY MASS INDEX: 45.31 KG/M2 | DIASTOLIC BLOOD PRESSURE: 83 MMHG | SYSTOLIC BLOOD PRESSURE: 138 MMHG | HEIGHT: 60 IN | HEART RATE: 62 BPM

## 2023-01-23 DIAGNOSIS — Z96.651 S/P TOTAL KNEE REPLACEMENT, RIGHT: Primary | ICD-10-CM

## 2023-01-23 DIAGNOSIS — Z96.651 S/P TOTAL KNEE REPLACEMENT, RIGHT: ICD-10-CM

## 2023-01-23 DIAGNOSIS — M70.52 PES ANSERINUS BURSITIS OF LEFT KNEE: ICD-10-CM

## 2023-01-23 PROCEDURE — 99213 OFFICE O/P EST LOW 20 MIN: CPT | Performed by: ORTHOPAEDIC SURGERY

## 2023-01-23 PROCEDURE — 73560 X-RAY EXAM OF KNEE 1 OR 2: CPT | Mod: TC | Performed by: RADIOLOGY

## 2023-01-23 ASSESSMENT — PAIN SCALES - GENERAL: PAINLEVEL: MILD PAIN (2)

## 2023-01-23 NOTE — LETTER
1/23/2023         RE: Carol Guajardo  [id number] [address] [facility] [id number]        Dear Colleague,    Thank you for referring your patient, Carol Guajardo, to the [facility]. Please see a copy of my visit note below.    Chief Complaint   Patient presents with     Right Knee - Total Joint Post-op     DOS 6/14/22, 7 month s/p. Patient notes she has been having a really bad sore spot on the medial knee. She can tell when she walks. NKI. She is still on her feet 10 hours a day. The knee itself is really tight, feels like she has a tight sleeve on all the time. She has iced and taking ibuprofen or tylenol for pain.         SURGERY: Total knee arthroplasty, right knee ([facility])  DATE OF SURGERY: 6/14/2022      HISTORY OF PRESENT ILLNESS: Carol Guajardo is a 52 year old female seen for postoperative evaluation of right total knee arthroplasty. It has been 7 months since surgery. Returns today stating she's been having a really sore spot along the inner aspect of the knee, especially when she walks. She's back to work, 10h/day on her feet on concrete, working 50-58 hours per week. Will have swelling, tightness. She's been icing and taking ibuprofen, tylenol for pain.    She figures its from return to work and increased activities.    She's noted her restless leg syndrome seems more bothersome on the right side since surgery, given her sleeping difficulties.     Denies any wound problems. Denies numbness, tingling, or weakness in the affected extremity. Denies fevers chills night sweats.    Pain 2/10.    Past medical history:   Past Medical History:   Diagnosis Date     Anxiety      Depression      HLD (hyperlipidemia)      Hypothyroidism      Mass of right lower leg 6/5/2017     Osteoarthritis of both knees      Right lower lobe lung mass     Biopsied 8/2018, diagnosed as fibrosis     Total knee replacement status, left 9/30/2019     Patient Active Problem List     Diagnosis Date Noted     Chronic, continuous use of opioids 06/29/2022     Priority: Medium     S/P TKR (total knee replacement), right 06/14/2022     Priority: Medium     VALERIE (obstructive sleep apnea) 06/14/2022     Priority: Medium     Osteoarthritis of right knee 06/14/2022     Priority: Medium     Restless legs syndrome (RLS) 07/28/2021     Priority: Medium     Morbid obesity (H) 04/16/2021     Priority: Medium     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 CT August 2018:  The mass measures approximately 9.8 cm x 5.3 cm x 7.4 cm.  Biopsied 8/2018, diagnosed as fibrosis       Primary osteoarthritis of left knee 07/30/2018     Priority: Medium     Prediabetes 03/30/2018     Priority: Medium     Anxiety state 07/19/2017     Priority: Medium     Depression 07/19/2017     Priority: Medium     Vitamin D deficiency 07/19/2017     Priority: Medium     Nicotine dependence 07/19/2017     Priority: Medium     HLD (hyperlipidemia) 08/28/2015     Priority: Medium     Hypothyroidism 05/08/2015     Priority: Medium       Past Surgical History:   Past Surgical History:   Procedure Laterality Date     ARTHROPLASTY KNEE Left 9/30/2019    Procedure: Left Total Knee Arthroplasty;  Surgeon: Ion Bailey MD;  [facility]     ARTHROPLASTY KNEE Right 6/14/2022    Procedure: ARTHROPLASTY, KNEE, TOTAL RIGHT;  Surgeon: Emilio Pino MD;  Location: [facility]     BRONCHOSCOPY (RIGID OR FLEXIBLE), DIAGNOSTIC N/A 4/21/2021    Procedure: BRONCHOSCOPY, WITH BRONCHOALVEOLAR LAVAGE;  Surgeon: Keli Webber MD;  [facility]     CARPAL TUNNEL RELEASE RT/LT Bilateral      EXTERNAL EAR SURGERY       IR LUNG BIOPSY MEDIASTINUM RIGHT Right 08/2018    RLL mass, diagnosed as fibrosis per pt     LAPAROSCOPY DIAGNOSTIC (GENERAL)  02/2019     LAPAROSCOPY, SURGICAL; REPAIR UMBILICAL HERNIA  08/22/2018     THORACOTOMY, WEDGE RESECTION LUNG, COMBINED Right 4/19/2021    Procedure: Right  Thoracotomy, excision of solitary fibrous tumor, intercostal nerve cryo ablation;  Surgeon: Keli Webber MD;  [facility]     ZZC LIGATE FALLOPIAN TUBE      Description: Tubal Ligation;  Recorded: 04/09/2008;       Medications:   Current Outpatient Medications:      acetaminophen (TYLENOL) 325 MG tablet, Take 325-650 mg by mouth every 6 hours as needed for mild pain, Disp: , Rfl:      buPROPion (WELLBUTRIN XL) 150 MG 24 hr tablet, TAKE 1 TABLET BY MOUTH EVERY MORNING IN ADDITION TO THE 300MG TABLET FOR A TOTAL DAILY DOSE OF 450MG DAILY, Disp: 90 tablet, Rfl: 0     buPROPion (WELLBUTRIN XL) 300 MG 24 hr tablet, TAKE 1 TABLET BY MOUTH EVERY MORNING, Disp: 90 tablet, Rfl: 0     calcium carbonate (OS-BERTA) 500 MG tablet, Take 1 tablet by mouth 2 times daily, Disp: , Rfl:      doxycycline hyclate (VIBRA-TABS) 100 MG tablet, Take 1 tablet (100 mg) by mouth 2 times daily, Disp: 14 tablet, Rfl: 0     Ferrous Sulfate 324 (65 Fe) MG TBEC, Take 324 mg by mouth 2 times daily, Disp: , Rfl:      gabapentin (NEURONTIN) 300 MG capsule, TAKE THREE CAPSULES BY MOUTH TWO TIMES A DAY, Disp: 540 capsule, Rfl: 0     hydrOXYzine (ATARAX) 25 MG tablet, Take 1-2 tablets (25-50 mg) by mouth 3 times daily as needed for anxiety (and insomnia), Disp: 30 tablet, Rfl: 0     levothyroxine (SYNTHROID/LEVOTHROID) 175 MCG tablet, TAKE 1 TABLET BY MOUTH EVERY MORNING, Disp: 90 tablet, Rfl: 0     PARoxetine (PAXIL) 20 MG tablet, TAKE 1 TABLET BY MOUTH EVERY MORNING WITH 40 MG TABLET FOR TOTAL DAILY DOSE OF 60 MG., Disp: 90 tablet, Rfl: 3     PARoxetine (PAXIL) 40 MG tablet, TAKE 1 TABLET BY MOUTH EVERY MORNING WITH 20 MG TABLET FOR TOTAL DAILY DOSE OF 60 MG., Disp: 90 tablet, Rfl: 3     pramipexole (MIRAPEX) 0.5 MG tablet, TAKE 2 TABLETS BY MOUTH AT BEDTIME, Disp: 180 tablet, Rfl: 1     propranolol ER (INDERAL LA) 60 MG 24 hr capsule, TAKE 1 CAPSULE BY MOUTH EVERY MORNING, Disp: 90 capsule, Rfl: 1    Allergies:   Allergies   Allergen  Reactions     Amoxicillin-Pot Clavulanate Other (See Comments) and Hives     Edema     Augmentin Hives and Itching     Ciprofloxacin Hives and Itching     Penicillins Hives and Itching         REVIEW OF SYSTEMS:  CONSTITUTIONAL:NEGATIVE for fever, chills, night sweats, change in weight  INTEGUMENTARY/SKIN: NEGATIVE for worrisome rashes, moles or lesions  MUSCULOSKELETAL:See HPI above  NEURO: NEGATIVE for weakness, dizziness or paresthesias  CARDIOVASCULAR: negative for chest pain, shortness of breath    PHYSICAL EXAM:  /83   Pulse 62   Ht 1.524 m (5')   BMI 45.31 kg/m     GENERAL APPEARANCE: healthy, alert, no distress  SKIN: no suspicious lesions or rashes  NEURO: Normal strength and tone, mentation intact and speech normal  PSYCH:  mentation appears normal and affect normal  RESPIRATORY: No increased work of breathing.    BILATERAL LOWER EXTREMITIES:  Gait: slight favors the right , unassisted.    Intact sensation deep peroneal nerve, superficial peroneal nerve, med/lat tibial nerve, sural nerve, saphenous nerve  Intact EHL, EDL, TA, FHL, GS, quadriceps hamstrings and hip flexors  Toes warm and well perfused, brisk capillary refill. Palpable 2+ dp pulses.  Bilateral calf soft and nttp or squeeze.  Edema: trace    right  KNEE EXAM:    Incision: healed well.  Skin: intact, no ecchymosis   ROM: full extension to 120 flexion  Effusion: trace  Tender: moderate medial hamstrings up the inner thigh to the groin, pes  Minimal tender to palpation along the joint lines.      X-RAY:  2 views right knee from 1/23/2023 were reviewed in clinic today. No obvious fractures or dislocations. Total knee arthroplasty components in place without evidence of failure or loosening. Some increased medial tibial lucency.      Impression: Carol Guajardo is a 52 year old female status post Total knee arthroplasty, right knee, doing well, 7 months out from surgery with hamstrings tendinitis and pes bursitis..      Plan:   * reviewed  xrays with patient, showing total knee arthroplasty implants.  * I suspect due to increased activities, standing on concrete for 10h/day.  Appears more muscular with hamstrings and pes insertion rather than the knee itself.  * I recommended cutting back perhaps on her hours, she states they will be going down to 40h/week    * continue with knee range of motion exercises   * wean off all pain medications as tolerated  * xrays next visit: 2 views of the knee  * return to clinic 5 months for 1 year followup visit.  * consider return to therapy in future as needed.  * all questions addressed and answered prior to discharge from clinic today to patient's satisfaction.  * patient will need longterm prophylactic antibiotics use with dental procedures or other invasive procedures (2 g amoxicilin or 450mg ([id number]) clindamycin) 1 hour prior to dental procedures.    * KOOS Jr/Promis Knee score: IS to be done at next visit (12 months);  to be completed at 12 weeks and 12 months postoperative.      Emilio Pino M.D., M.S.  Dept. of Orthopaedic Surgery  [facility]            Again, thank you for allowing me to participate in the care of your patient.        Sincerely,        Emilio Pino MD

## 2023-02-27 ENCOUNTER — MYC MEDICAL ADVICE (OUTPATIENT)
Dept: FAMILY MEDICINE | Facility: CLINIC | Age: 53
End: 2023-02-27

## 2023-03-01 NOTE — TELEPHONE ENCOUNTER
Attempted to reach patient to discuss My Chart message. Should be seen in-person for an exam. Per Barbara Hedrick, ok to double book at 3:40 PM 03/03/23.  Radha LÓPEZ RN

## 2023-03-03 ENCOUNTER — OFFICE VISIT (OUTPATIENT)
Dept: FAMILY MEDICINE | Facility: CLINIC | Age: 53
End: 2023-03-03
Payer: COMMERCIAL

## 2023-03-03 VITALS
OXYGEN SATURATION: 97 % | WEIGHT: 217 LBS | BODY MASS INDEX: 42.6 KG/M2 | RESPIRATION RATE: 20 BRPM | HEART RATE: 72 BPM | SYSTOLIC BLOOD PRESSURE: 102 MMHG | DIASTOLIC BLOOD PRESSURE: 60 MMHG | TEMPERATURE: 97.8 F | HEIGHT: 60 IN

## 2023-03-03 DIAGNOSIS — M79.2 NEUROPATHIC PAIN: ICD-10-CM

## 2023-03-03 DIAGNOSIS — E03.9 HYPOTHYROIDISM, UNSPECIFIED TYPE: ICD-10-CM

## 2023-03-03 DIAGNOSIS — N63.0 BREAST LUMP IN FEMALE: ICD-10-CM

## 2023-03-03 DIAGNOSIS — G25.81 RESTLESS LEGS SYNDROME (RLS): ICD-10-CM

## 2023-03-03 DIAGNOSIS — N64.4 BREAST PAIN: Primary | ICD-10-CM

## 2023-03-03 LAB — TSH SERPL DL<=0.005 MIU/L-ACNC: 2.7 UIU/ML (ref 0.3–4.2)

## 2023-03-03 PROCEDURE — 84443 ASSAY THYROID STIM HORMONE: CPT | Performed by: NURSE PRACTITIONER

## 2023-03-03 PROCEDURE — 99213 OFFICE O/P EST LOW 20 MIN: CPT | Performed by: NURSE PRACTITIONER

## 2023-03-03 PROCEDURE — 36415 COLL VENOUS BLD VENIPUNCTURE: CPT | Performed by: NURSE PRACTITIONER

## 2023-03-03 ASSESSMENT — ANXIETY QUESTIONNAIRES
7. FEELING AFRAID AS IF SOMETHING AWFUL MIGHT HAPPEN: SEVERAL DAYS
8. IF YOU CHECKED OFF ANY PROBLEMS, HOW DIFFICULT HAVE THESE MADE IT FOR YOU TO DO YOUR WORK, TAKE CARE OF THINGS AT HOME, OR GET ALONG WITH OTHER PEOPLE?: SOMEWHAT DIFFICULT
GAD7 TOTAL SCORE: 7
1. FEELING NERVOUS, ANXIOUS, OR ON EDGE: SEVERAL DAYS
6. BECOMING EASILY ANNOYED OR IRRITABLE: SEVERAL DAYS
2. NOT BEING ABLE TO STOP OR CONTROL WORRYING: SEVERAL DAYS
3. WORRYING TOO MUCH ABOUT DIFFERENT THINGS: SEVERAL DAYS
5. BEING SO RESTLESS THAT IT IS HARD TO SIT STILL: SEVERAL DAYS
IF YOU CHECKED OFF ANY PROBLEMS ON THIS QUESTIONNAIRE, HOW DIFFICULT HAVE THESE PROBLEMS MADE IT FOR YOU TO DO YOUR WORK, TAKE CARE OF THINGS AT HOME, OR GET ALONG WITH OTHER PEOPLE: SOMEWHAT DIFFICULT
7. FEELING AFRAID AS IF SOMETHING AWFUL MIGHT HAPPEN: SEVERAL DAYS
4. TROUBLE RELAXING: SEVERAL DAYS
GAD7 TOTAL SCORE: 7

## 2023-03-03 ASSESSMENT — PAIN SCALES - GENERAL: PAINLEVEL: NO PAIN (0)

## 2023-03-03 NOTE — PATIENT INSTRUCTIONS
Breast pain  New breast lump over right nipple in the last week with tenderness and some swelling. Lump right behind nipple appreciated by provider. Will get diagnostic mammogram and ultrasound. Patient should be receiving a call, however can call [phone] to schedule.  - MA Diagnostic Digital Bilateral; Future  - US Breast Right Limited 1-3 Quadrants; Future

## 2023-03-03 NOTE — PROGRESS NOTES
Assessment & Plan     Breast pain  New breast lump over right nipple in the last week with tenderness and some swelling. Lump right behind nipple appreciated by provider. Will get diagnostic mammogram and ultrasound. Patient should be receiving a call, however can call [phone] to schedule.  - MA Diagnostic Digital Bilateral; Future  - US Breast Right Limited 1-3 Quadrants; Future    Breast lump in female  No breast lump with pain on right breast as above.  - MA Diagnostic Digital Bilateral; Future  - US Breast Right Limited 1-3 Quadrants; Future    Hypothyroidism, unspecified type  Chronic, stable.  Is due for repeat TSH.  We will call with results and recommendations.  - TSH WITH FREE T4 REFLEX; Future             Nicotine/Tobacco Cessation:  She reports that she has been smoking paan with tobacco, gutka, zarda, khaini and cigarettes. She started smoking about 37 years ago. She has a 24.75 pack-year smoking history. She quit smokeless tobacco use about 22 months ago.  Nicotine/Tobacco Cessation Plan:   Not discussed at this time      See Patient Instructions After discussion with patient, patient verbalizes and agreeable to receive AVS instructions via My Chart, not printed today     No follow-ups on file.    Barbara Hedrick, PANCHITO, APRN-CNP   [facility]    Subjective   Carol is a 52 year old, presenting for the following health issues:  Breast Problem      History of Present Illness       Reason for visit:  Right breast pain  Symptom onset:  3-7 days ago  Symptoms include:  Swelling, painful to touch,  Symptom intensity:  Moderate  Symptom progression:  Improving  Had these symptoms before:  No  What makes it worse:  No  What makes it better:  No    She eats 0-1 servings of fruits and vegetables daily.She consumes 1 sweetened beverage(s) daily.She exercises with enough effort to increase her heart rate 9 or less minutes per day.  She exercises with enough effort to increase her  heart rate 3 or less days per week.   She is taking medications regularly.  Today's SHEFALI-7 Score: 7     Since last Tues/Wed  Tender to touch  Swelling is getting better            Review of Systems   Constitutional, HEENT, cardiovascular, pulmonary, gi and gu systems are negative, except as otherwise noted.      Objective    /60 (BP Location: Right arm)   Pulse 72   Temp 97.8  F (36.6  C) (Tympanic)   Resp 20   Ht 1.524 m (5')   Wt 98.4 kg (217 lb)   LMP 02/20/2023   SpO2 97%   BMI 42.38 kg/m    Body mass index is 42.38 kg/m .  Physical Exam   GENERAL APPEARANCE: healthy, alert and no distress  BREAST: Small, pea-sized nodule directly beneath nipple on right breast without tenderness and no palpable axillary masses or adenopathy  PSYCH: mentation appears normal and affect normal/bright    Diagnostic Test Results:  Diagnostic mammogram pending        Chart documentation with Dragon Voice recognition Software. Although reviewed after completion, some words and grammatical errors may remain.

## 2023-03-06 DIAGNOSIS — E03.9 HYPOTHYROIDISM, UNSPECIFIED TYPE: ICD-10-CM

## 2023-03-06 RX ORDER — LEVOTHYROXINE SODIUM 175 UG/1
175 TABLET ORAL EVERY MORNING
Qty: 90 TABLET | Refills: 3 | Status: SHIPPED | OUTPATIENT
Start: 2023-03-06 | End: 2024-04-01

## 2023-03-07 RX ORDER — GABAPENTIN 300 MG/1
CAPSULE ORAL
Qty: 540 CAPSULE | Refills: 0 | Status: SHIPPED | OUTPATIENT
Start: 2023-03-07 | End: 2023-06-12

## 2023-03-29 ENCOUNTER — ANCILLARY ORDERS (OUTPATIENT)
Dept: FAMILY MEDICINE | Facility: CLINIC | Age: 53
End: 2023-03-29

## 2023-03-29 DIAGNOSIS — N64.4 BREAST PAIN: ICD-10-CM

## 2023-03-29 DIAGNOSIS — N63.15 BREAST LUMP ON RIGHT SIDE AT 12 O'CLOCK POSITION: ICD-10-CM

## 2023-03-29 DIAGNOSIS — N63.0 BREAST LUMP IN FEMALE: ICD-10-CM

## 2023-03-31 ENCOUNTER — HOSPITAL ENCOUNTER (OUTPATIENT)
Dept: ULTRASOUND IMAGING | Facility: CLINIC | Age: 53
Discharge: HOME | End: 2023-03-31
Attending: NURSE PRACTITIONER
Payer: COMMERCIAL

## 2023-03-31 ENCOUNTER — HOSPITAL ENCOUNTER (OUTPATIENT)
Dept: MAMMOGRAPHY | Facility: CLINIC | Age: 53
Discharge: HOME | End: 2023-03-31
Attending: NURSE PRACTITIONER
Payer: COMMERCIAL

## 2023-03-31 ENCOUNTER — ANCILLARY ORDERS (OUTPATIENT)
Dept: FAMILY MEDICINE | Facility: CLINIC | Age: 53
End: 2023-03-31

## 2023-03-31 DIAGNOSIS — N64.4 BREAST PAIN: ICD-10-CM

## 2023-03-31 DIAGNOSIS — N63.0 BREAST LUMP IN FEMALE: ICD-10-CM

## 2023-03-31 DIAGNOSIS — N63.15 BREAST LUMP ON RIGHT SIDE AT 9 O'CLOCK POSITION: ICD-10-CM

## 2023-03-31 DIAGNOSIS — N63.15 BREAST LUMP ON RIGHT SIDE AT 12 O'CLOCK POSITION: ICD-10-CM

## 2023-03-31 PROCEDURE — 77066 DX MAMMO INCL CAD BI: CPT

## 2023-03-31 PROCEDURE — 76642 ULTRASOUND BREAST LIMITED: CPT | Mod: RT

## 2023-03-31 RX ORDER — CEPHALEXIN 500 MG/1
500 CAPSULE ORAL 2 TIMES DAILY
Qty: 20 CAPSULE | Refills: 0 | Status: CANCELLED | OUTPATIENT
Start: 2023-03-31 | End: 2023-04-10

## 2023-03-31 NOTE — TELEPHONE ENCOUNTER
Called patient for results on mammo, need to talk with her. Please forward call to me when she returns call.    Thanks,  Barbara Hedrick DNP, APRN-CNP

## 2023-04-03 DIAGNOSIS — N61.1 BREAST ABSCESS: Primary | ICD-10-CM

## 2023-04-03 RX ORDER — CEPHALEXIN 500 MG/1
500 CAPSULE ORAL 2 TIMES DAILY
Qty: 20 CAPSULE | Refills: 0 | Status: SHIPPED | OUTPATIENT
Start: 2023-04-03 | End: 2023-04-13

## 2023-04-03 NOTE — PROGRESS NOTES
Received a call from Dr. Fuller Radiologist on 3/31/2023 regarding patient's ultrasound. Appears to be a small abscess, not enough to drain. Recommended antibiotics and if not improved in 3-4 weeks to have rechecked with possible drainage.   Patient called Friday with no answer or return call. Patient called back today 4/3/2023 and notified of of this and verbalized understanding.     Barbara Hedrick, DNP, APRN-CNP

## 2023-04-09 DIAGNOSIS — F41.1 ANXIETY STATE: ICD-10-CM

## 2023-04-10 RX ORDER — BUPROPION HYDROCHLORIDE 300 MG/1
TABLET ORAL
Qty: 90 TABLET | Refills: 0 | Status: SHIPPED | OUTPATIENT
Start: 2023-04-10 | End: 2023-07-11

## 2023-04-10 RX ORDER — BUPROPION HYDROCHLORIDE 150 MG/1
TABLET ORAL
Qty: 90 TABLET | Refills: 0 | Status: SHIPPED | OUTPATIENT
Start: 2023-04-10 | End: 2023-07-11

## 2023-04-23 ENCOUNTER — HEALTH MAINTENANCE LETTER (OUTPATIENT)
Age: 53
End: 2023-04-23

## 2023-04-25 ENCOUNTER — OFFICE VISIT (OUTPATIENT)
Dept: URGENT CARE | Facility: URGENT CARE | Age: 53
End: 2023-04-25
Payer: COMMERCIAL

## 2023-04-25 VITALS
BODY MASS INDEX: 42.18 KG/M2 | OXYGEN SATURATION: 97 % | DIASTOLIC BLOOD PRESSURE: 69 MMHG | TEMPERATURE: 97.8 F | WEIGHT: 216 LBS | SYSTOLIC BLOOD PRESSURE: 119 MMHG | HEART RATE: 63 BPM

## 2023-04-25 DIAGNOSIS — Z90.89 HISTORY OF LEFT MASTOIDECTOMY: ICD-10-CM

## 2023-04-25 DIAGNOSIS — H61.22 IMPACTED CERUMEN OF LEFT EAR: Primary | ICD-10-CM

## 2023-04-25 PROCEDURE — 99213 OFFICE O/P EST LOW 20 MIN: CPT | Performed by: PHYSICIAN ASSISTANT

## 2023-04-25 NOTE — LETTER
April 25, 2023      Carol Guajardo  [id number] [address] [facility] [id number]        To Whom It May Concern:    Carol Guajardo  was seen and treated in clinic and missed work 4/25/23 through 4/27/23.      Sincerely,        Yecenia Granado PA-C

## 2023-04-25 NOTE — PROGRESS NOTES
Assessment & Plan     Impacted cerumen of left ear  Would recommend follow up with ENT to suction cerumen from left canal given surgical history. Patient agrees with plan. Can take over the counter analgesic as needed for discomfort.     - Adult ENT  Referral; Future    History of left mastoidectomy    - Adult ENT  Referral; Future             BMI:   Estimated body mass index is 42.18 kg/m  as calculated from the following:    Height as of 3/3/23: 1.524 m (5').    Weight as of this encounter: 98 kg (216 lb).           Return in about 1 week (around 5/2/2023) for Follow up.    Yecenia Granado PA-C  [employer]              Subjective   Chief Complaint   Patient presents with     Ear Problem     Left ear pain started 4-5 days with discomfort and pain, starting to get dizzy when she leans to the left.          HPI     Ear problem     Onset of symptoms was 4-5 day(s) ago.  Course of illness is worsening.    Severity moderate  Current and Associated symptoms: left ear pain  Treatment measures tried include Tylenol/Ibuprofen.  Predisposing factors include None.                  Review of Systems   Constitutional, HEENT, cardiovascular, pulmonary, gi and gu systems are negative, except as otherwise noted.      Objective    /69   Pulse 63   Temp 97.8  F (36.6  C) (Tympanic)   Wt 98 kg (216 lb)   LMP 02/20/2023   SpO2 97%   BMI 42.18 kg/m    Body mass index is 42.18 kg/m .  Physical Exam  Constitutional:       General: She is not in acute distress.  HENT:      Ears:      Comments: Left canal is larger from previous surgeries. There is significant cerumen impaction. No redness or drainage.   Neurological:      Mental Status: She is alert.

## 2023-05-14 DIAGNOSIS — F41.1 ANXIETY STATE: ICD-10-CM

## 2023-05-14 DIAGNOSIS — G25.81 RESTLESS LEG SYNDROME: ICD-10-CM

## 2023-05-15 RX ORDER — PRAMIPEXOLE DIHYDROCHLORIDE 0.5 MG/1
TABLET ORAL
Qty: 180 TABLET | Refills: 0 | Status: SHIPPED | OUTPATIENT
Start: 2023-05-15 | End: 2023-08-09

## 2023-05-15 RX ORDER — PROPRANOLOL HCL 60 MG
CAPSULE, EXTENDED RELEASE 24HR ORAL
Qty: 90 CAPSULE | Refills: 0 | Status: SHIPPED | OUTPATIENT
Start: 2023-05-15 | End: 2023-08-10

## 2023-05-15 NOTE — PROGRESS NOTES
History of Present Illness - Carol Guajardo is a 53 year old female here to see me for the first time due to problems with cerumen impaction.    She does have a significant ear history of a LEFT mastoidectomy.      Past medical history -   Patient Active Problem List   Diagnosis     Anxiety state     Depression     HLD (hyperlipidemia)     Vitamin D deficiency     Hypothyroidism     Nicotine dependence     Primary osteoarthritis of left knee     Solitary fibrous tumor     Morbid obesity (H)     Restless legs syndrome (RLS)     S/P TKR (total knee replacement), right     VALERIE (obstructive sleep apnea)     Osteoarthritis of right knee     Prediabetes     Chronic, continuous use of opioids       BP (!) 140/80   Pulse 69   Resp 22   SpO2 97%     General - The patient is well nourished and well developed, and appears to have good nutritional status.  Alert and oriented to person and place, answers questions and cooperates with examination appropriately.   Head and Face - Normocephalic and atraumatic, with no gross asymmetry noted of the contour of the facial features.  The facial nerve is intact, with strong symmetric movements.  Eyes - Extraocular movements intact, and the pupils were reactive to light.  Sclera were not icteric or injected, conjunctiva were pink and moist.  Mouth - Examination of the oral cavity shows pink, healthy, moist mucosa.  No lesions or ulceration noted.  The dentition are in good repair.  The tongue is mobile and midline.      Procedure - Cleaning and debridement of mastoid bowl  The patient was positioned semi-supine in the examination chair.  I examined the mastoid bowl and performed debridement using the binocular surgical microscope on the LEFT ear.  I began by using a cerumen loop to gently peel the squamous debris away from the anterior and inferior aspects of the external canal.  Working my medially, I exposed the tympanic membrane and cleared the debris in an anterior to posterior  direction.  Once I was clear of the TM, I then switched to a #7 suction to debulk as much debris as I could from the mastoid bowl.  Once this was done, I used an alligator forcep to grasp the edge of the impacted mass, and rotated out of the ear in several large pieces.  I inspected the mastoid bowl and it was well epithelialized.  No evidence of cholesteatoma or herniation through the tegmen.  The patient tolerated the procedure well.      A/P - Carol Guajardo  (H61.22) Impacted cerumen of left ear  (Z90.89) History of left mastoidectomy    The patient has had their mastoid bowl procedurally cleared today.  I have discussed ear care at home, including avoiding qtips or any other object placed in the canal.  I have also discussed that over the counter cerumen kits like Debrox or Cerumenex can be useful.    If no other issues, follow up with me in one year for an ear check.

## 2023-05-15 NOTE — TELEPHONE ENCOUNTER
"Requested Prescriptions   Pending Prescriptions Disp Refills    pramipexole (MIRAPEX) 0.5 MG tablet [Pharmacy Med Name: Pramipexole Dihydrochloride Oral Tablet 0.5 MG] 180 tablet 0     Sig: TAKE 2 TABLETS BY MOUTH AT BEDTIME. 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       Antiparkinson's Agents Protocol Failed - 5/14/2023  9:42 AM        Failed - ALT on record in past 12 months         No lab results found.            Failed - Serum Creatinine on file in past 12 months     Recent Labs   Lab Test 04/20/21  0459   CR 0.63       Ok to refill medication if creatinine is low          Passed - Blood pressure under 140/90 in past 12 months     BP Readings from Last 3 Encounters:   04/25/23 119/69   03/03/23 102/60   01/23/23 138/83                 Passed - CBC on record in past 12 months     Recent Labs   Lab Test 06/15/22  0544 06/01/22  1624   WBC  --  9.4   RBC  --  3.78*   HGB 11.5* 11.7   HCT  --  36.9   PLT  --  275                   Passed - Medication is active on med list        Passed - Patient is age 18 or older        Passed - No active pregnancy on record        Passed - No positive pregnancy test in the past 12 months        Passed - Recent (6 mo) or future (30 days) visit within the authorizing provider's specialty     Patient had office visit in the last 6 months or has a visit in the next 30 days with authorizing provider or within the authorizing provider's specialty.  See \"Patient Info\" tab in inbasket, or \"Choose Columns\" in Meds & Orders section of the refill encounter.             Signed Prescriptions Disp Refills    propranolol ER (INDERAL LA) 60 MG 24 hr capsule 90 capsule 0     Sig: TAKE 1 CAPSULE BY MOUTH EVERY MORNING       Beta-Blockers Protocol Passed - 5/14/2023  9:42 AM        Passed - Blood pressure under 140/90 in past 12 months     BP Readings from Last 3 Encounters:   04/25/23 119/69   03/03/23 102/60   01/23/23 138/83                 Passed - Patient is age 6 or older        Passed - Recent " "(12 mo) or future (30 days) visit within the authorizing provider's specialty     Patient has had an office visit with the authorizing provider or a provider within the authorizing providers department within the previous 12 mos or has a future within next 30 days. See \"Patient Info\" tab in inbasket, or \"Choose Columns\" in Meds & Orders section of the refill encounter.              Passed - Medication is active on med list             "

## 2023-05-19 ENCOUNTER — OFFICE VISIT (OUTPATIENT)
Dept: OTOLARYNGOLOGY | Facility: CLINIC | Age: 53
End: 2023-05-19
Payer: COMMERCIAL

## 2023-05-19 VITALS
SYSTOLIC BLOOD PRESSURE: 140 MMHG | HEART RATE: 69 BPM | RESPIRATION RATE: 22 BRPM | OXYGEN SATURATION: 97 % | DIASTOLIC BLOOD PRESSURE: 80 MMHG

## 2023-05-19 DIAGNOSIS — H61.22 IMPACTED CERUMEN OF LEFT EAR: ICD-10-CM

## 2023-05-19 DIAGNOSIS — Z90.89 HISTORY OF LEFT MASTOIDECTOMY: ICD-10-CM

## 2023-05-19 PROCEDURE — 99212 OFFICE O/P EST SF 10 MIN: CPT | Mod: 25 | Performed by: OTOLARYNGOLOGY

## 2023-05-19 PROCEDURE — 69220 CLEAN OUT MASTOID CAVITY: CPT | Mod: LT | Performed by: OTOLARYNGOLOGY

## 2023-05-19 NOTE — LETTER
5/19/2023         RE: Carol Guajardo  [id number] [id number]        Dear Colleague,    Thank you for referring your patient, Carol Guajardo, to the [facility]. Please see a copy of my visit note below.    History of Present Illness - Carol Guajardo is a 53 year old female here to see me for the first time due to problems with cerumen impaction.    She does have a significant ear history of a LEFT mastoidectomy.      Past medical history -   Patient Active Problem List   Diagnosis     Anxiety state     Depression     HLD (hyperlipidemia)     Vitamin D deficiency     Hypothyroidism     Nicotine dependence     Primary osteoarthritis of left knee     Solitary fibrous tumor     Morbid obesity (H)     Restless legs syndrome (RLS)     S/P TKR (total knee replacement), right     VALERIE (obstructive sleep apnea)     Osteoarthritis of right knee     Prediabetes     Chronic, continuous use of opioids       BP (!) 140/80   Pulse 69   Resp 22   SpO2 97%     General - The patient is well nourished and well developed, and appears to have good nutritional status.  Alert and oriented to person and place, answers questions and cooperates with examination appropriately.   Head and Face - Normocephalic and atraumatic, with no gross asymmetry noted of the contour of the facial features.  The facial nerve is intact, with strong symmetric movements.  Eyes - Extraocular movements intact, and the pupils were reactive to light.  Sclera were not icteric or injected, conjunctiva were pink and moist.  Mouth - Examination of the oral cavity shows pink, healthy, moist mucosa.  No lesions or ulceration noted.  The dentition are in good repair.  The tongue is mobile and midline.      Procedure - Cleaning and debridement of mastoid bowl  The patient was positioned semi-supine in the examination chair.  I examined the mastoid bowl and performed debridement using the binocular surgical microscope on the LEFT  ear.  I began by using a cerumen loop to gently peel the squamous debris away from the anterior and inferior aspects of the external canal.  Working my medially, I exposed the tympanic membrane and cleared the debris in an anterior to posterior direction.  Once I was clear of the TM, I then switched to a #7 suction to debulk as much debris as I could from the mastoid bowl.  Once this was done, I used an alligator forcep to grasp the edge of the impacted mass, and rotated out of the ear in several large pieces.  I inspected the mastoid bowl and it was well epithelialized.  No evidence of cholesteatoma or herniation through the tegmen.  The patient tolerated the procedure well.      A/P - Carol Guajardo  (H61.22) Impacted cerumen of left ear  (Z90.89) History of left mastoidectomy    The patient has had their mastoid bowl procedurally cleared today.  I have discussed ear care at home, including avoiding qtips or any other object placed in the canal.  I have also discussed that over the counter cerumen kits like Debrox or Cerumenex can be useful.    If no other issues, follow up with me in one year for an ear check.          Again, thank you for allowing me to participate in the care of your patient.        Sincerely,        Chava Rivera MD

## 2023-05-22 ENCOUNTER — MYC MEDICAL ADVICE (OUTPATIENT)
Dept: FAMILY MEDICINE | Facility: CLINIC | Age: 53
End: 2023-05-22
Payer: COMMERCIAL

## 2023-05-24 ENCOUNTER — MYC MEDICAL ADVICE (OUTPATIENT)
Dept: FAMILY MEDICINE | Facility: CLINIC | Age: 53
End: 2023-05-24
Payer: COMMERCIAL

## 2023-05-24 DIAGNOSIS — M17.11 PRIMARY OSTEOARTHRITIS OF RIGHT KNEE: ICD-10-CM

## 2023-05-24 DIAGNOSIS — Z96.651 S/P TKR (TOTAL KNEE REPLACEMENT), RIGHT: Primary | ICD-10-CM

## 2023-05-25 ENCOUNTER — HOSPITAL ENCOUNTER (EMERGENCY)
Facility: CLINIC | Age: 53
Discharge: HOME | End: 2023-05-25
Attending: EMERGENCY MEDICINE | Admitting: EMERGENCY MEDICINE
Payer: COMMERCIAL

## 2023-05-25 ENCOUNTER — MYC MEDICAL ADVICE (OUTPATIENT)
Dept: ORTHOPEDICS | Facility: CLINIC | Age: 53
End: 2023-05-25

## 2023-05-25 VITALS
RESPIRATION RATE: 10 BRPM | SYSTOLIC BLOOD PRESSURE: 114 MMHG | WEIGHT: 216 LBS | OXYGEN SATURATION: 99 % | HEART RATE: 68 BPM | TEMPERATURE: 97.2 F | DIASTOLIC BLOOD PRESSURE: 72 MMHG | BODY MASS INDEX: 42.18 KG/M2

## 2023-05-25 DIAGNOSIS — G89.29 CHRONIC PAIN OF RIGHT KNEE: ICD-10-CM

## 2023-05-25 DIAGNOSIS — M25.561 CHRONIC PAIN OF RIGHT KNEE: ICD-10-CM

## 2023-05-25 PROCEDURE — 99283 EMERGENCY DEPT VISIT LOW MDM: CPT | Performed by: EMERGENCY MEDICINE

## 2023-05-25 PROCEDURE — 99281 EMR DPT VST MAYX REQ PHY/QHP: CPT | Performed by: EMERGENCY MEDICINE

## 2023-05-25 RX ORDER — LIDOCAINE 4 G/G
1 PATCH TOPICAL EVERY 24 HOURS
Status: DISCONTINUED | OUTPATIENT
Start: 2023-05-25 | End: 2023-05-25 | Stop reason: HOSPADM

## 2023-05-25 RX ORDER — KETOROLAC TROMETHAMINE 30 MG/ML
30 INJECTION, SOLUTION INTRAMUSCULAR; INTRAVENOUS ONCE
Status: COMPLETED | OUTPATIENT
Start: 2023-05-25 | End: 2023-05-25

## 2023-05-25 ASSESSMENT — ENCOUNTER SYMPTOMS
FEVER: 0
COLOR CHANGE: 0

## 2023-05-25 NOTE — ED NOTES
Writer went into pt's room to give pt lidocaine patch and toradol.   Pt refused medications and was crying and left ER.   Pt upset about not getting answers about right knee pain.   Pt left without signing AMA form

## 2023-05-25 NOTE — ED NOTES
Pt reports increased pain and swelling in right knee over the last couple of days. Hx of R TKA and pt has followed up with surgeon x 3 and was told normal healing process.  Pt came to ED due to increased pain and swelling on right knee

## 2023-05-25 NOTE — ED PROVIDER NOTES
History     Chief Complaint   Patient presents with     Knee Pain     Had total knee replacement one year ago, ongoing right knee pain no recent injury, ambulated into triage, taking OTC pain medication, not working     HPI  Carol Guajardo is a 53 year old female who presents with right knee pain.  The patient underwent a total right knee replacement about a year ago.  She has had ongoing right knee pain off and on since that time.  Her regular doctor has told her that it is part of the healing process.  She says she can barely walk.  Triage note noted that she walked into the ER without difficulty. The pain comes and goes. She has seen her surgeon several times about this pain and the surgeon also told her that the pain was part of the healing process. Review of chart shows she was seen on 1/23/23 and the pain was thought to be due to increased activity and more likely muscular than the knee itself. She came to the ER because she wants someone to tell her why it is continuing to hurt her.  No fevers.  No new trauma.  No rashes or skin changes.  No swelling.  She does have a history of obstructive sleep apnea, morbid obesity, depression, anxiety, and restless leg syndrome.    Allergies:  Allergies   Allergen Reactions     Amoxicillin-Pot Clavulanate Other (See Comments) and Hives     Edema     Amoxicillin-Pot Clavulanate Hives and Itching     Ciprofloxacin Hives and Itching     Penicillins Hives and Itching       Problem List:    Patient Active Problem List    Diagnosis Date Noted     Chronic, continuous use of opioids 06/29/2022     Priority: Medium     S/P TKR (total knee replacement), right 06/14/2022     Priority: Medium     VALERIE (obstructive sleep apnea) 06/14/2022     Priority: Medium     Osteoarthritis of right knee 06/14/2022     Priority: Medium     Restless legs syndrome (RLS) 07/28/2021     Priority: Medium     Morbid obesity (H) 04/16/2021     Priority: Medium     Solitary fibrous tumor 04/05/2021      Priority: Medium     RLL lung mass. CT August 2018:  The mass measures approximately 9.8 cm x 5.3 cm x 7.4 cm.  Biopsied 8/2018, diagnosed as fibrosis       Primary osteoarthritis of left knee 07/30/2018     Priority: Medium     Prediabetes 03/30/2018     Priority: Medium     Anxiety state 07/19/2017     Priority: Medium     Depression 07/19/2017     Priority: Medium     Vitamin D deficiency 07/19/2017     Priority: Medium     Nicotine dependence 07/19/2017     Priority: Medium     HLD (hyperlipidemia) 08/28/2015     Priority: Medium     Hypothyroidism 05/08/2015     Priority: Medium        Past Medical History:    Past Medical History:   Diagnosis Date     Anxiety      Depression      HLD (hyperlipidemia)      Hypothyroidism      Mass of right lower leg 6/5/2017     Osteoarthritis of both knees      Right lower lobe lung mass      Total knee replacement status, left 9/30/2019       Past Surgical History:    Past Surgical History:   Procedure Laterality Date     ARTHROPLASTY KNEE Left 9/30/2019    Procedure: Left Total Knee Arthroplasty;  Surgeon: Ion Bailey MD;  Location: [facility] OR     ARTHROPLASTY KNEE Right 6/14/2022    Procedure: ARTHROPLASTY, KNEE, TOTAL RIGHT;  Surgeon: Emilio Pino MD;  Location: [facility]     BRONCHOSCOPY (RIGID OR FLEXIBLE), DIAGNOSTIC N/A 4/21/2021    Procedure: BRONCHOSCOPY, WITH BRONCHOALVEOLAR LAVAGE;  Surgeon: Keli Webber MD;  Location: [facility]     CARPAL TUNNEL RELEASE RT/LT Bilateral      EXTERNAL EAR SURGERY       IR LUNG BIOPSY MEDIASTINUM RIGHT Right 08/2018    RLL mass, diagnosed as fibrosis per pt     IR LUNG BIOPSY MEDIASTINUM RIGHT  8/31/2018     LAPAROSCOPY DIAGNOSTIC (GENERAL)  02/2019     LAPAROSCOPY, SURGICAL; REPAIR UMBILICAL HERNIA  08/22/2018     THORACOTOMY, WEDGE RESECTION LUNG, COMBINED Right 4/19/2021    Procedure: Right Thoracotomy, excision of solitary fibrous tumor, intercostal nerve cryo ablation;  Surgeon: Keli Webber MD;   Location:  OR     [facility] LIGATE FALLOPIAN TUBE      Description: Tubal Ligation;  Recorded: 2008;       Family History:    Family History   Problem Relation Age of Onset     Anesthesia Reaction Mother         PONV     Hyperlipidemia Mother      Hypertension Mother      Diabetes Mother      Thyroid Disease Mother      CABG Father      Heart Failure Father      Coronary Stenting Father      Cardiovascular Father         ICD implant     Lung Cancer Father      Coronary Artery Disease Father      Kidney Disease Maternal Grandmother      Breast Cancer Maternal Grandmother      Abdominal Aortic Aneurysm Maternal Grandmother      Abdominal Aortic Aneurysm Paternal Grandfather      Abdominal Aortic Aneurysm Paternal Uncle      Deep Vein Thrombosis (DVT) No family hx of        Social History:  Marital Status:   [2]  Social History     Tobacco Use     Smoking status: Every Day     Packs/day: 0.75     Years: 33.00     Pack years: 24.75     Types: Paan with tobacco, gutka, zarda, khaini, Cigarettes     Start date:      Last attempt to quit: 5/15/2022     Years since quittin.0     Smokeless tobacco: Former     Quit date: 2021   Substance Use Topics     Alcohol use: Yes     Alcohol/week: 6.0 standard drinks of alcohol     Types: 6 Cans of beer per week        Medications:    acetaminophen (TYLENOL) 325 MG tablet  buPROPion (WELLBUTRIN XL) 150 MG 24 hr tablet  buPROPion (WELLBUTRIN XL) 300 MG 24 hr tablet  calcium carbonate (OS-BERTA) 500 MG tablet  doxycycline hyclate (VIBRA-TABS) 100 MG tablet  Ferrous Sulfate 324 (65 Fe) MG TBEC  gabapentin (NEURONTIN) 300 MG capsule  hydrOXYzine (ATARAX) 25 MG tablet  levothyroxine (SYNTHROID/LEVOTHROID) 175 MCG tablet  PARoxetine (PAXIL) 20 MG tablet  PARoxetine (PAXIL) 40 MG tablet  pramipexole (MIRAPEX) 0.5 MG tablet  propranolol ER (INDERAL LA) 60 MG 24 hr capsule          Review of Systems   Constitutional: Negative for fever.   Musculoskeletal:        R knee pain     Skin: Negative for color change and rash.       Physical Exam   BP: 114/72  Pulse: 68  Temp: 97.2  F (36.2  C)  Resp: 10  Weight: 98 kg (216 lb)  SpO2: 99 %      Physical Exam  Constitutional:       General: She is not in acute distress.     Appearance: She is not toxic-appearing.   HENT:      Head: Normocephalic and atraumatic.      Right Ear: External ear normal.      Left Ear: External ear normal.      Nose: Nose normal.   Eyes:      General:         Right eye: No discharge.         Left eye: No discharge.      Extraocular Movements: Extraocular movements intact.   Cardiovascular:      Rate and Rhythm: Normal rate.   Pulmonary:      Effort: No respiratory distress.   Abdominal:      General: Abdomen is flat. There is no distension.      Tenderness: There is no abdominal tenderness.   Musculoskeletal:         General: No swelling, tenderness, deformity or signs of injury.      Right lower leg: No edema.      Left lower leg: No edema.      Comments: Right knee shows surgical scars that are well healed  No swelling of r knee vs left  No effusion of r knee  No erythema or warmth of r knee  No deformity of r knee  Normal gait  No instability on exam of right knee  Extensor mechanism intact b/l   Skin:     Capillary Refill: Capillary refill takes less than 2 seconds.   Neurological:      General: No focal deficit present.      Mental Status: She is alert and oriented to person, place, and time.      Cranial Nerves: No cranial nerve deficit.      Motor: No weakness.         ED Course              ED Course as of 05/26/23 0200   u May 25, 2023   2637 I went to reexamine the patient.  Her knee is stable and not swollen.  There is no redness.  There is no warmth versus the left.  She was tearful on exam.  She says that she is very upset that no one is giving her answers.  She has seen her surgeon 3 times per her and the surgeon has told her that the pain is part of the healing process.  She is very upset about this I  expressed my empathy for her frustration.  She refused the lidocaine patch and the Toradol that I ordered.  She decided that she wants to leave and does not want any imaging.  I did offer to perform steroid injection but she refused.  I tried to tell her that in the ER we usually do not tell people what is wrong but rather what is not wrong and that these sort of chronic issues are best addressed by specialist and primary care doctors because the ER is focused on emergent problems and not non-emergent issues.  She was very upset with this informiation and then walked out without difficulty.  She had no gait abnormality when she left.     Procedures                  No results found for this or any previous visit (from the past 24 hour(s)).    Medications   ketorolac (TORADOL) injection 30 mg (30 mg Intramuscular Not Given 5/25/23 1640)       Assessments & Plan (with Medical Decision Making)     This appears to be chronic pain of the right knee after knee replacement. I do not think I'll be able to elucidate a cause here in the ED, and I explained this to her. I will get an xray out of caution and will give her Toradol and a lidocaine patch for comfort. Will also offer a steroid injection. I suspect that the surgeon and her PCP are correct and this pain is part of the healing process and/or due to increasing activity and more likely to be muscular than the joint itself. the patient has not red flags. She did walk in without difficulty, so doubt a dislocation. Given time course and waxing and waning symptoms, doubt popliteal thrombosis or infection. There is no evidence of ruptured extensor tendon. Doubt tibia plateau fracture given time course, but will get xray to look for a fracture.     I have reviewed the nursing notes.    I have reviewed the findings, diagnosis, plan and need for follow up with the patient.          Medical Decision Making  The patient's presentation was of low complexity (a stable chronic  illness).    The patient's evaluation involved:  Review of 1 note by another provider    The patient's management necessitated only low risk treatment.    This patient was seen in the setting of ER overcrowding, ER overuse due to inadequate primary care availability, chronic boarding, a national mental health crisis that has brought a record number of patient with mental health crises and anxiety to the ER, and understaffing of emergency medicine physicians, all of which have been shown to present risk and decrease patient satisfaction. Every effort was made to provide optimum care to this patient despite the extreme, ongoing burden on the ER staff.     This note was in part created using dictation software in an effort to speed and improve patient care; any typos should be read with the frequent shortcomings of this technology in mind.      Discharge Medication List as of 5/25/2023  4:46 PM          Final diagnoses:   Chronic pain of right knee       5/25/2023   [facility] EMERGENCY DEPT     Serafin Jasmine MD  05/26/23 0209       Serafin Jasmine MD  05/26/23 0212

## 2023-05-25 NOTE — ED TRIAGE NOTES
Had total knee replacement one year ago, ongoing right knee pain no recent injury, ambulated into triage, taking OTC pain medication, not working       Triage Assessment     Row Name 05/25/23 8814       Triage Assessment (Adult)    Airway WDL WDL       Respiratory WDL    Respiratory WDL WDL       Peripheral/Neurovascular WDL    Peripheral Neurovascular WDL WDL       Cognitive/Neuro/Behavioral WDL    Cognitive/Neuro/Behavioral WDL WDL

## 2023-06-06 NOTE — PROGRESS NOTES
Chief Complaint   Patient presents with     Right Knee - Surgical Followup     Right knee total knee arthroplasty. DOS:6/14/22. 1 year follow-up. She continues to have pain. Works 10hour days on concrete. Gets sore by the end of the week.          SURGERY: Total knee arthroplasty, right knee ([facility])  DATE OF SURGERY: 6/14/2022      HISTORY OF PRESENT ILLNESS: Carol Guajardo is a 53 year old female seen for postoperative evaluation of right total knee arthroplasty. It has been 12 months since surgery. Returns today stating she's been having a really sore spot along the inner aspect of the knee, especially with being on her feet 10h/day at work on concrete. Sore by the end of the week. Will get stiff after sitting. Some days not really any pain especially early on in the week.    She's working 40 hours per week now, but had been 50-58 hours per week.   She's been icing and taking ibuprofen, tylenol for pain.    Denies any wound problems. Denies numbness, tingling, or weakness in the affected extremity. Denies fevers chills night sweats.    Pain 2/10.    Past medical history:   Past Medical History:   Diagnosis Date     Anxiety      Depression      HLD (hyperlipidemia)      Hypothyroidism      Mass of right lower leg 6/5/2017     Osteoarthritis of both knees      Right lower lobe lung mass     Biopsied 8/2018, diagnosed as fibrosis     Total knee replacement status, left 9/30/2019     Patient Active Problem List    Diagnosis Date Noted     Chronic, continuous use of opioids 06/29/2022     Priority: Medium     S/P TKR (total knee replacement), right 06/14/2022     Priority: Medium     VALERIE (obstructive sleep apnea) 06/14/2022     Priority: Medium     Osteoarthritis of right knee 06/14/2022     Priority: Medium     Restless legs syndrome (RLS) 07/28/2021     Priority: Medium     Morbid obesity (H) 04/16/2021     Priority: Medium     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 CT August 2018:  The mass measures approximately 9.8 cm x 5.3 cm x 7.4 cm.  Biopsied 8/2018, diagnosed as fibrosis       Primary osteoarthritis of left knee 07/30/2018     Priority: Medium     Prediabetes 03/30/2018     Priority: Medium     Anxiety state 07/19/2017     Priority: Medium     Depression 07/19/2017     Priority: Medium     Vitamin D deficiency 07/19/2017     Priority: Medium     Nicotine dependence 07/19/2017     Priority: Medium     HLD (hyperlipidemia) 08/28/2015     Priority: Medium     Hypothyroidism 05/08/2015     Priority: Medium       Past Surgical History:   Past Surgical History:   Procedure Laterality Date     ARTHROPLASTY KNEE Left 9/30/2019    Procedure: Left Total Knee Arthroplasty;  Surgeon: Ion Bailey MD;  Location: [facility]     ARTHROPLASTY KNEE Right 6/14/2022    Procedure: ARTHROPLASTY, KNEE, TOTAL RIGHT;  Surgeon: Emilio Pino MD;  Location: [facility]     BRONCHOSCOPY (RIGID OR FLEXIBLE), DIAGNOSTIC N/A 4/21/2021    Procedure: BRONCHOSCOPY, WITH BRONCHOALVEOLAR LAVAGE;  Surgeon: Keli Webber MD;  Location: [facility]     CARPAL TUNNEL RELEASE RT/LT Bilateral      EXTERNAL EAR SURGERY       IR LUNG BIOPSY MEDIASTINUM RIGHT Right 08/2018    RLL mass, diagnosed as fibrosis per pt     IR LUNG BIOPSY MEDIASTINUM RIGHT  8/31/2018     LAPAROSCOPY DIAGNOSTIC (GENERAL)  02/2019     LAPAROSCOPY, SURGICAL; REPAIR UMBILICAL HERNIA  08/22/2018     THORACOTOMY, WEDGE RESECTION LUNG, COMBINED Right 4/19/2021    Procedure: Right Thoracotomy, excision of solitary fibrous tumor, intercostal nerve cryo ablation;  Surgeon: Keli Webber MD;  Location:  OR     [facility] LIGATE FALLOPIAN TUBE      Description: Tubal Ligation;  Recorded: 04/09/2008;       Medications:   Current Outpatient Medications:      acetaminophen (TYLENOL) 325 MG tablet, Take 325-650 mg by mouth every 6 hours as needed for mild pain, Disp: , Rfl:      buPROPion (WELLBUTRIN XL) 150 MG 24 hr tablet, TAKE  1 TABLET BY MOUTH EVERY MORNING in addition to 300mg tab for total daily dose of 450mg, Disp: 90 tablet, Rfl: 0     buPROPion (WELLBUTRIN XL) 300 MG 24 hr tablet, TAKE 1 TABLET BY MOUTH EVERY MORNING, Disp: 90 tablet, Rfl: 0     calcium carbonate (OS-BERTA) 500 MG tablet, Take 1 tablet by mouth 2 times daily, Disp: , Rfl:      doxycycline hyclate (VIBRA-TABS) 100 MG tablet, Take 1 tablet (100 mg) by mouth 2 times daily, Disp: 14 tablet, Rfl: 0     Ferrous Sulfate 324 (65 Fe) MG TBEC, Take 324 mg by mouth 2 times daily, Disp: , Rfl:      gabapentin (NEURONTIN) 300 MG capsule, TAKE 3 CAPSULES BY MOUTH TWO TIMES A DAY, Disp: 540 capsule, Rfl: 0     hydrOXYzine (ATARAX) 25 MG tablet, Take 1-2 tablets (25-50 mg) by mouth 3 times daily as needed for anxiety (and insomnia), Disp: 30 tablet, Rfl: 0     levothyroxine (SYNTHROID/LEVOTHROID) 175 MCG tablet, Take 1 tablet (175 mcg) by mouth every morning, Disp: 90 tablet, Rfl: 3     PARoxetine (PAXIL) 20 MG tablet, TAKE 1 TABLET BY MOUTH EVERY MORNING WITH 40 MG TABLET FOR TOTAL DAILY DOSE OF 60 MG., Disp: 90 tablet, Rfl: 3     PARoxetine (PAXIL) 40 MG tablet, TAKE 1 TABLET BY MOUTH EVERY MORNING WITH 20 MG TABLET FOR TOTAL DAILY DOSE OF 60 MG., Disp: 90 tablet, Rfl: 3     pramipexole (MIRAPEX) 0.5 MG tablet, TAKE 2 TABLETS BY MOUTH AT BEDTIME. 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, Disp: 180 tablet, Rfl: 0     propranolol ER (INDERAL LA) 60 MG 24 hr capsule, TAKE 1 CAPSULE BY MOUTH EVERY MORNING, Disp: 90 capsule, Rfl: 0    Allergies:   Allergies   Allergen Reactions     Amoxicillin-Pot Clavulanate Other (See Comments) and Hives     Edema     Amoxicillin-Pot Clavulanate Hives and Itching     Ciprofloxacin Hives and Itching     Penicillins Hives and Itching         REVIEW OF SYSTEMS:  CONSTITUTIONAL:NEGATIVE for fever, chills, night sweats, change in weight  INTEGUMENTARY/SKIN: NEGATIVE for worrisome rashes, moles or lesions  MUSCULOSKELETAL:See HPI above  NEURO: NEGATIVE for  weakness, dizziness or paresthesias  CARDIOVASCULAR: negative for chest pain, shortness of breath    PHYSICAL EXAM:  Ht 1.524 m (5')   Wt 98 kg (216 lb)   BMI 42.18 kg/m     GENERAL APPEARANCE: healthy, alert, no distress  SKIN: no suspicious lesions or rashes  NEURO: Normal strength and tone, mentation intact and speech normal  PSYCH:  mentation appears normal and affect normal  RESPIRATORY: No increased work of breathing.    BILATERAL LOWER EXTREMITIES:  Gait: fairly normal, unassisted.    Intact sensation deep peroneal nerve, superficial peroneal nerve, med/lat tibial nerve, sural nerve, saphenous nerve  Intact EHL, EDL, TA, FHL, GS, quadriceps hamstrings and hip flexors  Bilateral calf soft and nttp or squeeze.  Edema: trace    right  KNEE EXAM:    Incision: healed well.  Skin: intact, no ecchymosis   ROM: full extension to 120 flexion  Effusion: none.  Tender: moderate medial hamstrings up the inner thigh to the groin, pes  Minimal tender to palpation along the joint lines.  Nontender to palpation anterior knee.      X-RAY:  2 views right knee from 6/8/2023 were reviewed in clinic today. No obvious fractures or dislocations. Total knee arthroplasty components in place without evidence of failure or loosening. Some decreased medial tibial lucency.      Impression: Carol Guajardo is a 53 year old female status post Total knee arthroplasty, right knee, doing well 12 months out from surgery with hamstrings tendinitis and pes bursitis..      Plan:   * reviewed xrays with patient, showing total knee arthroplasty implants. Again everything looks stable.  * I suspect due to increased activities, standing on concrete for 10h/day.  Appears more muscular with hamstrings and pes insertion rather than the knee itself. She actually agrees with that assessment.  * I recommended if able to stand on a cushioned mat at work that would probably alleviate a lot of her pain.    * continue with knee range of motion exercises   * I  advised trying topical diclofenac, which is available over the counter.  * I did discuss trying a pes bursal injection, but she declines today.    * also discussed return to therapy, referral placed.    * all questions addressed and answered prior to discharge from clinic today to patient's satisfaction.  * patient will need longterm prophylactic antibiotics use with dental procedures or other invasive procedures (2 g amoxicilin or 450mg ([id number]) clindamycin) 1 hour prior to dental procedures.  * return to clinic 4 years.    * KOOS Jr/Promis Knee score: completed today.      Emiilo Pino M.D., M.S.  Dept. of Orthopaedic Surgery  [facility]

## 2023-06-08 ENCOUNTER — ANCILLARY PROCEDURE (OUTPATIENT)
Dept: GENERAL RADIOLOGY | Facility: CLINIC | Age: 53
End: 2023-06-08
Attending: PHYSICIAN ASSISTANT
Payer: COMMERCIAL

## 2023-06-08 ENCOUNTER — OFFICE VISIT (OUTPATIENT)
Dept: ORTHOPEDICS | Facility: CLINIC | Age: 53
End: 2023-06-08
Payer: COMMERCIAL

## 2023-06-08 VITALS — WEIGHT: 216 LBS | HEIGHT: 60 IN | BODY MASS INDEX: 42.41 KG/M2

## 2023-06-08 DIAGNOSIS — Z96.651 S/P TOTAL KNEE ARTHROPLASTY, RIGHT: ICD-10-CM

## 2023-06-08 DIAGNOSIS — M70.51 PES ANSERINUS BURSITIS OF RIGHT KNEE: Primary | ICD-10-CM

## 2023-06-08 PROCEDURE — 99213 OFFICE O/P EST LOW 20 MIN: CPT | Performed by: ORTHOPAEDIC SURGERY

## 2023-06-08 PROCEDURE — 73560 X-RAY EXAM OF KNEE 1 OR 2: CPT | Mod: TC | Performed by: RADIOLOGY

## 2023-06-08 ASSESSMENT — KOOS JR
GOING UP OR DOWN STAIRS: MODERATE
RISING FROM SITTING: SEVERE
KOOS JR SCORING: 54.84
BENDING TO THE FLOOR TO PICK UP OBJECT: MILD
STANDING UPRIGHT: MILD
HOW SEVERE IS YOUR KNEE STIFFNESS AFTER FIRST WAKING IN MORNING: MODERATE
TWISING OR PIVOTING ON KNEE: SEVERE
STRAIGHTENING KNEE FULLY: MILD

## 2023-06-08 ASSESSMENT — PAIN SCALES - GENERAL: PAINLEVEL: MILD PAIN (2)

## 2023-06-08 NOTE — LETTER
6/8/2023         RE: Carol Guajardo  [address] [facility] [id number]        Dear Colleague,    Thank you for referring your patient, Carol Guajardo, to the [facility]. Please see a copy of my visit note below.    Chief Complaint   Patient presents with     Right Knee - Surgical Followup     Right knee total knee arthroplasty. DOS:6/14/22. 1 year follow-up. She continues to have pain. Works 10hour days on concrete. Gets sore by the end of the week.          SURGERY: Total knee arthroplasty, right knee ([facility])  DATE OF SURGERY: 6/14/2022      HISTORY OF PRESENT ILLNESS: Carol Guajardo is a 53 year old female seen for postoperative evaluation of right total knee arthroplasty. It has been 12 months since surgery. Returns today stating she's been having a really sore spot along the inner aspect of the knee, especially with being on her feet 10h/day at work on concrete. Sore by the end of the week. Will get stiff after sitting. Some days not really any pain especially early on in the week.    She's working 40 hours per week now, but had been 50-58 hours per week.   She's been icing and taking ibuprofen, tylenol for pain.    Denies any wound problems. Denies numbness, tingling, or weakness in the affected extremity. Denies fevers chills night sweats.    Pain 2/10.    Past medical history:   Past Medical History:   Diagnosis Date     Anxiety      Depression      HLD (hyperlipidemia)      Hypothyroidism      Mass of right lower leg 6/5/2017     Osteoarthritis of both knees      Right lower lobe lung mass     Biopsied 8/2018, diagnosed as fibrosis     Total knee replacement status, left 9/30/2019     Patient Active Problem List    Diagnosis Date Noted     Chronic, continuous use of opioids 06/29/2022     Priority: Medium     S/P TKR (total knee replacement), right 06/14/2022     Priority: Medium     VALERIE (obstructive sleep apnea) 06/14/2022     Priority: Medium      Osteoarthritis of right knee 06/14/2022     Priority: Medium     Restless legs syndrome (RLS) 07/28/2021     Priority: Medium     Morbid obesity (H) 04/16/2021     Priority: Medium     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 CT August 2018:  The mass measures approximately 9.8 cm x 5.3 cm x 7.4 cm.  Biopsied 8/2018, diagnosed as fibrosis       Primary osteoarthritis of left knee 07/30/2018     Priority: Medium     Prediabetes 03/30/2018     Priority: Medium     Anxiety state 07/19/2017     Priority: Medium     Depression 07/19/2017     Priority: Medium     Vitamin D deficiency 07/19/2017     Priority: Medium     Nicotine dependence 07/19/2017     Priority: Medium     HLD (hyperlipidemia) 08/28/2015     Priority: Medium     Hypothyroidism 05/08/2015     Priority: Medium       Past Surgical History:   Past Surgical History:   Procedure Laterality Date     ARTHROPLASTY KNEE Left 9/30/2019    Procedure: Left Total Knee Arthroplasty;  Surgeon: Ion Bailey MD;  [facility]     ARTHROPLASTY KNEE Right 6/14/2022    Procedure: ARTHROPLASTY, KNEE, TOTAL RIGHT;  Surgeon: Emilio Pino MD;  Location: [facility]     BRONCHOSCOPY (RIGID OR FLEXIBLE), DIAGNOSTIC N/A 4/21/2021    Procedure: BRONCHOSCOPY, WITH BRONCHOALVEOLAR LAVAGE;  Surgeon: Keli Webber MD;  [facility]: [facility]     CARPAL TUNNEL RELEASE RT/LT Bilateral      EXTERNAL EAR SURGERY       IR LUNG BIOPSY MEDIASTINUM RIGHT Right 08/2018    RLL mass, diagnosed as fibrosis per pt     IR LUNG BIOPSY MEDIASTINUM RIGHT  8/31/2018     LAPAROSCOPY DIAGNOSTIC (GENERAL)  02/2019     LAPAROSCOPY, SURGICAL; REPAIR UMBILICAL HERNIA  08/22/2018     THORACOTOMY, WEDGE RESECTION LUNG, COMBINED Right 4/19/2021    Procedure: Right Thoracotomy, excision of solitary fibrous tumor, intercostal nerve cryo ablation;  Surgeon: Keli Webber MD;  Location:  OR     [facility] L[facility]ATE FALLOP[facility] TUBE      Description: Tubal Ligation;   Recorded: 04/09/2008;       Medications:   Current Outpatient Medications:      acetaminophen (TYLENOL) 325 MG tablet, Take 325-650 mg by mouth every 6 hours as needed for mild pain, Disp: , Rfl:      buPROPion (WELLBUTRIN XL) 150 MG 24 hr tablet, TAKE 1 TABLET BY MOUTH EVERY MORNING in addition to 300mg tab for total daily dose of 450mg, Disp: 90 tablet, Rfl: 0     buPROPion (WELLBUTRIN XL) 300 MG 24 hr tablet, TAKE 1 TABLET BY MOUTH EVERY MORNING, Disp: 90 tablet, Rfl: 0     calcium carbonate (OS-BERTA) 500 MG tablet, Take 1 tablet by mouth 2 times daily, Disp: , Rfl:      doxycycline hyclate (VIBRA-TABS) 100 MG tablet, Take 1 tablet (100 mg) by mouth 2 times daily, Disp: 14 tablet, Rfl: 0     Ferrous Sulfate 324 (65 Fe) MG TBEC, Take 324 mg by mouth 2 times daily, Disp: , Rfl:      gabapentin (NEURONTIN) 300 MG capsule, TAKE 3 CAPSULES BY MOUTH TWO TIMES A DAY, Disp: 540 capsule, Rfl: 0     hydrOXYzine (ATARAX) 25 MG tablet, Take 1-2 tablets (25-50 mg) by mouth 3 times daily as needed for anxiety (and insomnia), Disp: 30 tablet, Rfl: 0     levothyroxine (SYNTHROID/LEVOTHROID) 175 MCG tablet, Take 1 tablet (175 mcg) by mouth every morning, Disp: 90 tablet, Rfl: 3     PARoxetine (PAXIL) 20 MG tablet, TAKE 1 TABLET BY MOUTH EVERY MORNING WITH 40 MG TABLET FOR TOTAL DAILY DOSE OF 60 MG., Disp: 90 tablet, Rfl: 3     PARoxetine (PAXIL) 40 MG tablet, TAKE 1 TABLET BY MOUTH EVERY MORNING WITH 20 MG TABLET FOR TOTAL DAILY DOSE OF 60 MG., Disp: 90 tablet, Rfl: 3     pramipexole (MIRAPEX) 0.5 MG tablet, TAKE 2 TABLETS BY MOUTH AT BEDTIME. 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, Disp: 180 tablet, Rfl: 0     propranolol ER (INDERAL LA) 60 MG 24 hr capsule, TAKE 1 CAPSULE BY MOUTH EVERY MORNING, Disp: 90 capsule, Rfl: 0    Allergies:   Allergies   Allergen Reactions     Amoxicillin-Pot Clavulanate Other (See Comments) and Hives     Edema     Amoxicillin-Pot Clavulanate Hives and Itching     Ciprofloxacin Hives and Itching      Penicillins Hives and Itching         REVIEW OF SYSTEMS:  CONSTITUTIONAL:NEGATIVE for fever, chills, night sweats, change in weight  INTEGUMENTARY/SKIN: NEGATIVE for worrisome rashes, moles or lesions  MUSCULOSKELETAL:See HPI above  NEURO: NEGATIVE for weakness, dizziness or paresthesias  CARDIOVASCULAR: negative for chest pain, shortness of breath    PHYSICAL EXAM:  Ht 1.524 m (5')   Wt 98 kg (216 lb)   BMI 42.18 kg/m     GENERAL APPEARANCE: healthy, alert, no distress  SKIN: no suspicious lesions or rashes  NEURO: Normal strength and tone, mentation intact and speech normal  PSYCH:  mentation appears normal and affect normal  RESPIRATORY: No increased work of breathing.    BILATERAL LOWER EXTREMITIES:  Gait: fairly normal, unassisted.    Intact sensation deep peroneal nerve, superficial peroneal nerve, med/lat tibial nerve, sural nerve, saphenous nerve  Intact EHL, EDL, TA, FHL, GS, quadriceps hamstrings and hip flexors  Bilateral calf soft and nttp or squeeze.  Edema: trace    right  KNEE EXAM:    Incision: healed well.  Skin: intact, no ecchymosis   ROM: full extension to 120 flexion  Effusion: none.  Tender: moderate medial hamstrings up the inner thigh to the groin, pes  Minimal tender to palpation along the joint lines.  Nontender to palpation anterior knee.      X-RAY:  2 views right knee from 6/8/2023 were reviewed in clinic today. No obvious fractures or dislocations. Total knee arthroplasty components in place without evidence of failure or loosening. Some decreased medial tibial lucency.      Impression: Carol Guajardo is a 53 year old female status post Total knee arthroplasty, right knee, doing well 12 months out from surgery with hamstrings tendinitis and pes bursitis..      Plan:   * reviewed xrays with patient, showing total knee arthroplasty implants. Again everything looks stable.  * I suspect due to increased activities, standing on concrete for 10h/day.  Appears more muscular with hamstrings  and pes insertion rather than the knee itself. She actually agrees with that assessment.  * I recommended if able to stand on a cushioned mat at work that would probably alleviate a lot of her pain.    * continue with knee range of motion exercises   * I advised trying topical diclofenac, which is available over the counter.  * I did discuss trying a pes bursal injection, but she declines today.    * also discussed return to therapy, referral placed.    * all questions addressed and answered prior to discharge from clinic today to patient's satisfaction.  * patient will need longterm prophylactic antibiotics use with dental procedures or other invasive procedures (2 g amoxicilin or 450mg ([id number]) clindamycin) 1 hour prior to dental procedures.  * return to clinic 4 years.    * KOOS Jr/Promis Knee score: completed today.      Emilio Pino M.D., M.S.  Dept. of Orthopaedic Surgery  [facility]            Again, thank you for allowing me to participate in the care of your patient.        Sincerely,        Emilio Pino MD

## 2023-06-10 DIAGNOSIS — M79.2 NEUROPATHIC PAIN: ICD-10-CM

## 2023-06-10 DIAGNOSIS — G25.81 RESTLESS LEGS SYNDROME (RLS): ICD-10-CM

## 2023-06-12 RX ORDER — GABAPENTIN 300 MG/1
CAPSULE ORAL
Qty: 540 CAPSULE | Refills: 1 | Status: SHIPPED | OUTPATIENT
Start: 2023-06-12 | End: 2024-01-19

## 2023-07-11 DIAGNOSIS — F41.1 ANXIETY STATE: ICD-10-CM

## 2023-07-11 RX ORDER — BUPROPION HYDROCHLORIDE 300 MG/1
TABLET ORAL
Qty: 90 TABLET | Refills: 0 | Status: SHIPPED | OUTPATIENT
Start: 2023-07-11 | End: 2023-11-10 | Stop reason: ALTCHOICE

## 2023-07-11 RX ORDER — BUPROPION HYDROCHLORIDE 150 MG/1
TABLET ORAL
Qty: 90 TABLET | Refills: 0 | Status: SHIPPED | OUTPATIENT
Start: 2023-07-11 | End: 2023-11-10 | Stop reason: ALTCHOICE

## 2023-07-25 ENCOUNTER — MYC MEDICAL ADVICE (OUTPATIENT)
Dept: FAMILY MEDICINE | Facility: CLINIC | Age: 53
End: 2023-07-25

## 2023-08-03 ENCOUNTER — MYC MEDICAL ADVICE (OUTPATIENT)
Dept: FAMILY MEDICINE | Facility: CLINIC | Age: 53
End: 2023-08-03

## 2023-08-09 DIAGNOSIS — G25.81 RESTLESS LEG SYNDROME: ICD-10-CM

## 2023-08-09 RX ORDER — PRAMIPEXOLE DIHYDROCHLORIDE 0.5 MG/1
TABLET ORAL
Qty: 180 TABLET | Refills: 0 | Status: SHIPPED | OUTPATIENT
Start: 2023-08-09 | End: 2023-11-03

## 2023-08-09 NOTE — TELEPHONE ENCOUNTER
"Routing refill request to provider for review/approval because:  Requested Prescriptions   Pending Prescriptions Disp Refills    pramipexole (MIRAPEX) 0.5 MG tablet [Pharmacy Med Name: Pramipexole Dihydrochloride Oral Tablet 0.5 MG] 180 tablet 0     Sig: TAKE 2 TABLETS BY MOUTH AT BEDTIME DUE FOR LABWORK BEFORE FURTHER FILLS       Antiparkinson's Agents Protocol Failed - 8/9/2023  9:10 AM        Failed - CBC on record in past 12 months     Recent Labs   Lab Test 06/15/22  0544 06/01/22  1624   WBC  --  9.4   RBC  --  3.78*   HGB 11.5* 11.7   HCT  --  36.9   PLT  --  275                 Failed - ALT on record in past 12 months         No lab results found.          Failed - Serum Creatinine on file in past 12 months     Recent Labs   Lab Test 04/20/21  0459   CR 0.63       Ok to refill medication if creatinine is low          Passed - Blood pressure under 140/90 in past 12 months     BP Readings from Last 3 Encounters:   05/25/23 114/72   05/19/23 (!) 140/80   04/25/23 119/69                 Passed - Medication is active on med list        Passed - Patient is age 18 or older        Passed - No active pregnancy on record        Passed - No positive pregnancy test in the past 12 months        Passed - Recent (6 mo) or future (30 days) visit within the authorizing provider's specialty     Patient had office visit in the last 6 months or has a visit in the next 30 days with authorizing provider or within the authorizing provider's specialty.  See \"Patient Info\" tab in inbasket, or \"Choose Columns\" in Meds & Orders section of the refill encounter.              Date of last OV with prescriber: 3/3/23, has upcoming appointment 9/8/23.    Julie Behrendt RN    "

## 2023-08-10 DIAGNOSIS — F41.1 ANXIETY STATE: ICD-10-CM

## 2023-08-10 RX ORDER — PROPRANOLOL HCL 60 MG
CAPSULE, EXTENDED RELEASE 24HR ORAL
Qty: 90 CAPSULE | Refills: 2 | Status: SHIPPED | OUTPATIENT
Start: 2023-08-10 | End: 2024-05-13

## 2023-09-08 ENCOUNTER — MYC MEDICAL ADVICE (OUTPATIENT)
Dept: CONSULT | Facility: CLINIC | Age: 53
End: 2023-09-08

## 2023-09-08 ENCOUNTER — OFFICE VISIT (OUTPATIENT)
Dept: FAMILY MEDICINE | Facility: CLINIC | Age: 53
End: 2023-09-08
Payer: COMMERCIAL

## 2023-09-08 VITALS
HEART RATE: 67 BPM | TEMPERATURE: 97.6 F | OXYGEN SATURATION: 98 % | DIASTOLIC BLOOD PRESSURE: 78 MMHG | SYSTOLIC BLOOD PRESSURE: 122 MMHG | RESPIRATION RATE: 22 BRPM

## 2023-09-08 DIAGNOSIS — F41.1 GAD (GENERALIZED ANXIETY DISORDER): ICD-10-CM

## 2023-09-08 DIAGNOSIS — F33.1 MODERATE EPISODE OF RECURRENT MAJOR DEPRESSIVE DISORDER (H): Primary | ICD-10-CM

## 2023-09-08 PROCEDURE — 99214 OFFICE O/P EST MOD 30 MIN: CPT | Performed by: NURSE PRACTITIONER

## 2023-09-08 RX ORDER — SERTRALINE HYDROCHLORIDE 25 MG/1
TABLET, FILM COATED ORAL
Qty: 31 TABLET | Refills: 0 | Status: SHIPPED | OUTPATIENT
Start: 2023-09-08 | End: 2023-09-12

## 2023-09-08 ASSESSMENT — PATIENT HEALTH QUESTIONNAIRE - PHQ9
SUM OF ALL RESPONSES TO PHQ QUESTIONS 1-9: 24
SUM OF ALL RESPONSES TO PHQ QUESTIONS 1-9: 24
10. IF YOU CHECKED OFF ANY PROBLEMS, HOW DIFFICULT HAVE THESE PROBLEMS MADE IT FOR YOU TO DO YOUR WORK, TAKE CARE OF THINGS AT HOME, OR GET ALONG WITH OTHER PEOPLE: EXTREMELY DIFFICULT

## 2023-09-08 ASSESSMENT — ANXIETY QUESTIONNAIRES
7. FEELING AFRAID AS IF SOMETHING AWFUL MIGHT HAPPEN: MORE THAN HALF THE DAYS
GAD7 TOTAL SCORE: 20
1. FEELING NERVOUS, ANXIOUS, OR ON EDGE: NEARLY EVERY DAY
GAD7 TOTAL SCORE: 20
2. NOT BEING ABLE TO STOP OR CONTROL WORRYING: NEARLY EVERY DAY
3. WORRYING TOO MUCH ABOUT DIFFERENT THINGS: NEARLY EVERY DAY
4. TROUBLE RELAXING: NEARLY EVERY DAY
IF YOU CHECKED OFF ANY PROBLEMS ON THIS QUESTIONNAIRE, HOW DIFFICULT HAVE THESE PROBLEMS MADE IT FOR YOU TO DO YOUR WORK, TAKE CARE OF THINGS AT HOME, OR GET ALONG WITH OTHER PEOPLE: EXTREMELY DIFFICULT
5. BEING SO RESTLESS THAT IT IS HARD TO SIT STILL: NEARLY EVERY DAY
6. BECOMING EASILY ANNOYED OR IRRITABLE: NEARLY EVERY DAY

## 2023-09-08 ASSESSMENT — PAIN SCALES - GENERAL: PAINLEVEL: NO PAIN (0)

## 2023-09-08 NOTE — LETTER
[facility]  [address] [place] [id number]  [phone]          September 8, 2023    Carol EARLY Casandra                                                                                                                     [address] [facility] [address]      To Whom it May Concern,      Carol is under my general care and will need to be excused for the remainder of the work day today 9/8/2023.       Sincerely,       Barbara Hedrick, DNP, APRN-CNP

## 2023-09-08 NOTE — PROGRESS NOTES
Assessment & Plan     Moderate episode of recurrent major depressive disorder (H)  Chronic, uncontrolled.  Patient very stressed, stressful at work.  Crying randomly.  Having difficult time sleeping and functioning.  Does not feel Wellbutrin or Paxil is doing anything for her.  Was also previously on fluoxetine, however does not remember when she went off.  Currently not in any counseling.  Would highly recommend counseling and did discuss medication management.  Patient is open to this.  Discussed GeneSight testing as well.  We will have patient wean off of Wellbutrin and Paxil and start sertraline, tapering up after 1 week.  Tapering instructions per patient instructions.  Referrals placed for counseling as well as psychiatry for medication management and GeneSight testing.  Patient should be contacted to schedule all appointments.  Other counseling recommendations per patient sections as well.  Due to patient's depression and anxiety, finding it difficult to work.  Discussed FMLA.  Patient to fax papers, once received will fill out and fax.  Patient to follow-up via virtual visit in 1 month for medication recheck.  - Adult Genetics & Metabolism Referral; Future  - Adult Mental Health  Referral; Future  - Adult Mental Health  Referral; Future    SHEFALI (generalized anxiety disorder)  Chronic, uncontrolled as above.  - Adult Genetics & Metabolism Referral; Future  - Adult Mental Health  Referral; Future  - Adult Mental Health  Referral; Future             BMI:   Estimated body mass index is 42.18 kg/m  as calculated from the following:    Height as of 6/8/23: 1.524 m (5').    Weight as of 6/8/23: 98 kg (216 lb).   Weight management plan: Discussed healthy diet and exercise guidelines    Depression Screening Follow Up        9/8/2023     9:35 AM   PHQ   PHQ-9 Total Score 24   Q9: Thoughts of better off dead/self-harm past 2 weeks Several days   F/U: Thoughts of suicide or self-harm  No   F/U: Safety concerns No             See Patient Instructions After discussion with patient, patient verbalizes and agreeable to receive AVS instructions via My Chart, not printed today     Barbara Hedrick, PANCHITO, APRN-CNP   [facility]    Jey Medina is a 53 year old, presenting for the following health issues:  Depression        9/8/2023     9:43 AM   Additional Questions   Roomed by Beatriz MADISON CMA       History of Present Illness       Mental Health Follow-up:  Patient presents to follow-up on Depression & Anxiety.Patient's depression since last visit has been:  Worse  The patient is not having other symptoms associated with depression.  Patient's anxiety since last visit has been:  Worse  The patient is not having other symptoms associated with anxiety.  Any significant life events: relationship concerns and job concerns  Patient is feeling anxious or having panic attacks.  Patient has no concerns about alcohol or drug use.    She eats 0-1 servings of fruits and vegetables daily.She consumes 1 sweetened beverage(s) daily.She exercises with enough effort to increase her heart rate 9 or less minutes per day.  She exercises with enough effort to increase her heart rate 4 days per week.   She is taking medications regularly.     Not sleeping well - between restless legs and mind racing  Gets up 5 times a night  Work is very stressful right now   Has been smoking a little marijuana when gets home from work and does help some       Has been on Prozac previously through [facility]  - not sure how she did on this     Sometimes the mirapex and gabapentin work and other times it doesn't - seems when more stressed out is worse.       FMLA - intermittent 3 days/month             Review of Systems   Constitutional, HEENT, cardiovascular, pulmonary, gi and gu systems are negative, except as otherwise noted.      Objective    /78 (BP Location: Right arm, Patient Position: Sitting, Cuff  Size: Adult Regular)   Pulse 67   Temp 97.6  F (36.4  C) (Tympanic)   Resp 22   LMP  (LMP Unknown)   SpO2 98%   There is no height or weight on file to calculate BMI.  Physical Exam   GENERAL APPEARANCE: healthy, alert, and no distress  RESP: lungs clear to auscultation - no rales, rhonchi or wheezes  CV: regular rates and rhythm, normal S1 S2, no S3 or S4, and no murmur, click or rub  ABDOMEN: soft, nontender, without hepatosplenomegaly or masses and bowel sounds normal  MS: extremities normal- no gross deformities noted  SKIN: no suspicious lesions or rashes  NEURO: Normal strength and tone, mentation intact, and speech normal  PSYCH: mentation appears normal, affect flat, and crying    Diagnostic Test Results:  Labs reviewed in Epic  none            Chart documentation with Dragon Voice recognition Software. Although reviewed after completion, some words and grammatical errors may remain.

## 2023-09-11 ENCOUNTER — MYC MEDICAL ADVICE (OUTPATIENT)
Dept: FAMILY MEDICINE | Facility: CLINIC | Age: 53
End: 2023-09-11
Payer: COMMERCIAL

## 2023-09-11 ENCOUNTER — TELEPHONE (OUTPATIENT)
Dept: FAMILY MEDICINE | Facility: CLINIC | Age: 53
End: 2023-09-11
Payer: COMMERCIAL

## 2023-09-11 DIAGNOSIS — F41.1 GAD (GENERALIZED ANXIETY DISORDER): ICD-10-CM

## 2023-09-11 DIAGNOSIS — F33.1 MODERATE EPISODE OF RECURRENT MAJOR DEPRESSIVE DISORDER (H): ICD-10-CM

## 2023-09-11 NOTE — TELEPHONE ENCOUNTER
Pharmacy calling asking for clarification for patients Sertraline. Current directions are to take 1 tablet (25mg) by mouth daily for 7 days, then 1 tablet (25mg) daily for 24 days. No change in dose. Pharmacy is asking if there is supposed to be a change in dose. Needing new RX sent with correct directions.     Preferred Pharmacy:   [id number] - [place] - [address]  [address]  [place] [address]  Phone: [phone] Fax: [phone]

## 2023-09-12 RX ORDER — SERTRALINE HYDROCHLORIDE 25 MG/1
TABLET, FILM COATED ORAL
Qty: 55 TABLET | Refills: 0 | Status: SHIPPED | OUTPATIENT
Start: 2023-09-12 | End: 2023-10-17

## 2023-09-12 NOTE — PATIENT INSTRUCTIONS
Moderate episode of recurrent major depressive disorder (H)  Chronic, uncontrolled.  Patient very stressed, stressful at work.  Crying randomly.  Having difficult time sleeping and functioning.  Does not feel Wellbutrin or Paxil is doing anything for her.  Was also previously on fluoxetine, however does not remember when she went off.  Currently not in any counseling.  Would highly recommend counseling and did discuss medication management.  Patient is open to this.  Discussed GeneSight testing as well.  We will have patient wean off of Wellbutrin and Paxil and start sertraline, tapering up after 1 week.  Tapering instructions per patient instructions.  Referrals placed for counseling as well as psychiatry for medication management and GeneSight testing.  Patient should be contacted to schedule all appointments.  Other counseling recommendations per patient sections as well.  Due to patient's depression and anxiety, finding it difficult to work.  Discussed FMLA.  Patient to fax papers, once received will fill out and fax.  Patient to follow-up via virtual visit in 1 month for medication recheck.  - Adult Genetics & Metabolism Referral; Future  - Adult Mental Health  Referral; Future  - Adult Mental Health  Referral; Future    SHEFALI (generalized anxiety disorder)  Chronic, uncontrolled as above.  - Adult Genetics & Metabolism Referral; Future  - Adult Mental Health  Referral; Future  - Adult Mental Health  Referral; Future    Start with stopping the 150 mg of Wellbutrin and the 20 mg of the Paxil daily x 5 days; Then take Wellbutrin 150 mg every other day x 3 doses and split 40 mg tablet of Paxil in half and take daily x 3 days, then can stop both.  Can start new medication (sertraline) in the last 3 days of taking Paxil.    Other counseling options  [facility] ([place])-- [phone]  [facility] ([place]) -- [phone]  [facility] ([facility]) --  [phone]  [facility] ([place]) -- [phone]  [facility] ([place], other [facility] as well) -- [phone]  [facility] ([place]) -- [phone]  [facility] ([place], multiple locations) -- [phone]  [facility] ([facility], multiple locations) -- [phone]

## 2023-09-16 ENCOUNTER — MYC MEDICAL ADVICE (OUTPATIENT)
Dept: ORTHOPEDICS | Facility: CLINIC | Age: 53
End: 2023-09-16

## 2023-09-21 ENCOUNTER — MYC MEDICAL ADVICE (OUTPATIENT)
Dept: CONSULT | Facility: CLINIC | Age: 53
End: 2023-09-21

## 2023-10-02 ENCOUNTER — MYC MEDICAL ADVICE (OUTPATIENT)
Dept: FAMILY MEDICINE | Facility: CLINIC | Age: 53
End: 2023-10-02
Payer: COMMERCIAL

## 2023-10-03 ENCOUNTER — MYC MEDICAL ADVICE (OUTPATIENT)
Dept: FAMILY MEDICINE | Facility: CLINIC | Age: 53
End: 2023-10-03

## 2023-10-03 ENCOUNTER — MYC MEDICAL ADVICE (OUTPATIENT)
Dept: FAMILY MEDICINE | Facility: CLINIC | Age: 53
End: 2023-10-03
Payer: COMMERCIAL

## 2023-10-03 NOTE — TELEPHONE ENCOUNTER
See mychart note from yesterday:    When I first started taking the two pills everything seemed to be okay. I just don't feel right I start to make some food and just the smell I can't eat it so I give it to the dog I wake up in the morning nothing in my stomach and dry heaving . The fluid seem to be fine           10/2/23 11:03 AM  Radha Hudson, RN routed this conversation to Hedrick, Barbara Becca, APRN CNP  Fedder, Radha, BREANNE   to Carol Guajardo         10/2/23 11:03 AM  Nelson Medina,  I tried to reach you by phone to discuss the severity of your symptoms. Nausea and vomiting can certainly be a side effect of Zoloft. Are you able to keep any food/fluid down? Have symptoms gotten better, worse or stayed the same?   Thanks, BREANNE Mejía   to [facility] (supporting Barbara Hedrick APRN CNP)         10/2/23 10:22 AM  I started the 2 pills of the Zoloft over a week ago. I have been not been feeling well. I have no appetite. When I do eat it does not taste right. Throwing up. Mostly nothing because of no food. Could really use some help. Thank you, Carol Guajardo

## 2023-10-03 NOTE — LETTER
[facility]  [address] [place] [id number]  [phone]          October 5, 2023    Carol EARLY Casandra                                                                                                                     [address] [facility] [address]        To Whom it May Concern,    Carol is under my general care and will need to be excused from work 10/2/2023 - 10/5/2023.        Sincerely,         Barbara Hedrick, DNP, APRN-CNP

## 2023-10-04 DIAGNOSIS — G47.00 INSOMNIA, UNSPECIFIED TYPE: ICD-10-CM

## 2023-10-04 DIAGNOSIS — F41.1 ANXIETY STATE: ICD-10-CM

## 2023-10-04 RX ORDER — HYDROXYZINE HYDROCHLORIDE 25 MG/1
TABLET, FILM COATED ORAL
Qty: 30 TABLET | Refills: 0 | Status: SHIPPED | OUTPATIENT
Start: 2023-10-04 | End: 2023-11-22

## 2023-10-04 NOTE — TELEPHONE ENCOUNTER
Prescription approved per [facility] Refill Protocol     Carmen Radford     RN MSN

## 2023-10-05 ENCOUNTER — TELEPHONE (OUTPATIENT)
Dept: FAMILY MEDICINE | Facility: CLINIC | Age: 53
End: 2023-10-05

## 2023-10-05 ENCOUNTER — ALLIED HEALTH/NURSE VISIT (OUTPATIENT)
Dept: FAMILY MEDICINE | Facility: CLINIC | Age: 53
End: 2023-10-05
Payer: COMMERCIAL

## 2023-10-05 DIAGNOSIS — F41.1 ANXIETY STATE: Primary | ICD-10-CM

## 2023-10-05 PROCEDURE — 99207 PR NO CHARGE NURSE ONLY: CPT

## 2023-10-05 NOTE — PROGRESS NOTES
Carol stopped into clinic today because she has not heard back from 3 previous [employer] messages sent this week. She has missed work all week due to n/v, sweating and loose stools and crying easily since increasing dose of Sertraline to 50 mg. She says she is slightly better today, able to eat a small amount and she was able to get through our whole nurse visit without crying. We talked about giving the increase dose another week to see how she feels since her symptoms are starting to get better. She is willing to do this and she will take hydroxyzine as needed for anxiety.      She is requesting a note sent to her [employer] that she missed work 10/2/23-10/5/23 due to illness.      MARIA ALEJANDRA paperwork also in your folder as discussed at last appointment

## 2023-10-09 ENCOUNTER — TELEPHONE (OUTPATIENT)
Dept: FAMILY MEDICINE | Facility: CLINIC | Age: 53
End: 2023-10-09
Payer: COMMERCIAL

## 2023-10-09 NOTE — TELEPHONE ENCOUNTER
Forms completed by Barbara.     Note from Barbara: Patient may have to sign next page before faxing and please notify pt she needs to follow up virtual visit ASAP.    Called and left message for pt to call clinic back. Pt needs to sign 2nd page on forms before forms can be faxed. Forms at [facility]'s desk.    Bhumi Blake Patient

## 2023-10-09 NOTE — TELEPHONE ENCOUNTER
Forms placed at  for pt to , per pt's request.    Copies made, sent to scanning.    Bhumi Blake Patient

## 2023-10-16 DIAGNOSIS — F41.1 GAD (GENERALIZED ANXIETY DISORDER): ICD-10-CM

## 2023-10-16 DIAGNOSIS — F33.1 MODERATE EPISODE OF RECURRENT MAJOR DEPRESSIVE DISORDER (H): ICD-10-CM

## 2023-10-20 ENCOUNTER — MYC MEDICAL ADVICE (OUTPATIENT)
Dept: FAMILY MEDICINE | Facility: CLINIC | Age: 53
End: 2023-10-20

## 2023-10-23 ASSESSMENT — ANXIETY QUESTIONNAIRES
1. FEELING NERVOUS, ANXIOUS, OR ON EDGE: SEVERAL DAYS
2. NOT BEING ABLE TO STOP OR CONTROL WORRYING: SEVERAL DAYS
3. WORRYING TOO MUCH ABOUT DIFFERENT THINGS: SEVERAL DAYS
GAD7 TOTAL SCORE: 7
IF YOU CHECKED OFF ANY PROBLEMS ON THIS QUESTIONNAIRE, HOW DIFFICULT HAVE THESE PROBLEMS MADE IT FOR YOU TO DO YOUR WORK, TAKE CARE OF THINGS AT HOME, OR GET ALONG WITH OTHER PEOPLE: VERY DIFFICULT
6. BECOMING EASILY ANNOYED OR IRRITABLE: SEVERAL DAYS
7. FEELING AFRAID AS IF SOMETHING AWFUL MIGHT HAPPEN: SEVERAL DAYS
GAD7 TOTAL SCORE: 7
5. BEING SO RESTLESS THAT IT IS HARD TO SIT STILL: SEVERAL DAYS
4. TROUBLE RELAXING: SEVERAL DAYS

## 2023-10-23 ASSESSMENT — PATIENT HEALTH QUESTIONNAIRE - PHQ9
SUM OF ALL RESPONSES TO PHQ QUESTIONS 1-9: 8
10. IF YOU CHECKED OFF ANY PROBLEMS, HOW DIFFICULT HAVE THESE PROBLEMS MADE IT FOR YOU TO DO YOUR WORK, TAKE CARE OF THINGS AT HOME, OR GET ALONG WITH OTHER PEOPLE: VERY DIFFICULT
SUM OF ALL RESPONSES TO PHQ QUESTIONS 1-9: 8

## 2023-10-24 ENCOUNTER — VIRTUAL VISIT (OUTPATIENT)
Dept: FAMILY MEDICINE | Facility: CLINIC | Age: 53
End: 2023-10-24
Payer: COMMERCIAL

## 2023-10-24 DIAGNOSIS — F33.1 MODERATE EPISODE OF RECURRENT MAJOR DEPRESSIVE DISORDER (H): Primary | ICD-10-CM

## 2023-10-24 DIAGNOSIS — F41.1 GAD (GENERALIZED ANXIETY DISORDER): ICD-10-CM

## 2023-10-24 PROCEDURE — 99214 OFFICE O/P EST MOD 30 MIN: CPT | Mod: VID | Performed by: NURSE PRACTITIONER

## 2023-10-24 NOTE — PROGRESS NOTES
Carol is a 53 year old who is being evaluated via a billable video visit.      How would you like to obtain your AVS? MyChart  If the video visit is dropped, the invitation should be resent by: Text to cell phone: [phone]  Will anyone else be joining your video visit? No        Assessment & Plan     Moderate episode of recurrent major depressive disorder (H)  Chronic, previously uncontrolled.  Was having increased stress at work.  Having random crying episodes and a difficult time sleeping and functioning.  Started sertraline last month, had initial side effects, now tolerating well.  Moods are much improved, no more crying.  Still has days here there when she feels down or slightly anxious.  Has therapy scheduled for next month.  Did have to miss more days in the last month due to moods, will fill out new paperwork.  Patient to follow-up in 6 months or sooner if needed.    SHEFALI (generalized anxiety disorder)  Chronic, stable.  Doing much better on the sertraline.  Tolerating well now.  Still some anxiety at night, taking hydroxyzine sparingly.  Advised patient to try taking regularly every night approximately an hour before bed to help with anxiety and sleep.  Patient to follow-up in 6 months or sooner if needed.             See Patient Instructions    Barbara Hedrick DNP, APRN-CNP   [facility]    Subjective   Carol is a 53 year old, presenting for the following health issues:  Depression and Anxiety        10/24/2023    10:33 AM   Additional Questions   Roomed by Beatriz MADISON CMA       History of Present Illness       Mental Health Follow-up:  Patient presents to follow-up on Depression & Anxiety.Patient's depression since last visit has been:  Better  The patient is not having other symptoms associated with depression.  Patient's anxiety since last visit has been:  Better  The patient is not having other symptoms associated with anxiety.  Any significant life events: relationship  concerns, job concerns and health concerns  Patient is feeling anxious or having panic attacks.  Patient has no concerns about alcohol or drug use.    She eats 0-1 servings of fruits and vegetables daily.She consumes 1 sweetened beverage(s) daily.She exercises with enough effort to increase her heart rate 9 or less minutes per day.  She exercises with enough effort to increase her heart rate 4 days per week.   She is taking medications regularly.       Is better ~ has days still   Only takes hydroxyzine once in a while   Still not sleeping well   Not crying like she used to   Has therapy set up not until November though    Missed 6 days this month (October)  -, then 2 other days  &   Will be good to going back to 3 days/month       Social History     Tobacco Use    Smoking status: Every Day     Packs/day: 0.50     Years: 33.00     Additional pack years: 0.00     Total pack years: 16.50     Types: Cigarettes     Start date:      Last attempt to quit: 5/15/2022     Years since quittin.4    Smokeless tobacco: Former     Quit date: 2021   Vaping Use    Vaping Use: Never used   Substance Use Topics    Alcohol use: Yes     Alcohol/week: 6.0 standard drinks of alcohol     Types: 6 Cans of beer per week         10/14/2022     9:26 AM 2023     9:35 AM 10/23/2023     1:40 PM   PHQ   PHQ-9 Total Score 9 24 8   Q9: Thoughts of better off dead/self-harm past 2 weeks Not at all Several days Not at all   F/U: Thoughts of suicide or self-harm  No    F/U: Safety concerns  No          3/3/2023     3:19 PM 2023     9:36 AM 10/23/2023     1:43 PM   SHEFALI-7 SCORE   Total Score 7 (mild anxiety) 20 (severe anxiety) 7 (mild anxiety)   Total Score 7 20 7         10/23/2023     1:40 PM   Last PHQ-9   1.  Little interest or pleasure in doing things 1   2.  Feeling down, depressed, or hopeless 1   3.  Trouble falling or staying asleep, or sleeping too much 1   4.  Feeling tired or having little energy 1   5.   Poor appetite or overeating 1   6.  Feeling bad about yourself 1   7.  Trouble concentrating 1   8.  Moving slowly or restless 1   Q9: Thoughts of better off dead/self-harm past 2 weeks 0   PHQ-9 Total Score 8         10/23/2023     1:43 PM   SHEFALI-7    1. Feeling nervous, anxious, or on edge 1   2. Not being able to stop or control worrying 1   3. Worrying too much about different things 1   4. Trouble relaxing 1   5. Being so restless that it is hard to sit still 1   6. Becoming easily annoyed or irritable 1   7. Feeling afraid, as if something awful might happen 1   SHEFALI-7 Total Score 7   If you checked any problems, how difficult have they made it for you to do your work, take care of things at home, or get along with other people? Very difficult       Suicide Assessment Five-step Evaluation and Treatment (SAFE-T)          Review of Systems   Constitutional, HEENT, cardiovascular, pulmonary, gi and gu systems are negative, except as otherwise noted.      Objective           Vitals:  No vitals were obtained today due to virtual visit.    Physical Exam   GENERAL: Healthy, alert and no distress  EYES: Eyes grossly normal to inspection.  No discharge or erythema, or obvious scleral/conjunctival abnormalities.  RESP: No audible wheeze, cough, or visible cyanosis.  No visible retractions or increased work of breathing.    SKIN: Visible skin clear. No significant rash, abnormal pigmentation or lesions.  NEURO: Cranial nerves grossly intact.  Mentation and speech appropriate for age.  PSYCH: Mentation appears normal, affect normal/bright, judgement and insight intact, normal speech and appearance well-groomed.    Diagnostic Test Results:  none              Video-Visit Details    Type of service:  Video Visit     Originating Location (pt. Location): Home    Distant Location (provider location):  Off-site  Platform used for Video Visit: [employer]    Chart documentation with Dragon Voice recognition Software. Although reviewed  after completion, some words and grammatical errors may remain.

## 2023-10-24 NOTE — PATIENT INSTRUCTIONS
Moderate episode of recurrent major depressive disorder (H)  Chronic, previously uncontrolled.  Was having increased stress at work.  Having random crying episodes and a difficult time sleeping and functioning.  Started sertraline last month, had initial side effects, now tolerating well.  Moods are much improved, no more crying.  Still has days here there when she feels down or slightly anxious.  Has therapy scheduled for next month.  Did have to miss more days in the last month due to moods, will fill out new paperwork.  Patient to follow-up in 6 months or sooner if needed.    SHEFALI (generalized anxiety disorder)  Chronic, stable.  Doing much better on the sertraline.  Tolerating well now.  Still some anxiety at night, taking hydroxyzine sparingly.  Advised patient to try taking regularly every night approximately an hour before bed to help with anxiety and sleep.  Patient to follow-up in 6 months or sooner if needed.

## 2023-11-03 DIAGNOSIS — G25.81 RESTLESS LEG SYNDROME: ICD-10-CM

## 2023-11-03 RX ORDER — PRAMIPEXOLE DIHYDROCHLORIDE 0.5 MG/1
TABLET ORAL
Qty: 60 TABLET | Refills: 0 | Status: SHIPPED | OUTPATIENT
Start: 2023-11-03 | End: 2023-12-12

## 2023-11-03 NOTE — TELEPHONE ENCOUNTER
"Requested Prescriptions   Pending Prescriptions Disp Refills    pramipexole (MIRAPEX) 0.5 MG tablet [Pharmacy Med Name: Pramipexole Dihydrochloride Oral Tablet 0.5 MG] 180 tablet 0     Sig: TAKE 2 TABLETS BY MOUTH AT BEDTIME. DUE FOR LABWORK BEFORE FURTHER REFILLS       Antiparkinson's Agents Protocol Failed - 11/3/2023 10:09 AM        Failed - CBC on record in past 12 months     Recent Labs   Lab Test 06/15/22  0544 06/01/22  1624   WBC  --  9.4   RBC  --  3.78*   HGB 11.5* 11.7   HCT  --  36.9   PLT  --  275                 Failed - ALT on record in past 12 months         No lab results found.          Failed - Serum Creatinine on file in past 12 months     Recent Labs   Lab Test 04/20/21  0459   CR 0.63       Ok to refill medication if creatinine is low          Passed - Blood pressure under 140/90 in past 12 months     BP Readings from Last 3 Encounters:   09/08/23 122/78   05/25/23 114/72   05/19/23 (!) 140/80                 Passed - Medication is active on med list        Passed - Patient is age 18 or older        Passed - No active pregnancy on record        Passed - No positive pregnancy test in the past 12 months        Passed - Recent (6 mo) or future (30 days) visit within the authorizing provider's specialty     Patient had office visit in the last 6 months or has a visit in the next 30 days with authorizing provider or within the authorizing provider's specialty.  See \"Patient Info\" tab in inbasket, or \"Choose Columns\" in Meds & Orders section of the refill encounter.              Last Written Prescription Date:  8/9/23  Last Fill Quantity: 180,  # refills: 0   Last office visit: 9/8/2023 ; last virtual visit: 10/24/2023 with prescribing provider:     Future Office Visit:      Julie Behrendt RN            "

## 2023-11-09 ENCOUNTER — MYC MEDICAL ADVICE (OUTPATIENT)
Dept: FAMILY MEDICINE | Facility: CLINIC | Age: 53
End: 2023-11-09

## 2023-11-10 DIAGNOSIS — F33.1 MODERATE EPISODE OF RECURRENT MAJOR DEPRESSIVE DISORDER (H): ICD-10-CM

## 2023-11-10 DIAGNOSIS — F41.1 GAD (GENERALIZED ANXIETY DISORDER): ICD-10-CM

## 2023-11-10 RX ORDER — SERTRALINE HYDROCHLORIDE 100 MG/1
100 TABLET, FILM COATED ORAL DAILY
Qty: 90 TABLET | Refills: 0 | Status: SHIPPED | OUTPATIENT
Start: 2023-11-10 | End: 2024-01-04

## 2023-11-22 ENCOUNTER — VIRTUAL VISIT (OUTPATIENT)
Dept: PSYCHIATRY | Facility: CLINIC | Age: 53
End: 2023-11-22
Payer: COMMERCIAL

## 2023-11-22 ENCOUNTER — VIRTUAL VISIT (OUTPATIENT)
Dept: BEHAVIORAL HEALTH | Facility: CLINIC | Age: 53
End: 2023-11-22
Payer: COMMERCIAL

## 2023-11-22 DIAGNOSIS — F41.9 ANXIETY DISORDER, UNSPECIFIED TYPE: ICD-10-CM

## 2023-11-22 DIAGNOSIS — F41.1 GAD (GENERALIZED ANXIETY DISORDER): ICD-10-CM

## 2023-11-22 DIAGNOSIS — F33.1 MODERATE EPISODE OF RECURRENT MAJOR DEPRESSIVE DISORDER (H): ICD-10-CM

## 2023-11-22 DIAGNOSIS — G47.00 INSOMNIA, UNSPECIFIED TYPE: ICD-10-CM

## 2023-11-22 DIAGNOSIS — F33.1 MODERATE EPISODE OF RECURRENT MAJOR DEPRESSIVE DISORDER (H): Primary | ICD-10-CM

## 2023-11-22 PROCEDURE — 90791 PSYCH DIAGNOSTIC EVALUATION: CPT | Mod: 52 | Performed by: SOCIAL WORKER

## 2023-11-22 PROCEDURE — 99205 OFFICE O/P NEW HI 60 MIN: CPT | Mod: 95 | Performed by: NURSE PRACTITIONER

## 2023-11-22 RX ORDER — LORAZEPAM 1 MG/1
1 TABLET ORAL DAILY PRN
Qty: 10 TABLET | Refills: 0 | Status: SHIPPED | OUTPATIENT
Start: 2023-11-22

## 2023-11-22 RX ORDER — HYDROXYZINE HYDROCHLORIDE 25 MG/1
25 TABLET, FILM COATED ORAL EVERY 6 HOURS PRN
Qty: 30 TABLET | Refills: 0 | Status: SHIPPED | OUTPATIENT
Start: 2023-11-22

## 2023-11-22 ASSESSMENT — COLUMBIA-SUICIDE SEVERITY RATING SCALE - C-SSRS
TOTAL  NUMBER OF INTERRUPTED ATTEMPTS LIFETIME: NO
ATTEMPT LIFETIME: NO
6. HAVE YOU EVER DONE ANYTHING, STARTED TO DO ANYTHING, OR PREPARED TO DO ANYTHING TO END YOUR LIFE?: NO
1. HAVE YOU WISHED YOU WERE DEAD OR WISHED YOU COULD GO TO SLEEP AND NOT WAKE UP?: NO
TOTAL  NUMBER OF ABORTED OR SELF INTERRUPTED ATTEMPTS LIFETIME: NO
2. HAVE YOU ACTUALLY HAD ANY THOUGHTS OF KILLING YOURSELF?: NO

## 2023-11-22 ASSESSMENT — PATIENT HEALTH QUESTIONNAIRE - PHQ9
SUM OF ALL RESPONSES TO PHQ QUESTIONS 1-9: 8
SUM OF ALL RESPONSES TO PHQ QUESTIONS 1-9: 8
10. IF YOU CHECKED OFF ANY PROBLEMS, HOW DIFFICULT HAVE THESE PROBLEMS MADE IT FOR YOU TO DO YOUR WORK, TAKE CARE OF THINGS AT HOME, OR GET ALONG WITH OTHER PEOPLE: SOMEWHAT DIFFICULT
SUM OF ALL RESPONSES TO PHQ QUESTIONS 1-9: 8
10. IF YOU CHECKED OFF ANY PROBLEMS, HOW DIFFICULT HAVE THESE PROBLEMS MADE IT FOR YOU TO DO YOUR WORK, TAKE CARE OF THINGS AT HOME, OR GET ALONG WITH OTHER PEOPLE: SOMEWHAT DIFFICULT
SUM OF ALL RESPONSES TO PHQ QUESTIONS 1-9: 8

## 2023-11-22 NOTE — ASSESSMENT & PLAN NOTE
Patient recently transitioned off of Paxil and Wellbutrin onto sertraline.  She has been on the sertraline 100 mg for approximately 2 to 3 weeks with some improvement in mood.  Will allow to become more therapeutic.  She has had a panic attack once a week for the last 2 weeks which appears it might be related to coming off the Paxil.  While we are waiting for more therapeutic response from the sertraline will provide a small quantity of lorazepam to take as needed when she gets these panic attacks.  We will follow-up in 6 weeks

## 2023-11-22 NOTE — PATIENT INSTRUCTIONS
"Patient Education   Collaborative Care Psychiatry Service  What to Expect  Here's what to expect from your Collaborative Care Psychiatry Service (CCPS).   About CCPS  CCPS means 2 people work together to help you get better. You'll meet with a behavioral health clinician and a psychiatric doctor. A behavioral health clinician helps people with mental health problems by talking with them. A psychiatric doctor helps people by giving them medicine.  How it works  At every visit, you'll see the behavioral health clinician (BHC) first. They'll talk with you about how you're doing and teach you how to feel better.   Then you'll see the psychiatric doctor. This doctor can help you deal with troubling thoughts and feelings by giving you medicine. They'll make sure you know the plan for your care.   CCPS usually takes 3 to 6 visits. If you need more visits, we may have you start seeing a different psychiatric doctor for ongoing care.  If you have any questions or concerns, we'll be glad to talk with you.  About visits  Be open  At your visits, please talk openly about your problems. It may feel hard, but it's the best way for us to help you.  Cancelling visits  If you can't come to your visit, please call us right away at [phone]. If you don't cancel at least 24 hours (1 full day) before your visit, that's \"late cancellation.\"  Being late to visits  Being very late is the same as not showing up. You will be a \"no show\" if:  Your appointment starts with a BHC, and you're more than 15 minutes late for a 30-minute (half hour) visit. This will also cancel your appointment with the psychiatric doctor.  Your appointment is with a psychiatric doctor only, and you're more than 15 minutes late for a 30-minute (half hour) visit.  Your appointment is with a psychiatric doctor only, and you're more than 30 minutes late for a 60-minute (full hour) visit.  If you cancel late or don't show up 2 times within 6 months, we may end your " care.   Getting help between visits  If you need help between visits, you can call us Monday to Friday from 8 a.m. to 4:30 p.m. at [phone].  Emergency care  Call 911 or go to the nearest emergency department if your life or someone else's life is in danger.  Call 988 anytime to reach the national Suicide and Crisis hotline.  Medicine refills  To refill your medicine, call your pharmacy. You can also call [facility] at [phone], Monday to Friday, 8 a.m. to 4:30 p.m. It can take 1 to 3 business days to get a refill.   Forms, letters, and tests  You may have papers to fill out, like FMLA, short-term disability, and workability. We can help you with these forms at your visits, but you must have an appointment. You may need more than 1 visit for this, to be in an intensive therapy program, or both.  Before we can give you medicine for ADHD, we may refer you to get tested for it or confirm it another way.  We may not be able to give you an emotional support animal letter.  We don't do mental health checks ordered by the court.   We don't do mental health testing, but we can refer you to get tested.   Thank you for choosing us for your care.  For informational purposes only. Not to replace the advice of your health care provider. Copyright   2022 [facility]. All rights reserved. [employer] [id number] - 12/22.

## 2023-11-22 NOTE — NURSING NOTE
Is the patient currently in the state of [place]? YES    Visit mode:VIDEO    If the visit is dropped, the patient can be reconnected by: VIDEO VISIT: Text to cell phone:   Telephone Information:   Mobile [phone]       Will anyone else be joining the visit? NO  (If patient encounters technical issues they should call [phone]    How would you like to obtain your AVS? MyChart    Are changes needed to the allergy or medication list? No    Reason for visit: Consult    Erika WADSWORTH

## 2023-11-22 NOTE — PROGRESS NOTES
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   - At least 1 chronic problem that is not stable  - Engaged in prescription drug management during visit (discussed any medication benefits, side effects, alternatives, etc.)           Patient Status:  Our psychiatry providers act as a specialty service for Primary Care Providers in the [place] [facility] that seek to optimize medications for unstable patients.  Once medications have been optimized, our providers discharge the patient back to the referring Primary Care Provider for ongoing medication management.  This type of system allows our providers to serve a high volume of patients. At this time  Patient will continue to be seen for ongoing consultation and stabilization.    Signed:   Claudia Meza, PANCHITO, APRN, FMHNP-[facility] ([facility]PS)   Chart documentation done in part with Dragon Voice Recognition software.  Although reviewed after completion, some word and grammatical errors may remain.
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
## 2023-12-12 ENCOUNTER — VIRTUAL VISIT (OUTPATIENT)
Dept: PSYCHOLOGY | Facility: CLINIC | Age: 53
End: 2023-12-12
Payer: COMMERCIAL

## 2023-12-12 ENCOUNTER — MYC MEDICAL ADVICE (OUTPATIENT)
Dept: FAMILY MEDICINE | Facility: CLINIC | Age: 53
End: 2023-12-12
Payer: COMMERCIAL

## 2023-12-12 DIAGNOSIS — F41.1 GAD (GENERALIZED ANXIETY DISORDER): ICD-10-CM

## 2023-12-12 DIAGNOSIS — F33.1 MODERATE EPISODE OF RECURRENT MAJOR DEPRESSIVE DISORDER (H): ICD-10-CM

## 2023-12-12 DIAGNOSIS — G25.81 RESTLESS LEG SYNDROME: ICD-10-CM

## 2023-12-12 PROCEDURE — 90834 PSYTX W PT 45 MINUTES: CPT | Mod: VID | Performed by: MARRIAGE & FAMILY THERAPIST

## 2023-12-12 RX ORDER — PRAMIPEXOLE DIHYDROCHLORIDE 0.5 MG/1
TABLET ORAL
Qty: 60 TABLET | Refills: 0 | Status: SHIPPED | OUTPATIENT
Start: 2023-12-12 | End: 2024-01-08

## 2023-12-12 ASSESSMENT — PATIENT HEALTH QUESTIONNAIRE - PHQ9
10. IF YOU CHECKED OFF ANY PROBLEMS, HOW DIFFICULT HAVE THESE PROBLEMS MADE IT FOR YOU TO DO YOUR WORK, TAKE CARE OF THINGS AT HOME, OR GET ALONG WITH OTHER PEOPLE: SOMEWHAT DIFFICULT
SUM OF ALL RESPONSES TO PHQ QUESTIONS 1-9: 8
SUM OF ALL RESPONSES TO PHQ QUESTIONS 1-9: 8

## 2023-12-12 NOTE — PROGRESS NOTES
[facility]                                     Progress Note    Patient Name: Carol Guajardo  Date: 23         Service Type: Individual      Session Start Time: 10:30  Session End Time: 11:20     Session Length: 38-52    Session #: 1    Attendees: Client attended alone    Service Modality:  Video Visit:      Provider verified identity through the following two step process.  Patient provided:  Patient photo and Patient     Telemedicine Visit: The patient's condition can be safely assessed and treated via synchronous audio and visual telemedicine encounter.      Reason for Telemedicine Visit: Patient has requested telehealth visit    Originating Site (Patient Location): Patient's place of employment [employer] in [place]    Distant Site (Provider Location): [facility]    Consent:  The patient/guardian has verbally consented to: the potential risks and benefits of telemedicine (video visit) versus in person care; bill my insurance or make self-payment for services provided; and responsibility for payment of non-covered services.     Patient would like the video invitation sent by:  My Chart    Mode of Communication:  Video Conference via [facility]    Distant Location (Provider):  On-site    As the provider I attest to compliance with applicable laws and regulations related to telemedicine.    DATA  Interactive Complexity: No  Crisis: No        Progress Since Last Session (Related to Symptoms / Goals / Homework):   Symptoms:  n/a    Homework:  n/a      Episode of Care Goals: Minimal progress - ACTION (Actively working towards change); Intervened by reinforcing change plan / affirming steps taken     Current / Ongoing Stressors and Concerns:   Discussed current functioning and goals.  She would like to communicate better and have more confidence in communication with .   has a temper and she worries about his reaction.   She tends to clam up with he is angry.    Father's death ten years ago is still difficult.  She reports relationship with mother is difficult.  She is estranged from two of her sisters.  She wants to build relationship with younger sister.      Increase in panic attacks recently.     Treatment Objective(s) Addressed in This Session:   Assessment and goal planning     Intervention:   Assessment and goal planning.  Psychoeducation about nervous system stress response.  Guided through calming strategies.    Assessments completed prior to visit:  The following assessments were completed by patient for this visit:  PHQ9:       5/31/2022     9:07 AM 8/2/2022     2:13 PM 10/14/2022     9:26 AM 9/8/2023     9:35 AM 10/23/2023     1:40 PM 11/22/2023     7:02 AM 12/12/2023     4:35 AM   PHQ-9 SCORE   PHQ-9 Total Score MyChart 8 (Mild depression) 15 (Moderately severe depression) 9 (Mild depression) 24 (Severe depression) 8 (Mild depression) 8 (Mild depression) 8 (Mild depression)   PHQ-9 Total Score 8 15 9 24 8 8    8 8     GAD7:       10/25/2021    10:20 AM 1/4/2022     9:33 AM 8/2/2022     2:11 PM 10/14/2022     9:22 AM 3/3/2023     3:19 PM 9/8/2023     9:36 AM 10/23/2023     1:43 PM   SHEFALI-7 SCORE   Total Score  12 (moderate anxiety) 11 (moderate anxiety) 8 (mild anxiety) 7 (mild anxiety) 20 (severe anxiety) 7 (mild anxiety)   Total Score 7 12 11 8 7 20 7     CAGE-AID:       11/20/2023    11:13 AM   CAGE-AID Total Score   Total Score 0    0   Total Score MyChart 0 (A total score of 2 or greater is considered clinically significant)     PROMIS 10-Global Health (all questions and answers displayed):       2/8/2022     6:32 PM 6/8/2022     9:10 AM 6/8/2022     3:00 PM 9/7/2022     2:04 PM 6/8/2023     3:00 PM 11/20/2023    11:11 AM 12/12/2023     4:36 AM   PROMIS 10   In general, would you say your health is: Good Good  Fair  Fair Fair   In general, would you say your quality of life is: Good Good  Fair  Fair Fair   In  general, how would you rate your physical health? Good Good  Poor  Fair Fair   In general, how would you rate your mental health, including your mood and your ability to think? Good Poor  Fair  Poor Fair   In general, how would you rate your satisfaction with your social activities and relationships? Good Fair  Fair  Fair Fair   In general, please rate how well you carry out your usual social activities and roles Fair Fair  Fair  Fair Fair   To what extent are you able to carry out your everyday physical activities such as walking, climbing stairs, carrying groceries, or moving a chair? A little A little  Moderately  Mostly Moderately   In the past 7 days, how often have you been bothered by emotional problems such as feeling anxious, depressed, or irritable? Sometimes Sometimes  Sometimes  Often Sometimes   In the past 7 days, how would you rate your fatigue on average? Mild Mild  Mild  Moderate Mild   In the past 7 days, how would you rate your pain on average, where 0 means no pain, and 10 means worst imaginable pain? 6 4  4  4 3   In general, would you say your health is: 3 3 3 2 2 2    2 2   In general, would you say your quality of life is: 3 3 2 2 2 2    2 2   In general, how would you rate your physical health? 3 3 2 1 1 2    2 2   In general, how would you rate your mental health, including your mood and your ability to think? 3 1 2 2 3 1    1 2   In general, how would you rate your satisfaction with your social activities and relationships? 3 2 2 2 3 2    2 2   In general, please rate how well you carry out your usual social activities and roles. (This includes activities at home, at work and in your community, and responsibilities as a parent, child, spouse, employee, friend, etc.) 2 2 1 2 2 2    2 2   To what extent are you able to carry out your everyday physical activities such as walking, climbing stairs, carrying groceries, or moving a chair? 2 2 2 3 2 4    4 3   In the past 7 days, how often have  you been bothered by emotional problems such as feeling anxious, depressed, or irritable? 3 3 3 3 3 4    4 3   In the past 7 days, how would you rate your fatigue on average? 2 2 3 2 3 3    3 2   In the past 7 days, how would you rate your pain on average, where 0 means no pain, and 10 means worst imaginable pain? 6 4 6 4 6 4    4 3   Global Mental Health Score 12 9 9 9 11 7    7 9   Global Physical Health Score 12 12 10 11 9 12    12 13   PROMIS TOTAL - SUBSCORES 24 21 19 20 20 19    19 22         ASSESSMENT: Current Emotional / Mental Status (status of significant symptoms):   Risk status (Self / Other harm or suicidal ideation)   Patient denies current fears or concerns for personal safety.   Patient denies current or recent suicidal ideation or behaviors.   Patient denies current or recent homicidal ideation or behaviors.   Patient denies current or recent self injurious behavior or ideation.   Patient denies other safety concerns.   Patient reports there has been no change in risk factors since their last session.     Patient reports there has been no change in protective factors since their last session.     Recommended that patient call 911 or go to the local ED should there be a change in any of these risk factors.     Appearance:   Appropriate    Eye Contact:   Fair    Psychomotor Behavior: Normal    Attitude:   Cooperative    Orientation:   All   Speech    Rate / Production: Normal     Volume:  Normal    Mood:    Anxious    Affect:    Appropriate    Thought Content:  Clear    Thought Form:  Coherent  Logical    Insight:    Good      Medication Review:   No changes to current psychiatric medication(s)     Medication Compliance:   Yes     Changes in Health Issues:   None reported     Chemical Use Review:   Substance Use: Chemical use reviewed, no active concerns identified      Tobacco Use: No change in amount of tobacco use since last session.  Not discussed    Diagnosis:  1. Moderate episode of recurrent  major depressive disorder (H)    2. SHEFALI (generalized anxiety disorder)        Collateral Reports Completed:   Not Applicable    PLAN: (Patient Tasks / Therapist Tasks / Other)  Focus on eye vergence exercise and 5-4-3-2-1 for calming.          Sybil Marshall, LMFT                                                         ______________________________________________________________________    Individual Treatment Plan    Patient's Name: Carol Guajardo  YOB: 1970    Date of Creation: 12/12/2023    Date Treatment Plan Last Reviewed/Revised: 12/12/2023    DSM5 Diagnoses: 296.32 (F33.1) Major Depressive Disorder, Recurrent Episode, Moderate _ or 300.02 (F41.1) Generalized Anxiety Disorder  Psychosocial / Contextual Factors: marital conflict, job dissatisfaction  PROMIS (reviewed every 90 days):     Referral / Collaboration:  Referral to another professional/service is not indicated at this time..    Anticipated number of session for this episode of care: 9-12 sessions  Anticipation frequency of session: Weekly  Anticipated Duration of each session: 38-52 minutes  Treatment plan will be reviewed in 90 days or when goals have been changed.       MeasurableTreatment Goal(s) related to diagnosis / functional impairment(s)  Goal 1: Client will report reduction in anxiety also as evidenced by reduction of SHEFALI-7 score below 6 points within the next 12 weeks.    Objective #A (Client Action)    Client will identify at least three triggers for anxiety.  Status:  New: 12/12/23      Intervention(s)  Therapist will provide educational materials on common triggers and signs of anxiety.    Objective #B  Client will use relaxation strategies at least two times per day to reduce the physical symptoms of anxiety.  Status:   New: 12/12/23       Intervention(s)  Therapist will teach relaxation strategies such as mindfulness, deep breathing, muscle relaxation, and sensory activities.    Objective #C  Client will use  cognitive strategies identified in therapy to challenge anxious thoughts.  Status:   New: 12/12/23       Intervention(s)  Therapist will teach strategies for cognitive modification using REBT model.    Goal 2: Client will report improved mood as indicated by PHQ-9 score below 6 for consistent 8 weeks.    Objective #A (Client Action)    Status: New - Date: 12/12/23     Client will Increase interest, engagement, and pleasure in doing things.    Intervention(s)  Therapist will assign homework for daily involvement in pleasant activities.    Objective #B  Client will Identify negative self-talk and behaviors: challenge core beliefs, myths, and actions.    Status:   New: 12/12/23       Intervention(s)  Therapist will teach strategies for cognitive modification using REBT models.    Objective #C  Client will Improve quantity and quality of night time sleep / decrease daytime naps.  Status:   New: 12/12/23       Intervention(s)  Therapist will teach sleep hygiene strategies.  Assign homework for daily practice.    Goal 3: Client will increase assertiveness by 50%.    Objective #A (Client Action)    Client will learn & utilize at least five assertive communication skills weekly.  Status:   New: 12/12/23       Intervention(s)  Therapist will teach assertiveness skills. Role-play skills in session.    Objective #B  Client will practice setting boundaries five times weekly in the next eight weeks.  Status: New - Date: 12/12/23     Intervention(s)  Therapist will teach about healthy boundaries. Consider boundaries client would like to set in own life.    Objective #C  Client will track and record at least two daily pleasant exchanges with .  Status: New - Date: 12/12/23      Intervention(s)  Therapist will assign homework for tracking pleasant exchanges.    Patient has reviewed and agreed to the above plan.      Sybil Marshall, LMFT  December 12, 2023

## 2024-01-03 NOTE — PROGRESS NOTES
[facility]  2024      Behavioral Health Clinician Progress Note    Patient Name: Carol Guajardo           Service Type:  Individual      Service Location:   [facility] / Email (patient reached)     Session Start Time: 903am  Session End Time: 921 am      Session Length: 16 - 37      Attendees: Patient     Service Modality:  Video Visit:      Provider verified identity through the following two step process.  Patient provided:  Patient photo and Patient     Telemedicine Visit: The patient's condition can be safely assessed and treated via synchronous audio and visual telemedicine encounter.      Reason for Telemedicine Visit: Patient convenience (e.g. access to timely appointments / distance to available provider)    Originating Site (Patient Location): Patient's home    Distant Site (Provider Location): Provider Remote Setting- Home Office    Consent:  The patient/guardian has verbally consented to: the potential risks and benefits of telemedicine (video visit) versus in person care; bill my insurance or make self-payment for services provided; and responsibility for payment of non-covered services.     Patient would like the video invitation sent by:  My Chart    Mode of Communication:  Video Conference via [facility]    Distant Location (Provider):  Off-site    As the provider I attest to compliance with applicable laws and regulations related to telemedicine.    Visit Activities (Refresh list every visit): [facility] Only    Diagnostic Assessment Date: 2023 by Tierra GODFREY [facility]  Treatment Plan Review Date: 2024  See Flowsheets for today's PHQ-9 and SHEFALI-7 results  Previous PHQ-9:       2023     7:02 AM 2023     4:35 AM 2024     4:32 AM   PHQ-9 SCORE   PHQ-9 Total Score MyChart 8 (Mild depression) 8 (Mild depression) 8 (Mild depression)   PHQ-9 Total Score 8    8 8 8     Previous SHEFALI-7:       3/3/2023     3:19 PM 2023      9:36 AM 10/23/2023     1:43 PM   SHEFALI-7 SCORE   Total Score 7 (mild anxiety) 20 (severe anxiety) 7 (mild anxiety)   Total Score 7 20 7       INO LEVEL:       No data to display                DATA  Extended Session (60+ minutes): No  Interactive Complexity: No  Crisis: No  [facility] Patient: No    Treatment Objective(s) Addressed in This Session:  Target Behavior(s):  improve ability to manage conflict    Depressed Mood: Increase interest, engagement, and pleasure in doing things    Current Stressors / Issues:   update: [facility] reviewed DA. Pt reports that her anxiety and depression are improved a little. She's only needed her lorazapam once but not needed the hydroxzine. Pt reports that she has trouble staying asleep. [facility] explored. Identifies some environmental issues. Pt reports that she limits her liquid intake close to bedtime. Pt reports that although she just started therapy and is optimistic it will be helpful. Pt identifies work as primary stress. [facility] explored. Pt is an  at [employer] for 14 years. She reports conflicts with co-workers. 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 This co-worker became very angry and started yelling at her. Pt then yelled at co-worker. Pt got sent home and written up but she refused to sign it. She feels this was unfair, believes they both should've been disciplined. She feels she was singled out. This co-worker has since apologized but pt doesn't feel she can trust her. Another co-worker heard injured co-worker comment that was trying to get pt sent home. She doesn't want to look for a different job but feels frustrated by lack of good management. Typically, she's able to walk away from conflict so this incident was unique. She attributes this to her co-worker continuing to argue when pt tried to walk away. Pt's plan is to just walk away if they have more issues.     Stresses: work  Appetite: unremarkable  Sleep:  trouble staying asleep  Therapy: Sybil GODFREY at [facility]  RENNY:  Preg: NA  Interventions: goal development, [facility] reviewed skills she's learning in therapy  Most important: medication update       Progress on Treatment Objective(s) / Homework:  Satisfactory progress - ACTION (Actively working towards change); Intervened by reinforcing change plan / affirming steps taken    Motivational Interviewing    MI Intervention: Co-Developed Goal: improve ability to manage conflict, Expressed Empathy/Understanding, Open-ended questions, Reflections: simple and complex, Change talk (evoked), and Reframe     Change Talk Expressed by the Patient: Desire to change Ability to change Reasons to change Need to change Committment to change Activation Taking steps    Provider Response to Change Talk: E - Evoked more info from patient about behavior change, A - Affirmed patient's thoughts, decisions, or attempts at behavior change, R - Reflected patient's change talk, and S - Summarized patient's change talk statements    Also provided psychoeducation about behavioral health condition, symptoms, and treatment options    Assessments completed prior to visit:  The following assessments were completed by patient for this visit:  PROMIS 10-Global Health (all questions and answers displayed):       2/8/2022     6:32 PM 6/8/2022     9:10 AM 6/8/2022     3:00 PM 9/7/2022     2:04 PM 6/8/2023     3:00 PM 11/20/2023    11:11 AM 12/12/2023     4:36 AM   PROMIS 10   In general, would you say your health is: Good Good  Fair  Fair Fair   In general, would you say your quality of life is: Good Good  Fair  Fair Fair   In general, how would you rate your physical health? Good Good  Poor  Fair Fair   In general, how would you rate your mental health, including your mood and your ability to think? Good Poor  Fair  Poor Fair   In general, how would you rate your satisfaction with your social activities and relationships? Good Fair  Fair  Fair Fair    In general, please rate how well you carry out your usual social activities and roles Fair Fair  Fair  Fair Fair   To what extent are you able to carry out your everyday physical activities such as walking, climbing stairs, carrying groceries, or moving a chair? A little A little  Moderately  Mostly Moderately   In the past 7 days, how often have you been bothered by emotional problems such as feeling anxious, depressed, or irritable? Sometimes Sometimes  Sometimes  Often Sometimes   In the past 7 days, how would you rate your fatigue on average? Mild Mild  Mild  Moderate Mild   In the past 7 days, how would you rate your pain on average, where 0 means no pain, and 10 means worst imaginable pain? 6 4  4  4 3   In general, would you say your health is: 3 3 3 2 2 2 2   In general, would you say your quality of life is: 3 3 2 2 2 2 2   In general, how would you rate your physical health? 3 3 2 1 1 2 2   In general, how would you rate your mental health, including your mood and your ability to think? 3 1 2 2 3 1 2   In general, how would you rate your satisfaction with your social activities and relationships? 3 2 2 2 3 2 2   In general, please rate how well you carry out your usual social activities and roles. (This includes activities at home, at work and in your community, and responsibilities as a parent, child, spouse, employee, friend, etc.) 2 2 1 2 2 2 2   To what extent are you able to carry out your everyday physical activities such as walking, climbing stairs, carrying groceries, or moving a chair? 2 2 2 3 2 4 3   In the past 7 days, how often have you been bothered by emotional problems such as feeling anxious, depressed, or irritable? 3 3 3 3 3 4 3   In the past 7 days, how would you rate your fatigue on average? 2 2 3 2 3 3 2   In the past 7 days, how would you rate your pain on average, where 0 means no pain, and 10 means worst imaginable pain? 6 4 6 4 6 4 3   Global Mental Health Score 12 9 9 9 11 7     7 9   Global Physical Health Score 12 12 10 11 9 12    12 13   PROMIS TOTAL - SUBSCORES 24 21 19 20 20 19    19 22     PROMIS 10-Global Health (only subscores and total score):       2/8/2022     6:32 PM 6/8/2022     9:10 AM 6/8/2022     3:00 PM 9/7/2022     2:04 PM 6/8/2023     3:00 PM 11/20/2023    11:11 AM 12/12/2023     4:36 AM   PROMIS-10 Scores Only   Global Mental Health Score 12 9 9 9 11 7    7 9   Global Physical Health Score 12 12 10 11 9 12    12 13   PROMIS TOTAL - SUBSCORES 24 21 19 20 20 19    19 22       Care Plan review completed: Yes    Medication Review:  No changes to current psychiatric medication(s)    Medication Compliance:  Yes    Changes in Health Issues:   None reported    Chemical Use Review:   Substance Use: Chemical use reviewed, no active concerns identified      Tobacco Use: No current tobacco use.      Assessment: Current Emotional / Mental Status (status of significant symptoms):  Risk status (Self / Other harm or suicidal ideation)  Patient denies a history of suicidal ideation, suicide attempts, self-injurious behavior, homicidal ideation, homicidal behavior, and and other safety concerns  Patient denies current fears or concerns for personal safety.  Patient denies current or recent suicidal ideation or behaviors.  Patient denies current or recent homicidal ideation or behaviors.  Patient denies current or recent self injurious behavior or ideation.  Patient denies other safety concerns.  A safety and risk management plan has not been developed at this time, however patient was encouraged to call [place] [phone] should there be a change in any of these risk factors.    Appearance:   Appropriate   Eye Contact:   Good   Psychomotor Behavior: Normal   Attitude:   Cooperative   Orientation:   All  Speech   Rate / Production: Normal    Volume:  Normal   Mood:    Normal  Affect:    congruent   Thought Content:  Clear   Thought Form:  Coherent  Logical   Insight:    Good      Diagnoses:  1. Moderate episode of recurrent major depressive disorder (H)        Collateral Reports Completed:  Collaborated with [facility] team     Plan: (Homework, other):  Patient was given information about behavioral services and encouraged to schedule a follow up appointment with the clinic [facility] in 1 month.  She was also given information about mental health symptoms and treatment options .  CD Recommendations: No indications of CD issues.       Crystal Arellano, [facility]    ______________________________________________________________________    Integrated Primary Care Behavioral Health Treatment Plan    Patient's Name: Carol Guajardo  YOB: 1970    Date of Creation: 01/04/2024  Date Treatment Plan Last Reviewed/Revised: pending    DSM5 Diagnoses: 296.31 (F33.0) Major Depressive Disorder, Recurrent Episode, Mild With anxious distress  Psychosocial / Contextual Factors: none noted  PROMIS (reviewed every 90 days):   The following assessments were completed by patient for this visit:  PROMIS 10-Global Health (all questions and answers displayed):       2/8/2022     6:32 PM 6/8/2022     9:10 AM 6/8/2022     3:00 PM 9/7/2022     2:04 PM 6/8/2023     3:00 PM 11/20/2023    11:11 AM 12/12/2023     4:36 AM   PROMIS 10   In general, would you say your health is: Good Good  Fair  Fair Fair   In general, would you say your quality of life is: Good Good  Fair  Fair Fair   In general, how would you rate your physical health? Good Good  Poor  Fair Fair   In general, how would you rate your mental health, including your mood and your ability to think? Good Poor  Fair  Poor Fair   In general, how would you rate your satisfaction with your social activities and relationships? Good Fair  Fair  Fair Fair   In general, please rate how well you carry out your usual social activities and roles Fair Fair  Fair  Fair Fair   To what extent are you able to carry out your everyday physical activities such as walking,  climbing stairs, carrying groceries, or moving a chair? A little A little  Moderately  Mostly Moderately   In the past 7 days, how often have you been bothered by emotional problems such as feeling anxious, depressed, or irritable? Sometimes Sometimes  Sometimes  Often Sometimes   In the past 7 days, how would you rate your fatigue on average? Mild Mild  Mild  Moderate Mild   In the past 7 days, how would you rate your pain on average, where 0 means no pain, and 10 means worst imaginable pain? 6 4  4  4 3   In general, would you say your health is: 3 3 3 2 2 2 2   In general, would you say your quality of life is: 3 3 2 2 2 2 2   In general, how would you rate your physical health? 3 3 2 1 1 2 2   In general, how would you rate your mental health, including your mood and your ability to think? 3 1 2 2 3 1 2   In general, how would you rate your satisfaction with your social activities and relationships? 3 2 2 2 3 2 2   In general, please rate how well you carry out your usual social activities and roles. (This includes activities at home, at work and in your community, and responsibilities as a parent, child, spouse, employee, friend, etc.) 2 2 1 2 2 2 2   To what extent are you able to carry out your everyday physical activities such as walking, climbing stairs, carrying groceries, or moving a chair? 2 2 2 3 2 4 3   In the past 7 days, how often have you been bothered by emotional problems such as feeling anxious, depressed, or irritable? 3 3 3 3 3 4 3   In the past 7 days, how would you rate your fatigue on average? 2 2 3 2 3 3 2   In the past 7 days, how would you rate your pain on average, where 0 means no pain, and 10 means worst imaginable pain? 6 4 6 4 6 4 3   Global Mental Health Score 12 9 9 9 11 7    7 9   Global Physical Health Score 12 12 10 11 9 12    12 13   PROMIS TOTAL - SUBSCORES 24 21 19 20 20 19    19 22     PROMIS 10-Global Health (only subscores and total score):       2/8/2022     6:32 PM  6/8/2022     9:10 AM 6/8/2022     3:00 PM 9/7/2022     2:04 PM 6/8/2023     3:00 PM 11/20/2023    11:11 AM 12/12/2023     4:36 AM   PROMIS-10 Scores Only   Global Mental Health Score 12 9 9 9 11 7    7 9   Global Physical Health Score 12 12 10 11 9 12    12 13   PROMIS TOTAL - SUBSCORES 24 21 19 20 20 19    19 22     Referral / Collaboration:  Collaborated with [facility] team .    Anticipated number of session for this episode of care: 10-12  Anticipation frequency of session: Monthly  Anticipated Duration of each session: 16-37 minutes  Treatment plan will be reviewed in 90 days or when goals have been changed.       MeasurableTreatment Goal(s) related to diagnosis / functional impairment(s)  Goal 1: Patient will experience improved depression    I will know I've met my goal when I start doing something and finish it.      Objective #A (Patient Action)    Patient will Increase interest, engagement, and pleasure in doing things.  Status: New - Date: 01/04/2024      Intervention(s)  Therapist will teach skills to manage depression.      Patient has reviewed and agreed to the above plan.      Crystal Arellano, [facility]  January 4, 2024

## 2024-01-04 ENCOUNTER — VIRTUAL VISIT (OUTPATIENT)
Dept: BEHAVIORAL HEALTH | Facility: CLINIC | Age: 54
End: 2024-01-04
Payer: COMMERCIAL

## 2024-01-04 ENCOUNTER — VIRTUAL VISIT (OUTPATIENT)
Dept: PSYCHIATRY | Facility: CLINIC | Age: 54
End: 2024-01-04
Payer: COMMERCIAL

## 2024-01-04 DIAGNOSIS — F33.1 MODERATE EPISODE OF RECURRENT MAJOR DEPRESSIVE DISORDER (H): Primary | ICD-10-CM

## 2024-01-04 DIAGNOSIS — F33.1 MODERATE EPISODE OF RECURRENT MAJOR DEPRESSIVE DISORDER (H): ICD-10-CM

## 2024-01-04 DIAGNOSIS — F41.1 GAD (GENERALIZED ANXIETY DISORDER): ICD-10-CM

## 2024-01-04 PROBLEM — F32.9 MAJOR DEPRESSIVE DISORDER: Status: ACTIVE | Noted: 2017-07-19

## 2024-01-04 PROCEDURE — 99214 OFFICE O/P EST MOD 30 MIN: CPT | Mod: 95 | Performed by: NURSE PRACTITIONER

## 2024-01-04 PROCEDURE — 90832 PSYTX W PT 30 MINUTES: CPT | Mod: 95 | Performed by: SOCIAL WORKER

## 2024-01-04 RX ORDER — SERTRALINE HYDROCHLORIDE 100 MG/1
100 TABLET, FILM COATED ORAL DAILY
Qty: 90 TABLET | Refills: 0 | Status: SHIPPED | OUTPATIENT
Start: 2024-01-04 | End: 2024-04-17

## 2024-01-04 NOTE — PROGRESS NOTES
Virtual Visit Details    Type of service:  Video Visit     Originating Location (pt. Location): Home    Distant Location (provider location):  Off-site  Platform used for Video Visit: Aigou       PSYCHIATRIC MEDICATION FOLLOW UP APPT     Name:  Carol Guajardo  : 1970    Referred by:   Barbara Hedrick APRN CNP [facility]      Patient Care Team:  Barbara Hedrick APRN CNP as PCP - General (Family Medicine)  Nely Chang RN as Registered Nurse (Orthopaedic Surgery)  [facility] ([facility] ([facility] ([place]  Barbara Hedrick APRN CNP as Assigned PCP  Matthew Mtz MD as MD (Internal Medicine)  Emilio Pino MD as Assigned Musculoskeletal Provider  Chava Rivera MD as MD (Otolaryngology)  Chava Rivera MD as Assigned Surgical Provider  Therapist: none at present     Patient attended the phone/video session alone.    Last seen for outpatient psychiatry Consultation on 2024.      FOLLOWING PLAN PUT INTO PLACE: Patient recently transitioned off of Paxil and Wellbutrin onto sertraline. She has been on the sertraline 100 mg for approximately 2 to 3 weeks with some improvement in mood. Will allow to become more therapeutic. She has had a panic attack once a week for the last 2 weeks which appears it might be related to coming off the Paxil. While we are waiting for more therapeutic response from the sertraline will provide a small quantity of lorazepam to take as needed when she gets these panic attacks. We will follow-up in 6 weeks    INTERIM HISTORY     COMMUNICATIONS FROM PATIENT VIA:  none     RECORDS AVAILABLE FOR REVIEW: EHR records through [facility]  and previous psychiatric progress note.  In addition, reviewed the assessment completed by Crystal Arellano [facility], dated today        HISTORY OF PRESENT ILLNESS   [facility]PS referral for psychiatric medication consult in 2023.  Reports history  of  depression and anxiety since her early 30's.  First sought treatment in her early 30's for depression.   Denies prior psychiatric hospitalizations. No history of suicidal thoughts or attempts. No history of self-injurious behaviors. Genetically loaded for  depression, anxiety a substance use. Grew up in an intact home with all basic needs being met.        FAMILY, MEDICAL, SURGICAL HISTORY REVIEWED.  MEDICATION HAVE BEEN REVIEWED AND ARE CURRENT TO THE BEST OF MY KNOWLEDGE AND ABILITY.    Working at [employer]    MEDICATIONS                                                                                                Current Outpatient Medications   Medication Sig    acetaminophen (TYLENOL) 325 MG tablet Take 325-650 mg by mouth every 6 hours as needed for mild pain    calcium carbonate (OS-BERTA) 500 MG tablet Take 1 tablet by mouth 2 times daily    Ferrous Sulfate 324 (65 Fe) MG TBEC Take 324 mg by mouth 2 times daily    gabapentin (NEURONTIN) 300 MG capsule TAKE 3 CAPSULES BY MOUTH TWO TIMES A DAY    hydrOXYzine (ATARAX) 25 MG tablet Take 1 tablet (25 mg) by mouth every 6 hours as needed for anxiety    levothyroxine (SYNTHROID/LEVOTHROID) 175 MCG tablet Take 1 tablet (175 mcg) by mouth every morning    LORazepam (ATIVAN) 1 MG tablet Take 1 tablet (1 mg) by mouth daily as needed for anxiety or vomiting (panic)    pramipexole (MIRAPEX) 0.5 MG tablet TAKE 2 TABLETS BY MOUTH AT BEDTIME. 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    propranolol ER (INDERAL LA) 60 MG 24 hr capsule TAKE 1 CAPSULE BY MOUTH EVERY MORNING    sertraline (ZOLOFT) 100 MG tablet Take 1 tablet (100 mg) by mouth daily     No current facility-administered medications for this visit.        NOTES ABOUT CURRENT PSYCHOTROPIC MEDICATIONS:   Sertraline 100 mg    Lorazepam as needed, none in two weeks  Hydroxyzine 25 mg three times a day as needed      Gabapentin 600 mg three times a day restless legs  Propranolol XR 60 mg on for a long  "time     Levothyroxine    Mirapex     SUPPLEMENTS: calcium and iron  SIDE EFFECTS:   denies    COMPLIANCE:   states Adherent to medication regimen       PAST PSYCHOTROPIC MEDICATIONS:  Paroxetine for years  Bupropion        TODAY PATIENT REPORTS THE FOLLOWING PSYCHIATRIC ROS:   Per [facility], Crystal Arellano, during today's team-based visit:  \" update: [facility] reviewed DA. Pt reports that her anxiety and depression are improved a little. She's only needed her lorazapam once but not needed the hydroxzine. Pt reports that she has trouble staying asleep. [facility] explored. Identifies some environmental issues. Pt reports that she limits her liquid intake close to bedtime. Pt reports that although she just started therapy and is optimistic it will be helpful. Pt identifies work as primary stress. [facility] explored. Pt is an  at [employer] for 14 years. She reports conflicts with co-workers. 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 This co-worker became very angry and started yelling at her. Pt then yelled at co-worker. Pt got sent home and written up but she refused to sign it. She feels this was unfair, believes they both should've been disciplined. She feels she was singled out. This co-worker has since apologized but pt doesn't feel she can trust her. Another co-worker heard injured co-worker comment that was trying to get pt sent home. She doesn't want to look for a different job but feels frustrated by lack of good management. Typically, she's able to walk away from conflict so this incident was unique. She attributes this to her co-worker continuing to argue when pt tried to walk away. Pt's plan is to just walk away if they have more issues.     Stresses: work  Appetite: unremarkable  Sleep: trouble staying asleep  Therapy: Sybil GODFREY at [facility]  RENNY:  Preg: NA  Interventions: goal development, [facility] reviewed skills she's learning in therapy  Most " "important: medication update\"     EXERCISE: Adequate  SIDE EFFECTS:   tolerating medications without reported side effects  COMPLIANCE:   states Adherent to medication regimen  REPORTS THE FOLLOWING NEW MEDICAL ISSUES:   none    PROBLEM: DEPRESSION: Improving        1/4/2024     4:32 AM   Last PHQ-9   1.  Little interest or pleasure in doing things 1   2.  Feeling down, depressed, or hopeless 1   3.  Trouble falling or staying asleep, or sleeping too much 1   4.  Feeling tired or having little energy 1   5.  Poor appetite or overeating 1   6.  Feeling bad about yourself 1   7.  Trouble concentrating 1   8.  Moving slowly or restless 1   Q9: Thoughts of better off dead/self-harm past 2 weeks 0   PHQ-9 Total Score 8         11/22/2023     7:02 AM 12/12/2023     4:35 AM 1/4/2024     4:32 AM   PHQ-9 SCORE   PHQ-9 Total Score MyChart 8 (Mild depression) 8 (Mild depression) 8 (Mild depression)   PHQ-9 Total Score 8    8 8 8     PHQ9 score is 8 indicating mild depression.   Suicidal ideation:  No     PROBLEM: ANXIETY: Improving.    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    Weight as of 6/8/23: 98 kg (216 lb).    LABS & IMAGING                                                                                                                   Recent Labs   Lab Test 06/15/22  0544 06/01/22  1624   WBC  --  9.4   HGB 11.5* 11.7   HCT  --  36.9   MCV  --  98   PLT  --  275     Recent Labs   Lab Test 07/28/21  1811 04/20/21  0459   NA  --  136   POTASSIUM  --  4.2   CHLORIDE  --  100   CO2  --  28   GLC  --  119*   BERTA  --  8.8   MAG 2.1  --    BUN  --  19   CR  --  0.63   GFRESTIMATED  --  >90     No lab results found.  Recent Labs   Lab Test 03/03/23  1623 10/25/21  1023 07/28/21  1811   TSH 2.70   < > 9.42*   T4  --   --  1.04    < > = values in this interval not displayed.     No results found for: \"WYR396\", \"LUUV144\", \"BZWN57TIYRC\", \"VITD3\", \"D2VIT\", \"D3VIT\", \"DTOT\", \"LW60454759\", \"[id number]\", \"[id number]\", \"[id number]\", \"[id number]\", \"SI10176914\"     ALLERGY & IMMUNIZATIONS       Allergies   Allergen Reactions    Amoxicillin-Pot Clavulanate Other (See Comments) and Hives     Edema    Amoxicillin-Pot Clavulanate Hives and Itching    Ciprofloxacin Hives and Itching    Penicillins Hives and Itching       MEDICAL REVIEW OF SYSTEMS:   Ten system review was completed with pertinent positives noted     MENTAL STATUS EXAM:      General/Constitutional:  Appearance:   awake, alert, adequately groomed, appeared stated age and no apparent distress  Attitude:    cooperative   Eye Contact:  good  Musculoskeletal:  Psychomotor Behavior:  no evidence of tardive dyskinesia, dystonia, or tics from the head up  Psychiatric:  Speech:  clear, coherent, regular rate, rhythm, and volume,   No pressure speech noted.  Associations:  no loose associations  Thought Process:   logical, linear and goal oriented  Thought Content:    No evidence of suicidal ideation or homicidal ideation, no evidence of psychotic thought, no auditory hallucinations present and no visual " hallucinations present  Mood:  better  Affect:  full range/stable (normal variation of emotions during exam) and was congruent to speech content.  Insight:  good  Judgment:  intact, adequate for safety  Impulse Control:  intact  Neurological:  Oriented to:  person, place, time, and situation  Attention Span and Concentration:  Able to attend to the interview      Language: intact     Recent and Remote Memory:  Intact to interview. Not formally assessed. No amnesia.    Fund of Knowledge: appropriate        SAFETY   Feels safe in home: YES     Suicidal ideation: Denies  History of suicide attempts:  No   Hx of impulsivity: No   Hope for the future: present   Hx of Command hallucinations or current psychosis: None endorsed    History of Self-injurious behaviors: denies    Family member  by suicide:  not discussed       SAFETY ASSESSMENT:   Based on all available evidence including the factors cited above, overall Risk for harm is low and is appropriate for outpatient level of care.   Recommended that patient call 911 or go to the local ED should there be a change in any of these risk factors.         DSM 5 DIAGNOSIS:   296.32 (F33.1) Major Depressive Disorder, Recurrent Episode, Moderate _      MEDICAL COMORBIDITY IMPACTING CLINICAL PICTURE:  hypothyroidism.  Known issue that I take into account for their medical decisions          ASSESSMENT AND PLAN      Problem List as of 2024 Reviewed: 2023  1:36 PM by Claudia Meza DNP            Noted       Behavioral    Last System Assessment & Plan 2024 Virtual Visit Written 2024  9:48 AM by Claudia Meza DNP     Tolerating the sertraline at 100 mg with positive effective in targeting mood and anxiety.  Will continue and follow-up in 3 months.  If stable will transition back to PCP for ongoing medication management and treatment planning         1. SHEFALI (generalized anxiety disorder) 2017     Overview Deleted 6/15/2022  7:42 AM by  Denis Sunshine MD              Relevant Medications     sertraline (ZOLOFT) 100 MG tablet    2. Major depressive disorder 7/19/2017     Overview Deleted 6/15/2022  7:42 AM by Denis Sunshine MD              Relevant Medications     sertraline (ZOLOFT) 100 MG tablet      CONSULTS/REFERRALS:   Continue plan for therapy  Coordinate care with therapist as needed       MEDICAL:   None at this time  Coordinate care with PCP (Barbara Hedrick) as needed  Follow up with primary care provider as planned or for acute medical concerns.     PSYCHOEDUCATION:  Medication side effects and alternatives reviewed. Health promotion activities recommended and reviewed today. All questions addressed. Education and counseling completed regarding risks and benefits of medications and psychotherapy options.  Consent provided by patient/guardian  Call the psychiatric nurse line with medication questions or concerns at [phone].  The Invisible Armor may be used to communicate with your provider, but this is not intended to be used for emergencies.  LitRes.gov is information for patients.  It is run by the [employer] and it contains information about all disorders, diseases and all medications.       COMMUNITY RESOURCES:    CRISIS NUMBERS: Provided in AVS 11/22/2023  National Suicide Prevention Lifeline: 5-578-887-TALK (292-158-9217)  [employer]/resources for a list of additional resources (SOS)            [facility] - [phone]   Urgent Care Adult Mental Rwvbfs-[phone] mobile unit/ 24/7 crisis line  [facility] -[phone]   COPE 24/7 [facility] Mobile Team -[phone] (adults)/ [phone] (child)  Poison Control Center - [phone]    OR  go to nearest ER  Crisis Text Line for any crisis 24/7 send this-   To: 165635   [facility] ([facility] ER  [phone]  National Suicide Prevention Lifeline: 595.309.5651 (TTY: 829.907.7001). Call anytime for help.   (www.suicidepreventionlifeline.org)  National Challenge on Mental Illness (www.leah.org): 318.513.1491 or 041-429-8493.   Mental Health Association (www.mentalhealth.org): 738.309.1349 or 710-287-4267.  [place] Crisis Text Line: Text [place] to 043491  Suicide LifeLine Chat: suicidepreCarbonFlowline.org/       ADMINISTRATIVE BILLING:   Level of Medical Decision Making:   - At least 2 stable chronic problems  - Engaged in prescription drug management during visit (discussed any medication benefits, side effects, alternatives, etc.)             Patient Status:  Patient will continue to be seen for ongoing consultation and stabilization.    Signed:   Claudia Meza, MSN, APRN, FMHNP-[facility] ([facility])   Chart documentation done in part with Dragon Voice Recognition software.  Although reviewed after completion, some word and grammatical errors may remain.

## 2024-01-04 NOTE — ASSESSMENT & PLAN NOTE
Tolerating the sertraline at 100 mg with positive effective in targeting mood and anxiety.  Will continue and follow-up in 3 months.  If stable will transition back to PCP for ongoing medication management and treatment planning

## 2024-01-04 NOTE — NURSING NOTE
Is the patient currently in the state of [place]? YES    Visit mode:VIDEO    If the visit is dropped, the patient can be reconnected by: VIDEO VISIT: Text to cell phone:   Telephone Information:   Mobile [phone]       Will anyone else be joining the visit? NO  (If patient encounters technical issues they should call [phone])    How would you like to obtain your AVS? MyChart    Are changes needed to the allergy or medication list? No    Reason for visit: RECHECK    Erika WADSWORTH

## 2024-01-05 ENCOUNTER — VIRTUAL VISIT (OUTPATIENT)
Dept: PSYCHOLOGY | Facility: CLINIC | Age: 54
End: 2024-01-05
Payer: COMMERCIAL

## 2024-01-05 DIAGNOSIS — F33.1 MODERATE EPISODE OF RECURRENT MAJOR DEPRESSIVE DISORDER (H): Primary | ICD-10-CM

## 2024-01-05 DIAGNOSIS — F41.1 GAD (GENERALIZED ANXIETY DISORDER): ICD-10-CM

## 2024-01-05 PROCEDURE — 90834 PSYTX W PT 45 MINUTES: CPT | Mod: 95 | Performed by: MARRIAGE & FAMILY THERAPIST

## 2024-01-05 NOTE — PROGRESS NOTES
[facility]                                     Progress Note    Patient Name: Carol Guajardo  Date: 2024         Service Type: Individual      Session Start Time: 10:30  Session End Time: 11:20     Session Length: 38-52    Session #: 2    Attendees: Client attended alone    Service Modality:  Video Visit:      Provider verified identity through the following two step process.  Patient provided:  Patient photo and Patient     Telemedicine Visit: The patient's condition can be safely assessed and treated via synchronous audio and visual telemedicine encounter.      Reason for Telemedicine Visit: Patient has requested telehealth visit    Originating Site (Patient Location): Patient's place of employment [employer] in [place]    Distant Site (Provider Location): [facility]    Consent:  The patient/guardian has verbally consented to: the potential risks and benefits of telemedicine (video visit) versus in person care; bill my insurance or make self-payment for services provided; and responsibility for payment of non-covered services.     Patient would like the video invitation sent by:  My Chart    Mode of Communication:  Video Conference via Amwell.  Switched to phone call [facility] through due to connection issues.    Distant Location (Provider):  On-site    As the provider I attest to compliance with applicable laws and regulations related to telemedicine.    DATA  Interactive Complexity: No  Crisis: No        Progress Since Last Session (Related to Symptoms / Goals / Homework):   Symptoms: Worsening      Homework:  n/a      Episode of Care Goals: Minimal progress - ACTION (Actively working towards change); Intervened by reinforcing change plan / affirming steps taken     Current / Ongoing Stressors and Concerns:   Current conflicts with co-workers and she is emotional when talking about it.  She's feeling sadness about change in  relationship with close work friend.     Treatment Objective(s) Addressed in This Session:   Client will use cognitive strategies identified in therapy to challenge anxious thoughts.     Intervention:   Validation. Psychoeducation about nervous system stress response. Cognitive modification.    Assessments completed prior to visit:  The following assessments were completed by patient for this visit:  PHQ9:       8/2/2022     2:13 PM 10/14/2022     9:26 AM 9/8/2023     9:35 AM 10/23/2023     1:40 PM 11/22/2023     7:02 AM 12/12/2023     4:35 AM 1/4/2024     4:32 AM   PHQ-9 SCORE   PHQ-9 Total Score MyChart 15 (Moderately severe depression) 9 (Mild depression) 24 (Severe depression) 8 (Mild depression) 8 (Mild depression) 8 (Mild depression) 8 (Mild depression)   PHQ-9 Total Score 15 9 24 8 8    8 8 8     GAD7:       10/25/2021    10:20 AM 1/4/2022     9:33 AM 8/2/2022     2:11 PM 10/14/2022     9:22 AM 3/3/2023     3:19 PM 9/8/2023     9:36 AM 10/23/2023     1:43 PM   SHEFALI-7 SCORE   Total Score  12 (moderate anxiety) 11 (moderate anxiety) 8 (mild anxiety) 7 (mild anxiety) 20 (severe anxiety) 7 (mild anxiety)   Total Score 7 12 11 8 7 20 7     CAGE-AID:       11/20/2023    11:13 AM   CAGE-AID Total Score   Total Score 0    0   Total Score MyChart 0 (A total score of 2 or greater is considered clinically significant)     PROMIS 10-Global Health (all questions and answers displayed):       2/8/2022     6:32 PM 6/8/2022     9:10 AM 6/8/2022     3:00 PM 9/7/2022     2:04 PM 6/8/2023     3:00 PM 11/20/2023    11:11 AM 12/12/2023     4:36 AM   PROMIS 10   In general, would you say your health is: Good Good  Fair  Fair Fair   In general, would you say your quality of life is: Good Good  Fair  Fair Fair   In general, how would you rate your physical health? Good Good  Poor  Fair Fair   In general, how would you rate your mental health, including your mood and your ability to think? Good Poor  Fair  Poor Fair   In general, how  would you rate your satisfaction with your social activities and relationships? Good Fair  Fair  Fair Fair   In general, please rate how well you carry out your usual social activities and roles Fair Fair  Fair  Fair Fair   To what extent are you able to carry out your everyday physical activities such as walking, climbing stairs, carrying groceries, or moving a chair? A little A little  Moderately  Mostly Moderately   In the past 7 days, how often have you been bothered by emotional problems such as feeling anxious, depressed, or irritable? Sometimes Sometimes  Sometimes  Often Sometimes   In the past 7 days, how would you rate your fatigue on average? Mild Mild  Mild  Moderate Mild   In the past 7 days, how would you rate your pain on average, where 0 means no pain, and 10 means worst imaginable pain? 6 4  4  4 3   In general, would you say your health is: 3 3 3 2 2 2 2   In general, would you say your quality of life is: 3 3 2 2 2 2 2   In general, how would you rate your physical health? 3 3 2 1 1 2 2   In general, how would you rate your mental health, including your mood and your ability to think? 3 1 2 2 3 1 2   In general, how would you rate your satisfaction with your social activities and relationships? 3 2 2 2 3 2 2   In general, please rate how well you carry out your usual social activities and roles. (This includes activities at home, at work and in your community, and responsibilities as a parent, child, spouse, employee, friend, etc.) 2 2 1 2 2 2 2   To what extent are you able to carry out your everyday physical activities such as walking, climbing stairs, carrying groceries, or moving a chair? 2 2 2 3 2 4 3   In the past 7 days, how often have you been bothered by emotional problems such as feeling anxious, depressed, or irritable? 3 3 3 3 3 4 3   In the past 7 days, how would you rate your fatigue on average? 2 2 3 2 3 3 2   In the past 7 days, how would you rate your pain on average, where 0  means no pain, and 10 means worst imaginable pain? 6 4 6 4 6 4 3   Global Mental Health Score 12 9 9 9 11 7    7 9   Global Physical Health Score 12 12 10 11 9 12    12 13   PROMIS TOTAL - SUBSCORES 24 21 19 20 20 19    19 22         ASSESSMENT: Current Emotional / Mental Status (status of significant symptoms):   Risk status (Self / Other harm or suicidal ideation)   Patient denies current fears or concerns for personal safety.   Patient denies current or recent suicidal ideation or behaviors.   Patient denies current or recent homicidal ideation or behaviors.   Patient denies current or recent self injurious behavior or ideation.   Patient denies other safety concerns.   Patient reports there has been no change in risk factors since their last session.     Patient reports there has been no change in protective factors since their last session.     Recommended that patient call 911 or go to the local ED should there be a change in any of these risk factors.     Appearance:   Appropriate    Eye Contact:   Fair    Psychomotor Behavior: Normal    Attitude:   Cooperative    Orientation:   All   Speech    Rate / Production: Normal     Volume:  Normal    Mood:    Anxious  Sad    Affect:    Appropriate    Thought Content:  Clear    Thought Form:  Coherent  Logical    Insight:    Good      Medication Review:   No changes to current psychiatric medication(s)     Medication Compliance:   Yes     Changes in Health Issues:   None reported     Chemical Use Review:   Substance Use: Chemical use reviewed, no active concerns identified      Tobacco Use: No change in amount of tobacco use since last session.  Not discussed    Diagnosis:  1. Moderate episode of recurrent major depressive disorder (H)    2. SHEFALI (generalized anxiety disorder)          Collateral Reports Completed:   Not Applicable    PLAN: (Patient Tasks / Therapist Tasks / Other)  Continue with eye vergence exercise and 5-4-3-2-1 for calming.  Use outside walks and  keeping up with food intake to help with emotion regulation.  Remind self that she doesn't want to work harder than the friend to keep the relationship going.  Consider writing out thoughts about friendship.         Sybil Marshall, LMFT                                                         ______________________________________________________________________    Individual Treatment Plan    Patient's Name: Carol Guajardo  YOB: 1970    Date of Creation: 12/12/2023    Date Treatment Plan Last Reviewed/Revised: 12/12/2023    DSM5 Diagnoses: 296.32 (F33.1) Major Depressive Disorder, Recurrent Episode, Moderate _ or 300.02 (F41.1) Generalized Anxiety Disorder  Psychosocial / Contextual Factors: marital conflict, job dissatisfaction  PROMIS (reviewed every 90 days):     Referral / Collaboration:  Referral to another professional/service is not indicated at this time..    Anticipated number of session for this episode of care: 9-12 sessions  Anticipation frequency of session: Weekly  Anticipated Duration of each session: 38-52 minutes  Treatment plan will be reviewed in 90 days or when goals have been changed.       MeasurableTreatment Goal(s) related to diagnosis / functional impairment(s)  Goal 1: Client will report reduction in anxiety also as evidenced by reduction of SHEFALI-7 score below 6 points within the next 12 weeks.    Objective #A (Client Action)    Client will identify at least three triggers for anxiety.  Status:  New: 12/12/23      Intervention(s)  Therapist will provide educational materials on common triggers and signs of anxiety.    Objective #B  Client will use relaxation strategies at least two times per day to reduce the physical symptoms of anxiety.  Status:   New: 12/12/23       Intervention(s)  Therapist will teach relaxation strategies such as mindfulness, deep breathing, muscle relaxation, and sensory activities.    Objective #C  Client will use cognitive strategies identified  in therapy to challenge anxious thoughts.  Status:   New: 12/12/23       Intervention(s)  Therapist will teach strategies for cognitive modification using REBT model.    Goal 2: Client will report improved mood as indicated by PHQ-9 score below 6 for consistent 8 weeks.    Objective #A (Client Action)    Status: New - Date: 12/12/23     Client will Increase interest, engagement, and pleasure in doing things.    Intervention(s)  Therapist will assign homework for daily involvement in pleasant activities.    Objective #B  Client will Identify negative self-talk and behaviors: challenge core beliefs, myths, and actions.    Status:   New: 12/12/23       Intervention(s)  Therapist will teach strategies for cognitive modification using REBT models.    Objective #C  Client will Improve quantity and quality of night time sleep / decrease daytime naps.  Status:   New: 12/12/23       Intervention(s)  Therapist will teach sleep hygiene strategies.  Assign homework for daily practice.    Goal 3: Client will increase assertiveness by 50%.    Objective #A (Client Action)    Client will learn & utilize at least five assertive communication skills weekly.  Status:   New: 12/12/23       Intervention(s)  Therapist will teach assertiveness skills. Role-play skills in session.    Objective #B  Client will practice setting boundaries five times weekly in the next eight weeks.  Status: New - Date: 12/12/23     Intervention(s)  Therapist will teach about healthy boundaries. Consider boundaries client would like to set in own life.    Objective #C  Client will track and record at least two daily pleasant exchanges with .  Status: New - Date: 12/12/23      Intervention(s)  Therapist will assign homework for tracking pleasant exchanges.    Patient has reviewed and agreed to the above plan.      Sybil Marshall, LMFT  December 12, 2023

## 2024-01-06 ENCOUNTER — LAB (OUTPATIENT)
Dept: LAB | Facility: CLINIC | Age: 54
End: 2024-01-06
Payer: COMMERCIAL

## 2024-01-06 DIAGNOSIS — G25.81 RESTLESS LEG SYNDROME: ICD-10-CM

## 2024-01-06 LAB
ALBUMIN SERPL BCG-MCNC: 4.4 G/DL (ref 3.5–5.2)
ALP SERPL-CCNC: 122 U/L (ref 40–150)
ALT SERPL W P-5'-P-CCNC: 15 U/L (ref 0–50)
ANION GAP SERPL CALCULATED.3IONS-SCNC: 6 MMOL/L (ref 7–15)
AST SERPL W P-5'-P-CCNC: 20 U/L (ref 0–45)
BILIRUB SERPL-MCNC: 0.3 MG/DL
BUN SERPL-MCNC: 22.9 MG/DL (ref 6–20)
CALCIUM SERPL-MCNC: 9.8 MG/DL (ref 8.6–10)
CHLORIDE SERPL-SCNC: 103 MMOL/L (ref 98–107)
CREAT SERPL-MCNC: 0.49 MG/DL (ref 0.51–0.95)
DEPRECATED HCO3 PLAS-SCNC: 32 MMOL/L (ref 22–29)
EGFRCR SERPLBLD CKD-EPI 2021: >90 ML/MIN/1.73M2
ERYTHROCYTE [DISTWIDTH] IN BLOOD BY AUTOMATED COUNT: 11.8 % (ref 10–15)
GLUCOSE SERPL-MCNC: 93 MG/DL (ref 70–99)
HCT VFR BLD AUTO: 43.3 % (ref 35–47)
HGB BLD-MCNC: 13.8 G/DL (ref 11.7–15.7)
MCH RBC QN AUTO: 31.5 PG (ref 26.5–33)
MCHC RBC AUTO-ENTMCNC: 31.9 G/DL (ref 31.5–36.5)
MCV RBC AUTO: 99 FL (ref 78–100)
PLATELET # BLD AUTO: 271 10E3/UL (ref 150–450)
POTASSIUM SERPL-SCNC: 4.5 MMOL/L (ref 3.4–5.3)
PROT SERPL-MCNC: 7.7 G/DL (ref 6.4–8.3)
RBC # BLD AUTO: 4.38 10E6/UL (ref 3.8–5.2)
SODIUM SERPL-SCNC: 141 MMOL/L (ref 135–145)
WBC # BLD AUTO: 9.6 10E3/UL (ref 4–11)

## 2024-01-06 PROCEDURE — 85027 COMPLETE CBC AUTOMATED: CPT

## 2024-01-06 PROCEDURE — 80053 COMPREHEN METABOLIC PANEL: CPT

## 2024-01-06 PROCEDURE — 36415 COLL VENOUS BLD VENIPUNCTURE: CPT

## 2024-01-08 DIAGNOSIS — G25.81 RESTLESS LEG SYNDROME: ICD-10-CM

## 2024-01-08 RX ORDER — PRAMIPEXOLE DIHYDROCHLORIDE 0.5 MG/1
TABLET ORAL
Qty: 180 TABLET | Refills: 2 | Status: SHIPPED | OUTPATIENT
Start: 2024-01-08 | End: 2024-10-01

## 2024-01-18 DIAGNOSIS — G25.81 RESTLESS LEGS SYNDROME (RLS): ICD-10-CM

## 2024-01-18 DIAGNOSIS — M79.2 NEUROPATHIC PAIN: ICD-10-CM

## 2024-01-19 RX ORDER — GABAPENTIN 300 MG/1
CAPSULE ORAL
Qty: 540 CAPSULE | Refills: 1 | Status: SHIPPED | OUTPATIENT
Start: 2024-01-19 | End: 2024-07-18

## 2024-02-05 ENCOUNTER — MYC MEDICAL ADVICE (OUTPATIENT)
Dept: FAMILY MEDICINE | Facility: CLINIC | Age: 54
End: 2024-02-05

## 2024-02-11 ENCOUNTER — ANCILLARY PROCEDURE (OUTPATIENT)
Dept: GENERAL RADIOLOGY | Facility: CLINIC | Age: 54
End: 2024-02-11
Attending: NURSE PRACTITIONER
Payer: COMMERCIAL

## 2024-02-11 ENCOUNTER — OFFICE VISIT (OUTPATIENT)
Dept: URGENT CARE | Facility: URGENT CARE | Age: 54
End: 2024-02-11
Payer: COMMERCIAL

## 2024-02-11 VITALS
TEMPERATURE: 98.1 F | WEIGHT: 202 LBS | SYSTOLIC BLOOD PRESSURE: 129 MMHG | HEIGHT: 60 IN | HEART RATE: 53 BPM | DIASTOLIC BLOOD PRESSURE: 63 MMHG | RESPIRATION RATE: 18 BRPM | OXYGEN SATURATION: 94 % | BODY MASS INDEX: 39.66 KG/M2

## 2024-02-11 DIAGNOSIS — R06.2 WHEEZING: Primary | ICD-10-CM

## 2024-02-11 DIAGNOSIS — H61.22 IMPACTED CERUMEN OF LEFT EAR: ICD-10-CM

## 2024-02-11 LAB
FLUAV AG SPEC QL IA: NEGATIVE
FLUBV AG SPEC QL IA: NEGATIVE

## 2024-02-11 PROCEDURE — 87804 INFLUENZA ASSAY W/OPTIC: CPT | Performed by: NURSE PRACTITIONER

## 2024-02-11 PROCEDURE — 71046 X-RAY EXAM CHEST 2 VIEWS: CPT | Mod: TC | Performed by: RADIOLOGY

## 2024-02-11 PROCEDURE — 94640 AIRWAY INHALATION TREATMENT: CPT | Performed by: NURSE PRACTITIONER

## 2024-02-11 PROCEDURE — 99214 OFFICE O/P EST MOD 30 MIN: CPT | Mod: 25 | Performed by: NURSE PRACTITIONER

## 2024-02-11 PROCEDURE — 87635 SARS-COV-2 COVID-19 AMP PRB: CPT | Performed by: NURSE PRACTITIONER

## 2024-02-11 RX ORDER — ALBUTEROL SULFATE 90 UG/1
2 AEROSOL, METERED RESPIRATORY (INHALATION) EVERY 6 HOURS PRN
Qty: 18 G | Refills: 0 | Status: SHIPPED | OUTPATIENT
Start: 2024-02-11 | End: 2024-07-01

## 2024-02-11 RX ORDER — IPRATROPIUM BROMIDE AND ALBUTEROL SULFATE 2.5; .5 MG/3ML; MG/3ML
3 SOLUTION RESPIRATORY (INHALATION) ONCE
Status: COMPLETED | OUTPATIENT
Start: 2024-02-11 | End: 2024-02-11

## 2024-02-11 RX ADMIN — IPRATROPIUM BROMIDE AND ALBUTEROL SULFATE 3 ML: 2.5; .5 SOLUTION RESPIRATORY (INHALATION) at 13:29

## 2024-02-11 ASSESSMENT — ENCOUNTER SYMPTOMS
EYE PAIN: 0
VOMITING: 1
SHORTNESS OF BREATH: 0
NAUSEA: 0
WHEEZING: 0
COUGH: 0
PHOTOPHOBIA: 0
CHILLS: 1

## 2024-02-11 NOTE — LETTER
February 11, 2024      Carol Guajardo  [address] [facility] [address]        To Whom It May Concern:    Carol Guajardo  was seen on 2/11/024  Please excuse patient tomorrow on 2/12/2024 due to illness.        Sincerely,        Adwoa Alford NP

## 2024-02-11 NOTE — PROGRESS NOTES
Assessment & Plan     Wheezing  - XR Chest 2 Views  Narrative & Impression   EXAM: XR CHEST 2 VIEWS  LOCATION: [facility]  DATE: 02/11/2024  INDICATION: Wheezing.  COMPARISON: 07/16/2021.                                                             IMPRESSION: Enlarged cardiac silhouette again evident and unchanged. No acute infiltrates or effusions.     - Influenza A & B Antigen - Clinic Collect negative   - Symptomatic COVID-19 Virus (Coronavirus) by PCR Nose pending   - ipratropium - albuterol 0.5 mg/2.5 mg/3 mL (DUONEB) neb solution 3 mL and NP noted decreased wheezes posterior lungs    - albuterol (PROAIR HFA/PROVENTIL HFA/VENTOLIN HFA) 108 (90 Base) MCG/ACT inhaler  Dispense: 18 g; Refill: 0    Impacted cerumen of left ear  - Adult ENT  Referral  Follow up with ENT related to ear     Vomiting with dizziness  Patient declined CBC   Follow up with primary provider this week   Adwoa Alford NP  [facility]    Jey Medina is a 53 year old female who presents to clinic today for the following health issues: Patient states she has had left ear pain described as pressure, aching, brownish discharge in appearance. Patient states she had a cholesteatoma removed and has had ENT removed the ear wax in the past. Patient states she only as ENT remove it. Patient states she has been having ear infections since that surgery.  Patient states she has had vomiting x1 once today due the the stuff in her left ear. Patient states when it was plugged in the past this is what happened.    Patient states that the room does not spin and with moving her head she does not have dizziness. Patient does not feel dizzy today but felt nauseated and vomited.    Chief Complaint   Patient presents with    Ear Problem     Left ear pain described as pressure/medium ache/discharge-brown in appearance.Patient reports having an operation on left ear and ever since that  surgery she's been having infections with dizzy spells.     URI Adult  Onset of symptoms was 3 day(s) ago.  Course of illness is worsening.    Severity moderately severe  Current and Associated symptoms: chills, runny nose, and vomiting  Treatment measures tried include Tylenol  Predisposing factors include had ear infection not history     Review of Systems   Constitutional:  Positive for chills.   Eyes:  Negative for photophobia and pain.   Respiratory:  Negative for cough, shortness of breath and wheezing.    Cardiovascular:  Negative for chest pain.   Gastrointestinal:  Positive for vomiting. Negative for nausea.   Skin:  Negative for rash.         Objective    /63   Pulse 53   Temp 98.1  F (36.7  C) (Oral)   Resp 18   Ht 1.524 m (5')   Wt 91.6 kg (202 lb)   SpO2 94%   BMI 39.45 kg/m    Physical Exam  Vitals and nursing note reviewed.   Constitutional:       Appearance: Normal appearance.   HENT:      Right Ear: Tympanic membrane, ear canal and external ear normal.      Left Ear: There is impacted cerumen.      Mouth/Throat:      Mouth: Mucous membranes are moist.      Pharynx: No posterior oropharyngeal erythema.   Eyes:      Conjunctiva/sclera: Conjunctivae normal.   Cardiovascular:      Rate and Rhythm: Normal rate and regular rhythm.   Pulmonary:      Effort: Pulmonary effort is normal.      Breath sounds: Wheezing present.   Lymphadenopathy:      Cervical: Cervical adenopathy present.   Neurological:      Mental Status: She is alert.

## 2024-02-12 LAB — SARS-COV-2 RNA RESP QL NAA+PROBE: NEGATIVE

## 2024-02-13 ENCOUNTER — VIRTUAL VISIT (OUTPATIENT)
Dept: FAMILY MEDICINE | Facility: CLINIC | Age: 54
End: 2024-02-13
Payer: COMMERCIAL

## 2024-02-13 DIAGNOSIS — R93.89 ABNORMAL CHEST X-RAY: Primary | ICD-10-CM

## 2024-02-13 DIAGNOSIS — R06.2 WHEEZING: ICD-10-CM

## 2024-02-13 PROCEDURE — 99213 OFFICE O/P EST LOW 20 MIN: CPT | Mod: 95 | Performed by: NURSE PRACTITIONER

## 2024-02-13 NOTE — PATIENT INSTRUCTIONS
Abnormal chest x-ray  Patient seen in urgent care a few days ago, noted to be wheezing.  Patient denies any shortness of breath, chest pain or pressure.  Has had some mild coughing.  Chest x-ray obtained and was negative for any acute process, however again identified enlarged cardiac silhouette.  Given symptoms and chest x-ray will further evaluate with echocardiogram.  Patient to call and schedule, number provided.  Will call with results and further recommendations.  - Echocardiogram Complete; Future    Wheezing  Wheezing noted during office visit as above.  Patient doing inhalers and nebulizers at home, advised to continue as needed.

## 2024-02-13 NOTE — PROGRESS NOTES
Carol is a 53 year old who is being evaluated via a billable video visit.      How would you like to obtain your AVS? MyChart  If the video visit is dropped, the invitation should be resent by: Text to cell phone: [phone]  Will anyone else be joining your video visit? No          Assessment & Plan     Abnormal chest x-ray  Patient seen in urgent care a few days ago, noted to be wheezing.  Patient denies any shortness of breath, chest pain or pressure.  Has had some mild coughing.  Chest x-ray obtained and was negative for any acute process, however again identified enlarged cardiac silhouette.  Given symptoms and chest x-ray will further evaluate with echocardiogram.  Patient to call and schedule, number provided.  Will call with results and further recommendations.  - Echocardiogram Complete; Future    Wheezing  Wheezing noted during office visit as above.  Patient doing inhalers and nebulizers at home, advised to continue as needed.          Nicotine/Tobacco Cessation  She reports that she has been smoking cigarettes. She started smoking about 38 years ago. She has a 16.5 pack-year smoking history. She quit smokeless tobacco use about 2 years ago.  Nicotine/Tobacco Cessation Plan  Information offered: Patient not interested at this time        See Patient Instructions    Subjective   Carol is a 53 year old, presenting for the following health issues:  Urgent Care (Follow up)        2/13/2024     2:25 PM   Additional Questions   Roomed by Tiffanie EARLY   Accompanied by Self         2/13/2024     2:25 PM   Patient Reported Additional Medications   Patient reports taking the following new medications .     History of Present Illness       Reason for visit:  Left ear and chest X-ray    She eats 0-1 servings of fruits and vegetables daily.She consumes 1 sweetened beverage(s) daily.She exercises with enough effort to increase her heart rate 9 or less minutes per day.  She exercises with enough effort to increase her  heart rate 4 days per week.   She is taking medications regularly.       ED/UC Followup:    Facility:  [facility]  Date of visit: 2/11/24  Reason for visit: ear pain  Current Status: Would like to discuss recent xray      EXAM: XR CHEST 2 VIEWS  LOCATION: [facility] [place]  DATE: 02/11/2024     INDICATION: Wheezing.  COMPARISON: 07/16/2021.                                                                  IMPRESSION: Enlarged cardiac silhouette again evident and unchanged. No acute infiltrates or effusions.     Doesn't feel like she's wheezing   Has been coughing a little more than normal   Hasn't scheduled with the ENT yet          Review of Systems  Constitutional, HEENT, cardiovascular, pulmonary, gi and gu systems are negative, except as otherwise noted.      Objective           Vitals:  No vitals were obtained today due to virtual visit.    Physical Exam   GENERAL: alert and no distress  EYES: Eyes grossly normal to inspection.  No discharge or erythema, or obvious scleral/conjunctival abnormalities.  RESP: No audible wheeze, cough, or visible cyanosis.    SKIN: Visible skin clear. No significant rash, abnormal pigmentation or lesions.  NEURO: Cranial nerves grossly intact.  Mentation and speech appropriate for age.  PSYCH: Appropriate affect, tone, and pace of words    Diagnostic Test Results:  Labs reviewed in Epic  Echocardiogram-pending      Video-Visit Details    Type of service:  Video Visit     Originating Location (pt. Location): Home    Distant Location (provider location):  Off-site  Platform used for Video Visit: [place]  Signed Electronically by: Barbara Koenig DNP, APRN-CNP       Chart documentation with Dragon Voice recognition Software. Although reviewed after completion, some words and grammatical errors may remain.

## 2024-02-26 ENCOUNTER — HOSPITAL ENCOUNTER (OUTPATIENT)
Dept: CARDIOLOGY | Facility: CLINIC | Age: 54
Discharge: HOME | End: 2024-02-26
Attending: NURSE PRACTITIONER | Admitting: NURSE PRACTITIONER
Payer: COMMERCIAL

## 2024-02-26 DIAGNOSIS — R93.89 ABNORMAL CHEST X-RAY: ICD-10-CM

## 2024-02-26 LAB — LVEF ECHO: NORMAL

## 2024-02-26 PROCEDURE — 93306 TTE W/DOPPLER COMPLETE: CPT | Mod: 26 | Performed by: INTERNAL MEDICINE

## 2024-02-26 PROCEDURE — 93306 TTE W/DOPPLER COMPLETE: CPT

## 2024-03-31 DIAGNOSIS — E03.9 HYPOTHYROIDISM, UNSPECIFIED TYPE: ICD-10-CM

## 2024-04-01 RX ORDER — LEVOTHYROXINE SODIUM 175 UG/1
175 TABLET ORAL EVERY MORNING
Qty: 30 TABLET | Refills: 0 | Status: SHIPPED | OUTPATIENT
Start: 2024-04-01 | End: 2024-04-30

## 2024-04-01 NOTE — TELEPHONE ENCOUNTER
Medication is being filled for 1 time refill only due to:  Patient needs labs TSH.   Marcia Cheung RN on 4/1/2024 at 12:10 PM

## 2024-04-08 ENCOUNTER — VIRTUAL VISIT (OUTPATIENT)
Dept: PSYCHOLOGY | Facility: CLINIC | Age: 54
End: 2024-04-08
Payer: COMMERCIAL

## 2024-04-08 DIAGNOSIS — F33.1 MODERATE EPISODE OF RECURRENT MAJOR DEPRESSIVE DISORDER (H): Primary | ICD-10-CM

## 2024-04-08 DIAGNOSIS — F41.1 GAD (GENERALIZED ANXIETY DISORDER): ICD-10-CM

## 2024-04-09 ENCOUNTER — MYC MEDICAL ADVICE (OUTPATIENT)
Dept: FAMILY MEDICINE | Facility: CLINIC | Age: 54
End: 2024-04-09

## 2024-04-17 ENCOUNTER — MYC REFILL (OUTPATIENT)
Dept: PSYCHIATRY | Facility: CLINIC | Age: 54
End: 2024-04-17
Payer: COMMERCIAL

## 2024-04-17 DIAGNOSIS — F41.1 GAD (GENERALIZED ANXIETY DISORDER): ICD-10-CM

## 2024-04-17 DIAGNOSIS — F33.1 MODERATE EPISODE OF RECURRENT MAJOR DEPRESSIVE DISORDER (H): ICD-10-CM

## 2024-04-17 RX ORDER — SERTRALINE HYDROCHLORIDE 100 MG/1
100 TABLET, FILM COATED ORAL DAILY
Qty: 30 TABLET | Refills: 0 | Status: SHIPPED | OUTPATIENT
Start: 2024-04-17 | End: 2024-04-30

## 2024-04-17 NOTE — TELEPHONE ENCOUNTER
[facility] please call patient to schedule a follow up appointment  Date of Last Office Visit: 01/04/2024  Date of Next Office Visit: none  No shows since last visit: 0  Cancellations since last visit: 1    Medication requested: sertraline (ZOLOFT) 100 MG tablet  Date last ordered: 01/04/2024 Qty: 90 Refills: 0     Review of MN ?: N/A  Lapse in medication adherence greater than 5 days?: no  Medication refill request verified as identical to current order?: yes  Result of Last DAM, VPA, Li+ Level, CBC, or Carbamazepine Level (at or since last visit): N/A    Last visit treatment plan:   Return in about 3 months (around 4/4/2024) for Follow up with me.   continue sertraline at 100 mg   []Medication refilled per  Medication Refill in Ambulatory Care  policy.  []Medication unable to be refilled by RN due to criteria not met as indicated below:    []Eligibility - not seen in the last year   [x]Supervision - no future appointment   [x]Compliance - no shows, cancellations or lapse in therapy   []Verification - order discrepancy   []Controlled medication   [x]Medication not included in policy   []90-day supply request   [x]Other

## 2024-04-27 ASSESSMENT — ANXIETY QUESTIONNAIRES
7. FEELING AFRAID AS IF SOMETHING AWFUL MIGHT HAPPEN: SEVERAL DAYS
6. BECOMING EASILY ANNOYED OR IRRITABLE: SEVERAL DAYS
IF YOU CHECKED OFF ANY PROBLEMS ON THIS QUESTIONNAIRE, HOW DIFFICULT HAVE THESE PROBLEMS MADE IT FOR YOU TO DO YOUR WORK, TAKE CARE OF THINGS AT HOME, OR GET ALONG WITH OTHER PEOPLE: VERY DIFFICULT
4. TROUBLE RELAXING: SEVERAL DAYS
8. IF YOU CHECKED OFF ANY PROBLEMS, HOW DIFFICULT HAVE THESE MADE IT FOR YOU TO DO YOUR WORK, TAKE CARE OF THINGS AT HOME, OR GET ALONG WITH OTHER PEOPLE?: VERY DIFFICULT
GAD7 TOTAL SCORE: 7
5. BEING SO RESTLESS THAT IT IS HARD TO SIT STILL: SEVERAL DAYS
1. FEELING NERVOUS, ANXIOUS, OR ON EDGE: SEVERAL DAYS
7. FEELING AFRAID AS IF SOMETHING AWFUL MIGHT HAPPEN: SEVERAL DAYS
GAD7 TOTAL SCORE: 7
2. NOT BEING ABLE TO STOP OR CONTROL WORRYING: SEVERAL DAYS
3. WORRYING TOO MUCH ABOUT DIFFERENT THINGS: SEVERAL DAYS

## 2024-04-30 ENCOUNTER — VIRTUAL VISIT (OUTPATIENT)
Dept: FAMILY MEDICINE | Facility: CLINIC | Age: 54
End: 2024-04-30
Attending: NURSE PRACTITIONER
Payer: COMMERCIAL

## 2024-04-30 DIAGNOSIS — E03.9 HYPOTHYROIDISM, UNSPECIFIED TYPE: ICD-10-CM

## 2024-04-30 DIAGNOSIS — F33.1 MODERATE EPISODE OF RECURRENT MAJOR DEPRESSIVE DISORDER (H): Primary | ICD-10-CM

## 2024-04-30 DIAGNOSIS — F41.1 GAD (GENERALIZED ANXIETY DISORDER): ICD-10-CM

## 2024-04-30 PROCEDURE — 99214 OFFICE O/P EST MOD 30 MIN: CPT | Mod: 95 | Performed by: NURSE PRACTITIONER

## 2024-04-30 RX ORDER — SERTRALINE HYDROCHLORIDE 100 MG/1
100 TABLET, FILM COATED ORAL DAILY
Qty: 90 TABLET | Refills: 1 | Status: SHIPPED | OUTPATIENT
Start: 2024-04-30

## 2024-04-30 RX ORDER — LEVOTHYROXINE SODIUM 175 UG/1
175 TABLET ORAL EVERY MORNING
Qty: 90 TABLET | Refills: 0 | Status: SHIPPED | OUTPATIENT
Start: 2024-04-30 | End: 2024-07-02

## 2024-04-30 ASSESSMENT — PATIENT HEALTH QUESTIONNAIRE - PHQ9: SUM OF ALL RESPONSES TO PHQ QUESTIONS 1-9: 11

## 2024-04-30 NOTE — PROGRESS NOTES
Carol is a 54 year old who is being evaluated via a billable video visit.    How would you like to obtain your AVS? MyChart  If the video visit is dropped, the invitation should be resent by: Text to cell phone: [phone]  Will anyone else be joining your video visit? No      Assessment & Plan     Moderate episode of recurrent major depressive disorder (H)  SHEFALI (generalized anxiety disorder)  Chronic, patient doing very well on current dose of sertraline.  Down to seeing therapy approximately twice a month.  Feels she is coping very well.  Will continue current dosage without any changes and continue to see counseling.  - sertraline (ZOLOFT) 100 MG tablet; Take 1 tablet (100 mg) by mouth daily    Hypothyroidism, unspecified type  Chronic, previously stable.  Is overdue for lab recheck.  Continue current dose and patient to schedule lab only appointment for recheck.  - TSH with free T4 reflex; Future  - levothyroxine (SYNTHROID/LEVOTHROID) 175 MCG tablet; Take 1 tablet (175 mcg) by mouth every morning        BMI  Estimated body mass index is 39.45 kg/m  as calculated from the following:    Height as of 2/11/24: 1.524 m (5').    Weight as of 2/11/24: 91.6 kg (202 lb).   Weight management plan: Discussed healthy diet and exercise guidelines    Depression Screening Follow Up              See Patient Instructions    Subjective   Carol is a 54 year old, presenting for the following health issues:  Depression, Anxiety, and Thyroid Problem        4/30/2024     4:31 PM   Additional Questions   Roomed by Beatriz MADISON CMA     History of Present Illness       Mental Health Follow-up:  Patient presents to follow-up on Depression & Anxiety.Patient's depression since last visit has been:  Medium  The patient is having other symptoms associated with depression.  Patient's anxiety since last visit has been:  Medium  The patient is having other symptoms associated with anxiety.  Any significant life events: job concerns  Patient is  feeling anxious or having panic attacks.  Patient has no concerns about alcohol or drug use.    Hypothyroidism:     Since last visit, patient describes the following symptoms::  Anxiety, Depression, Fatigue and Tremors    She eats 0-1 servings of fruits and vegetables daily.She consumes 2 sweetened beverage(s) daily.She exercises with enough effort to increase her heart rate 9 or less minutes per day.  She exercises with enough effort to increase her heart rate 3 or less days per week. She is missing 1 dose(s) of medications per week.  She is not taking prescribed medications regularly due to remembering to take.     Things are a lot better   Therapy has helped significantly   Hasn't needed the hydroxyzine        9/8/2023     9:36 AM 10/23/2023     1:43 PM 4/27/2024    11:31 AM   SHEFALI-7 SCORE   Total Score 20 (severe anxiety) 7 (mild anxiety) 7 (mild anxiety)   Total Score 20 7 7         1/4/2024     4:32 AM 2/13/2024    11:33 AM 4/30/2024     4:31 PM   PHQ   PHQ-9 Total Score 8 8 11   Q9: Thoughts of better off dead/self-harm past 2 weeks Not at all Not at all Several days               Review of Systems  Constitutional, HEENT, cardiovascular, pulmonary, gi and gu systems are negative, except as otherwise noted.      Objective           Vitals:  No vitals were obtained today due to virtual visit.    Physical Exam   GENERAL: alert and no distress  EYES: Eyes grossly normal to inspection.  No discharge or erythema, or obvious scleral/conjunctival abnormalities.  RESP: No audible wheeze, cough, or visible cyanosis.    SKIN: Visible skin clear. No significant rash, abnormal pigmentation or lesions.  NEURO: Cranial nerves grossly intact.  Mentation and speech appropriate for age.  PSYCH: Appropriate affect, tone, and pace of words    Diagnostic Test Results:  Labs reviewed in Epic  Pending      Video-Visit Details    Type of service:  Video Visit   Originating Location (pt. Location): Home    Distant Location (provider  location):  Off-site  Platform used for Video Visit: Francisco  Signed Electronically by: Liberty Sawyer DNP,, ARNULFO CNP      Chart documentation with Dragon Voice recognition Software. Although reviewed after completion, some words and grammatical errors may remain.

## 2024-05-01 NOTE — PATIENT INSTRUCTIONS
Moderate episode of recurrent major depressive disorder (H)  SHEFALI (generalized anxiety disorder)  Chronic, patient doing very well on current dose of sertraline.  Down to seeing therapy approximately twice a month.  Feels she is coping very well.  Will continue current dosage without any changes and continue to see counseling.  - sertraline (ZOLOFT) 100 MG tablet; Take 1 tablet (100 mg) by mouth daily    Hypothyroidism, unspecified type  Chronic, previously stable.  Is overdue for lab recheck.  Continue current dose and patient to schedule lab only appointment for recheck.  - TSH with free T4 reflex; Future  - levothyroxine (SYNTHROID/LEVOTHROID) 175 MCG tablet; Take 1 tablet (175 mcg) by mouth every morning

## 2024-05-13 DIAGNOSIS — F41.1 ANXIETY STATE: ICD-10-CM

## 2024-05-13 RX ORDER — PROPRANOLOL HCL 60 MG
CAPSULE, EXTENDED RELEASE 24HR ORAL
Qty: 90 CAPSULE | Refills: 3 | Status: SHIPPED | OUTPATIENT
Start: 2024-05-13

## 2024-06-04 ENCOUNTER — VIRTUAL VISIT (OUTPATIENT)
Dept: PSYCHOLOGY | Facility: CLINIC | Age: 54
End: 2024-06-04
Payer: COMMERCIAL

## 2024-06-04 DIAGNOSIS — F41.1 GAD (GENERALIZED ANXIETY DISORDER): ICD-10-CM

## 2024-06-04 DIAGNOSIS — F33.1 MODERATE EPISODE OF RECURRENT MAJOR DEPRESSIVE DISORDER (H): Primary | ICD-10-CM

## 2024-06-04 PROCEDURE — 90834 PSYTX W PT 45 MINUTES: CPT | Mod: 95 | Performed by: MARRIAGE & FAMILY THERAPIST

## 2024-06-04 ASSESSMENT — ANXIETY QUESTIONNAIRES
5. BEING SO RESTLESS THAT IT IS HARD TO SIT STILL: SEVERAL DAYS
IF YOU CHECKED OFF ANY PROBLEMS ON THIS QUESTIONNAIRE, HOW DIFFICULT HAVE THESE PROBLEMS MADE IT FOR YOU TO DO YOUR WORK, TAKE CARE OF THINGS AT HOME, OR GET ALONG WITH OTHER PEOPLE: VERY DIFFICULT
3. WORRYING TOO MUCH ABOUT DIFFERENT THINGS: SEVERAL DAYS
7. FEELING AFRAID AS IF SOMETHING AWFUL MIGHT HAPPEN: MORE THAN HALF THE DAYS
6. BECOMING EASILY ANNOYED OR IRRITABLE: MORE THAN HALF THE DAYS
GAD7 TOTAL SCORE: 9
GAD7 TOTAL SCORE: 9
1. FEELING NERVOUS, ANXIOUS, OR ON EDGE: SEVERAL DAYS
GAD7 TOTAL SCORE: 9
4. TROUBLE RELAXING: SEVERAL DAYS
8. IF YOU CHECKED OFF ANY PROBLEMS, HOW DIFFICULT HAVE THESE MADE IT FOR YOU TO DO YOUR WORK, TAKE CARE OF THINGS AT HOME, OR GET ALONG WITH OTHER PEOPLE?: VERY DIFFICULT
2. NOT BEING ABLE TO STOP OR CONTROL WORRYING: SEVERAL DAYS
7. FEELING AFRAID AS IF SOMETHING AWFUL MIGHT HAPPEN: MORE THAN HALF THE DAYS

## 2024-06-04 ASSESSMENT — PATIENT HEALTH QUESTIONNAIRE - PHQ9
10. IF YOU CHECKED OFF ANY PROBLEMS, HOW DIFFICULT HAVE THESE PROBLEMS MADE IT FOR YOU TO DO YOUR WORK, TAKE CARE OF THINGS AT HOME, OR GET ALONG WITH OTHER PEOPLE: VERY DIFFICULT
SUM OF ALL RESPONSES TO PHQ QUESTIONS 1-9: 15
SUM OF ALL RESPONSES TO PHQ QUESTIONS 1-9: 15

## 2024-06-04 NOTE — PROGRESS NOTES
[facility]                                     Progress Note    Patient Name: Carol Guajardo  Date: 2024         Service Type: Individual      Session Start Time: 12:31  Session End Time: 1:19     Session Length: 38-52    Session #: 3    Attendees: Client attended alone    Service Modality:  Video Visit:      Provider verified identity through the following two step process.  Patient provided:  Patient photo and Patient     Telemedicine Visit: The patient's condition can be safely assessed and treated via synchronous audio and visual telemedicine encounter.      Reason for Telemedicine Visit: Patient has requested telehealth visit    Originating Site (Patient Location): Patient's home     Distant Site (Provider Location[facility]    Consent:  The patient/guardian has verbally consented to: the potential risks and benefits of telemedicine (video visit) versus in person care; bill my insurance or make self-payment for services provided; and responsibility for payment of non-covered services.     Patient would like the video invitation sent by:  My Chart    Mode of Communication:  Video Conference via [employer]well.  Switched to phone call assisted through due to connection issues.    Distant Location (Provider):  On-site    As the provider I attest to compliance with applicable laws and regulations related to telemedicine.    DATA  Interactive Complexity: No  Crisis: No        Progress Since Last Session (Related to Symptoms / Goals / Homework):   Symptoms: No change      Homework:  n/a      Episode of Care Goals: Minimal progress - ACTION (Actively working towards change); Intervened by reinforcing change plan / affirming steps taken     Current / Ongoing Stressors and Concerns:   Reports current stressors as co-worker conflict and fear about 's reactions.  She says  has anger issues and these are amplified when he is  drinking.  She is getting less than three hours of sleep per night.  She wakes every 0.5-1 hour and then is awake for 1-1.5 hours.  Tries to fall asleep at 6:30 and reports being tired.  Wakes for the day at 3am to leave for work by 3:40am.  Appetite is quite low.  She has Diet Coke for breakfast.  Lunch is beef jerky and triscuits.  Sometimes she eats dinner but usually snacks on crackers if anything at all.      Clarified that her initial report on PHQ-9 about suicidal ideation was an error.  She denies any current and/or recent suicidal ideation.     Treatment Objective(s) Addressed in This Session:   Client will use cognitive strategies identified in therapy to challenge anxious thoughts.     Intervention:   Validation. Psychoeducation about nervous system stress response. Distress tolerance; psychoeducation about physical/emotional self-care.    Assessments completed prior to visit:  The following assessments were completed by patient for this visit:  PHQ9:       10/23/2023     1:40 PM 11/22/2023     7:02 AM 12/12/2023     4:35 AM 1/4/2024     4:32 AM 2/13/2024    11:33 AM 4/30/2024     4:31 PM 6/4/2024    11:43 AM   PHQ-9 SCORE   PHQ-9 Total Score MyChart 8 (Mild depression) 8 (Mild depression) 8 (Mild depression) 8 (Mild depression) 8 (Mild depression)  15 (Moderately severe depression)   PHQ-9 Total Score 8 8    8 8 8 8 11 15     GAD7:       8/2/2022     2:11 PM 10/14/2022     9:22 AM 3/3/2023     3:19 PM 9/8/2023     9:36 AM 10/23/2023     1:43 PM 4/27/2024    11:31 AM 6/4/2024    11:44 AM   SHEFALI-7 SCORE   Total Score 11 (moderate anxiety) 8 (mild anxiety) 7 (mild anxiety) 20 (severe anxiety) 7 (mild anxiety) 7 (mild anxiety) 9 (mild anxiety)   Total Score 11 8 7 20 7 7 9     CAGE-AID:       11/20/2023    11:13 AM   CAGE-AID Total Score   Total Score 0    0   Total Score MyChart 0 (A total score of 2 or greater is considered clinically significant)     PROMIS 10-Global Health (all questions and answers  displayed):       6/8/2022     9:10 AM 6/8/2022     3:00 PM 9/7/2022     2:04 PM 6/8/2023     3:00 PM 11/20/2023    11:11 AM 12/12/2023     4:36 AM 6/4/2024    11:45 AM   PROMIS 10   In general, would you say your health is: Good  Fair  Fair Fair Fair   In general, would you say your quality of life is: Good  Fair  Fair Fair Good   In general, how would you rate your physical health? Good  Poor  Fair Fair Fair   In general, how would you rate your mental health, including your mood and your ability to think? Poor  Fair  Poor Fair Poor   In general, how would you rate your satisfaction with your social activities and relationships? Fair  Fair  Fair Fair Fair   In general, please rate how well you carry out your usual social activities and roles Fair  Fair  Fair Fair Poor   To what extent are you able to carry out your everyday physical activities such as walking, climbing stairs, carrying groceries, or moving a chair? A little  Moderately  Mostly Moderately A little   In the past 7 days, how often have you been bothered by emotional problems such as feeling anxious, depressed, or irritable? Sometimes  Sometimes  Often Sometimes Often   In the past 7 days, how would you rate your fatigue on average? Mild  Mild  Moderate Mild Moderate   In the past 7 days, how would you rate your pain on average, where 0 means no pain, and 10 means worst imaginable pain? 4  4  4 3 3   In general, would you say your health is: 3 3 2 2 2 2 2   In general, would you say your quality of life is: 3 2 2 2 2 2 3   In general, how would you rate your physical health? 3 2 1 1 2 2 2   In general, how would you rate your mental health, including your mood and your ability to think? 1 2 2 3 1 2 1   In general, how would you rate your satisfaction with your social activities and relationships? 2 2 2 3 2 2 2   In general, please rate how well you carry out your usual social activities and roles. (This includes activities at home, at work and in your  community, and responsibilities as a parent, child, spouse, employee, friend, etc.) 2 1 2 2 2 2 1   To what extent are you able to carry out your everyday physical activities such as walking, climbing stairs, carrying groceries, or moving a chair? 2 2 3 2 4 3 2   In the past 7 days, how often have you been bothered by emotional problems such as feeling anxious, depressed, or irritable? 3 3 3 3 4 3 4   In the past 7 days, how would you rate your fatigue on average? 2 3 2 3 3 2 3   In the past 7 days, how would you rate your pain on average, where 0 means no pain, and 10 means worst imaginable pain? 4 6 4 6 4 3 3   Global Mental Health Score 9 9 9 11 7    7 9 8   Global Physical Health Score 12 10 11 9 12    12 13 11   PROMIS TOTAL - SUBSCORES 21 19 20 20 19    19 22 19         ASSESSMENT: Current Emotional / Mental Status (status of significant symptoms):   Risk status (Self / Other harm or suicidal ideation)   Patient denies current fears or concerns for personal safety.   Patient denies current or recent suicidal ideation or behaviors.  See note above.   Patient denies current or recent homicidal ideation or behaviors.   Patient denies current or recent self injurious behavior or ideation.   Patient denies other safety concerns.   Patient reports there has been no change in risk factors since their last session.     Patient reports there has been no change in protective factors since their last session.     Recommended that patient call 911 or go to the local ED should there be a change in any of these risk factors.     Appearance:   Appropriate    Eye Contact:   Fair    Psychomotor Behavior: Normal    Attitude:   Cooperative    Orientation:   All   Speech    Rate / Production: Normal     Volume:  Normal    Mood:    Anxious  Sad    Affect:    Appropriate    Thought Content:  Clear    Thought Form:  Coherent  Logical    Insight:    Good      Medication Review:   No changes to current psychiatric  medication(s)     Medication Compliance:   Yes     Changes in Health Issues:   None reported     Chemical Use Review:   Substance Use: Chemical use reviewed, no active concerns identified      Tobacco Use: No change in amount of tobacco use since last session.  Not discussed    Diagnosis:  1. Moderate episode of recurrent major depressive disorder (H)    2. SHEFALI (generalized anxiety disorder)      Collateral Reports Completed:   Not Applicable    PLAN: (Patient Tasks / Therapist Tasks / Other)  5-4-3-2-1 exercise, 4-7-8 breathing, and look into Calm blas to help with bedtime preparation and distress tolerance during the day.  Talk with PCP about sleep concerns and medication recommendations.  Provided resources for [facility] and Talia Ju[facility]rez if she needs someone to talk with between appointments.        Sybil Marshall, [facility]                                                         ______________________________________________________________________    Individual Treatment Plan    Patient's Name: Carol Guajardo  YOB: 1970    Date of Creation: 12/12/2023    Date Treatment Plan Last Reviewed/Revised: 12/12/2023    DSM5 Diagnoses: 296.32 (F33.1) Major Depressive Disorder, Recurrent Episode, Moderate _ or 300.02 (F41.1) Generalized Anxiety Disorder  Psychosocial / Contextual Factors: marital conflict, job dissatisfaction  PROMIS (reviewed every 90 days):     Referral / Collaboration:  Referral to another professional/service is not indicated at this time..    Anticipated number of session for this episode of care: 9-12 sessions  Anticipation frequency of session: Weekly  Anticipated Duration of each session: 38-52 minutes  Treatment plan will be reviewed in 90 days or when goals have been changed.       MeasurableTreatment Goal(s) related to diagnosis / functional impairment(s)  Goal 1: Client will report reduction in anxiety also as evidenced by reduction of SHEFALI-7 score below 6 points within the  next 12 weeks.    Objective #A (Client Action)    Client will identify at least three triggers for anxiety.  Status:  New: 12/12/23      Intervention(s)  Therapist will provide educational materials on common triggers and signs of anxiety.    Objective #B  Client will use relaxation strategies at least two times per day to reduce the physical symptoms of anxiety.  Status:   New: 12/12/23       Intervention(s)  Therapist will teach relaxation strategies such as mindfulness, deep breathing, muscle relaxation, and sensory activities.    Objective #C  Client will use cognitive strategies identified in therapy to challenge anxious thoughts.  Status:   New: 12/12/23       Intervention(s)  Therapist will teach strategies for cognitive modification using REBT model.    Goal 2: Client will report improved mood as indicated by PHQ-9 score below 6 for consistent 8 weeks.    Objective #A (Client Action)    Status: New - Date: 12/12/23     Client will Increase interest, engagement, and pleasure in doing things.    Intervention(s)  Therapist will assign homework for daily involvement in pleasant activities.    Objective #B  Client will Identify negative self-talk and behaviors: challenge core beliefs, myths, and actions.    Status:   New: 12/12/23       Intervention(s)  Therapist will teach strategies for cognitive modification using REBT models.    Objective #C  Client will Improve quantity and quality of night time sleep / decrease daytime naps.  Status:   New: 12/12/23       Intervention(s)  Therapist will teach sleep hygiene strategies.  Assign homework for daily practice.    Goal 3: Client will increase assertiveness by 50%.    Objective #A (Client Action)    Client will learn & utilize at least five assertive communication skills weekly.  Status:   New: 12/12/23       Intervention(s)  Therapist will teach assertiveness skills. Role-play skills in session.    Objective #B  Client will practice setting boundaries five times  weekly in the next eight weeks.  Status: New - Date: 12/12/23     Intervention(s)  Therapist will teach about healthy boundaries. Consider boundaries client would like to set in own life.    Objective #C  Client will track and record at least two daily pleasant exchanges with .  Status: New - Date: 12/12/23      Intervention(s)  Therapist will assign homework for tracking pleasant exchanges.    Patient has reviewed and agreed to the above plan.      Sybil Marshall, LMFT  December 12, 2023

## 2024-06-05 ENCOUNTER — MYC MEDICAL ADVICE (OUTPATIENT)
Dept: PSYCHOLOGY | Facility: CLINIC | Age: 54
End: 2024-06-05

## 2024-06-20 ENCOUNTER — MYC MEDICAL ADVICE (OUTPATIENT)
Dept: FAMILY MEDICINE | Facility: CLINIC | Age: 54
End: 2024-06-20

## 2024-06-30 ENCOUNTER — HEALTH MAINTENANCE LETTER (OUTPATIENT)
Age: 54
End: 2024-06-30

## 2024-07-01 ENCOUNTER — OFFICE VISIT (OUTPATIENT)
Dept: FAMILY MEDICINE | Facility: CLINIC | Age: 54
End: 2024-07-01
Payer: COMMERCIAL

## 2024-07-01 VITALS
SYSTOLIC BLOOD PRESSURE: 118 MMHG | WEIGHT: 182 LBS | HEART RATE: 52 BPM | DIASTOLIC BLOOD PRESSURE: 72 MMHG | BODY MASS INDEX: 35.73 KG/M2 | RESPIRATION RATE: 16 BRPM | OXYGEN SATURATION: 98 % | TEMPERATURE: 97.5 F | HEIGHT: 60 IN

## 2024-07-01 DIAGNOSIS — E03.9 HYPOTHYROIDISM, UNSPECIFIED TYPE: ICD-10-CM

## 2024-07-01 DIAGNOSIS — N63.41 SUBAREOLAR MASS OF RIGHT BREAST: ICD-10-CM

## 2024-07-01 DIAGNOSIS — Z01.818 PREOP GENERAL PHYSICAL EXAM: Primary | ICD-10-CM

## 2024-07-01 DIAGNOSIS — G56.01 CARPAL TUNNEL SYNDROME OF RIGHT WRIST: ICD-10-CM

## 2024-07-01 LAB
ERYTHROCYTE [DISTWIDTH] IN BLOOD BY AUTOMATED COUNT: 11.8 % (ref 10–15)
HCG UR QL: NEGATIVE
HCT VFR BLD AUTO: 45.8 % (ref 35–47)
HGB BLD-MCNC: 14.6 G/DL (ref 11.7–15.7)
MCH RBC QN AUTO: 31.3 PG (ref 26.5–33)
MCHC RBC AUTO-ENTMCNC: 31.9 G/DL (ref 31.5–36.5)
MCV RBC AUTO: 98 FL (ref 78–100)
PLATELET # BLD AUTO: 309 10E3/UL (ref 150–450)
RBC # BLD AUTO: 4.67 10E6/UL (ref 3.8–5.2)
TSH SERPL DL<=0.005 MIU/L-ACNC: 1.75 UIU/ML (ref 0.3–4.2)
WBC # BLD AUTO: 10.3 10E3/UL (ref 4–11)

## 2024-07-01 PROCEDURE — 85027 COMPLETE CBC AUTOMATED: CPT | Performed by: NURSE PRACTITIONER

## 2024-07-01 PROCEDURE — 81025 URINE PREGNANCY TEST: CPT | Performed by: NURSE PRACTITIONER

## 2024-07-01 PROCEDURE — 36415 COLL VENOUS BLD VENIPUNCTURE: CPT | Performed by: NURSE PRACTITIONER

## 2024-07-01 PROCEDURE — 84443 ASSAY THYROID STIM HORMONE: CPT | Performed by: NURSE PRACTITIONER

## 2024-07-01 PROCEDURE — 99214 OFFICE O/P EST MOD 30 MIN: CPT | Performed by: NURSE PRACTITIONER

## 2024-07-01 ASSESSMENT — PAIN SCALES - GENERAL: PAINLEVEL: MILD PAIN (3)

## 2024-07-01 NOTE — PROGRESS NOTES
Preoperative Evaluation  [facility]  [address] [place] [address]  Phone: [phone]  Fax: [phone]  Primary Provider: Liberty Sawyer DNP,, ARNULFO SWIFT  Pre-op Performing Provider: Liberty Sawyer DNP,, ARNULFO SWIFT  Jul 1, 2024 6/30/2024   Surgical Information   What procedure is being done? Carpal tunnel release and ulna nerve relocate ~ right hand    Facility or Hospital where procedure/surgery will be performed: [facility]   Who is doing the procedure / surgery? Dr. Benjamin Pereira   Date of surgery / procedure: 7/8/2025   Time of surgery / procedure: A.M.   Where do you plan to recover after surgery? at home with family        Fax number for surgical facility: [phone]    Assessment & Plan     The proposed surgical procedure is considered INTERMEDIATE risk.    Preop general physical exam  - CBC with platelets; Future  - HCG Qual, Urine ([id number])  - CBC with platelets    Carpal tunnel syndrome of right wrist  Patient is history of carpal tunnel on right wrist, worsening.  Having release and ulnar nerve relocating.    Hypothyroidism, unspecified type  Chronic, has been stable.  Due for recheck.  - TSH with free T4 reflex    Subareolar mass of right breast  Patient developed small area of tenderness just below right nipple with lump approximate 1 week ago.  Denies any fevers.  Had very similar lump approximate 1 year ago, had breast ultrasound and mammogram which identified an inflamed phlegmon or developing abscess and was treated with antibiotics and improved.  Patient has noted improvement over the last week, is not nearly as tender and decreasing in size.  Lump likely represents similar findings from 1 year ago.  Recommend patient hot compresses multiple times throughout the day, monitor over the next few days and if becomes tender, red, warm or swollen to notify provider and will start course of antibiotics.          - No identified  additional risk factors other than previously addressed    Antiplatelet or Anticoagulation Medication Instructions   - Patient is on no antiplatelet or anticoagulation medications.    Additional Medication Instructions   - Beta Blockers: Continue taking on the day of surgery.   - SSRIs, SNRIs, TCAs, Antipsychotics: Continue without modification.     Recommendation  Approval given to proceed with proposed procedure, without further diagnostic evaluation.    Jey Medina is a 54 year old, presenting for the following:  Pre-Op Exam          7/1/2024     8:40 AM   Additional Questions   Roomed by BRAULIO Arnold related to upcoming procedure: Patient presents today for preop evaluation for right carpal tunnel surgery.  Patient is feeling healthy and well.  No acute concerns today.        6/30/2024   Pre-Op Questionnaire   Have you ever had a heart attack or stroke? No   Have you ever had surgery on your heart or blood vessels, such as a stent placement, a coronary artery bypass, or surgery on an artery in your head, neck, heart, or legs? No   Do you have chest pain with activity? No   Do you have a history of heart failure? No   Do you currently have a cold, bronchitis or symptoms of other infection? No   Do you have a cough, shortness of breath, or wheezing? No   Do you or anyone in your family have previous history of blood clots? (!) UNKNOWN    Do you or does anyone in your family have a serious bleeding problem such as prolonged bleeding following surgeries or cuts? No   Have you ever had problems with anemia or been told to take iron pills? No   Have you had any abnormal blood loss such as black, tarry or bloody stools, or abnormal vaginal bleeding? No   Have you ever had a blood transfusion? No   Are you willing to have a blood transfusion if it is medically needed before, during, or after your surgery? Yes   Have you or any of your relatives ever had problems with anesthesia? No   Do you have sleep  apnea, excessive snoring or daytime drowsiness? No   Do you have any artifical heart valves or other implanted medical devices like a pacemaker, defibrillator, or continuous glucose monitor? No   Do you have artificial joints? (!) YES   Are you allergic to latex? No        Health Care Directive  Patient does not have a Health Care Directive or Living Will: Discussed advance care planning with patient; information given to patient to review.    Preoperative Review of    reviewed - no record of controlled substances prescribed.      Status of Chronic Conditions:  See problem list for active medical problems.  Problems all longstanding and stable, except as noted/documented.  See ROS for pertinent symptoms related to these conditions.    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 CT August 2018:  The mass measures approximately 9.8 cm x 5.3 cm x 7.4 cm.  Biopsied 8/2018, diagnosed as fibrosis      Primary osteoarthritis of left knee 07/30/2018     Priority: Medium    Prediabetes 03/30/2018     Priority: Medium    SHEFALI (generalized anxiety disorder) 07/19/2017     Priority: Medium    Major depressive disorder 07/19/2017     Priority: Medium    Vitamin D deficiency 07/19/2017     Priority: Medium    Nicotine dependence 07/19/2017     Priority: Medium    HLD (hyperlipidemia) 08/28/2015     Priority: Medium    Hypothyroidism 05/08/2015     Priority: Medium      Past Medical History:    Diagnosis Date    Anxiety     Depression     Depressive disorder     HLD (hyperlipidemia)     Hypothyroidism     Mass of right lower leg 06/05/2017    Osteoarthritis of both knees     Right lower lobe lung mass     Biopsied 8/2018, diagnosed as fibrosis    Total knee replacement status, left 09/30/2019     Past Surgical History:   Procedure Laterality Date    ARTHROPLASTY KNEE Left 09/30/2019    Procedure: Left Total Knee Arthroplasty;  Surgeon: Ion Bailey MD;  Location: [facility] OR    ARTHROPLASTY KNEE Right 06/14/2022    Procedure: ARTHROPLASTY, KNEE, TOTAL RIGHT;  Surgeon: Emilio Pino MD;  Location: [facility] OR    BIOPSY  One year ago    Lung biopsy    BRONCHOSCOPY (RIGID OR FLEXIBLE), DIAGNOSTIC N/A 04/21/2021    Procedure: BRONCHOSCOPY, WITH BRONCHOALVEOLAR LAVAGE;  Surgeon: Keli Webber MD;  Location:  GI    CARPAL TUNNEL RELEASE RT/LT Bilateral     ENT SURGERY  3 years ago    EXTERNAL EAR SURGERY      IR LUNG BIOPSY MEDIASTINUM RIGHT Right 08/2018    RLL mass, diagnosed as fibrosis per pt    IR LUNG BIOPSY MEDIASTINUM RIGHT  08/31/2018    LAPAROSCOPY DIAGNOSTIC (GENERAL)  02/2019    LAPAROSCOPY, SURGICAL; REPAIR UMBILICAL HERNIA  08/22/2018    THORACOTOMY, WEDGE RESECTION LUNG, COMBINED Right 04/19/2021    Procedure: Right Thoracotomy, excision of solitary fibrous tumor, intercostal nerve cryo ablation;  Surgeon: Keli Webber MD;  Location:  OR    [facility] LIGATE FALLOPIAN TUBE      Description: Tubal Ligation;  Recorded: 04/09/2008;     Current Outpatient Medications   Medication Sig Dispense Refill    acetaminophen (TYLENOL) 325 MG tablet Take 325-650 mg by mouth every 6 hours as needed for mild pain      calcium carbonate (OS-BERTA) 500 MG tablet Take 1 tablet by mouth 2 times daily      Ferrous Sulfate 324 (65 Fe) MG TBEC Take 324 mg by mouth 2 times daily      gabapentin (NEURONTIN) 300 MG capsule TAKE 3 CAPSULES BY MOUTH TWO TIMES A  capsule 1    hydrOXYzine  (ATARAX) 25 MG tablet Take 1 tablet (25 mg) by mouth every 6 hours as needed for anxiety 30 tablet 0    levothyroxine (SYNTHROID/LEVOTHROID) 175 MCG tablet Take 1 tablet (175 mcg) by mouth every morning 90 tablet 0    LORazepam (ATIVAN) 1 MG tablet Take 1 tablet (1 mg) by mouth daily as needed for anxiety or vomiting (panic) 10 tablet 0    pramipexole (MIRAPEX) 0.5 MG tablet TAKE 2 TABLETS BY MOUTH AT BEDTIME. 180 tablet 2    propranolol ER (INDERAL LA) 60 MG 24 hr capsule TAKE 1 CAPSULE BY MOUTH EVERY MORNING 90 capsule 3    sertraline (ZOLOFT) 100 MG tablet Take 1 tablet (100 mg) by mouth daily 90 tablet 1    albuterol (PROAIR HFA/PROVENTIL HFA/VENTOLIN HFA) 108 (90 Base) MCG/ACT inhaler Inhale 2 puffs into the lungs every 6 hours as needed for shortness of breath, wheezing or cough 18 g 0       Allergies   Allergen Reactions    Amoxicillin-Pot Clavulanate Other (See Comments) and Hives     Edema    Amoxicillin-Pot Clavulanate Hives and Itching    Ciprofloxacin Hives and Itching    Penicillins Hives and Itching        Social History     Tobacco Use    Smoking status: Former     Current packs/day: 0.00     Average packs/day: 0.5 packs/day for 36.4 years (18.2 ttl pk-yrs)     Types: Cigarettes     Start date:      Quit date: 5/15/2022     Years since quittin.1    Smokeless tobacco: Never   Substance Use Topics    Alcohol use: Yes     Alcohol/week: 6.0 standard drinks of alcohol     Types: 6 Cans of beer per week     Comment: maybe every other week     Family History   Problem Relation Age of Onset    Anesthesia Reaction Mother         PONV    Hyperlipidemia Mother     Hypertension Mother     Diabetes Mother     Thyroid Disease Mother     Depression Mother     Anxiety Disorder Mother     CABG Father     Heart Failure Father     Coronary Stenting Father     Cardiovascular Father         ICD implant    Lung Cancer Father     Coronary Artery Disease Father     Hypertension Father     Hyperlipidemia Father      Other Cancer Father     Depression Father     Anxiety Disorder Father     Substance Abuse Father     Kidney Disease Maternal Grandmother     Breast Cancer Maternal Grandmother     Abdominal Aortic Aneurysm Maternal Grandmother     Abdominal Aortic Aneurysm Paternal Grandfather     Abdominal Aortic Aneurysm Paternal Uncle     Depression Sister     Anxiety Disorder Sister     Substance Abuse Sister     Thyroid Disease Other     Deep Vein Thrombosis (DVT) No family hx of      History   Drug Use    Types: Marijuana     Comment: recently started smoking due to increase anxiety             Review of Systems  Constitutional, neuro, ENT, endocrine, pulmonary, cardiac, gastrointestinal, genitourinary, musculoskeletal, integument and psychiatric systems are negative, except as otherwise noted.    Objective    LMP 06/22/2024    Estimated body mass index is 39.45 kg/m  as calculated from the following:    Height as of 2/11/24: 1.524 m (5').    Weight as of 2/11/24: 91.6 kg (202 lb).  Physical Exam  GENERAL: alert and no distress  HENT: ear canals and TM's normal, nose and mouth without ulcers or lesions  NECK: no adenopathy, no asymmetry, masses, or scars  RESP: lungs clear to auscultation - no rales, rhonchi or wheezes  BREAST: Palpable, semifirm nodule, approximately 0.5 cm, just below nipple of lower, outer quadrant of right breast with very mild erythema, no warmth or swelling.  CV: regular rate and rhythm, normal S1 S2, no S3 or S4, no murmur, click or rub, no peripheral edema  ABDOMEN: soft, nontender, no hepatosplenomegaly, no masses and bowel sounds normal  MS: no gross musculoskeletal defects noted, no edema  SKIN: no suspicious lesions or rashes  NEURO: Normal strength and tone, mentation intact and speech normal  PSYCH: mentation appears normal, affect normal/bright    Recent Labs   Lab Test 01/06/24  1022   HGB 13.8         POTASSIUM 4.5   CR 0.49*        Diagnostics  Recent Results (from the past 24  hour(s))   HCG Qual, Urine ([id number])    Collection Time: 07/01/24  9:37 AM   Result Value Ref Range    hCG Urine Qualitative Negative Negative   CBC with platelets    Collection Time: 07/01/24  9:37 AM   Result Value Ref Range    WBC Count 10.3 4.0 - 11.0 10e3/uL    RBC Count 4.67 3.80 - 5.20 10e6/uL    Hemoglobin 14.6 11.7 - 15.7 g/dL    Hematocrit 45.8 35.0 - 47.0 %    MCV 98 78 - 100 fL    MCH 31.3 26.5 - 33.0 pg    MCHC 31.9 31.5 - 36.5 g/dL    RDW 11.8 10.0 - 15.0 %    Platelet Count 309 150 - 450 10e3/uL      No EKG required, no history of coronary heart disease, significant arrhythmia, peripheral arterial disease or other structural heart disease.    Revised Cardiac Risk Index (RCRI)  The patient has the following serious cardiovascular risks for perioperative complications:   - No serious cardiac risks = 0 points     RCRI Interpretation: 0 points: Class I (very low risk - 0.4% complication rate)         Signed Electronically by: Liberty Sawyer DNP,, APRN CNP  Copy of this evaluation report is provided to requesting physician.           Chart documentation with Dragon Voice recognition Software. Although reviewed after completion, some words and grammatical errors may remain.

## 2024-07-01 NOTE — PATIENT INSTRUCTIONS
How to Take Your Medication Before Surgery  Preoperative Medication Instructions   Antiplatelet or Anticoagulation Medication Instructions   - Patient is on no antiplatelet or anticoagulation medications.    Additional Medication Instructions  Take all morning medication with the exception of Gabapentin        Subareolar mass of right breast  Patient developed small area of tenderness just below right nipple with lump approximate 1 week ago.  Denies any fevers.  Had very similar lump approximate 1 year ago, had breast ultrasound and mammogram which identified an inflamed phlegmon or developing abscess and was treated with antibiotics and improved.  Patient has noted improvement over the last week, is not nearly as tender and decreasing in size.  Lump likely represents similar findings from 1 year ago.  Recommend patient hot compresses multiple times throughout the day, monitor over the next few days and if becomes tender, red, warm or swollen to notify provider and will start course of antibiotics.        Patient Education   Preparing for Your Surgery  Getting started  A nurse will call you to review your health history and instructions. They will give you an arrival time based on your scheduled surgery time. Please be ready to share:  Your doctor's clinic name and phone number  Your medical, surgical, and anesthesia history  A list of allergies and sensitivities  A list of medicines, including herbal treatments and over-the-counter drugs  Whether the patient has a legal guardian (ask how to send us the papers in advance)  Please tell us if you're pregnant--or if there's any chance you might be pregnant. Some surgeries may injure a fetus (unborn baby), so they require a pregnancy test. Surgeries that are safe for a fetus don't always need a test, and you can choose whether to have one.   If you have a child who's having surgery, please ask for a copy of Preparing for Your Child's Surgery.    Preparing for  surgery  Within 10 to 30 days of surgery: Have a pre-op exam (sometimes called an H&P, or History and Physical). This can be done at a clinic or pre-operative center.  If you're having a , you may not need this exam. Talk to your care team.  At your pre-op exam, talk to your care team about all medicines you take. If you need to stop any medicines before surgery, ask when to start taking them again.  We do this for your safety. Many medicines can make you bleed too much during surgery. Some change how well surgery (anesthesia) drugs work.  Call your insurance company to let them know you're having surgery. (If you don't have insurance, call [phone].)  Call your clinic if there's any change in your health. This includes signs of a cold or flu (sore throat, runny nose, cough, rash, fever). It also includes a scrape or scratch near the surgery site.  If you have questions on the day of surgery, call your hospital or surgery center.  Eating and drinking guidelines  For your safety: Unless your surgeon tells you otherwise, follow the guidelines below.  Eat and drink as usual until 8 hours before you arrive for surgery. After that, no food or milk.  Drink clear liquids until 2 hours before you arrive. These are liquids you can see through, like water, Gatorade, and Propel Water. They also include plain black coffee and tea (no cream or milk), candy, and breath mints. You can spit out gum when you arrive.  If you drink alcohol: Stop drinking it the night before surgery.  If your care team tells you to take medicine on the morning of surgery, it's okay to take it with a sip of water.  Preventing infection  Shower or bathe the night before and morning of your surgery. Follow the instructions your clinic gave you. (If no instructions, use regular soap.)  Don't shave or clip hair near your surgery site. We'll remove the hair if needed.  Don't smoke or vape the morning of surgery. You may chew nicotine gum up to 2  hours before surgery. A nicotine patch is okay.  Note: Some surgeries require you to completely quit smoking and nicotine. Check with your surgeon.  Your care team will make every effort to keep you safe from infection. We will:  Clean our hands often with soap and water (or an alcohol-based hand rub).  Clean the skin at your surgery site with a special soap that kills germs.  Give you a special gown to keep you warm. (Cold raises the risk of infection.)  Wear special hair covers, masks, gowns and gloves during surgery.  Give antibiotic medicine, if prescribed. Not all surgeries need antibiotics.  What to bring on the day of surgery  Photo ID and insurance card  Copy of your health care directive, if you have one  Glasses and hearing aids (bring cases)  You can't wear contacts during surgery  Inhaler and eye drops, if you use them (tell us about these when you arrive)  CPAP machine or breathing device, if you use them  A few personal items, if spending the night  If you have . . .  A pacemaker, ICD (cardiac defibrillator) or other implant: Bring the ID card.  An implanted stimulator: Bring the remote control.  A legal guardian: Bring a copy of the certified (court-stamped) guardianship papers.  Please remove any jewelry, including body piercings. Leave jewelry and other valuables at home.  If you're going home the day of surgery  You must have a responsible adult drive you home. They should stay with you overnight as well.  If you don't have someone to stay with you, and you aren't safe to go home alone, we may keep you overnight. Insurance often won't pay for this.  After surgery  If it's hard to control your pain or you need more pain medicine, please call your surgeon's office.  Questions?   If you have any questions for your care team, list them here:  _________________________________________________________________________________________________________________________________________________________________________ ____________________________________ ____________________________________ ____________________________________  For informational purposes only. Not to replace the advice of your health care provider. Copyright   2003, 2019 [facility]. All rights reserved. Clinically reviewed by Tanisha Dia MD. [employer] [id number] - REV 12/22.

## 2024-07-02 DIAGNOSIS — E03.9 HYPOTHYROIDISM, UNSPECIFIED TYPE: ICD-10-CM

## 2024-07-02 RX ORDER — LEVOTHYROXINE SODIUM 175 UG/1
175 TABLET ORAL EVERY MORNING
Qty: 90 TABLET | Refills: 0 | Status: SHIPPED | OUTPATIENT
Start: 2024-07-02

## 2024-07-18 DIAGNOSIS — G25.81 RESTLESS LEGS SYNDROME (RLS): ICD-10-CM

## 2024-07-18 DIAGNOSIS — M79.2 NEUROPATHIC PAIN: ICD-10-CM

## 2024-07-18 RX ORDER — GABAPENTIN 300 MG/1
CAPSULE ORAL
Qty: 540 CAPSULE | Refills: 0 | Status: SHIPPED | OUTPATIENT
Start: 2024-07-18

## 2024-07-19 ENCOUNTER — VIRTUAL VISIT (OUTPATIENT)
Dept: PSYCHOLOGY | Facility: CLINIC | Age: 54
End: 2024-07-19
Payer: COMMERCIAL

## 2024-07-19 DIAGNOSIS — F33.1 MODERATE EPISODE OF RECURRENT MAJOR DEPRESSIVE DISORDER (H): Primary | ICD-10-CM

## 2024-07-19 PROCEDURE — 99207 PR NO CHARGE LOS: CPT | Mod: 95 | Performed by: MARRIAGE & FAMILY THERAPIST

## 2024-07-19 NOTE — PROGRESS NOTES
[facility]                                     Progress Note    Patient Name: Carol Guajardo  Date: 2024         Service Type: Individual      Session Start Time: 7:31  Session End Time: 8:19     Session Length: 38-52    Session #: 4    Attendees: Client attended alone    Service Modality:  Video Visit:      Provider verified identity through the following two step process.  Patient provided:  Patient photo and Patient     Telemedicine Visit: The patient's condition can be safely assessed and treated via synchronous audio and visual telemedicine encounter.      Reason for Telemedicine Visit: Patient has requested telehealth visit    Originating Site (Patient Location): Patient's home     Distant Site (Provider Location): Provider Remote Setting- Home Office    Consent:  The patient/guardian has verbally consented to: the potential risks and benefits of telemedicine (video visit) versus in person care; bill my insurance or make self-payment for services provided; and responsibility for payment of non-covered services.     Patient would like the video invitation sent by:  My Chart    Mode of Communication:  Video Conference via [employer].  Switched to phone call detention through due to connection issues.    Distant Location (Provider):  Off-site    As the provider I attest to compliance with applicable laws and regulations related to telemedicine.    DATA  Interactive Complexity: No  Crisis: No        Progress Since Last Session (Related to Symptoms / Goals / Homework):   Symptoms: No change      Homework:  n/a      Episode of Care Goals: Minimal progress - ACTION (Actively working towards change); Intervened by reinforcing change plan / affirming steps taken     Current / Ongoing Stressors and Concerns:   Reports current stressors as co-worker conflict and fear about 's reactions.  She says  has anger issues and these are amplified when he is drinking.  She is getting less than  three hours of sleep per night.  She wakes every 0.5-1 hour and then is awake for 1-1.5 hours.  Tries to fall asleep at 6:30 and reports being tired.  Wakes for the day at 3am to leave for work by 3:40am.  Appetite is quite low.  She has Diet Coke for breakfast.  Lunch is beef jerky and triscuits.  Sometimes she eats dinner but usually snacks on crackers if anything at all.      Clarified that her initial report on PHQ-9 about suicidal ideation was an error.  She denies any current and/or recent suicidal ideation.     Treatment Objective(s) Addressed in This Session:   Client will use cognitive strategies identified in therapy to challenge anxious thoughts.     Intervention:   Validation. Psychoeducation about nervous system stress response. Distress tolerance; psychoeducation about physical/emotional self-care.    Assessments completed prior to visit:  The following assessments were completed by patient for this visit:  PHQ9:       11/22/2023     7:02 AM 12/12/2023     4:35 AM 1/4/2024     4:32 AM 2/13/2024    11:33 AM 4/30/2024     4:31 PM 6/4/2024    11:43 AM 6/4/2024    12:00 PM   PHQ-9 SCORE   PHQ-9 Total Score MyChart 8 (Mild depression) 8 (Mild depression) 8 (Mild depression) 8 (Mild depression)  15 (Moderately severe depression)    PHQ-9 Total Score 8    8 8 8 8 11 15 14     GAD7:       8/2/2022     2:11 PM 10/14/2022     9:22 AM 3/3/2023     3:19 PM 9/8/2023     9:36 AM 10/23/2023     1:43 PM 4/27/2024    11:31 AM 6/4/2024    11:44 AM   SHEFALI-7 SCORE   Total Score 11 (moderate anxiety) 8 (mild anxiety) 7 (mild anxiety) 20 (severe anxiety) 7 (mild anxiety) 7 (mild anxiety) 9 (mild anxiety)   Total Score 11 8 7 20 7 7 9     CAGE-AID:       11/20/2023    11:13 AM   CAGE-AID Total Score   Total Score 0    0   Total Score MyChart 0 (A total score of 2 or greater is considered clinically significant)     PROMIS 10-Global Health (all questions and answers displayed):       6/8/2022     9:10 AM 6/8/2022     3:00 PM  9/7/2022     2:04 PM 6/8/2023     3:00 PM 11/20/2023    11:11 AM 12/12/2023     4:36 AM 6/4/2024    11:45 AM   PROMIS 10   In general, would you say your health is: Good  Fair  Fair Fair Fair   In general, would you say your quality of life is: Good  Fair  Fair Fair Good   In general, how would you rate your physical health? Good  Poor  Fair Fair Fair   In general, how would you rate your mental health, including your mood and your ability to think? Poor  Fair  Poor Fair Poor   In general, how would you rate your satisfaction with your social activities and relationships? Fair  Fair  Fair Fair Fair   In general, please rate how well you carry out your usual social activities and roles Fair  Fair  Fair Fair Poor   To what extent are you able to carry out your everyday physical activities such as walking, climbing stairs, carrying groceries, or moving a chair? A little  Moderately  Mostly Moderately A little   In the past 7 days, how often have you been bothered by emotional problems such as feeling anxious, depressed, or irritable? Sometimes  Sometimes  Often Sometimes Often   In the past 7 days, how would you rate your fatigue on average? Mild  Mild  Moderate Mild Moderate   In the past 7 days, how would you rate your pain on average, where 0 means no pain, and 10 means worst imaginable pain? 4  4  4 3 3   In general, would you say your health is: 3 3 2 2 2 2 2   In general, would you say your quality of life is: 3 2 2 2 2 2 3   In general, how would you rate your physical health? 3 2 1 1 2 2 2   In general, how would you rate your mental health, including your mood and your ability to think? 1 2 2 3 1 2 1   In general, how would you rate your satisfaction with your social activities and relationships? 2 2 2 3 2 2 2   In general, please rate how well you carry out your usual social activities and roles. (This includes activities at home, at work and in your community, and responsibilities as a parent, child, spouse,  employee, friend, etc.) 2 1 2 2 2 2 1   To what extent are you able to carry out your everyday physical activities such as walking, climbing stairs, carrying groceries, or moving a chair? 2 2 3 2 4 3 2   In the past 7 days, how often have you been bothered by emotional problems such as feeling anxious, depressed, or irritable? 3 3 3 3 4 3 4   In the past 7 days, how would you rate your fatigue on average? 2 3 2 3 3 2 3   In the past 7 days, how would you rate your pain on average, where 0 means no pain, and 10 means worst imaginable pain? 4 6 4 6 4 3 3   Global Mental Health Score 9 9 9 11 7    7 9 8   Global Physical Health Score 12 10 11 9 12    12 13 11   PROMIS TOTAL - SUBSCORES 21 19 20 20 19    19 22 19         ASSESSMENT: Current Emotional / Mental Status (status of significant symptoms):   Risk status (Self / Other harm or suicidal ideation)   Patient denies current fears or concerns for personal safety.   Patient denies current or recent suicidal ideation or behaviors.  See note above.   Patient denies current or recent homicidal ideation or behaviors.   Patient denies current or recent self injurious behavior or ideation.   Patient denies other safety concerns.   Patient reports there has been no change in risk factors since their last session.     Patient reports there has been no change in protective factors since their last session.     Recommended that patient call 911 or go to the local ED should there be a change in any of these risk factors.     Appearance:   Appropriate    Eye Contact:   Fair    Psychomotor Behavior: Normal    Attitude:   Cooperative    Orientation:   All   Speech    Rate / Production: Normal     Volume:  Normal    Mood:    Anxious  Sad    Affect:    Appropriate    Thought Content:  Clear    Thought Form:  Coherent  Logical    Insight:    Good      Medication Review:   No changes to current psychiatric medication(s)     Medication Compliance:   Yes     Changes in Health  Issues:   None reported     Chemical Use Review:   Substance Use: Chemical use reviewed, no active concerns identified      Tobacco Use: No change in amount of tobacco use since last session.  Not discussed    Diagnosis:  No diagnosis found.    Collateral Reports Completed:   Not Applicable    PLAN: (Patient Tasks / Therapist Tasks / Other)  5-4-3-2-1 exercise, 4-7-8 breathing, and look into Calm blas to help with bedtime preparation and distress tolerance during the day.  Talk with PCP about sleep concerns and medication recommendations.  Provided resources for JOSELUIS Castillo and Talia Juárez if she needs someone to talk with between appointments.        Sybil Marshall, LMFT                                                         ______________________________________________________________________    Individual Treatment Plan    Patient's Name: Carol Guajardo  YOB: 1970    Date of Creation: 12/12/2023    Date Treatment Plan Last Reviewed/Revised: 12/12/2023, 7/19/24    DSM5 Diagnoses: 296.32 (F33.1) Major Depressive Disorder, Recurrent Episode, Moderate _ or 300.02 (F41.1) Generalized Anxiety Disorder  Psychosocial / Contextual Factors: marital conflict, job dissatisfaction  PROMIS (reviewed every 90 days):     Referral / Collaboration:  Referral to another professional/service is not indicated at this time..    Anticipated number of session for this episode of care: 9-12 sessions  Anticipation frequency of session: Weekly  Anticipated Duration of each session: 38-52 minutes  Treatment plan will be reviewed in 90 days or when goals have been changed.       MeasurableTreatment Goal(s) related to diagnosis / functional impairment(s)  Goal 1: Client will report reduction in anxiety also as evidenced by reduction of SHEFALI-7 score below 6 points within the next 12 weeks.    Objective #A (Client Action)    Client will identify at least three triggers for anxiety.  Status:  Continued - Date: 7/19/24        Intervention(s)  Therapist will provide educational materials on common triggers and signs of anxiety.    Objective #B  Client will use relaxation strategies at least two times per day to reduce the physical symptoms of anxiety.  Status:   Continued - Date: 7/19/24        Intervention(s)  Therapist will teach relaxation strategies such as mindfulness, deep breathing, muscle relaxation, and sensory activities.    Objective #C  Client will use cognitive strategies identified in therapy to challenge anxious thoughts.  Status:   Continued - Date: 7/19/24        Intervention(s)  Therapist will teach strategies for cognitive modification using REBT model.    Goal 2: Client will report improved mood as indicated by PHQ-9 score below 6 for consistent 8 weeks.    Objective #A (Client Action)    Status: Continued - Date: 7/19/24     Client will Increase interest, engagement, and pleasure in doing things.    Intervention(s)  Therapist will assign homework for daily involvement in pleasant activities.    Objective #B  Client will Identify negative self-talk and behaviors: challenge core beliefs, myths, and actions.    Status:   Continued: 7/19/24       Intervention(s)  Therapist will teach strategies for cognitive modification using REBT models.    Objective #C  Client will Improve quantity and quality of night time sleep / decrease daytime naps.  Status:   Continued:  7/19/24      Intervention(s)  Therapist will teach sleep hygiene strategies.  Assign homework for daily practice.    Goal 3: Client will increase assertiveness by 50%.    Objective #A (Client Action)    Client will learn & utilize at least five assertive communication skills weekly.  Status:   Continued: 7/19/24       Intervention(s)  Therapist will teach assertiveness skills. Role-play skills in session.    Objective #B  Client will practice setting boundaries five times weekly in the next eight weeks.  Status: Continued - Date: 7/19/24      Intervention(s)  Therapist will teach about healthy boundaries. Consider boundaries client would like to set in own life.    Objective #C  Client will track and record at least two daily pleasant exchanges with .  Status: Continued - Date(s):7/19/24     Intervention(s)  Therapist will assign homework for tracking pleasant exchanges.    Patient has reviewed and agreed to the above plan.      Sybil Marshall, LMFT  December 12, 2023

## 2024-08-01 ENCOUNTER — FCC EXTENDED DOCUMENTATION (OUTPATIENT)
Dept: PSYCHOLOGY | Facility: CLINIC | Age: 54
End: 2024-08-01
Payer: COMMERCIAL

## 2024-08-01 NOTE — PROGRESS NOTES
Discharge Summary  Multiple Sessions    Client Name: Carol Guajardo MRN#: [id number] YOB: 1970      Intake / Discharge Date: Intake: 12/12/23   /    Discharge: 8/1/24      DSM5 Diagnoses: (Sustained by DSM5 Criteria Listed Above)  Diagnoses: 296.32 (F33.1) Major Depressive Disorder, Recurrent Episode, Moderate _  300.02 (F41.1) Generalized Anxiety Disorder  Psychosocial & Contextual Factors: marital conflict; job dissatisfaction        Presenting Concern:  Marital conflict and lack of confidence in communication with .  Estrangement from family of origin.  Increase in panic attacks.  Work conflicts.      Reason for Discharge:  Noncompliance with attendance contract with several No Show appointments and late cancellations.  No future appointments were scheduled after her last late cancellation.      Disposition at Time of Last Encounter:   Comments:   From note during last appointment on 6/4/24:    Reports current stressors as co-worker conflict and fear about 's reactions.  She says  has anger issues and these are amplified when he is drinking.  She is getting less than three hours of sleep per night.  She wakes every 0.5-1 hour and then is awake for 1-1.5 hours.  Tries to fall asleep at 6:30 and reports being tired.  Wakes for the day at 3am to leave for work by 3:40am.  Appetite is quite low.  She has Diet Coke for breakfast.  Lunch is beef jerky and triscuits.  Sometimes she eats dinner but usually snacks on crackers if anything at all.       Clarified that her initial report on PHQ-9 about suicidal ideation was an error.  She denies any current and/or recent suicidal ideation.     Risk Management:   Client denies a history of suicidal ideation, suicide attempts, self-injurious behavior, homicidal ideation, homicidal behavior, and and other safety concerns  Recommended that patient call 911 or go to the local ED should there be a change in any of these risk  factors.      Referred To:  Client is welcome to return to [facility] for therapy in the future and can contact [facility] Intake to schedule ([phone]).          Sybil Marshall, DIANNE   8/1/2024

## 2024-10-01 DIAGNOSIS — G25.81 RESTLESS LEG SYNDROME: ICD-10-CM

## 2024-10-01 RX ORDER — PRAMIPEXOLE DIHYDROCHLORIDE 0.5 MG/1
TABLET ORAL
Qty: 180 TABLET | Refills: 1 | Status: SHIPPED | OUTPATIENT
Start: 2024-10-01

## 2024-10-08 DIAGNOSIS — E03.9 HYPOTHYROIDISM, UNSPECIFIED TYPE: ICD-10-CM

## 2024-10-08 RX ORDER — LEVOTHYROXINE SODIUM 175 UG/1
175 TABLET ORAL EVERY MORNING
Qty: 90 TABLET | Refills: 2 | Status: SHIPPED | OUTPATIENT
Start: 2024-10-08

## 2024-10-11 ASSESSMENT — PATIENT HEALTH QUESTIONNAIRE - PHQ9: SUM OF ALL RESPONSES TO PHQ QUESTIONS 1-9: 15

## 2024-10-15 DIAGNOSIS — G25.81 RESTLESS LEGS SYNDROME (RLS): ICD-10-CM

## 2024-10-15 DIAGNOSIS — M79.2 NEUROPATHIC PAIN: ICD-10-CM

## 2024-10-15 RX ORDER — GABAPENTIN 300 MG/1
CAPSULE ORAL
Qty: 540 CAPSULE | Refills: 1 | Status: SHIPPED | OUTPATIENT
Start: 2024-10-15

## 2024-11-05 ENCOUNTER — TELEPHONE (OUTPATIENT)
Dept: FAMILY MEDICINE | Facility: CLINIC | Age: 54
End: 2024-11-05
Payer: COMMERCIAL

## 2024-11-05 NOTE — TELEPHONE ENCOUNTER
Patient Quality Outreach    Patient is due for the following:   Colon Cancer Screening  Depression  -  PHQ-9 needed  Physical Preventive Adult Physical    Next Steps:   Schedule a Adult Preventative  Patient was assigned appropriate questionnaire to complete    Type of outreach:    Sent [employer] message.      Questions for provider review:    None           Bailee Kahler

## 2025-01-15 ENCOUNTER — MYC MEDICAL ADVICE (OUTPATIENT)
Dept: FAMILY MEDICINE | Facility: CLINIC | Age: 55
End: 2025-01-15
Payer: COMMERCIAL

## 2025-01-15 DIAGNOSIS — F33.1 MODERATE EPISODE OF RECURRENT MAJOR DEPRESSIVE DISORDER (H): ICD-10-CM

## 2025-01-15 DIAGNOSIS — F41.1 GAD (GENERALIZED ANXIETY DISORDER): ICD-10-CM

## 2025-01-15 RX ORDER — SERTRALINE HYDROCHLORIDE 100 MG/1
100 TABLET, FILM COATED ORAL DAILY
Qty: 90 TABLET | Refills: 0 | OUTPATIENT
Start: 2025-01-15

## 2025-01-16 RX ORDER — SERTRALINE HYDROCHLORIDE 100 MG/1
100 TABLET, FILM COATED ORAL DAILY
Qty: 90 TABLET | Refills: 0 | Status: SHIPPED | OUTPATIENT
Start: 2025-01-16

## 2025-01-16 ASSESSMENT — PATIENT HEALTH QUESTIONNAIRE - PHQ9
SUM OF ALL RESPONSES TO PHQ QUESTIONS 1-9: 9
10. IF YOU CHECKED OFF ANY PROBLEMS, HOW DIFFICULT HAVE THESE PROBLEMS MADE IT FOR YOU TO DO YOUR WORK, TAKE CARE OF THINGS AT HOME, OR GET ALONG WITH OTHER PEOPLE: SOMEWHAT DIFFICULT
SUM OF ALL RESPONSES TO PHQ QUESTIONS 1-9: 9

## 2025-01-16 ASSESSMENT — ANXIETY QUESTIONNAIRES
3. WORRYING TOO MUCH ABOUT DIFFERENT THINGS: SEVERAL DAYS
7. FEELING AFRAID AS IF SOMETHING AWFUL MIGHT HAPPEN: SEVERAL DAYS
GAD7 TOTAL SCORE: 7
7. FEELING AFRAID AS IF SOMETHING AWFUL MIGHT HAPPEN: SEVERAL DAYS
IF YOU CHECKED OFF ANY PROBLEMS ON THIS QUESTIONNAIRE, HOW DIFFICULT HAVE THESE PROBLEMS MADE IT FOR YOU TO DO YOUR WORK, TAKE CARE OF THINGS AT HOME, OR GET ALONG WITH OTHER PEOPLE: VERY DIFFICULT
8. IF YOU CHECKED OFF ANY PROBLEMS, HOW DIFFICULT HAVE THESE MADE IT FOR YOU TO DO YOUR WORK, TAKE CARE OF THINGS AT HOME, OR GET ALONG WITH OTHER PEOPLE?: VERY DIFFICULT
GAD7 TOTAL SCORE: 7
1. FEELING NERVOUS, ANXIOUS, OR ON EDGE: SEVERAL DAYS
5. BEING SO RESTLESS THAT IT IS HARD TO SIT STILL: SEVERAL DAYS
6. BECOMING EASILY ANNOYED OR IRRITABLE: SEVERAL DAYS
2. NOT BEING ABLE TO STOP OR CONTROL WORRYING: SEVERAL DAYS
4. TROUBLE RELAXING: SEVERAL DAYS
GAD7 TOTAL SCORE: 7

## 2025-01-27 ENCOUNTER — MYC MEDICAL ADVICE (OUTPATIENT)
Dept: FAMILY MEDICINE | Facility: CLINIC | Age: 55
End: 2025-01-27
Payer: COMMERCIAL

## 2025-01-27 NOTE — TELEPHONE ENCOUNTER
Last CT chest done 11/29/2022, date of last preventative exam 1/20/22.    H/O solitary fibrous tumor and nicotine dependence.     [employer] message sent to patient requesting she schedule annual preventative wellness exam.       Julie Behrendt RN

## 2025-02-17 ENCOUNTER — PATIENT OUTREACH (OUTPATIENT)
Dept: CARE COORDINATION | Facility: CLINIC | Age: 55
End: 2025-02-17
Payer: COMMERCIAL

## 2025-02-20 ENCOUNTER — ANCILLARY PROCEDURE (OUTPATIENT)
Dept: GENERAL RADIOLOGY | Facility: CLINIC | Age: 55
End: 2025-02-20
Payer: COMMERCIAL

## 2025-02-20 ENCOUNTER — OFFICE VISIT (OUTPATIENT)
Dept: URGENT CARE | Facility: URGENT CARE | Age: 55
End: 2025-02-20
Payer: COMMERCIAL

## 2025-02-20 VITALS
SYSTOLIC BLOOD PRESSURE: 130 MMHG | DIASTOLIC BLOOD PRESSURE: 78 MMHG | TEMPERATURE: 97.9 F | WEIGHT: 174 LBS | HEART RATE: 59 BPM | RESPIRATION RATE: 16 BRPM | BODY MASS INDEX: 33.28 KG/M2 | OXYGEN SATURATION: 96 %

## 2025-02-20 DIAGNOSIS — R05.1 ACUTE COUGH: Primary | ICD-10-CM

## 2025-02-20 DIAGNOSIS — R05.1 ACUTE COUGH: ICD-10-CM

## 2025-02-20 RX ORDER — BENZONATATE 200 MG/1
200 CAPSULE ORAL 3 TIMES DAILY PRN
Qty: 30 CAPSULE | Refills: 0 | Status: SHIPPED | OUTPATIENT
Start: 2025-02-20

## 2025-02-20 RX ORDER — ALBUTEROL SULFATE 90 UG/1
2 INHALANT RESPIRATORY (INHALATION) EVERY 6 HOURS PRN
Qty: 18 G | Refills: 0 | Status: SHIPPED | OUTPATIENT
Start: 2025-02-20

## 2025-02-20 NOTE — PROGRESS NOTES
----- Message from Leanne Massey sent at 2/28/2018  4:40 PM EST -----  Regarding: Dr. Juan M Srivastava  Pt would like a refill of Nadolol 20 mg BID sent to [facility] [phone]. Pt advises she is not able to come in for a visit at this time due to not having insurance coverage. Please call to advise at [phone]. URGENT CARE  Assessment & Plan   Assessment:   Carol Guajardo is a 54 year old female who's clinical presentation today is consistent with:   1. Acute cough    - XR Chest 2 Views; Future  - benzonatate (TESSALON) 200 MG capsule;    - albuterol (PROAIR HFA/PROVENTIL HFA/VENTOLIN HFA) 108 (90 Base) MCG/ACT inhaler;    Plan:  Will treat patient today for acute cough supportively and symptomatically. Patient's chest xray was reassuring, no suspicion (low likelihood) for bacterial infectious lung etiology, patient does not appear to need antibiotics. Will attempt to alleviate patient's symptoms today w/ an inhaler and antitussives; side effects of medications reviewed. Also encouraged patient to continue with OTC cold/flu medications and encouraged fluids and rest.   Additionally we discussed if symptoms do not improve after starting today's treatment to follow up in 7-10 days, sooner if symptoms worsen, return precautions given    No alarm signs or symptoms present   Differential Diagnoses for this patient's chief complaint that I considered include:   Bacterial vs viral etiology of cough, pneumonia, bronchitis, pulmonary embolism, pericarditis, pertussis, allergic rhinitis/PND/UACS, asthma/COPD exacerbation, postinfectious cough, inflammatory cough      Patient is agreeable to treatment plan and state they will follow-up if symptoms do not improve and/or if symptoms worsen   see patient's AVS 'monitor for' section for specific patient instructions given and discussed regarding what to watch for and when to follow up    ARNULFO Russell [facility] [place]      ______________________________________________________________________      Subjective     HPI: Carol Guajardo  is a 54 year old  female who presents today for evaluation the following concerns:   Patient  endorses cough today which started 3-4 days ago   Patient reports coughing and chest congestion   Started out with body aches, and  fevers, that has resolved now just lingering cough    Denies pleuritic pain   Endorses being a smoker, denies asthma or copd hx     Review of Systems:  Pertinent review of systems as reflected in HPI, otherwise negative.     Objective    Physical Exam:  Vitals:    02/20/25 1004   BP: 130/78   Pulse: 59   Resp: 16   Temp: 97.9  F (36.6  C)   TempSrc: Tympanic   SpO2: 96%   Weight: 78.9 kg (174 lb)      General: Alert and oriented, no acute distress, Vital signs reviewed: afebrile,  normotensive   Psy/mental status: Cooperative, nonanxious  SKIN: Intact, no rashes  EYES: EOMs intact, PERRLA bilaterally   Conjunctiva: Clear bilaterally, no injection or erythema present  EARS: TMs intact, translucent gray in color with normal landmarks present no erythema  or bulging tympanic membrane   Canals are without swelling, however have a mild amount of cerumen, no impaction  NOSE:  mucosa moist               No frontal or maxillary sinus tenderness present bilaterally  MOUTH/THROAT: lips, tongue, & oral mucosa appear normal upon inspection                Posterior oropharynx is mildly erythematous but without exudate, lesions or tonsillar  Edema, no dysphonia, no unilateral tonsillar edema, no uvular deviation,   no signs of peritonsillar abscess  NECK: supple, has full range of motion with no meningeal signs              No lymphadenopathy present  LUNG: normal work of breathing, good respiratory effort without retractions, good air  movement, non labored, inspection reveals normal chest expansion w/  inspiration            Lung sounds are clear to auscultation bilaterally,            No rales/rhonic/crackles wheezing noted           No cough noted or heard today     Imaging:   All images were personally read by this provider (myself).   Per my independent interpretation the xray shows no signs of consolidation or infiltrates suggesting pneumonia       ______________________________________________________________________    I  explained my diagnostic considerations and recommendations to the patient, who voiced understanding and agreement with the treatment plan.   All questions were answered.   We discussed potential side effects, risks and benefits of any prescribed or recommended therapies, as well as expectations for response to treatments.  Please see AVS for any patient instructions & handouts given.   Patient was advised to contact the Nurse Care Line, their Primary Care provider, Urgent Care, or the Emergency Department if there are new or worsening symptoms, or call 911 for emergencies.

## 2025-02-20 NOTE — LETTER
February 20, 2025      Carol Guajardo  [address] [address] [facility] [id number]        To Whom It May Concern:    Carol Guajardo was seen in our clinic. She may return to work on Monday 2/24/25, please excuse her while she has been ill     Sincerely,        ARNULFO Russell CNP

## 2025-02-20 NOTE — PATIENT INSTRUCTIONS
Diagnosis: acute cough, concern for pneumonia   Today we did:  Will get a chest xray and some lab work    Can go home and will call you when results are done and make plan     Plan:   Start the anti-cough pill and inhaler to help with the breathing and cough

## 2025-03-11 NOTE — TELEPHONE ENCOUNTER
[facility] EMERGENCY DEPT  EMERGENCY DEPARTMENT HISTORY AND PHYSICAL EXAM      Date: 3/11/2025  Patient Name: Hardeep Dhaliwal  MRN: [id number]  Birthdate 1947  Date of evaluation: 3/11/2025  Provider: Jose Allen MD   Note Started: 7:37 PM EDT 3/11/25    HISTORY OF PRESENT ILLNESS     Chief Complaint   Patient presents with    Urinary Catheter       History Provided By: patient    HPI: Hardeep Dhaliwal is a 77 y.o. male with PMH BPH w/urinary catheter placed by myself 2d ago presenting with pain. Today with hematuria, urinary pain and leg bag leaking. No F/C/N/V/D.    PAST MEDICAL HISTORY   Past Medical History:  Past Medical History:   Diagnosis Date    Gout     High cholesterol     History of colon polyps     Hypertension        Past Surgical History:  Past Surgical History:   Procedure Laterality Date    CARDIAC PROCEDURE N/A 08/29/2023    Left heart cath / coronary angiography performed by Wander Patel MD at [facility] CARDIAC CATH LAB    CARDIAC VALVE SURGERY  10/2023    [place]    COLONOSCOPY N/A 3/17/2022    COLONOSCOPY (ANES TIVA) performed by Petrona Gilman MD at [facility] ENDOSCOPY    COLONOSCOPY N/A     COLONOSCOPY N/A 05/30/2024    COLONOSCOPY DIAGNOSTIC performed by Petrona Gilman Jr., MD at [facility] ENDOSCOPY    CYSTOSCOPY  06/17/2024    ORTHOPEDIC SURGERY      Back fusion       Family History:  Family History   Problem Relation Age of Onset    No Known Problems Mother     No Known Problems Father        Social History:  Social History     Tobacco Use    Smoking status: Former     Passive exposure: Past    Smokeless tobacco: Never   Substance Use Topics    Alcohol use: Not Currently     Comment: occ    Drug use: Not Currently     Types: Marijuana (Weed)       Allergies:  No Known Allergies    PCP: Ty Negrete Sr., MD    Current Meds:   Current Facility-Administered Medications   Medication Dose Route Frequency Provider Last Rate Last Admin    lidocaine (XYLOCAINE) 2 % uro-jet   Topical PRN Jose Allen,  Patient Quality Outreach    Patient is due for the following:   Hypertension -  BP check  Pain, will need CSA, UTOX if pcp prescribes  Needs physical and colonoscopy  NEXT STEPS:   Schedule a yearly physical    Type of outreach:    Sent [email or web address] message.    Next Steps:  Reach out within 90 days via [email or web address].    Max number of attempts reached: No. Will try again in 90 days if patient still on fail list.    Questions for provider review:    None     Radha Hernandez, [facility]  Chart routed to Care Team.

## 2025-03-12 ENCOUNTER — PATIENT OUTREACH (OUTPATIENT)
Dept: SURGERY | Facility: CLINIC | Age: 55
End: 2025-03-12
Payer: COMMERCIAL

## 2025-03-12 DIAGNOSIS — D49.2 SOLITARY FIBROUS TUMOR: Primary | ICD-10-CM

## 2025-03-12 NOTE — TELEPHONE ENCOUNTER
[facility]: Thoracic Surgery                                                                                       Received a voicemail from patient inquiring about needing yearly surveillance Chest CT scans.  She had surgery with Dr Maya on 4/21/21 Right thoracotomy, resection of SFT, intercostal nerve cryoablation.   Last visit CT was completed on 11/29/2022, with plan to have repeat Chest CT in 6-months. Chest CT never done.     Called and spoke with patient. Informed patient that the APRN that she was following with post-operatively has retired. Reviewed previous plan. Informed her that we would like her to have a Chest CT and follow-up with our Thoracic Surgery APRN Delilah Ortez in clinic (inperson/virtual). Informed her that scheduling will be reaching out to her to scheduled the appointments. Patient verbalized her understanding and appreciated writer's call.     Dayana Myrick RN, BSN  Thoracic Surgery RN Care Coordinator

## 2025-03-25 DIAGNOSIS — G25.81 RESTLESS LEG SYNDROME: ICD-10-CM

## 2025-03-25 RX ORDER — PRAMIPEXOLE DIHYDROCHLORIDE 0.5 MG/1
TABLET ORAL
Qty: 180 TABLET | Refills: 3 | Status: SHIPPED | OUTPATIENT
Start: 2025-03-25

## 2025-04-04 ENCOUNTER — HOSPITAL ENCOUNTER (OUTPATIENT)
Dept: CT IMAGING | Facility: CLINIC | Age: 55
Discharge: HOME | End: 2025-04-04
Attending: CLINICAL NURSE SPECIALIST | Admitting: CLINICAL NURSE SPECIALIST
Payer: COMMERCIAL

## 2025-04-04 DIAGNOSIS — D49.2 SOLITARY FIBROUS TUMOR: ICD-10-CM

## 2025-04-04 PROCEDURE — 71260 CT THORAX DX C+: CPT

## 2025-04-04 PROCEDURE — 250N000009 HC RX 250: Performed by: RADIOLOGY

## 2025-04-04 PROCEDURE — 250N000011 HC RX IP 250 OP 636: Performed by: RADIOLOGY

## 2025-04-04 RX ORDER — IOPAMIDOL 755 MG/ML
85 INJECTION, SOLUTION INTRAVASCULAR ONCE
Status: COMPLETED | OUTPATIENT
Start: 2025-04-04 | End: 2025-04-04

## 2025-04-04 RX ADMIN — IOPAMIDOL 85 ML: 755 INJECTION, SOLUTION INTRAVENOUS at 14:59

## 2025-04-04 RX ADMIN — SODIUM CHLORIDE 61 ML: 9 INJECTION, SOLUTION INTRAVENOUS at 15:00

## 2025-04-08 NOTE — PROGRESS NOTES
THORACIC SURGERY FOLLOW UP VISIT      I saw Mrs. Guajardo in follow-up today. The clinical summary follows:     PREOP DIAGNOSIS   Right solitary fibrous tumor  PROCEDURE   1.  Flexible bronchoscopy.  2.  Right posterolateral thoracotomy.  3.  Excision of right solitary fibrous tumor.  4.  Intercostal nerve cryoablation.    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  - The tumor involves special pleura and extends into lung parenchyma.  - Intravascular thrombosis and ischemic infarct are present within the  tumor.  - Parenchymal margin is negative for tumor (at least 2 cm from mass).  - No evidence of malignant transformation observed.     Immunohistochemistry studies, performed with appropriate controls on block    A5, demonstrate that the spindle   cells are diffusely and strongly positive for STAT-6 and CD34, and are   negative for cytokeratin Luis and   S100, confirming the morphologic impression and supporting the diagnosis.     COMPLICATIONS  None    INTERVAL STUDIES  CT chest 0/04/2025:  1.  Surgical change in the right lung. No evidence of recurrent or metastatic disease in the chest.  2.  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 No definite obstruction.        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, diagnosed as fibrosis    Total knee replacement status, left 09/30/2019      Past Surgical History:   Procedure Laterality Date    ARTHROPLASTY KNEE Left 09/30/2019    Procedure: Left Total Knee Arthroplasty;  Surgeon: Ion Bailey MD;  [facility]: [facility]    ARTHROPLASTY KNEE Right 06/14/2022    Procedure: ARTHROPLASTY, KNEE, TOTAL RIGHT;   Surgeon: Emilio Pino MD;  Location: [facility]    BIOPSY  One year ago    Lung biopsy    BRONCHOSCOPY (RIGID OR FLEXIBLE), DIAGNOSTIC N/A 2021    Procedure: BRONCHOSCOPY, WITH BRONCHOALVEOLAR LAVAGE;  Surgeon: Keli Webber MD;  Location:  GI    CARPAL TUNNEL RELEASE RT/LT Bilateral     ENT SURGERY  3 years ago    EXTERNAL EAR SURGERY      IR LUNG BIOPSY MEDIASTINUM RIGHT Right 2018    RLL mass, diagnosed as fibrosis per pt    IR LUNG BIOPSY MEDIASTINUM RIGHT  2018    LAPAROSCOPY DIAGNOSTIC (GENERAL)  2019    LAPAROSCOPY, SURGICAL; REPAIR UMBILICAL HERNIA  2018    THORACOTOMY, WEDGE RESECTION LUNG, COMBINED Right 2021    Procedure: Right Thoracotomy, excision of solitary fibrous tumor, intercostal nerve cryo ablation;  Surgeon: Keli Webber MD;  Location:  OR    [facility] LIGATE FALLOPIAN TUBE      Description: Tubal Ligation;  Recorded: 2008;      Social History     Socioeconomic History    Marital status:      Spouse name: Not on file    Number of children: Not on file    Years of education: Not on file    Highest education level: Not on file   Occupational History    Not on file   Tobacco Use    Smoking status: Former     Current packs/day: 0.00     Average packs/day: 0.5 packs/day for 36.4 years (18.2 ttl pk-yrs)     Types: Cigarettes     Start date: 1986     Quit date: 5/15/2022     Years since quittin.9    Smokeless tobacco: Never   Vaping Use    Vaping status: Never Used   Substance and Sexual Activity    Alcohol use: Yes     Alcohol/week: 6.0 standard drinks of alcohol     Types: 6 Cans of beer per week     Comment: maybe every other week    Drug use: Yes     Types: Marijuana     Comment: recently started smoking due to increase anxiety    Sexual activity: Not Currently     Partners: Male     Birth control/protection: Female Surgical, Other   Other Topics Concern    Parent/sibling w/ CABG, MI or angioplasty before 65F 55M? No   Social  History Narrative    Not on file     Social Drivers of Health     Financial Resource Strain: Low Risk  (2/14/2025)    Financial Resource Strain     Within the past 12 months, have you or your family members you live with been unable to get utilities (heat, electricity) when it was really needed?: No   Food Insecurity: Low Risk  (2/14/2025)    Food Insecurity     Within the past 12 months, did you worry that your food would run out before you got money to buy more?: No     Within the past 12 months, did the food you bought just not last and you didn t have money to get more?: Patient declined   Transportation Needs: Low Risk  (2/14/2025)    Transportation Needs     Within the past 12 months, has lack of transportation kept you from medical appointments, getting your medicines, non-medical meetings or appointments, work, or from getting things that you need?: No   Physical Activity: Insufficiently Active (2/14/2025)    Exercise Vital Sign     Days of Exercise per Week: 1 day     Minutes of Exercise per Session: 10 min   Stress: Stress Concern Present (2/14/2025)    [employer] [place] [employer] - Occupational Stress Questionnaire     Feeling of Stress : Very much   Social Connections: Unknown (2/14/2025)    Social Connection and Isolation Panel [NHANES]     Frequency of Communication with Friends and Family: Not on file     Frequency of Social Gatherings with Friends and Family: Patient declined     Attends Restoration Services: Not on file     Active Member of Clubs or Organizations: Not on file     Attends Club or Organization Meetings: Not on file     Marital Status: Not on file   Interpersonal Safety: Low Risk  (2/14/2025)    Interpersonal Safety     Do you feel physically and emotionally safe where you currently live?: Yes     Within the past 12 months, have you been hit, slapped, kicked or otherwise physically hurt by someone?: No     Within the past 12 months, have you been humiliated or emotionally  abused in other ways by your partner or ex-partner?: No   Housing Stability: Low Risk  (2/14/2025)    Housing Stability     Do you have housing? : Yes     Are you worried about losing your housing?: No      SUBJECTIVE   Carol is doing ok. She does report shortness of breath and does have issues with foods getting stuck. This morning she was coughing and ended up regurgitating food.She does get full even when she only eats a small amount of food. She did not do follow up with us over the last couple of years because she ws dealing with other medical issues.    OBJECTIVE  /81   Pulse 74   Temp 97.9  F (36.6  C) (Tympanic)   Ht 1.524 m (5')   Wt 78.9 kg (174 lb)   LMP 01/08/2025 (Exact Date)   SpO2 95%   BMI 33.98 kg/m       From a personal perspective, she is here alone today.    IMPRESSION  Carol is a 55 year-old female status post right posterolateral thoracotomy, excision of solitary fibrous tumor and intercostal nerve cryoablation.      I reviewed her CT images with her and showed her the large Morgagni hernia. The lungs themselves appear stable however, I do recommend she see Dr. Maya to discuss repair of this large hernia. She would like to see him to get this repaired.    PLAN  I spent 25 min on the date of the encounter in chart review, patient visit, review of tests, documentation and/or discussion with other providers about the issues documented above. I reviewed the plan as follows:  Follow up with me in 1 year with a chest CT prior. Referral placed for her to see Dr. Maya to discuss surgical repair of the Morgagni hernia.    All questions were answered and the patient and present family were in agreement with the plan.  I appreciate the opportunity to participate in the care of your patient and will keep you updated.  Sincerely,    Delilah Ortez, ARNULFO CNS

## 2025-04-09 ENCOUNTER — PATIENT OUTREACH (OUTPATIENT)
Dept: ONCOLOGY | Facility: CLINIC | Age: 55
End: 2025-04-09

## 2025-04-09 ENCOUNTER — ONCOLOGY VISIT (OUTPATIENT)
Dept: SURGERY | Facility: CLINIC | Age: 55
End: 2025-04-09
Payer: COMMERCIAL

## 2025-04-09 VITALS
HEART RATE: 74 BPM | WEIGHT: 174 LBS | TEMPERATURE: 97.9 F | DIASTOLIC BLOOD PRESSURE: 81 MMHG | SYSTOLIC BLOOD PRESSURE: 126 MMHG | BODY MASS INDEX: 34.16 KG/M2 | HEIGHT: 60 IN | OXYGEN SATURATION: 95 %

## 2025-04-09 DIAGNOSIS — D49.2 SOLITARY FIBROUS TUMOR: ICD-10-CM

## 2025-04-09 DIAGNOSIS — Q79.0 MORGAGNI HERNIA: Primary | ICD-10-CM

## 2025-04-09 PROCEDURE — 99202 OFFICE O/P NEW SF 15 MIN: CPT | Performed by: CLINICAL NURSE SPECIALIST

## 2025-04-09 ASSESSMENT — PAIN SCALES - GENERAL: PAINLEVEL_OUTOF10: NO PAIN (0)

## 2025-04-09 NOTE — TELEPHONE ENCOUNTER
RECORDS STATUS - ALL OTHER DIAGNOSIS      RECORDS RECEIVED FROM: [facility]   NOTES STATUS DETAILS   OFFICE NOTE from referring provider Epic 4/9/25: ARNULFO Nichols CNS   MEDICATION LIST [facility]    LABS     ANYTHING RELATED TO DIAGNOSIS Epic Most recent 7/1/24   IMAGING (NEED IMAGES & REPORT)     CT SCANS PACS 4/4/25: CT Chest

## 2025-04-09 NOTE — PROGRESS NOTES
New Patient Oncology Nurse Navigator Note     Referring provider: ARNULFO Nichols CNS    Referring Clinic/Organization: [facility]  Referred to: Thoracic Surgery  Requested provider (if applicable): Dr. Maya  Referral Received: 04/09/25       Evaluation for :   Diagnosis   Q79.0 (ICD-10-CM) - Morgagni hernia     Clinical History (per Nurse review of records provided):      04/04/2025 CT Chest w/ contrast (bookmarked) showed:   IMPRESSION:  1.  Surgical change in the right lung. No evidence of recurrent or metastatic disease in the chest.  2.  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 No definite obstruction.    04/09/2025 OV note from referring provider (bookmarked) discusses referral.   Referral placed for her to see Dr. Maya to discuss surgical repair of the Morgagni hernia.     Clinical Assessment / Barriers to Care (Per Nurse):  Tobacco History    Smoking Status  Former Smoking Start Date  1/1/1986 Quit Date  5/15/2022 Average Packs/Day  0.5 packs/day for 36.4 years (18.2 ttl pk-yrs) Smoking Tobacco Type  Cigarettes from 1/1/1986 to 5/15/2022     Records Location: [facility]   Records Needed: None  Additional testing needed prior to consult: None    ASHLY AnnN, RN, OCN  [facility] Oncology Nurse Navigator  [phone] / [phone]

## 2025-04-13 DIAGNOSIS — M79.2 NEUROPATHIC PAIN: ICD-10-CM

## 2025-04-13 DIAGNOSIS — F33.1 MODERATE EPISODE OF RECURRENT MAJOR DEPRESSIVE DISORDER (H): ICD-10-CM

## 2025-04-13 DIAGNOSIS — F41.1 GAD (GENERALIZED ANXIETY DISORDER): ICD-10-CM

## 2025-04-13 DIAGNOSIS — G25.81 RESTLESS LEGS SYNDROME (RLS): ICD-10-CM

## 2025-04-15 RX ORDER — GABAPENTIN 300 MG/1
CAPSULE ORAL
Qty: 540 CAPSULE | Refills: 2 | Status: SHIPPED | OUTPATIENT
Start: 2025-04-15

## 2025-04-15 RX ORDER — SERTRALINE HYDROCHLORIDE 100 MG/1
100 TABLET, FILM COATED ORAL DAILY
Qty: 90 TABLET | Refills: 2 | Status: SHIPPED | OUTPATIENT
Start: 2025-04-15

## 2025-04-29 NOTE — PROGRESS NOTES
THORACIC SURGERY - NEW PATIENT OFFICE VISIT     I saw Carol Guajardo in consultation for the evaluation and treatment of a morgagni hernia.    HPI  Carol Guajardo is a 55 year old female who I performed a SFT removal from the left chest in 2022. He now is being referred for evaluation of a morgagni hernia. In terms of symptoms she has some abdominal discomfort and occasional pain but nothing mayor. No history of incarceration or strangulation.     Previsit Tests   4/4/25 CT scan: Morgagni hernia containing colon.          PMH    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, diagnosed as fibrosis    Total knee replacement status, left 09/30/2019        PSH    Past Surgical History:   Procedure Laterality Date    ARTHROPLASTY KNEE Left 09/30/2019    Procedure: Left Total Knee Arthroplasty;  Surgeon: Ion Bailey MD;  [facility]    ARTHROPLASTY KNEE Right 06/14/2022    Procedure: ARTHROPLASTY, KNEE, TOTAL RIGHT;  Surgeon: Emilio Pino MD;  Location: [facility] OR    BIOPSY  One year ago    Lung biopsy    BRONCHOSCOPY (RIGID OR FLEXIBLE), DIAGNOSTIC N/A 04/21/2021    Procedure: BRONCHOSCOPY, WITH BRONCHOALVEOLAR LAVAGE;  Surgeon: Keli Webber MD;  Location: [facility] GI    CARPAL TUNNEL RELEASE RT/LT Bilateral     ENT SURGERY  3 years ago    EXTERNAL EAR SURGERY      IR LUNG BIOPSY MEDIASTINUM RIGHT Right 08/2018    RLL mass, diagnosed as fibrosis per pt    IR LUNG BIOPSY MEDIASTINUM RIGHT  08/31/2018    LAPAROSCOPY DIAGNOSTIC (GENERAL)  02/2019    LAPAROSCOPY, SURGICAL; REPAIR UMBILICAL HERNIA  08/22/2018    THORACOTOMY, WEDGE RESECTION LUNG, COMBINED Right 04/19/2021    Procedure: Right Thoracotomy, excision of solitary fibrous tumor, intercostal nerve cryo ablation;  Surgeon: Francesco Johnson  MD Keli;  Location:  OR    [facility] LIGATE FALLOPIAN TUBE      Description: Tubal Ligation;  Recorded: 2008;      .naila  Current Outpatient Medications   Medication Sig Dispense Refill    acetaminophen (TYLENOL) 325 MG tablet Take 325-650 mg by mouth every 6 hours as needed for mild pain      albuterol (PROAIR HFA/PROVENTIL HFA/VENTOLIN HFA) 108 (90 Base) MCG/ACT inhaler Inhale 2 puffs into the lungs every 6 hours as needed for shortness of breath, wheezing or cough. 18 g 0    calcium carbonate (OS-BERTA) 500 MG tablet Take 1 tablet by mouth 2 times daily      Ferrous Sulfate 324 (65 Fe) MG TBEC Take 324 mg by mouth 2 times daily      gabapentin (NEURONTIN) 300 MG capsule TAKE 3 CAPSULES BY MOUTH TWICE DAILY 540 capsule 2    hydrOXYzine HCl (ATARAX) 25 MG tablet Take 1 tablet (25 mg) by mouth every 6 hours as needed for anxiety. 30 tablet 0    levothyroxine (SYNTHROID/LEVOTHROID) 175 MCG tablet TAKE 1 TABLET BY MOUTH EVERY MORNING 90 tablet 2    LORazepam (ATIVAN) 1 MG tablet Take 1 tablet (1 mg) by mouth daily as needed for anxiety or vomiting (panic) 10 tablet 0    pramipexole (MIRAPEX) 0.5 MG tablet TAKE 2 TABLETS BY MOUTH AT BEDTIME 180 tablet 3    propranolol ER (INDERAL LA) 60 MG 24 hr capsule TAKE 1 CAPSULE BY MOUTH EVERY MORNING 90 capsule 3    sertraline (ZOLOFT) 100 MG tablet TAKE 1 TABLET BY MOUTH ONCE DAILY 90 tablet 2     No current facility-administered medications for this visit.     Social History     Tobacco Use    Smoking status: Former     Current packs/day: 0.00     Average packs/day: 0.5 packs/day for 36.4 years (18.2 ttl pk-yrs)     Types: Cigarettes     Start date: 1986     Quit date: 5/15/2022     Years since quittin.9    Smokeless tobacco: Never   Vaping Use    Vaping status: Never Used   Substance Use Topics    Alcohol use: Yes     Alcohol/week: 6.0 standard drinks of alcohol     Types: 6 Cans of beer per week     Comment: maybe every other week    Drug use: Yes     Types:  "Marijuana     Comment: recently started smoking due to increase anxiety         Physical examination  /75 (BP Location: Right arm, Patient Position: Sitting, Cuff Size: Adult Regular)   Pulse 58   Temp 97.8  F (36.6  C) (Oral)   Ht 1.509 m (4' 11.4\")   Wt 77.7 kg (171 lb 6.4 oz)   SpO2 98%   BMI 34.15 kg/m    Alert and oriented NAD  Bilateral breath sounds.     From a personal perspective, she comes to clinic with her .       IMPRESSION   55 year old female with anterior diaphragm hernia.      PLAN  I spent 60 min on the date of the encounter in chart review, patient visit, review of tests, documentation and/or discussion with other providers about the issues documented above. I reviewed the plan as follows:    Procedure planned: Laparoscopic anterior diaphragm hernia repair with mesh.     Necessary Preop Tests & Appointments: Preoperative assessment clinic. Metamucil once a day starting today, full liquid diet for 48hrs prior to surgery.    Anticoagulation Plan: Enoxaparin postop.      I had an extensive discussion with the patient and her family regarding the rationale for surgery, the alternatives risks and benefits of the procedure. I explained the expected hospital stay, postoperative course, recovery time, diet and activity restrictions. Informed consent was obtained and they agreed to proceed.      I appreciate the opportunity to participate in the care of your patient and will keep you updated.      Sincerely,    Keli Raya MD      "

## 2025-04-30 ENCOUNTER — PREP FOR PROCEDURE (OUTPATIENT)
Dept: SURGERY | Facility: CLINIC | Age: 55
End: 2025-04-30
Payer: COMMERCIAL

## 2025-04-30 ENCOUNTER — ONCOLOGY VISIT (OUTPATIENT)
Dept: SURGERY | Facility: CLINIC | Age: 55
End: 2025-04-30
Attending: CLINICAL NURSE SPECIALIST
Payer: COMMERCIAL

## 2025-04-30 ENCOUNTER — PRE VISIT (OUTPATIENT)
Dept: SURGERY | Facility: CLINIC | Age: 55
End: 2025-04-30
Payer: COMMERCIAL

## 2025-04-30 VITALS
BODY MASS INDEX: 34.56 KG/M2 | HEART RATE: 58 BPM | SYSTOLIC BLOOD PRESSURE: 134 MMHG | OXYGEN SATURATION: 98 % | WEIGHT: 171.4 LBS | TEMPERATURE: 97.8 F | DIASTOLIC BLOOD PRESSURE: 75 MMHG | HEIGHT: 59 IN

## 2025-04-30 DIAGNOSIS — K44.9 DIAPHRAGMATIC HERNIA WITHOUT OBSTRUCTION AND WITHOUT GANGRENE: Primary | ICD-10-CM

## 2025-04-30 DIAGNOSIS — Q79.0 MORGAGNI HERNIA: ICD-10-CM

## 2025-04-30 PROCEDURE — 99213 OFFICE O/P EST LOW 20 MIN: CPT | Performed by: THORACIC SURGERY (CARDIOTHORACIC VASCULAR SURGERY)

## 2025-04-30 PROCEDURE — 99214 OFFICE O/P EST MOD 30 MIN: CPT | Performed by: THORACIC SURGERY (CARDIOTHORACIC VASCULAR SURGERY)

## 2025-04-30 RX ORDER — ACETAMINOPHEN 325 MG/1
975 TABLET ORAL ONCE
OUTPATIENT
Start: 2025-04-30 | End: 2025-04-30

## 2025-04-30 ASSESSMENT — PAIN SCALES - GENERAL: PAINLEVEL_OUTOF10: MILD PAIN (3)

## 2025-04-30 NOTE — NURSING NOTE
"Oncology Rooming Note    April 30, 2025 9:51 AM   Carol Guajardo is a 55 year old female who presents for:    Chief Complaint   Patient presents with    Oncology Clinic Visit     Morgagni hernia     Initial Vitals: /75 (BP Location: Right arm, Patient Position: Sitting, Cuff Size: Adult Regular)   Pulse 58   Temp 97.8  F (36.6  C) (Oral)   Ht 1.509 m (4' 11.4\")   Wt 77.7 kg (171 lb 6.4 oz)   SpO2 98%   BMI 34.15 kg/m   Estimated body mass index is 34.15 kg/m  as calculated from the following:    Height as of this encounter: 1.509 m (4' 11.4\").    Weight as of this encounter: 77.7 kg (171 lb 6.4 oz). Body surface area is 1.8 meters squared.  Mild Pain (3) Comment: burning sensation   No LMP recorded. Patient is perimenopausal.  Allergies reviewed: Yes  Medications reviewed: Yes    Medications: Medication refills not needed today.  Pharmacy name entered into [facility]: [facility] [id number] [facility] [id number] [facility]    Frailty Screening:   Is the patient here for a new oncology consult visit in cancer care? 1. Yes. Over the past month, have you experienced difficulty or required a caregiver to assist with:   1. Balance, walking or general mobility (including any falls)? NO  2. Completion of self-care tasks such as bathing, dressing, toileting, grooming/hygiene?  NO  3. Concentration or memory that affects your daily life?  NO     PHQ9:  Did this patient require a PHQ9?: No      Clinical concerns: none      Carol Huerta            "

## 2025-04-30 NOTE — LETTER
4/30/2025      Carol Guajardo  [address] [address] [id number]      Dear Colleague,    Thank you for referring your patient, Carol Guajardo, to the [facility]. Please see a copy of my visit note below.    THORACIC SURGERY - NEW PATIENT OFFICE VISIT     I saw Carol Guajardo in consultation for the evaluation and treatment of a morgagni hernia.    HPI  Carol Guajardo is a 55 year old female who I performed a SFT removal from the left chest in 2022. He now is being referred for evaluation of a morgagni hernia. In terms of symptoms she has some abdominal discomfort and occasional pain but nothing mayor. No history of incarceration or strangulation.     Previsit Tests   4/4/25 CT scan: Morgagni hernia containing colon.          PMH    Past Medical History:   Diagnosis Date     Anxiety      Depression      Depressive disorder      HLD (hyperlipidemia)      Hypothyroidism      Mass of right lower leg 06/05/2017     Osteoarthritis of both knees      Right lower lobe lung mass     Biopsied 8/2018, diagnosed as fibrosis     Total knee replacement status, left 09/30/2019        PSH    Past Surgical History:   Procedure Laterality Date     ARTHROPLASTY KNEE Left 09/30/2019    Procedure: Left Total Knee Arthroplasty;  Surgeon: Ion Bailey MD;  [facility]     ARTHROPLASTY KNEE Right 06/14/2022    Procedure: ARTHROPLASTY, KNEE, TOTAL RIGHT;  Surgeon: Emilio Pino MD;  Location: [facility]     BIOPSY  One year ago    Lung biopsy     BRONCHOSCOPY (RIGID OR FLEXIBLE), DIAGNOSTIC N/A 04/21/2021    Procedure: BRONCHOSCOPY, WITH BRONCHOALVEOLAR LAVAGE;  Surgeon: Keli Webber MD;  [facility]     CARPAL TUNNEL RELEASE RT/LT Bilateral      ENT SURGERY  3 years ago     EXTERNAL EAR SURGERY       IR LUNG BIOPSY MEDIASTINUM RIGHT Right 08/2018    RLL mass, diagnosed as fibrosis per pt     IR LUNG BIOPSY MEDIASTINUM RIGHT  08/31/2018     LAPAROSCOPY DIAGNOSTIC (GENERAL)   02/2019     LAPAROSCOPY, SURGICAL; REPAIR UMBILICAL HERNIA  08/22/2018     THORACOTOMY, WEDGE RESECTION LUNG, COMBINED Right 04/19/2021    Procedure: Right Thoracotomy, excision of solitary fibrous tumor, intercostal nerve cryo ablation;  Surgeon: Keli Webber MD;  Location[facility]     [facility] LIGATE FALLOPIAN TUBE      Description: Tubal Ligation;  Recorded: 04/09/2008;      .alelrg  Current Outpatient Medications   Medication Sig Dispense Refill     acetaminophen (TYLENOL) 325 MG tablet Take 325-650 mg by mouth every 6 hours as needed for mild pain       albuterol (PROAIR HFA/PROVENTIL HFA/VENTOLIN HFA) 108 (90 Base) MCG/ACT inhaler Inhale 2 puffs into the lungs every 6 hours as needed for shortness of breath, wheezing or cough. 18 g 0     calcium carbonate (OS-BERTA) 500 MG tablet Take 1 tablet by mouth 2 times daily       Ferrous Sulfate 324 (65 Fe) MG TBEC Take 324 mg by mouth 2 times daily       gabapentin (NEURONTIN) 300 MG capsule TAKE 3 CAPSULES BY MOUTH TWICE DAILY 540 capsule 2     hydrOXYzine HCl (ATARAX) 25 MG tablet Take 1 tablet (25 mg) by mouth every 6 hours as needed for anxiety. 30 tablet 0     levothyroxine (SYNTHROID/LEVOTHROID) 175 MCG tablet TAKE 1 TABLET BY MOUTH EVERY MORNING 90 tablet 2     LORazepam (ATIVAN) 1 MG tablet Take 1 tablet (1 mg) by mouth daily as needed for anxiety or vomiting (panic) 10 tablet 0     pramipexole (MIRAPEX) 0.5 MG tablet TAKE 2 TABLETS BY MOUTH AT BEDTIME 180 tablet 3     propranolol ER (INDERAL LA) 60 MG 24 hr capsule TAKE 1 CAPSULE BY MOUTH EVERY MORNING 90 capsule 3     sertraline (ZOLOFT) 100 MG tablet TAKE 1 TABLET BY MOUTH ONCE DAILY 90 tablet 2     No current facility-administered medications for this visit.     Social History     Tobacco Use     Smoking status: Former     Current packs/day: 0.00     Average packs/day: 0.5 packs/day for 36.4 years (18.2 ttl pk-yrs)     Types: Cigarettes     Start date: 1/1/1986     Quit date: 5/15/2022     Years since  "quittin.9     Smokeless tobacco: Never   Vaping Use     Vaping status: Never Used   Substance Use Topics     Alcohol use: Yes     Alcohol/week: 6.0 standard drinks of alcohol     Types: 6 Cans of beer per week     Comment: maybe every other week     Drug use: Yes     Types: Marijuana     Comment: recently started smoking due to increase anxiety         Physical examination  /75 (BP Location: Right arm, Patient Position: Sitting, Cuff Size: Adult Regular)   Pulse 58   Temp 97.8  F (36.6  C) (Oral)   Ht 1.509 m (4' 11.4\")   Wt 77.7 kg (171 lb 6.4 oz)   SpO2 98%   BMI 34.15 kg/m    Alert and oriented NAD  Bilateral breath sounds.     From a personal perspective, she comes to clinic with her .       IMPRESSION   55 year old female with anterior diaphragm hernia.      PLAN  I spent 60 min on the date of the encounter in chart review, patient visit, review of tests, documentation and/or discussion with other providers about the issues documented above. I reviewed the plan as follows:    Procedure planned: Laparoscopic anterior diaphragm hernia repair with mesh.     Necessary Preop Tests & Appointments: Preoperative assessment clinic. Metamucil once a day starting today, full liquid diet for 48hrs prior to surgery.    Anticoagulation Plan: Enoxaparin postop.      I had an extensive discussion with the patient and her family regarding the rationale for surgery, the alternatives risks and benefits of the procedure. I explained the expected hospital stay, postoperative course, recovery time, diet and activity restrictions. Informed consent was obtained and they agreed to proceed.      I appreciate the opportunity to participate in the care of your patient and will keep you updated.      Sincerely,    Keli Raya MD        Again, thank you for allowing me to participate in the care of your patient.        Sincerely,        Alex Maya MD    Electronically signed"

## 2025-05-01 DIAGNOSIS — K44.9 DIAPHRAGMATIC HERNIA WITHOUT OBSTRUCTION AND WITHOUT GANGRENE: Primary | ICD-10-CM

## 2025-05-07 ENCOUNTER — TELEPHONE (OUTPATIENT)
Dept: SURGERY | Facility: CLINIC | Age: 55
End: 2025-05-07
Payer: COMMERCIAL

## 2025-05-07 NOTE — TELEPHONE ENCOUNTER
Spoke with patient to schedule procedure with Dr. Maya   Procedure was scheduled on 6/16 at [facility] OR  Patient will have H&P with PAC    Informed pt of surgery details:  Date:    Monday, June 16, 2025  Arrival Time:  5:30 am    Pt is aware surgery start time may change, and someone will reach out with the new arrival time, if so.    Patient is aware a COVID-19 test is needed before their procedure ONLY IF symptomatic.   (Patient is aware Thoracic is no longer requiring COVID-19 test)       Patient is aware a / is needed day of surgery.   Surgery Letter was sent via [employer]     Patient has my direct contact information for any further questions.      Appointments in Next Year      Jun 06, 2025 10:00 AM  (Arrive by 9:45 AM)  PAC EVALUATION with ARNULFO [facility] [place] ([facility] [place] ) [phone]     Jun 06, 2025 11:00 AM  LAB with UC LAB  [facility] [place] ([facility] [place] ) [phone]     Jul 16, 2025 1:00 PM  (Arrive by 12:45 PM)  XR ESOPHAGRAM with UCSCXR2, UCSC GIGU RAD  [facility] X[facility] [place] ([facility] [place] ) [phone]     Jul 16, 2025 1:30 PM  (Arrive by 1:15 PM)  Return Patient with [facility]UL[facility] ([facility] [place] ) [phone]

## 2025-05-12 NOTE — CONFIDENTIAL NOTE
FUTURE VISIT INFORMATION      SURGERY INFORMATION:  Date: 6.16.25  Location: [facility]  Surgeon:  Keli Webber MD   Anesthesia Type:  General   Procedure: Laparoscopic anterior diaphragm hernia repair with mesh.       RECORDS REQUESTED FROM:       Primary Care Provider:   Barbara Kirby DNP Zaun, Shelley L, MD     Pertinent Medical History:  4.30.25 [facility]   4.9.25 [facility]     Most recent ECHO:  -2.26.24    Most recent PFT's:  -3.30.21

## 2025-05-19 ENCOUNTER — OFFICE VISIT (OUTPATIENT)
Dept: URGENT CARE | Facility: URGENT CARE | Age: 55
End: 2025-05-19
Payer: COMMERCIAL

## 2025-05-19 ENCOUNTER — DOCUMENTATION ONLY (OUTPATIENT)
Dept: SURGERY | Facility: CLINIC | Age: 55
End: 2025-05-19

## 2025-05-19 VITALS
BODY MASS INDEX: 34.67 KG/M2 | DIASTOLIC BLOOD PRESSURE: 69 MMHG | TEMPERATURE: 97.9 F | HEART RATE: 48 BPM | WEIGHT: 174 LBS | SYSTOLIC BLOOD PRESSURE: 114 MMHG | RESPIRATION RATE: 16 BRPM | OXYGEN SATURATION: 97 %

## 2025-05-19 DIAGNOSIS — J01.10 ACUTE NON-RECURRENT FRONTAL SINUSITIS: Primary | ICD-10-CM

## 2025-05-19 DIAGNOSIS — H65.92 OME (OTITIS MEDIA WITH EFFUSION), LEFT: ICD-10-CM

## 2025-05-19 PROBLEM — Q79.0 MORGAGNI HERNIA: Status: ACTIVE | Noted: 2025-04-30

## 2025-05-19 PROCEDURE — 99213 OFFICE O/P EST LOW 20 MIN: CPT | Performed by: NURSE PRACTITIONER

## 2025-05-19 RX ORDER — CEFDINIR 300 MG/1
300 CAPSULE ORAL 2 TIMES DAILY
Qty: 14 CAPSULE | Refills: 0 | Status: SHIPPED | OUTPATIENT
Start: 2025-05-19 | End: 2025-05-26

## 2025-05-19 NOTE — PROGRESS NOTES
Patient scheduled for Diaphragm hernia repair surgery on 6/16/2025 with Dr Maya.   Short Term disability paperwork completed and faxed to [facility] at [phone].    Dayana Myrick RN, BSN  Thoracic Surgery RN Care Coordinator

## 2025-05-19 NOTE — LETTER
May 19, 2025      Carol Guajardo  [id number] [address] [id number]        To Whom It May Concern:    Carol Guajardo  was seen on 5/19/25 due to illness.  She has missed work to be seen. Please excuse her absence.      Sincerely,        America Radford NP    Electronically signed

## 2025-05-19 NOTE — PROGRESS NOTES
SUBJECTIVE:   Carol Guajardo is a 55 year old female presenting with a chief complaint of   Chief Complaint   Patient presents with    Ear Problem     Left pain and bleeding, started last week with bleeding and today feels a little dizzy and off balance.    Had head cold started 2 weeks ago.  Pt noted some blood from the left ear but states it didn't hurt. Now is feeling off balance and having blood from the ear again, and redness to left eye.  Pt has had pressure behind eyes with this illness.    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, diagnosed as fibrosis    Total knee replacement status, left 09/30/2019     Family History   Problem Relation Age of Onset    Anesthesia Reaction Mother         PONV    Hyperlipidemia Mother     Hypertension Mother     Diabetes Mother     Thyroid Disease Mother     Depression Mother     Anxiety Disorder Mother     CABG Father     Heart Failure Father     Coronary Stenting Father     Cardiovascular Father         ICD implant    Lung Cancer Father     Coronary Artery Disease Father     Hypertension Father     Hyperlipidemia Father     Other Cancer Father     Depression Father     Anxiety Disorder Father     Substance Abuse Father     Kidney Disease Maternal Grandmother     Breast Cancer Maternal Grandmother     Abdominal Aortic Aneurysm Maternal Grandmother     Abdominal Aortic Aneurysm Paternal Grandfather     Abdominal Aortic Aneurysm Paternal Uncle     Depression Sister     Anxiety Disorder Sister     Substance Abuse Sister     Thyroid Disease Other     Deep Vein Thrombosis (DVT) No family hx of      Current Outpatient Medications   Medication Sig Dispense Refill    acetaminophen (TYLENOL) 325 MG tablet Take 325-650 mg by mouth every 6 hours as needed for mild pain       albuterol (PROAIR HFA/PROVENTIL HFA/VENTOLIN HFA) 108 (90 Base) MCG/ACT inhaler Inhale 2 puffs into the lungs every 6 hours as needed for shortness of breath, wheezing or cough. 18 g 0    calcium carbonate (OS-BERTA) 500 MG tablet Take 1 tablet by mouth 2 times daily      Ferrous Sulfate 324 (65 Fe) MG TBEC Take 324 mg by mouth 2 times daily      gabapentin (NEURONTIN) 300 MG capsule TAKE 3 CAPSULES BY MOUTH TWICE DAILY 540 capsule 2    hydrOXYzine HCl (ATARAX) 25 MG tablet Take 1 tablet (25 mg) by mouth every 6 hours as needed for anxiety. 30 tablet 0    levothyroxine (SYNTHROID/LEVOTHROID) 175 MCG tablet TAKE 1 TABLET BY MOUTH EVERY MORNING 90 tablet 2    LORazepam (ATIVAN) 1 MG tablet Take 1 tablet (1 mg) by mouth daily as needed for anxiety or vomiting (panic) 10 tablet 0    pramipexole (MIRAPEX) 0.5 MG tablet TAKE 2 TABLETS BY MOUTH AT BEDTIME 180 tablet 3    propranolol ER (INDERAL LA) 60 MG 24 hr capsule TAKE 1 CAPSULE BY MOUTH EVERY MORNING 90 capsule 3    sertraline (ZOLOFT) 100 MG tablet TAKE 1 TABLET BY MOUTH ONCE DAILY 90 tablet 2     Social History     Tobacco Use    Smoking status: Former     Current packs/day: 0.00     Average packs/day: 0.5 packs/day for 36.4 years (18.2 ttl pk-yrs)     Types: Cigarettes     Start date: 1/1/1986     Quit date: 5/15/2022     Years since quitting: 3.0    Smokeless tobacco: Never   Substance Use Topics    Alcohol use: Yes     Alcohol/week: 6.0 standard drinks of alcohol     Types: 6 Cans of beer per week     Comment: maybe every other week       OBJECTIVE  /69   Pulse (!) 48   Temp 97.9  F (36.6  C) (Tympanic)   Resp 16   Wt 78.9 kg (174 lb)   SpO2 97%   BMI 34.67 kg/m      Physical Exam  Constitutional:       Appearance: Normal appearance.   HENT:      Head: Normocephalic.      Right Ear: Tympanic membrane, ear canal and external ear normal.      Left Ear: Drainage present. A middle ear effusion is present. Tympanic membrane is injected.   Eyes:       Conjunctiva/sclera:      Left eye: Left conjunctiva is injected.   Cardiovascular:      Rate and Rhythm: Normal rate and regular rhythm.      Heart sounds: Normal heart sounds.   Pulmonary:      Effort: Pulmonary effort is normal.      Breath sounds: Normal breath sounds.   Neurological:      Mental Status: She is alert.         ASSESSMENT:  1. Acute non-recurrent frontal sinusitis (Primary)    - cefdinir (OMNICEF) 300 MG capsule; Take 1 capsule (300 mg) by mouth 2 times daily for 7 days.  Dispense: 14 capsule; Refill: 0    2. OME (otitis media with effusion), left    - cefdinir (OMNICEF) 300 MG capsule; Take 1 capsule (300 mg) by mouth 2 times daily for 7 days.  Dispense: 14 capsule; Refill: 0        PLAN:  1.  Take antibiotic according to bottle instructions with food to avoid stomach upset.  If you are prone to stomach upset with antibiotics, I recommend adding a probiotic to this regimen.  Culturelle is a trusted brand.  2.  I recommend using Mucinex to help thin mucus secretions.  Adding a saline nasal spray can also be helpful with clearing sinus congestion.  3.  Take Advil or Tylenol as needed for pain or fever control.  4.  Follow-up if you not having any improvement in your symptoms over the next 5 days.

## 2025-06-01 ENCOUNTER — HEALTH MAINTENANCE LETTER (OUTPATIENT)
Age: 55
End: 2025-06-01

## 2025-06-02 ENCOUNTER — PATIENT OUTREACH (OUTPATIENT)
Dept: SURGERY | Facility: CLINIC | Age: 55
End: 2025-06-02
Payer: COMMERCIAL

## 2025-06-02 DIAGNOSIS — F41.1 ANXIETY STATE: ICD-10-CM

## 2025-06-02 RX ORDER — PROPRANOLOL HYDROCHLORIDE 60 MG/1
CAPSULE, EXTENDED RELEASE ORAL
Qty: 90 CAPSULE | Refills: 2 | Status: SHIPPED | OUTPATIENT
Start: 2025-06-02

## 2025-06-02 NOTE — TELEPHONE ENCOUNTER
Patient scheduled for Laparoscopic Anterior Diaphragm Hernia Repair with Mesh with Dr Maya on 6/9/2025.     Called to review Pre-op Education. 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  Patient instructed to stop ibuprofen, naproxen, NSAIDS, fish oil/flax seed oil, Vit E, vitamin/mineral/herbal supplements one week before surgery.    Patient is aware that she will need a  after hospital discharge and a responsible caregiver to stay with them for 48 hours afterwards. The patient was instructed to notify the provider if there are any signs of a cold/flu/fever prior to surgery. The patient was instructed to avoid smoking tobacco/marijuana, vaping, chewing tobacco, or drinking alcohol for 1 week prior to surgery.  Instructed to avoid THC gummies for 48 hours prior to surgery.      Reviewed with patient that Dr Maya would like patient to do the following bowel prep in preparation for surgery:  1-Start taking metamucil daily at the time of her last clinic visit with Dr Maya (patient reports that she has)  2- Start Full Liquid Diet 2 days before surgery.   3- Take Miralax Twice a day the Day before Surgery.    Patient was at work at time of call. Reception was poor. Writer sending above information about bowel prep to patient via [employer] message with writer's call back number for any questions.     Patient appreciated writer's call.     Dayana Myrick RN BSN   Thoracic Surgery RN Care Coordinator

## 2025-06-03 LAB
ABO + RH BLD: NORMAL
BLD GP AB SCN SERPL QL: NEGATIVE
SPECIMEN EXP DATE BLD: NORMAL

## 2025-06-04 ENCOUNTER — ANESTHESIA EVENT (OUTPATIENT)
Dept: SURGERY | Facility: CLINIC | Age: 55
End: 2025-06-04
Payer: COMMERCIAL

## 2025-06-04 ENCOUNTER — PATIENT OUTREACH (OUTPATIENT)
Dept: SURGERY | Facility: CLINIC | Age: 55
End: 2025-06-04

## 2025-06-04 ENCOUNTER — RESULTS FOLLOW-UP (OUTPATIENT)
Dept: SURGERY | Facility: CLINIC | Age: 55
End: 2025-06-04

## 2025-06-04 ENCOUNTER — OFFICE VISIT (OUTPATIENT)
Dept: SURGERY | Facility: CLINIC | Age: 55
End: 2025-06-04
Payer: COMMERCIAL

## 2025-06-04 ENCOUNTER — APPOINTMENT (OUTPATIENT)
Dept: LAB | Facility: CLINIC | Age: 55
End: 2025-06-04
Payer: COMMERCIAL

## 2025-06-04 ENCOUNTER — LAB (OUTPATIENT)
Dept: LAB | Facility: CLINIC | Age: 55
End: 2025-06-04
Payer: COMMERCIAL

## 2025-06-04 VITALS
BODY MASS INDEX: 35.08 KG/M2 | HEIGHT: 59 IN | SYSTOLIC BLOOD PRESSURE: 132 MMHG | HEART RATE: 51 BPM | DIASTOLIC BLOOD PRESSURE: 85 MMHG | TEMPERATURE: 97.9 F | OXYGEN SATURATION: 96 % | WEIGHT: 174 LBS

## 2025-06-04 DIAGNOSIS — Q79.0 MORGAGNI HERNIA: ICD-10-CM

## 2025-06-04 DIAGNOSIS — Z01.818 PREOP EXAMINATION: ICD-10-CM

## 2025-06-04 DIAGNOSIS — Z01.818 PREOP EXAMINATION: Primary | ICD-10-CM

## 2025-06-04 LAB
ANION GAP SERPL CALCULATED.3IONS-SCNC: 10 MMOL/L (ref 7–15)
BLOOD BANK CHART COMMENT: NORMAL
BUN SERPL-MCNC: 17.1 MG/DL (ref 6–20)
CALCIUM SERPL-MCNC: 9.4 MG/DL (ref 8.8–10.4)
CHLORIDE SERPL-SCNC: 106 MMOL/L (ref 98–107)
CREAT SERPL-MCNC: 0.49 MG/DL (ref 0.51–0.95)
EGFRCR SERPLBLD CKD-EPI 2021: >90 ML/MIN/1.73M2
ERYTHROCYTE [DISTWIDTH] IN BLOOD BY AUTOMATED COUNT: 12.2 % (ref 10–15)
GLUCOSE SERPL-MCNC: 96 MG/DL (ref 70–99)
HCO3 SERPL-SCNC: 24 MMOL/L (ref 22–29)
HCT VFR BLD AUTO: 40 % (ref 35–47)
HGB BLD-MCNC: 12.6 G/DL (ref 11.7–15.7)
MCH RBC QN AUTO: 30.1 PG (ref 26.5–33)
MCHC RBC AUTO-ENTMCNC: 31.5 G/DL (ref 31.5–36.5)
MCV RBC AUTO: 96 FL (ref 78–100)
PLATELET # BLD AUTO: 265 10E3/UL (ref 150–450)
POTASSIUM SERPL-SCNC: 4.4 MMOL/L (ref 3.4–5.3)
RBC # BLD AUTO: 4.19 10E6/UL (ref 3.8–5.2)
SODIUM SERPL-SCNC: 140 MMOL/L (ref 135–145)
SPECIMEN EXP DATE BLD: NORMAL
WBC # BLD AUTO: 10.6 10E3/UL (ref 4–11)

## 2025-06-04 PROCEDURE — 36415 COLL VENOUS BLD VENIPUNCTURE: CPT | Performed by: PATHOLOGY

## 2025-06-04 PROCEDURE — 80048 BASIC METABOLIC PNL TOTAL CA: CPT | Performed by: PATHOLOGY

## 2025-06-04 PROCEDURE — 85027 COMPLETE CBC AUTOMATED: CPT | Performed by: PATHOLOGY

## 2025-06-04 RX ORDER — MULTIVITAMIN WITH IRON
1 TABLET ORAL EVERY EVENING
COMMUNITY

## 2025-06-04 RX ORDER — ERGOCALCIFEROL (VITAMIN D2) 10 MCG
TABLET ORAL EVERY MORNING
COMMUNITY

## 2025-06-04 ASSESSMENT — LIFESTYLE VARIABLES: TOBACCO_USE: 1

## 2025-06-04 ASSESSMENT — PAIN SCALES - GENERAL: PAINLEVEL_OUTOF10: NO PAIN (0)

## 2025-06-04 ASSESSMENT — ENCOUNTER SYMPTOMS: DYSRHYTHMIAS: 0

## 2025-06-04 NOTE — PATIENT INSTRUCTIONS
Preparing for Your Surgery      Name:  Carol Guajardo   MRN:  [id number]   :  1970   Today's Date:  2025     The [place] [employer] [address] Construction Project                                Timeline 2025 -2025    This project will affect travel to the [facility] and [facility], as well as the [facility].      Please check the [facility] [address] project website for the most up to date information and give yourself additional time to reach your destination.        Arriving for surgery:  Surgery date:  2025  Arrival time:  5:30 am    Please come to:     [facility]    [address] [place]  [address]     The [facility] ([facility]) [place] Patient/Visitor Ramp is at [address]. Patients and visitors who self-park will receive the reduced hospital parking rate. If the Patient /Visitor Ramp is full, please follow the signs to the [employer] car park located at the [facility] entrance.      [employer] parking is available (24 hours/ 7 days a week)      Discounted parking pass options are available for patients and visitors. They can be purchased at the [employer] desk at the [facility] entrance.     -    Stop at the security desk and they will direct surgery patients to the Surgery Check in and Family Lounge. [phone]        - If you need directions, a wheelchair or an escort please stop at the Information/security desk in the lobby.     __________________________________________________________    *Continue taking metamucil daily.      *TWO Days Before Surgery  Start Full Liquid Diet-  Liquids: Water, juices (with or without pulp), broth, tea, coffee (with or without cream/milk), milk (all types), vegetable juice, and milkshakes.   Foods that become liquid at room temperature: Ice cream, frozen yogurt, sherbet, pudding, Jell-O, and  strained soups.      *One Day Before Surgery  Continue Full Liquids until 8 Hours before your surgery Arrival Time (9:30 pm on 6/8/2025). At that time you will switch to ONLY Clear Liquids per the pre-operative preparation instructions. Stop clear liquids 2 hours prior to your arrival time (3:30 am)       Examples of clear liquids:  Water  Clear broth  Juices (apple, white grape, white cranberry  and cider) without pulp  Noncarbonated, powder based beverages  (lemonade and Alban-Aid)  Sodas (Sprite, 7-Up, ginger ale and seltzer)  Coffee or tea (without milk or cream)  Gatorade     Take Dose of Miralax Twice a day the Day before Surgery. (Miralax can be purchased over the counter).     -  No Alcohol or cannabis products for one week prior to surgery.     Which medicines can I take?    Hold Aspirin for 7 days before surgery.   Hold Multivitamins for 7 days before surgery.  Hold Supplements for 7 days before surgery.  Hold Ibuprofen (Advil, Motrin) for 7 day(s) before surgery--unless otherwise directed by surgeon.  Hold Naproxen (Aleve) for 7 days before surgery.    Take evening/bedtime medications as prescribed the evening before surgery.    Use inhalers as needed the day of surgery.       -  DO NOT take these medications the day of surgery:    Calcium Carbonate (Os-Allen)   Ferrous Sulfate   Hydroxyzine (Atarax)   Magnesium  Cholecalciferol (Vitamin D3)       -  PLEASE TAKE these medications the day of surgery:    Acetaminophen (Tylenol)   Gabapentin (Neurontin)    Levothyroxine (Synthroid)   Lorazepam (Ativan)   Propanolol (Inderal)   Sertraline (Zoloft)               How do I prepare myself?  - Please take 2 showers (one the night prior to surgery and one the morning of surgery) using Scrubcare or Hibiclens soap.    Use this soap only from the neck to your toes. Avoid genital area      Leave the soap on your skin for one minute--then rinse thoroughly.      You may use your own shampoo and conditioner. No other hair  products.   - Please remove all jewelry and body piercings.  - No lotions, deodorants or fragrance.  - No makeup or fingernail polish.   - Bring your ID and insurance card.    -For patients being admitted to the [facility]  Family members are to take the patient belongings with them and place them in the lockers provided in the Family Lounge.  Please limit the items you bring to 1 bag as the lockers are small.      -If you use a CPAP machine, please bring the CPAP machine, tubing, and mask to hospital.    -If you have a Deep Brain Stimulator, Spinal Cord Stimulator, or any Neuro Stimulator device---you must bring the remote control to the hospital.      ALL PATIENTS GOING HOME THE SAME DAY OF SURGERY ARE REQUIRED TO HAVE A RESPONSIBLE ADULT TO DRIVE AND BE IN ATTENDANCE WITH THEM FOR 24 HOURS FOLLOWING SURGERY.    Covid testing policy as of 12/06/2022  Your surgeon will notify and schedule you for a COVID test if one is needed before surgery--please direct any questions or COVID symptoms to your surgeon      Questions or Concerns:    - For any questions regarding the day of surgery or your hospital stay, please contact the Pre Admission Nursing Office at [phone].       - If you have health changes between today and your surgery, please call your surgeon.       - For questions after surgery, please call your surgeons office.           Current Visitor Guidelines    2 adult visitors for adult patients in the pre op area    If additional visitors come (beyond a patient care attendant or a group home caregiver), the additional visitors will be asked to wait in the main lobby of the hospital    Visiting hours: 8 a.m. to 8:30 p.m.    Patients confirmed or suspected to have symptoms of COVID 19 or flu:     No visitors allowed for adult patients.   Children (under age 18) can have 1 named visitor.     People who are sick or showing symptoms of COVID 19 or flu:    Are not allowed to visit patients--we can only  make exceptions in special situations.       Please follow these guidelines for your visit:          Please maintain social distance          Masking is optional--however at times you may be asked to wear a mask for the safety of yourself and others     Clean your hands with alcohol hand . Do this when you arrive at and leave the building and patient room,    And again after you touch your mask or anything in the room.     Go directly to and from the room you are visiting.     Stay in the patient s room during your visit. Limit going to other places in the hospital as much as possible     Leave bags and jackets at home or in the car.     For everyone s health, please don t come and go during your visit. That includes for smoking   during your visit.

## 2025-06-04 NOTE — TELEPHONE ENCOUNTER
[facility]: Thoracic Surgery                                                                                 Called patient to follow up on [employer] message requesting letter for work.   Patient scheduled for surgery Mon June 9, 2025. Patient states that her employer scheduled her to work Friday and Saturday prior to surgery. Dr Maya has ordered a pre-op bowel prep for patient to follow in the days prior to surgery. Patient asking for a Doctor's note excusing her from work starting Friday 6/6/2025 so she can complete her pre-op bowel prep.   Writer will follow up with Dr Maya with patient request.  Will update patient via [employer] once letter is available.     Patient's surgery was originally scheduled for 6/18/2025. Leave paperwork completed for [employer]. Patient reports she updated [employer] of the change in surgery date.    Patient with no additional questions or concerns at this time. Will reach out to [facility] if any questions arise.     Dayana Myrick RN, BSN  Thoracic Surgery RN Care Coordinator

## 2025-06-04 NOTE — H&P
Pre-Operative H & P     CC:  Preoperative exam to assess for increased cardiopulmonary risk while undergoing surgery and anesthesia.    Date of Encounter: 6/4/2025  Primary Care Physician:  Barbara Kirby     Reason for visit:   Encounter Diagnoses   Name Primary?    Preop examination Yes    Morgagni hernia        HPI  Carol Guajardo is a 55 year old female who presents for pre-operative H & P in preparation for  Procedure Information       Case: [id number] Date/Time: 06/09/25 0730    Procedure: Laparoscopic anterior diaphragm hernia repair with mesh. (Abdomen)    Anesthesia type: General    Diagnosis: Diaphragmatic hernia without obstruction and without gangrene [K44.9]    Pre-op diagnosis: Diaphragmatic hernia without obstruction and without gangrene [K44.9]    Location:  OR  /  OR    Providers: Keli Webber MD          History is obtained from the patient and chart review    Patient who has been followed by Dr. 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  Most recently she returned to Dr. Francesco Johnson for evaluation of a morgagni hernia containing colon, causing some abdominal pain.  Her imaging was reviewed and she was counseled for above surgical procedure    Patient's history is otherwise significant for HLD, hypertension, VALERIE, obesity, prediabetes, hypothyroidism, anxiety, depression, RLS, and OA, status post right knee arthroplasty on 6/14/2022.    Hx of abnormal bleeding or anti-platelet use: Denies      Menstrual history: No LMP recorded. Patient is perimenopausal.     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, diagnosed as fibrosis    Total knee replacement status, left 09/30/2019       Past Surgical History  Past Surgical History:   Procedure Laterality Date    ARTHROPLASTY KNEE Left 09/30/2019    Procedure: Left Total Knee Arthroplasty;  Surgeon: Ion Bailey MD;  Location: [facility]    ARTHROPLASTY KNEE Right 06/14/2022    Procedure: ARTHROPLASTY, KNEE, TOTAL RIGHT;  Surgeon: Emilio Pino MD;  Location: [facility]    BIOPSY  One year ago    Lung biopsy    BRONCHOSCOPY (RIGID OR FLEXIBLE), DIAGNOSTIC N/A 04/21/2021    Procedure: BRONCHOSCOPY, WITH BRONCHOALVEOLAR LAVAGE;  Surgeon: Keli Webber MD;  Location: [facility]    CARPAL TUNNEL RELEASE RT/LT Bilateral     ENT SURGERY  3 years ago    EXTERNAL EAR SURGERY      IR LUNG BIOPSY MEDIASTINUM RIGHT Right 08/2018    RLL mass, diagnosed as fibrosis per pt    IR LUNG BIOPSY MEDIASTINUM RIGHT  08/31/2018    LAPAROSCOPY DIAGNOSTIC (GENERAL)  02/2019    LAPAROSCOPY, SURGICAL; REPAIR UMBILICAL HERNIA  08/22/2018    THORACOTOMY, WEDGE RESECTION LUNG, COMBINED Right 04/19/2021    Procedure: Right Thoracotomy, excision of solitary fibrous tumor, intercostal nerve cryo ablation;  Surgeon: Keli Webber MD;  Location:  OR    [facility] LIGATE FALLOPIAN TUBE      Description: Tubal Ligation;  Recorded: 04/09/2008;       Prior to Admission Medications  Current Outpatient Medications   Medication Sig Dispense Refill    acetaminophen (TYLENOL) 325 MG tablet Take 325-650 mg by mouth every 6 hours as needed for mild pain      albuterol (PROAIR HFA/PROVENTIL HFA/VENTOLIN HFA) 108 (90 Base) MCG/ACT inhaler Inhale 2 puffs into the lungs every 6 hours as needed for shortness of breath, wheezing or cough. 18 g 0    calcium carbonate (OS-BERTA) 500 MG tablet Take 1 tablet by mouth every morning.      Ferrous Sulfate 324 (65 Fe) MG TBEC Take 324 mg by mouth every  morning.      gabapentin (NEURONTIN) 300 MG capsule TAKE 3 CAPSULES BY MOUTH TWICE DAILY (Patient taking differently: Take 900 mg by mouth 3 times daily.) 540 capsule 2    hydrOXYzine HCl (ATARAX) 25 MG tablet Take 1 tablet (25 mg) by mouth every 6 hours as needed for anxiety. 30 tablet 0    levothyroxine (SYNTHROID/LEVOTHROID) 175 MCG tablet TAKE 1 TABLET BY MOUTH EVERY MORNING 90 tablet 2    LORazepam (ATIVAN) 1 MG tablet Take 1 tablet (1 mg) by mouth daily as needed for anxiety or vomiting (panic) 10 tablet 0    magnesium 250 MG tablet Take 1 tablet by mouth every evening.      pramipexole (MIRAPEX) 0.5 MG tablet TAKE 2 TABLETS BY MOUTH AT BEDTIME (Patient taking differently: Take 1 mg by mouth at bedtime. TAKE 2 TABLETS BY MOUTH AT BEDTIME.) 180 tablet 3    propranolol ER (INDERAL LA) 60 MG 24 hr capsule TAKE 1 CAPSULE BY MOUTH EVERY MORNING (Patient taking differently: 60 mg every morning.) 90 capsule 2    sertraline (ZOLOFT) 100 MG tablet TAKE 1 TABLET BY MOUTH ONCE DAILY (Patient taking differently: Take 100 mg by mouth every morning.) 90 tablet 2    Vitamin D, Cholecalciferol, 10 MCG (400 UNIT) TABS Take by mouth every morning.         Allergies  Allergies   Allergen Reactions    Amoxicillin-Pot Clavulanate Other (See Comments) and Hives     Edema    Amoxicillin-Pot Clavulanate Hives and Itching    Ciprofloxacin Hives and Itching    Penicillins Hives and Itching       Social History  Social History     Socioeconomic History    Marital status:      Spouse name: Not on file    Number of children: Not on file    Years of education: Not on file    Highest education level: Not on file   Occupational History    Not on file   Tobacco Use    Smoking status: Former     Current packs/day: 0.00     Average packs/day: 0.5 packs/day for 36.4 years (18.2 ttl pk-yrs)     Types: Cigarettes     Start date: 1/1/1986     Quit date: 5/15/2022     Years since quitting: 3.0     Passive exposure: Past    Smokeless tobacco:  Never   Vaping Use    Vaping status: Never Used   Substance and Sexual Activity    Alcohol use: Not Currently     Alcohol/week: 6.0 standard drinks of alcohol     Types: 6 Cans of beer per week     Comment: maybe every other week    Drug use: Yes     Types: Marijuana     Comment: edibles for sleep daily    Sexual activity: Not Currently     Partners: Male     Birth control/protection: Female Surgical, Other   Other Topics Concern    Parent/sibling w/ CABG, MI or angioplasty before 65F 55M? No   Social History Narrative    Not on file     Social Drivers of Health     Financial Resource Strain: Low Risk  (2/14/2025)    Financial Resource Strain     Within the past 12 months, have you or your family members you live with been unable to get utilities (heat, electricity) when it was really needed?: No   Food Insecurity: Low Risk  (2/14/2025)    Food Insecurity     Within the past 12 months, did you worry that your food would run out before you got money to buy more?: No     Within the past 12 months, did the food you bought just not last and you didn t have money to get more?: Patient declined   Transportation Needs: Low Risk  (2/14/2025)    Transportation Needs     Within the past 12 months, has lack of transportation kept you from medical appointments, getting your medicines, non-medical meetings or appointments, work, or from getting things that you need?: No   Physical Activity: Insufficiently Active (2/14/2025)    Exercise Vital Sign     Days of Exercise per Week: 1 day     Minutes of Exercise per Session: 10 min   Stress: Stress Concern Present (2/14/2025)    [employer] - Occupational Stress Questionnaire     Feeling of Stress : Very much   Social Connections: Unknown (2/14/2025)    Social Connection and Isolation Panel [NHANES]     Frequency of Communication with Friends and Family: Not on file     Frequency of Social Gatherings with Friends and Family: Patient declined     Attends  Mandaeism Services: Not on file     Active Member of Clubs or Organizations: Not on file     Attends Club or Organization Meetings: Not on file     Marital Status: Not on file   Interpersonal Safety: Low Risk  (2/14/2025)    Interpersonal Safety     Do you feel physically and emotionally safe where you currently live?: Yes     Within the past 12 months, have you been hit, slapped, kicked or otherwise physically hurt by someone?: No     Within the past 12 months, have you been humiliated or emotionally abused in other ways by your partner or ex-partner?: No   Housing Stability: Low Risk  (2/14/2025)    Housing Stability     Do you have housing? : Yes     Are you worried about losing your housing?: No       Family History  Family History   Problem Relation Age of Onset    Anesthesia Reaction Mother         PONV    Hyperlipidemia Mother     Hypertension Mother     Diabetes Mother     Thyroid Disease Mother     Depression Mother     Anxiety Disorder Mother     CABG Father     Heart Failure Father     Coronary Stenting Father     Cardiovascular Father         ICD implant    Lung Cancer Father     Coronary Artery Disease Father     Hypertension Father     Hyperlipidemia Father     Other Cancer Father     Depression Father     Anxiety Disorder Father     Substance Abuse Father     Kidney Disease Maternal Grandmother     Breast Cancer Maternal Grandmother     Abdominal Aortic Aneurysm Maternal Grandmother     Abdominal Aortic Aneurysm Paternal Grandfather     Abdominal Aortic Aneurysm Paternal Uncle     Depression Sister     Anxiety Disorder Sister     Substance Abuse Sister     Thyroid Disease Other     Deep Vein Thrombosis (DVT) No family hx of        Review of Systems  The complete review of systems is negative other than noted in the HPI or here.   Anesthesia Evaluation   Pt has had prior anesthetic. Type: General and MAC.    History of anesthetic complications  - .  VALERIE.    ROS/MED HX  ENT/Pulmonary: Comment: Patient  "reports having sleep study in past, determined moderate VALERIE. She reported that the decision for CPAP was left up to her and she has never pursued    Albuterol on her list was given in the past for bronchitis. Never uses.     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    (+) sleep apnea, doesn't use CPAP,              tobacco use, Past use,                    (-) recent URI   Neurologic: Comment: RLS   (-) no CVA and no TIA   Cardiovascular:     (+)  - -   -  - -                                 Previous cardiac testing   Echo: Date: 2/6/24 Results:    Stress Test:  Date: Results:    ECG Reviewed:  Date: Results:    Cath:  Date: Results:   (-) taking anticoagulants/antiplatelets, DANIEL and arrhythmias   METS/Exercise Tolerance: >4 METS    Hematologic:    (-) history of blood clots and history of blood transfusion   Musculoskeletal: Comment: S/p right TKA  (+)  arthritis,             GI/Hepatic:    (-) GERD   Renal/Genitourinary:  - neg Renal ROS     Endo:     (+)          thyroid problem, hypothyroidism,    Obesity,       Psychiatric/Substance Use:     (+) psychiatric history anxiety and depression       Infectious Disease:  - neg infectious disease ROS     Malignancy:  - neg malignancy ROS     Other:  - neg other ROS          /85 (BP Location: Right arm, Patient Position: Sitting, Cuff Size: Adult Regular)   Pulse 51   Temp 97.9  F (36.6  C) (Oral)   Ht 1.509 m (4' 11.4\")   Wt 78.9 kg (174 lb)   SpO2 96%   BMI 34.67 kg/m      Physical Exam   Constitutional: Awake, alert, cooperative, no apparent distress, and appears stated age.  Eyes: Pupils equal, round and reactive to light, extra ocular muscles intact, sclera clear, conjunctiva normal.  Glasses on  HENT: Normocephalic, oral pharynx with moist mucus membranes, good dentition. No goiter appreciated.   Respiratory: Clear to auscultation bilaterally, no crackles or wheezing.  No cough or obvious dyspnea  Cardiovascular: Regular " rate and rhythm, normal S1 and S2, and no murmur noted.  Carotids +2, no bruits. No edema. Palpable pulses to radial  DP and PT arteries.   GI: Normal bowel sounds, soft, non-distended, non-tender, no masses palpated, no hepatosplenomegaly.  Surgical scars: lap, well healed  Lymph/Hematologic: No cervical lymphadenopathy and no supraclavicular lymphadenopathy.  Genitourinary: Deferred  Skin: Warm and dry.  No rashes at anticipated surgical site.   Musculoskeletal: Full ROM of neck. There is no redness, warmth, or swelling of the joints. Gross motor strength is normal.    Neurologic: Awake, alert, oriented to name, place and time. Cranial nerves II-XII are grossly intact. Gait is normal.   Neuropsychiatric: Calm, cooperative. Normal affect.     Prior Labs/Diagnostic Studies   All labs and imaging pertinent to the visit personally reviewed     EKG: None available for review    Echocardiogram 2/6/24  Interpretation Summary     1. The left ventricle is normal in structure, function and size. The visual  ejection fraction is estimated at 60%.  2. The right ventricle is normal in structure, function and size.  3. No valve disease.     No previous echo for comparison.        Latest Ref Rng & Units 3/30/2021     4:15 PM   PFT   FVC L 1.68    FEV1 L 1.48    FVC% % 55    FEV1% % 60      4/4/25 CT Chest                                                                  IMPRESSION:  1.  Surgical change in the right lung. No evidence of recurrent or metastatic disease in the chest.  2.  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 No definite obstruction.    The patient's records and results pertinent to the visit personally reviewed by this provider.     Outside records reviewed from: Care Everywhere    LAB/DIAGNOSTIC STUDIES TODAY:    Lab Results   Component Value Date    WBC 10.6 06/04/2025    WBC 11.7 04/20/2021     Lab Results   Component Value Date    RBC 4.19 06/04/2025    RBC 3.98 04/20/2021     Lab  Results   Component Value Date    HGB 12.6 06/04/2025    HGB 12.2 04/20/2021     Lab Results   Component Value Date    HCT 40.0 06/04/2025    HCT 39.5 04/20/2021     Lab Results   Component Value Date    MCV 96 06/04/2025    MCV 99 04/20/2021     Lab Results   Component Value Date    MCH 30.1 06/04/2025    MCH 30.7 04/20/2021     Lab Results   Component Value Date    MCHC 31.5 06/04/2025    MCHC 30.9 04/20/2021     Lab Results   Component Value Date    RDW 12.2 06/04/2025    RDW 13.4 04/20/2021     Lab Results   Component Value Date     06/04/2025     04/20/2021     Last Comprehensive Metabolic Panel:  Lab Results   Component Value Date     06/04/2025    POTASSIUM 4.4 06/04/2025    CHLORIDE 106 06/04/2025    CO2 24 06/04/2025    ANIONGAP 10 06/04/2025    GLC 96 06/04/2025    BUN 17.1 06/04/2025    CR 0.49 (L) 06/04/2025    GFRESTIMATED >90 06/04/2025    BERTA 9.4 06/04/2025     Type and screen    Assessment    Carol Guajardo is a 55 year old female seen as a PAC referral for risk assessment and optimization for anesthesia.    Plan/Recommendations  Pt will be optimized for the proposed procedure.  See below for details on the assessment, risk, and preoperative recommendations    NEUROLOGY  - No history of TIA, CVA or seizure  - RLS.  Will take gabapentin on day of surgery.  Mirapex at bedtime.  -Post Op delirium risk factors:  No risk identified    ENT  - No current airway concerns.  Will need to be reassessed day of surgery.  Mallampati: I  TM: > 3    Some teeth missing  Anesthesia records available    CARDIAC  BP today 132/85.  Denies cardiac history, symptoms, or meds. Last echocardiogram above. Ejection fraction 60%.  No valve disease.  Good exercise tolerance without exertional symptoms.    - METS (Metabolic Equivalents)>4    RCRI: 0.9% risk of serious cardiac events    PULMONARY  Denies asthma, cough or use of the albuterol inhaler on her med list.  This was given for bronchitis several years  "ago.    Patient did have a sleep study a few years ago which returned moderate VALERIE.  Per reports she was given the choice whether or not to pursue CPAP and she never has  - Tobacco History    History   Smoking Status    Former    Types: Cigarettes   Smokeless Tobacco    Never       GI: Denies GERD  PONV Medium Risk  Total Score: 2           1 AN PONV: Pt is Female    1 AN PONV: Patient is not a current smoker        Family history of postop nausea and vomiting in mother  Patient denies postop nausea    /RENAL  - Baseline Creatinine  0.49    ENDOCRINE    - BMI: Estimated body mass index is 34.67 kg/m  as calculated from the following:    Height as of this encounter: 1.509 m (4' 11.4\").    Weight as of this encounter: 78.9 kg (174 lb).  Obesity (BMI >30)  - No history of Diabetes Mellitus  Hypothyroidism.  Will take Synthroid on day of surgery  Last TSH 1.75     HEME  VTE Low Risk 0.26%             Total Score: 0      Denies personal or family history of blood clots  Denies history of blood transfusion   Type and screen drawn today    MSK: OA.  Status post right knee arthroplasty on 6/14/2022    PSYCH  - Anxiety/depression  Will take sertraline on day of surgery. Will take propranolol on day of surgery, used for anxiety  Rare use of lorazepam  Hydroxyzine use as needed at bedtime for sleep    Different anesthesia methods/types have been discussed with the patient, but they are aware that the final plan will be decided by the assigned anesthesia provider on the date of service.  Patient was discussed with Dr. Mooney    The patient is optimized for their procedure. AVS with information on surgery time/arrival time, meds and NPO status given by nursing staff. No further diagnostic testing indicated.        35 minutes were spent on the date of the encounter performing chart review, history and exam, documentation and/or discussion with other providers about the issues documented above.    Megan Mcclure, APRN " CNS  Preoperative Assessment Center  [facility]  Phone: [phone]  Fax: [phone]

## 2025-06-06 ENCOUNTER — PRE VISIT (OUTPATIENT)
Dept: SURGERY | Facility: CLINIC | Age: 55
End: 2025-06-06

## 2025-06-09 ENCOUNTER — HOSPITAL ENCOUNTER (OUTPATIENT)
Facility: CLINIC | Age: 55
Setting detail: OBSERVATION
Discharge: HOME | End: 2025-06-11
Attending: THORACIC SURGERY (CARDIOTHORACIC VASCULAR SURGERY) | Admitting: THORACIC SURGERY (CARDIOTHORACIC VASCULAR SURGERY)
Payer: COMMERCIAL

## 2025-06-09 ENCOUNTER — APPOINTMENT (OUTPATIENT)
Dept: GENERAL RADIOLOGY | Facility: CLINIC | Age: 55
End: 2025-06-09
Attending: THORACIC SURGERY (CARDIOTHORACIC VASCULAR SURGERY)
Payer: COMMERCIAL

## 2025-06-09 ENCOUNTER — APPOINTMENT (OUTPATIENT)
Dept: GENERAL RADIOLOGY | Facility: CLINIC | Age: 55
End: 2025-06-09
Payer: COMMERCIAL

## 2025-06-09 ENCOUNTER — ANESTHESIA (OUTPATIENT)
Dept: SURGERY | Facility: CLINIC | Age: 55
End: 2025-06-09
Payer: COMMERCIAL

## 2025-06-09 DIAGNOSIS — Q79.0 MORGAGNI HERNIA: Primary | ICD-10-CM

## 2025-06-09 LAB
ABO + RH BLD: NORMAL
ANION GAP SERPL CALCULATED.3IONS-SCNC: 9 MMOL/L (ref 7–15)
BLD GP AB SCN SERPL QL: NEGATIVE
BUN SERPL-MCNC: 18.2 MG/DL (ref 6–20)
CALCIUM SERPL-MCNC: 9.2 MG/DL (ref 8.8–10.4)
CHLORIDE SERPL-SCNC: 104 MMOL/L (ref 98–107)
CREAT SERPL-MCNC: 0.65 MG/DL (ref 0.51–0.95)
EGFRCR SERPLBLD CKD-EPI 2021: >90 ML/MIN/1.73M2
ERYTHROCYTE [DISTWIDTH] IN BLOOD BY AUTOMATED COUNT: 12.1 % (ref 10–15)
GLUCOSE BLDC GLUCOMTR-MCNC: 103 MG/DL (ref 70–99)
GLUCOSE SERPL-MCNC: 118 MG/DL (ref 70–99)
HCO3 SERPL-SCNC: 25 MMOL/L (ref 22–29)
HCT VFR BLD AUTO: 36.5 % (ref 35–47)
HGB BLD-MCNC: 11.5 G/DL (ref 11.7–15.7)
MCH RBC QN AUTO: 30.1 PG (ref 26.5–33)
MCHC RBC AUTO-ENTMCNC: 31.5 G/DL (ref 31.5–36.5)
MCV RBC AUTO: 96 FL (ref 78–100)
PLATELET # BLD AUTO: 218 10E3/UL (ref 150–450)
POTASSIUM SERPL-SCNC: 4.4 MMOL/L (ref 3.4–5.3)
RBC # BLD AUTO: 3.82 10E6/UL (ref 3.8–5.2)
SODIUM SERPL-SCNC: 138 MMOL/L (ref 135–145)
SPECIMEN EXP DATE BLD: NORMAL
WBC # BLD AUTO: 11.9 10E3/UL (ref 4–11)

## 2025-06-09 PROCEDURE — 88302 TISSUE EXAM BY PATHOLOGIST: CPT | Mod: 26 | Performed by: PATHOLOGY

## 2025-06-09 PROCEDURE — 258N000003 HC RX IP 258 OP 636: Performed by: NURSE ANESTHETIST, CERTIFIED REGISTERED

## 2025-06-09 PROCEDURE — 88302 TISSUE EXAM BY PATHOLOGIST: CPT | Mod: TC | Performed by: THORACIC SURGERY (CARDIOTHORACIC VASCULAR SURGERY)

## 2025-06-09 PROCEDURE — 250N000009 HC RX 250: Performed by: THORACIC SURGERY (CARDIOTHORACIC VASCULAR SURGERY)

## 2025-06-09 PROCEDURE — 250N000013 HC RX MED GY IP 250 OP 250 PS 637: Performed by: THORACIC SURGERY (CARDIOTHORACIC VASCULAR SURGERY)

## 2025-06-09 PROCEDURE — 120N000002 HC R&B MED SURG/OB UMMC

## 2025-06-09 PROCEDURE — 82962 GLUCOSE BLOOD TEST: CPT

## 2025-06-09 PROCEDURE — 80048 BASIC METABOLIC PNL TOTAL CA: CPT

## 2025-06-09 PROCEDURE — 258N000003 HC RX IP 258 OP 636

## 2025-06-09 PROCEDURE — 250N000009 HC RX 250: Performed by: NURSE ANESTHETIST, CERTIFIED REGISTERED

## 2025-06-09 PROCEDURE — 370N000017 HC ANESTHESIA TECHNICAL FEE, PER MIN: Performed by: THORACIC SURGERY (CARDIOTHORACIC VASCULAR SURGERY)

## 2025-06-09 PROCEDURE — 250N000011 HC RX IP 250 OP 636: Performed by: NURSE ANESTHETIST, CERTIFIED REGISTERED

## 2025-06-09 PROCEDURE — 96360 HYDRATION IV INFUSION INIT: CPT | Mod: XU

## 2025-06-09 PROCEDURE — 360N000077 HC SURGERY LEVEL 4, PER MIN: Performed by: THORACIC SURGERY (CARDIOTHORACIC VASCULAR SURGERY)

## 2025-06-09 PROCEDURE — 71045 X-RAY EXAM CHEST 1 VIEW: CPT

## 2025-06-09 PROCEDURE — 36415 COLL VENOUS BLD VENIPUNCTURE: CPT | Performed by: THORACIC SURGERY (CARDIOTHORACIC VASCULAR SURGERY)

## 2025-06-09 PROCEDURE — 86901 BLOOD TYPING SEROLOGIC RH(D): CPT | Performed by: THORACIC SURGERY (CARDIOTHORACIC VASCULAR SURGERY)

## 2025-06-09 PROCEDURE — 43282 LAP PARAESOPH HER RPR W/MESH: CPT | Mod: GC | Performed by: THORACIC SURGERY (CARDIOTHORACIC VASCULAR SURGERY)

## 2025-06-09 PROCEDURE — 250N000025 HC SEVOFLURANE, PER MIN: Performed by: THORACIC SURGERY (CARDIOTHORACIC VASCULAR SURGERY)

## 2025-06-09 PROCEDURE — 71045 X-RAY EXAM CHEST 1 VIEW: CPT | Mod: 26 | Performed by: STUDENT IN AN ORGANIZED HEALTH CARE EDUCATION/TRAINING PROGRAM

## 2025-06-09 PROCEDURE — C1781 MESH (IMPLANTABLE): HCPCS | Performed by: THORACIC SURGERY (CARDIOTHORACIC VASCULAR SURGERY)

## 2025-06-09 PROCEDURE — 96361 HYDRATE IV INFUSION ADD-ON: CPT | Mod: XU

## 2025-06-09 PROCEDURE — 85014 HEMATOCRIT: CPT

## 2025-06-09 PROCEDURE — 272N000001 HC OR GENERAL SUPPLY STERILE: Performed by: THORACIC SURGERY (CARDIOTHORACIC VASCULAR SURGERY)

## 2025-06-09 PROCEDURE — 71045 X-RAY EXAM CHEST 1 VIEW: CPT | Mod: 26 | Performed by: RADIOLOGY

## 2025-06-09 PROCEDURE — 250N000013 HC RX MED GY IP 250 OP 250 PS 637

## 2025-06-09 PROCEDURE — 250N000011 HC RX IP 250 OP 636: Mod: JZ

## 2025-06-09 PROCEDURE — 999N000065 XR CHEST PORT 1 VIEW

## 2025-06-09 PROCEDURE — 250N000013 HC RX MED GY IP 250 OP 250 PS 637: Performed by: NURSE ANESTHETIST, CERTIFIED REGISTERED

## 2025-06-09 PROCEDURE — 36415 COLL VENOUS BLD VENIPUNCTURE: CPT

## 2025-06-09 PROCEDURE — 250N000011 HC RX IP 250 OP 636: Performed by: THORACIC SURGERY (CARDIOTHORACIC VASCULAR SURGERY)

## 2025-06-09 PROCEDURE — 999N000141 HC STATISTIC PRE-PROCEDURE NURSING ASSESSMENT: Performed by: THORACIC SURGERY (CARDIOTHORACIC VASCULAR SURGERY)

## 2025-06-09 PROCEDURE — 710N000010 HC RECOVERY PHASE 1, LEVEL 2, PER MIN: Performed by: THORACIC SURGERY (CARDIOTHORACIC VASCULAR SURGERY)

## 2025-06-09 DEVICE — GORE DUALMESH BIOMATERIAL 15.0CMX19.0CMX2.0MM
Type: IMPLANTABLE DEVICE | Site: ABDOMEN | Status: FUNCTIONAL
Brand: GORE DUALMESH BIOMATERIAL

## 2025-06-09 RX ORDER — ACETAMINOPHEN 325 MG/1
975 TABLET ORAL ONCE
Status: COMPLETED | OUTPATIENT
Start: 2025-06-09 | End: 2025-06-09

## 2025-06-09 RX ORDER — PROCHLORPERAZINE MALEATE 5 MG/1
10 TABLET ORAL EVERY 6 HOURS PRN
Status: DISCONTINUED | OUTPATIENT
Start: 2025-06-09 | End: 2025-06-11 | Stop reason: HOSPADM

## 2025-06-09 RX ORDER — ONDANSETRON 2 MG/ML
4 INJECTION INTRAMUSCULAR; INTRAVENOUS EVERY 6 HOURS PRN
Status: DISCONTINUED | OUTPATIENT
Start: 2025-06-09 | End: 2025-06-11 | Stop reason: HOSPADM

## 2025-06-09 RX ORDER — ENOXAPARIN SODIUM 100 MG/ML
40 INJECTION SUBCUTANEOUS EVERY 24 HOURS
Status: DISCONTINUED | OUTPATIENT
Start: 2025-06-10 | End: 2025-06-11 | Stop reason: HOSPADM

## 2025-06-09 RX ORDER — HYDROMORPHONE HCL IN WATER/PF 6 MG/30 ML
0.4 PATIENT CONTROLLED ANALGESIA SYRINGE INTRAVENOUS EVERY 4 HOURS PRN
Status: DISCONTINUED | OUTPATIENT
Start: 2025-06-09 | End: 2025-06-11 | Stop reason: HOSPADM

## 2025-06-09 RX ORDER — ONDANSETRON 2 MG/ML
4 INJECTION INTRAMUSCULAR; INTRAVENOUS EVERY 30 MIN PRN
Status: DISCONTINUED | OUTPATIENT
Start: 2025-06-09 | End: 2025-06-09 | Stop reason: HOSPADM

## 2025-06-09 RX ORDER — BISACODYL 10 MG
10 SUPPOSITORY, RECTAL RECTAL DAILY PRN
Status: DISCONTINUED | OUTPATIENT
Start: 2025-06-12 | End: 2025-06-11 | Stop reason: HOSPADM

## 2025-06-09 RX ORDER — DEXAMETHASONE SODIUM PHOSPHATE 4 MG/ML
4 INJECTION, SOLUTION INTRA-ARTICULAR; INTRALESIONAL; INTRAMUSCULAR; INTRAVENOUS; SOFT TISSUE
Status: DISCONTINUED | OUTPATIENT
Start: 2025-06-09 | End: 2025-06-09 | Stop reason: HOSPADM

## 2025-06-09 RX ORDER — OXYCODONE HYDROCHLORIDE 5 MG/1
5 TABLET ORAL
Refills: 0 | Status: CANCELLED | OUTPATIENT
Start: 2025-06-09

## 2025-06-09 RX ORDER — FENTANYL CITRATE 50 UG/ML
INJECTION, SOLUTION INTRAMUSCULAR; INTRAVENOUS PRN
Status: DISCONTINUED | OUTPATIENT
Start: 2025-06-09 | End: 2025-06-09

## 2025-06-09 RX ORDER — NALOXONE HYDROCHLORIDE 0.4 MG/ML
0.1 INJECTION, SOLUTION INTRAMUSCULAR; INTRAVENOUS; SUBCUTANEOUS
Status: CANCELLED | OUTPATIENT
Start: 2025-06-09

## 2025-06-09 RX ORDER — FENTANYL CITRATE 50 UG/ML
25 INJECTION, SOLUTION INTRAMUSCULAR; INTRAVENOUS EVERY 5 MIN PRN
Status: DISCONTINUED | OUTPATIENT
Start: 2025-06-09 | End: 2025-06-09 | Stop reason: ALTCHOICE

## 2025-06-09 RX ORDER — SERTRALINE HYDROCHLORIDE 100 MG/1
100 TABLET, FILM COATED ORAL EVERY MORNING
Status: DISCONTINUED | OUTPATIENT
Start: 2025-06-10 | End: 2025-06-11 | Stop reason: HOSPADM

## 2025-06-09 RX ORDER — AMOXICILLIN 250 MG
1 CAPSULE ORAL 2 TIMES DAILY
Status: DISCONTINUED | OUTPATIENT
Start: 2025-06-09 | End: 2025-06-11 | Stop reason: HOSPADM

## 2025-06-09 RX ORDER — POLYETHYLENE GLYCOL 3350 17 G/17G
17 POWDER, FOR SOLUTION ORAL DAILY
Status: DISCONTINUED | OUTPATIENT
Start: 2025-06-10 | End: 2025-06-11 | Stop reason: HOSPADM

## 2025-06-09 RX ORDER — HYDROMORPHONE HCL IN WATER/PF 6 MG/30 ML
0.4 PATIENT CONTROLLED ANALGESIA SYRINGE INTRAVENOUS EVERY 5 MIN PRN
Status: DISCONTINUED | OUTPATIENT
Start: 2025-06-09 | End: 2025-06-09 | Stop reason: ALTCHOICE

## 2025-06-09 RX ORDER — HYDROXYZINE HYDROCHLORIDE 25 MG/1
25 TABLET, FILM COATED ORAL EVERY 6 HOURS PRN
Status: DISCONTINUED | OUTPATIENT
Start: 2025-06-09 | End: 2025-06-11 | Stop reason: HOSPADM

## 2025-06-09 RX ORDER — LIDOCAINE HYDROCHLORIDE 20 MG/ML
INJECTION, SOLUTION INFILTRATION; PERINEURAL PRN
Status: DISCONTINUED | OUTPATIENT
Start: 2025-06-09 | End: 2025-06-09

## 2025-06-09 RX ORDER — BUPIVACAINE HYDROCHLORIDE AND EPINEPHRINE 2.5; 5 MG/ML; UG/ML
INJECTION, SOLUTION EPIDURAL; INFILTRATION; INTRACAUDAL; PERINEURAL PRN
Status: DISCONTINUED | OUTPATIENT
Start: 2025-06-09 | End: 2025-06-09 | Stop reason: HOSPADM

## 2025-06-09 RX ORDER — FENTANYL CITRATE 50 UG/ML
50 INJECTION, SOLUTION INTRAMUSCULAR; INTRAVENOUS EVERY 5 MIN PRN
Status: DISCONTINUED | OUTPATIENT
Start: 2025-06-09 | End: 2025-06-09 | Stop reason: ALTCHOICE

## 2025-06-09 RX ORDER — ALBUTEROL SULFATE 90 UG/1
INHALANT RESPIRATORY (INHALATION) PRN
Status: DISCONTINUED | OUTPATIENT
Start: 2025-06-09 | End: 2025-06-09

## 2025-06-09 RX ORDER — HYDROMORPHONE HCL IN WATER/PF 6 MG/30 ML
0.2 PATIENT CONTROLLED ANALGESIA SYRINGE INTRAVENOUS EVERY 4 HOURS PRN
Status: DISCONTINUED | OUTPATIENT
Start: 2025-06-09 | End: 2025-06-11 | Stop reason: HOSPADM

## 2025-06-09 RX ORDER — PROPRANOLOL HYDROCHLORIDE 60 MG/1
60 CAPSULE, EXTENDED RELEASE ORAL EVERY MORNING
Status: DISCONTINUED | OUTPATIENT
Start: 2025-06-10 | End: 2025-06-11 | Stop reason: HOSPADM

## 2025-06-09 RX ORDER — LEVOTHYROXINE SODIUM 175 UG/1
175 TABLET ORAL
Status: DISCONTINUED | OUTPATIENT
Start: 2025-06-10 | End: 2025-06-11 | Stop reason: HOSPADM

## 2025-06-09 RX ORDER — ONDANSETRON 4 MG/1
4 TABLET, ORALLY DISINTEGRATING ORAL EVERY 30 MIN PRN
Status: DISCONTINUED | OUTPATIENT
Start: 2025-06-09 | End: 2025-06-09 | Stop reason: HOSPADM

## 2025-06-09 RX ORDER — PROPOFOL 10 MG/ML
INJECTION, EMULSION INTRAVENOUS PRN
Status: DISCONTINUED | OUTPATIENT
Start: 2025-06-09 | End: 2025-06-09

## 2025-06-09 RX ORDER — KETOROLAC TROMETHAMINE 30 MG/ML
INJECTION, SOLUTION INTRAMUSCULAR; INTRAVENOUS PRN
Status: DISCONTINUED | OUTPATIENT
Start: 2025-06-09 | End: 2025-06-09

## 2025-06-09 RX ORDER — SODIUM CHLORIDE, SODIUM LACTATE, POTASSIUM CHLORIDE, CALCIUM CHLORIDE 600; 310; 30; 20 MG/100ML; MG/100ML; MG/100ML; MG/100ML
INJECTION, SOLUTION INTRAVENOUS CONTINUOUS
Status: DISCONTINUED | OUTPATIENT
Start: 2025-06-09 | End: 2025-06-09 | Stop reason: HOSPADM

## 2025-06-09 RX ORDER — ACETAMINOPHEN 325 MG/1
975 TABLET ORAL EVERY 6 HOURS
Status: DISCONTINUED | OUTPATIENT
Start: 2025-06-09 | End: 2025-06-11 | Stop reason: HOSPADM

## 2025-06-09 RX ORDER — OXYCODONE HYDROCHLORIDE 5 MG/1
5 TABLET ORAL EVERY 4 HOURS PRN
Status: DISCONTINUED | OUTPATIENT
Start: 2025-06-09 | End: 2025-06-11 | Stop reason: HOSPADM

## 2025-06-09 RX ORDER — DEXAMETHASONE SODIUM PHOSPHATE 4 MG/ML
4 INJECTION, SOLUTION INTRA-ARTICULAR; INTRALESIONAL; INTRAMUSCULAR; INTRAVENOUS; SOFT TISSUE
Status: CANCELLED | OUTPATIENT
Start: 2025-06-09

## 2025-06-09 RX ORDER — OXYCODONE HYDROCHLORIDE 10 MG/1
10 TABLET ORAL
Refills: 0 | Status: CANCELLED | OUTPATIENT
Start: 2025-06-09

## 2025-06-09 RX ORDER — ONDANSETRON 4 MG/1
4 TABLET, ORALLY DISINTEGRATING ORAL EVERY 6 HOURS PRN
Status: DISCONTINUED | OUTPATIENT
Start: 2025-06-09 | End: 2025-06-11 | Stop reason: HOSPADM

## 2025-06-09 RX ORDER — NALOXONE HYDROCHLORIDE 0.4 MG/ML
0.1 INJECTION, SOLUTION INTRAMUSCULAR; INTRAVENOUS; SUBCUTANEOUS
Status: DISCONTINUED | OUTPATIENT
Start: 2025-06-09 | End: 2025-06-09 | Stop reason: HOSPADM

## 2025-06-09 RX ORDER — METHOCARBAMOL 500 MG/1
500 TABLET, FILM COATED ORAL EVERY 6 HOURS PRN
Status: DISCONTINUED | OUTPATIENT
Start: 2025-06-09 | End: 2025-06-11 | Stop reason: HOSPADM

## 2025-06-09 RX ORDER — ONDANSETRON 2 MG/ML
INJECTION INTRAMUSCULAR; INTRAVENOUS PRN
Status: DISCONTINUED | OUTPATIENT
Start: 2025-06-09 | End: 2025-06-09

## 2025-06-09 RX ORDER — HYDROMORPHONE HCL IN WATER/PF 6 MG/30 ML
0.2 PATIENT CONTROLLED ANALGESIA SYRINGE INTRAVENOUS EVERY 5 MIN PRN
Status: DISCONTINUED | OUTPATIENT
Start: 2025-06-09 | End: 2025-06-09 | Stop reason: ALTCHOICE

## 2025-06-09 RX ORDER — SODIUM CHLORIDE, SODIUM LACTATE, POTASSIUM CHLORIDE, CALCIUM CHLORIDE 600; 310; 30; 20 MG/100ML; MG/100ML; MG/100ML; MG/100ML
INJECTION, SOLUTION INTRAVENOUS CONTINUOUS PRN
Status: DISCONTINUED | OUTPATIENT
Start: 2025-06-09 | End: 2025-06-09

## 2025-06-09 RX ORDER — ONDANSETRON 4 MG/1
4 TABLET, ORALLY DISINTEGRATING ORAL EVERY 30 MIN PRN
Status: CANCELLED | OUTPATIENT
Start: 2025-06-09

## 2025-06-09 RX ORDER — ALBUTEROL SULFATE 90 UG/1
2 INHALANT RESPIRATORY (INHALATION) EVERY 6 HOURS PRN
Status: DISCONTINUED | OUTPATIENT
Start: 2025-06-09 | End: 2025-06-11 | Stop reason: HOSPADM

## 2025-06-09 RX ORDER — OXYCODONE HYDROCHLORIDE 10 MG/1
10 TABLET ORAL EVERY 4 HOURS PRN
Status: DISCONTINUED | OUTPATIENT
Start: 2025-06-09 | End: 2025-06-11 | Stop reason: HOSPADM

## 2025-06-09 RX ORDER — ONDANSETRON 2 MG/ML
4 INJECTION INTRAMUSCULAR; INTRAVENOUS EVERY 30 MIN PRN
Status: CANCELLED | OUTPATIENT
Start: 2025-06-09

## 2025-06-09 RX ORDER — CEFAZOLIN SODIUM/WATER 2 G/20 ML
2 SYRINGE (ML) INTRAVENOUS SEE ADMIN INSTRUCTIONS
Status: DISCONTINUED | OUTPATIENT
Start: 2025-06-09 | End: 2025-06-09 | Stop reason: HOSPADM

## 2025-06-09 RX ORDER — EPHEDRINE SULFATE 50 MG/ML
INJECTION, SOLUTION INTRAMUSCULAR; INTRAVENOUS; SUBCUTANEOUS PRN
Status: DISCONTINUED | OUTPATIENT
Start: 2025-06-09 | End: 2025-06-09

## 2025-06-09 RX ORDER — GABAPENTIN 300 MG/1
900 CAPSULE ORAL 3 TIMES DAILY
Status: DISCONTINUED | OUTPATIENT
Start: 2025-06-09 | End: 2025-06-11 | Stop reason: HOSPADM

## 2025-06-09 RX ORDER — PRAMIPEXOLE DIHYDROCHLORIDE 1 MG/1
1 TABLET ORAL AT BEDTIME
Status: DISCONTINUED | OUTPATIENT
Start: 2025-06-09 | End: 2025-06-11 | Stop reason: HOSPADM

## 2025-06-09 RX ORDER — CEFAZOLIN SODIUM/WATER 2 G/20 ML
2 SYRINGE (ML) INTRAVENOUS
Status: COMPLETED | OUTPATIENT
Start: 2025-06-09 | End: 2025-06-09

## 2025-06-09 RX ADMIN — MIDAZOLAM 2 MG: 1 INJECTION INTRAMUSCULAR; INTRAVENOUS at 07:28

## 2025-06-09 RX ADMIN — PHENYLEPHRINE HYDROCHLORIDE 200 MCG: 10 INJECTION INTRAVENOUS at 07:49

## 2025-06-09 RX ADMIN — ACETAMINOPHEN 975 MG: 325 TABLET ORAL at 16:20

## 2025-06-09 RX ADMIN — FENTANYL CITRATE 50 MCG: 50 INJECTION INTRAMUSCULAR; INTRAVENOUS at 07:41

## 2025-06-09 RX ADMIN — LIDOCAINE HYDROCHLORIDE 60 MG: 20 INJECTION, SOLUTION INFILTRATION; PERINEURAL at 07:41

## 2025-06-09 RX ADMIN — CEFAZOLIN 2 G: 10 INJECTION, POWDER, FOR SOLUTION INTRAVENOUS at 07:41

## 2025-06-09 RX ADMIN — Medication 100 MG: at 10:56

## 2025-06-09 RX ADMIN — ALBUTEROL SULFATE 10 PUFF: 108 INHALANT RESPIRATORY (INHALATION) at 08:43

## 2025-06-09 RX ADMIN — SODIUM CHLORIDE, SODIUM LACTATE, POTASSIUM CHLORIDE, AND CALCIUM CHLORIDE: .6; .31; .03; .02 INJECTION, SOLUTION INTRAVENOUS at 10:03

## 2025-06-09 RX ADMIN — KETOROLAC TROMETHAMINE 15 MG: 30 INJECTION, SOLUTION INTRAMUSCULAR at 10:49

## 2025-06-09 RX ADMIN — HYDROMORPHONE HYDROCHLORIDE 0.2 MG: 0.2 INJECTION, SOLUTION INTRAMUSCULAR; INTRAVENOUS; SUBCUTANEOUS at 16:21

## 2025-06-09 RX ADMIN — Medication 50 MG: at 07:43

## 2025-06-09 RX ADMIN — SODIUM CHLORIDE, SODIUM LACTATE, POTASSIUM CHLORIDE, AND CALCIUM CHLORIDE 1000 ML: .6; .31; .03; .02 INJECTION, SOLUTION INTRAVENOUS at 21:17

## 2025-06-09 RX ADMIN — FENTANYL CITRATE 50 MCG: 50 INJECTION INTRAMUSCULAR; INTRAVENOUS at 11:12

## 2025-06-09 RX ADMIN — ACETAMINOPHEN 975 MG: 325 TABLET ORAL at 21:18

## 2025-06-09 RX ADMIN — PROPOFOL 150 MG: 10 INJECTION, EMULSION INTRAVENOUS at 07:42

## 2025-06-09 RX ADMIN — FENTANYL CITRATE 50 MCG: 50 INJECTION INTRAMUSCULAR; INTRAVENOUS at 11:20

## 2025-06-09 RX ADMIN — ACETAMINOPHEN 975 MG: 325 TABLET, FILM COATED ORAL at 06:24

## 2025-06-09 RX ADMIN — PHENYLEPHRINE HYDROCHLORIDE 0.5 MCG/KG/MIN: 10 INJECTION INTRAVENOUS at 08:03

## 2025-06-09 RX ADMIN — SODIUM CHLORIDE, SODIUM LACTATE, POTASSIUM CHLORIDE, AND CALCIUM CHLORIDE: .6; .31; .03; .02 INJECTION, SOLUTION INTRAVENOUS at 07:28

## 2025-06-09 RX ADMIN — HYDROMORPHONE HYDROCHLORIDE 0.5 MG: 1 INJECTION, SOLUTION INTRAMUSCULAR; INTRAVENOUS; SUBCUTANEOUS at 10:12

## 2025-06-09 RX ADMIN — EPHEDRINE SULFATE 5 MG: 5 INJECTION INTRAVENOUS at 08:38

## 2025-06-09 RX ADMIN — PHENYLEPHRINE HYDROCHLORIDE 200 MCG: 10 INJECTION INTRAVENOUS at 08:03

## 2025-06-09 RX ADMIN — GABAPENTIN 900 MG: 300 CAPSULE ORAL at 19:29

## 2025-06-09 RX ADMIN — OXYCODONE HYDROCHLORIDE 5 MG: 5 TABLET ORAL at 14:25

## 2025-06-09 RX ADMIN — PHENYLEPHRINE HYDROCHLORIDE 200 MCG: 10 INJECTION INTRAVENOUS at 07:57

## 2025-06-09 RX ADMIN — PRAMIPEXOLE DIHYDROCHLORIDE 1 MG: 1 TABLET ORAL at 22:54

## 2025-06-09 RX ADMIN — HYDROMORPHONE HYDROCHLORIDE 0.5 MG: 1 INJECTION, SOLUTION INTRAMUSCULAR; INTRAVENOUS; SUBCUTANEOUS at 10:15

## 2025-06-09 RX ADMIN — FENTANYL CITRATE 100 MCG: 50 INJECTION INTRAMUSCULAR; INTRAVENOUS at 09:52

## 2025-06-09 RX ADMIN — ONDANSETRON 4 MG: 2 INJECTION INTRAMUSCULAR; INTRAVENOUS at 10:45

## 2025-06-09 RX ADMIN — Medication 20 MG: at 08:33

## 2025-06-09 RX ADMIN — SENNOSIDES AND DOCUSATE SODIUM 1 TABLET: 50; 8.6 TABLET ORAL at 19:29

## 2025-06-09 RX ADMIN — Medication 20 MG: at 09:52

## 2025-06-09 ASSESSMENT — ACTIVITIES OF DAILY LIVING (ADL)
ADLS_ACUITY_SCORE: 25
CONCENTRATING,_REMEMBERING_OR_MAKING_DECISIONS_DIFFICULTY: YES
ADLS_ACUITY_SCORE: 23
DOING_ERRANDS_INDEPENDENTLY_DIFFICULTY: NO
WEAR_GLASSES_OR_BLIND: YES
DIFFICULTY_EATING/SWALLOWING: NO
DRESSING/BATHING_DIFFICULTY: NO
ADLS_ACUITY_SCORE: 23
TOILETING_ISSUES: NO
ADLS_ACUITY_SCORE: 24
ADLS_ACUITY_SCORE: 24
ADLS_ACUITY_SCORE: 23
VISION_MANAGEMENT: GLASSES WITH PATIENT
ADLS_ACUITY_SCORE: 23
FALL_HISTORY_WITHIN_LAST_SIX_MONTHS: NO
CHANGE_IN_FUNCTIONAL_STATUS_SINCE_ONSET_OF_CURRENT_ILLNESS/INJURY: NO
ADLS_ACUITY_SCORE: 25
ADLS_ACUITY_SCORE: 23
ADLS_ACUITY_SCORE: 23
ADLS_ACUITY_SCORE: 25
WALKING_OR_CLIMBING_STAIRS_DIFFICULTY: NO
ADLS_ACUITY_SCORE: 25
ADLS_ACUITY_SCORE: 23
ADLS_ACUITY_SCORE: 24
ADLS_ACUITY_SCORE: 23
DIFFICULTY_COMMUNICATING: NO

## 2025-06-09 ASSESSMENT — LIFESTYLE VARIABLES: TOBACCO_USE: 1

## 2025-06-09 ASSESSMENT — ENCOUNTER SYMPTOMS: DYSRHYTHMIAS: 0

## 2025-06-09 NOTE — PROVIDER NOTIFICATION
Notified provider Camila Wright:    Carol Guajardo, [phone]. Pt BP is around 90/60, I see the 100ml LR was discontinued, should I still hold these fluids? Kenzie RAYMUNDO [phone]

## 2025-06-09 NOTE — SUMMARY OF CARE
Nursing Focus: Admission    D: Patient admitted/transferred from PACU via Bed for hernia repair.      I: Upon arrival to the unit patient was oriented to room, unit, and call light. Patient s height, weight, and vital signs were obtained. Allergies reviewed and allergy band applied. MD notified of patient s arrival on the unit.    A: Vital signs stable upon admission. Patient rates pain at 2. Two RN skin assessment completed . Second RN was Virginia CABRERA Significant Skin Findings include Redness in skin folds under breasts, lap sites, and abrasion between breasts.     P: Continue to monitor patient s vitals and intervene as needed. Continue with plan of care. Notify MD with any concerns or changes in patient status.

## 2025-06-09 NOTE — ANESTHESIA POSTPROCEDURE EVALUATION
Patient: Carol Guajardo    Procedure: Procedure(s):  Laparoscopic anterior diaphragm hernia repair with [place] Dual Mesh       Anesthesia Type:  General    Note:  Disposition: Outpatient   Postop Pain Control: Uneventful            Sign Out: Well controlled pain   PONV: No   Neuro/Psych: Uneventful            Sign Out: Acceptable/Baseline neuro status   Airway/Respiratory: Uneventful            Sign Out: Acceptable/Baseline resp. status   CV/Hemodynamics: Uneventful            Sign Out: Acceptable CV status; No obvious hypovolemia; No obvious fluid overload   Other NRE: NONE   DID A NON-ROUTINE EVENT OCCUR? No           Last vitals:  Vitals Value Taken Time   BP 97/57 06/09/25 13:00   Temp 36.4  C (97.6  F) 06/09/25 12:15   Pulse 51 06/09/25 13:14   Resp 15 06/09/25 13:14   SpO2 96 % 06/09/25 13:14   Vitals shown include unfiled device data.    Electronically Signed By: Vj Reyez MD  June 9, 2025  1:15 PM

## 2025-06-09 NOTE — ANESTHESIA CARE TRANSFER NOTE
Patient: Carol Guajardo    Procedure: Procedure(s):  Laparoscopic anterior diaphragm hernia repair with [place] Dual Mesh       Diagnosis: Diaphragmatic hernia without obstruction and without gangrene [K44.9]  Diagnosis Additional Information: No value filed.    Anesthesia Type:   General     Note:    Oropharynx: oropharynx clear of all foreign objects and spontaneously breathing  Level of Consciousness: awake  Oxygen Supplementation: face mask  Level of Supplemental Oxygen (L/min / FiO2): 10  Independent Airway: airway patency satisfactory and stable  Dentition: dentition unchanged  Vital Signs Stable: post-procedure vital signs reviewed and stable  Report to RN Given: handoff report given  Patient transferred to: PACU    Handoff Report: Identifed the Patient, Identified the Reponsible Provider, Reviewed the pertinent medical history, Discussed the surgical course, Reviewed Intra-OP anesthesia mangement and issues during anesthesia, Set expectations for post-procedure period and Allowed opportunity for questions and acknowledgement of understanding      Vitals:  Vitals Value Taken Time   /60 06/09/25 11:15   Temp 98.3    Pulse 69 06/09/25 11:18   Resp 15 06/09/25 11:18   SpO2 100 % 06/09/25 11:18   Vitals shown include unfiled device data.    Electronically Signed By: Kennedi Pete CRNA, APRN CRNA  June 9, 2025  11:19 AM

## 2025-06-09 NOTE — BRIEF OP NOTE
[facility]    Brief Operative Note    Pre-operative diagnosis: Diaphragmatic hernia without obstruction and without gangrene [K44.9]  Post-operative diagnosis Same as pre-operative diagnosis    Procedure: Laparoscopic anterior diaphragm hernia repair with [place] Dual Mesh, N/A - Abdomen    Surgeon: Surgeons and Role:     * Keli Webber MD - Primary     * Marylin Grace MD - Assisting  Anesthesia: General   Estimated Blood Loss: 20cc    Drains: None  Specimens:   ID Type Source Tests Collected by Time Destination   1 : Hernia Sac Tissue Hernia Sac SURGICAL PATHOLOGY EXAM Keli Webber MD 6/9/2025  9:02 AM      Findings:   Morgagni hernia, reduced. Defect repaired with gortex mesh.  Complications: None.  Implants:   Implant Name Type Inv. Item Serial No.  Lot No. LRB No. Used Action   MESH GORTEX DUAL 15.0CMX19.0 MX2.0MM OVAL [id number] - [id number] Mesh MESH GORTEX DUAL 15.0CMX19.0 MX2.0MM OVAL [id number] [employer]  N/A 1 Implanted

## 2025-06-09 NOTE — ANESTHESIA PREPROCEDURE EVALUATION
Anesthesia Pre-Procedure Evaluation    Patient: Carol Guajardo   MRN: [id number] : 1970          Procedure : Procedure(s):  Laparoscopic anterior diaphragm hernia repair with mesh.         Past Medical History:   Diagnosis Date    Anxiety     Depression     Depressive disorder     Diaphragmatic hernia without obstruction and without gangrene     HLD (hyperlipidemia)     Hypothyroidism     Mass of right lower leg 2017    VALERIE (obstructive sleep apnea)     Osteoarthritis of both knees     Prediabetes     Restless legs syndrome (RLS)     Right lower lobe lung mass     Biopsied 2018, diagnosed as fibrosis    Total knee replacement status, left 2019      Past Surgical History:   Procedure Laterality Date    ARTHROPLASTY KNEE Left 2019    Procedure: Left Total Knee Arthroplasty;  Surgeon: Ion Bailey MD;  Location: [facility]    ARTHROPLASTY KNEE Right 2022    Procedure: ARTHROPLASTY, KNEE, TOTAL RIGHT;  Surgeon: Emilio Pino MD;  Location: [facility]    BIOPSY  One year ago    Lung biopsy    BRONCHOSCOPY (RIGID OR FLEXIBLE), DIAGNOSTIC N/A 2021    Procedure: BRONCHOSCOPY, WITH BRONCHOALVEOLAR LAVAGE;  Surgeon: Keli Webber MD;  Location: [facility]    CARPAL TUNNEL RELEASE RT/LT Bilateral     ENT SURGERY  3 years ago    EXTERNAL EAR SURGERY      IR LUNG BIOPSY MEDIASTINUM RIGHT Right 2018    RLL mass, diagnosed as fibrosis per pt    IR LUNG BIOPSY MEDIASTINUM RIGHT  2018    LAPAROSCOPY DIAGNOSTIC (GENERAL)  2019    LAPAROSCOPY, SURGICAL; REPAIR UMBILICAL HERNIA  2018    THORACOTOMY, WEDGE RESECTION LUNG, COMBINED Right 2021    Procedure: Right Thoracotomy, excision of solitary fibrous tumor, intercostal nerve cryo ablation;  Surgeon: Keli Webber MD;  Location:  OR    [facility] LIGATE FALLOPIAN TUBE      Description: Tubal Ligation;  Recorded: 2008;      Allergies   Allergen Reactions    Amoxicillin-Pot Clavulanate Other (See  Comments) and Hives     Edema    Amoxicillin-Pot Clavulanate Hives and Itching    Ciprofloxacin Hives and Itching    Penicillins Hives and Itching      Social History     Tobacco Use    Smoking status: Former     Current packs/day: 0.00     Average packs/day: 0.5 packs/day for 36.4 years (18.2 ttl pk-yrs)     Types: Cigarettes     Start date: 1/1/1986     Quit date: 5/15/2022     Years since quitting: 3.0     Passive exposure: Past    Smokeless tobacco: Never   Substance Use Topics    Alcohol use: Not Currently     Alcohol/week: 6.0 standard drinks of alcohol     Types: 6 Cans of beer per week     Comment: maybe every other week      Wt Readings from Last 1 Encounters:   06/09/25 77.8 kg (171 lb 8.3 oz)        Anesthesia Evaluation   Pt has had prior anesthetic. Type: General and MAC.    History of anesthetic complications  - .  DL GrI in 2021.    ROS/MED HX  ENT/Pulmonary: Comment: Patient reports having sleep study in past, determined moderate VALERIE. She reported that the decision for CPAP was left up to her and she has never pursued    Albuterol on her list was given in the past for bronchitis. Never uses.     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    (+) sleep apnea, doesn't use CPAP,              tobacco use, Past use,                    (-) recent URI   Neurologic: Comment: RLS   (-) no CVA and no TIA   Cardiovascular:     (+)  - -   -  - -                                 Previous cardiac testing   Echo: Date: 2/6/24 Results:    Stress Test:  Date: Results:    ECG Reviewed:  Date: Results:    Cath:  Date: Results:   (-) taking anticoagulants/antiplatelets, DANIEL and arrhythmias   METS/Exercise Tolerance: >4 METS    Hematologic:    (-) history of blood clots and history of blood transfusion   Musculoskeletal: Comment: S/p right TKA  (+)  arthritis,             GI/Hepatic:    (-) GERD   Renal/Genitourinary:  - neg Renal ROS     Endo:     (+)          thyroid problem, hypothyroidism,  "   Obesity,       Psychiatric/Substance Use:     (+) psychiatric history anxiety and depression       Infectious Disease:  - neg infectious disease ROS     Malignancy:  - neg malignancy ROS     Other:  - neg other ROS            Physical Exam  Airway  Mallampati: II  TM distance: >3 FB  Neck ROM: full  Mouth opening: >= 4 cm    Cardiovascular   Rhythm: regular  Rate: normal rate     Dental   (+) Modest Abnormalities - crowns, retainers, 1 or 2 missing teeth      Pulmonary Breath sounds clear to auscultation        Neurological   She appears awake.    Other Findings       OUTSIDE LABS:  CBC:   Lab Results   Component Value Date    WBC 10.6 06/04/2025    WBC 10.3 07/01/2024    HGB 12.6 06/04/2025    HGB 14.6 07/01/2024    HCT 40.0 06/04/2025    HCT 45.8 07/01/2024     06/04/2025     07/01/2024     BMP:   Lab Results   Component Value Date     06/04/2025     01/06/2024    POTASSIUM 4.4 06/04/2025    POTASSIUM 4.5 01/06/2024    CHLORIDE 106 06/04/2025    CHLORIDE 103 01/06/2024    CO2 24 06/04/2025    CO2 32 (H) 01/06/2024    BUN 17.1 06/04/2025    BUN 22.9 (H) 01/06/2024    CR 0.49 (L) 06/04/2025    CR 0.49 (L) 01/06/2024     (H) 06/09/2025    GLC 96 06/04/2025     COAGS: No results found for: \"PTT\", \"INR\", \"FIBR\"  POC:   Lab Results   Component Value Date    BGM 96 04/19/2021    HCG Negative 07/01/2024     HEPATIC:   Lab Results   Component Value Date    ALBUMIN 4.4 01/06/2024    PROTTOTAL 7.7 01/06/2024    ALT 15 01/06/2024    AST 20 01/06/2024    ALKPHOS 122 01/06/2024    BILITOTAL 0.3 01/06/2024     OTHER:   Lab Results   Component Value Date    PH 7.32 (L) 04/19/2021    A1C 5.3 03/18/2011    BERTA 9.4 06/04/2025    MAG 2.1 07/28/2021    TSH 1.75 07/01/2024    T4 1.04 07/28/2021       Anesthesia Plan    ASA Status:  3      NPO Status: NPO Appropriate   Anesthesia Type: General.  Airway: oral.  Induction: intravenous.  Maintenance: Balanced.   Techniques and Equipment:       - " Monitoring Plan: standard ASA monitoring, arterial line kit, processed EEG monitor     Consents    Anesthesia Plan(s) and associated risks, benefits, and realistic alternatives discussed. Questions answered and patient/representative(s) expressed understanding.     - Discussed: anesthesiologist     - Discussed with:  Patient          Blood Consent:      - Discussed with: patient.     - Consented: consented to blood products     Postoperative Care    Pain management: non-narcotic analgesics, plan for postoperative opioid use.     Comments:    Other Comments: I discussed the risks and benefits of general anesthesia with the patient.  Questions were sought and answered.      Timothy Soto MD  Attending Anesthesiologist                  Timothy Soto MD    I have reviewed the pertinent notes and labs in the chart from the past 30 days and (re)examined the patient.  Any updates or changes from those notes are reflected in this note.    Clinically Significant Risk Factors Present on Admission                             # Obesity: Estimated body mass index is 33.5 kg/m  as calculated from the following:    Height as of this encounter: 1.524 m (5').    Weight as of this encounter: 77.8 kg (171 lb 8.3 oz).

## 2025-06-09 NOTE — PROGRESS NOTES
Post Op Check    06/09/2025    Carol Guajardo is a 55 year old female with h/o morgagni hernia now POD#0 s/p laparoscopic morgagni hernia repair with mesh.    Patient seen at the bedside awake, alert, and interactive. Pain was minimal, has been feeling it a bit more recently, now 5/10, but still tolerable. Denies SOB, chest pain, or dizziness. Has had some ice chips and denies nausea/vomiting. Has not yet gotten out of bed; waiting for room upstairs.    /61   Pulse 56   Temp 97.6  F (36.4  C) (Oral)   Resp 25   Ht 1.524 m (5')   Wt 77.8 kg (171 lb 8.3 oz)   SpO2 99%   BMI 33.50 kg/m      Gen: A&O x3, NAD  Chest: breathing non-labored on 1L   Abdomen: soft, appropriately tender, non-distended, incisions covered with dressings, epigastric ecchymosis     CXR: No pneumothorax     A/P: No acute post-op issues.    Marylin Grace  PGY-3 General Surgery

## 2025-06-09 NOTE — ANESTHESIA PROCEDURE NOTES
Airway       Patient location during procedure: OR       Procedure Start/Stop Times: 6/9/2025 7:46 AM  Staff -        CRNA: Kennedi Pete APRN CRNA       Performed By: CRNA  Consent for Airway        Urgency: elective  Indications and Patient Condition       Indications for airway management: deanna-procedural       Induction type:intravenous       Mask difficulty assessment: 1 - vent by mask    Final Airway Details       Final airway type: endotracheal airway       Successful airway: ETT - single  Endotracheal Airway Details        ETT size (mm): 7.0       Cuffed: yes       Successful intubation technique: direct laryngoscopy       DL Blade Type: MAC 3       Grade View of Cords: 1       Adjucts: stylet       Position: Right       Measured from: lips       Secured at (cm): 21       Bite block used: None    Post intubation assessment        Placement verified by: capnometry, equal breath sounds and chest rise        Number of attempts at approach: 1       Secured with: silk tape       Ease of procedure: easy       Dentition: Unchanged    Medication(s) Administered   Medication Administration Time: 6/9/2025 7:46 AM

## 2025-06-09 NOTE — PHARMACY-ADMISSION MEDICATION HISTORY
Pharmacist Admission Medication History    Admission medication history is complete. The information provided in this note is only as accurate as the sources available at the time of the update.    Information Source(s):  Pre-op RN assessment, Dispense history (Surescripts)      Medication History Completed By: Sayda Nix [facility] 6/9/2025 5:53 PM    PTA Med List   Medication Sig Last Dose/Taking    acetaminophen (TYLENOL) 325 MG tablet Take 325-650 mg by mouth every 6 hours as needed for mild pain Past Month    albuterol (PROAIR HFA/PROVENTIL HFA/VENTOLIN HFA) 108 (90 Base) MCG/ACT inhaler Inhale 2 puffs into the lungs every 6 hours as needed for shortness of breath, wheezing or cough. More than a month    calcium carbonate (OS-BERTA) 500 MG tablet Take 1 tablet by mouth every morning. 6/8/2025 Evening    Ferrous Sulfate 324 (65 Fe) MG TBEC Take 324 mg by mouth every morning. 6/8/2025 Evening    gabapentin (NEURONTIN) 300 MG capsule TAKE 3 CAPSULES BY MOUTH TWICE DAILY (Patient taking differently: Take 900 mg by mouth 3 times daily.) 6/9/2025 Morning    hydrOXYzine HCl (ATARAX) 25 MG tablet Take 1 tablet (25 mg) by mouth every 6 hours as needed for anxiety. 6/8/2025 Bedtime    levothyroxine (SYNTHROID/LEVOTHROID) 175 MCG tablet TAKE 1 TABLET BY MOUTH EVERY MORNING 6/9/2025 at  3:00 AM    LORazepam (ATIVAN) 1 MG tablet Take 1 tablet (1 mg) by mouth daily as needed for anxiety or vomiting (panic) More than a month    magnesium 250 MG tablet Take 1 tablet by mouth every evening. 6/8/2025 Evening    pramipexole (MIRAPEX) 0.5 MG tablet TAKE 2 TABLETS BY MOUTH AT BEDTIME (Patient taking differently: Take 1 mg by mouth at bedtime. TAKE 2 TABLETS BY MOUTH AT BEDTIME.) 6/8/2025 Bedtime    propranolol ER (INDERAL LA) 60 MG 24 hr capsule TAKE 1 CAPSULE BY MOUTH EVERY MORNING (Patient taking differently: 60 mg every morning.) 6/9/2025 at  3:00 AM    sertraline (ZOLOFT) 100 MG tablet TAKE 1 TABLET BY MOUTH ONCE DAILY (Patient  taking differently: Take 100 mg by mouth every morning.) 6/9/2025 at  3:00 AM

## 2025-06-09 NOTE — ANESTHESIA PROCEDURE NOTES
Arterial Line Procedure Note    Pre-Procedure   Staff -        Anesthesiologist:  Timothy Soto MD       Performed By: anesthesiologist       Location: OR       Pre-Anesthestic Checklist: patient identified, IV checked, risks and benefits discussed, informed consent, monitors and equipment checked, pre-op evaluation and at physician/surgeon's request  Timeout:       Correct Patient: Yes        Correct Procedure: Yes        Correct Site: Yes        Correct Position: Yes   Line Placement:   This line was placed Post Induction starting at 6/9/2025 7:55 AM and ending at 6/9/2025 7:57 AM  Procedure   Procedure: arterial line       Laterality: right       Insertion Site: radial.  Sterile Prep        Standard elements of sterile barrier followed       Skin prep: Chloraprep  Insertion/Injection        Technique: Seldinger Technique        Catheter Type/Size: 20 G, 1.75 in/4.5 cm quick cath (integral wire)  Narrative         Secured by: other       Tegaderm dressing used.       Complications: None apparent,        Arterial waveform: Yes        IBP within 10% of NIBP: Yes

## 2025-06-10 ENCOUNTER — APPOINTMENT (OUTPATIENT)
Dept: GENERAL RADIOLOGY | Facility: CLINIC | Age: 55
End: 2025-06-10
Payer: COMMERCIAL

## 2025-06-10 LAB
ANION GAP SERPL CALCULATED.3IONS-SCNC: 10 MMOL/L (ref 7–15)
BUN SERPL-MCNC: 16.9 MG/DL (ref 6–20)
CALCIUM SERPL-MCNC: 8.9 MG/DL (ref 8.8–10.4)
CHLORIDE SERPL-SCNC: 104 MMOL/L (ref 98–107)
CREAT SERPL-MCNC: 0.62 MG/DL (ref 0.51–0.95)
EGFRCR SERPLBLD CKD-EPI 2021: >90 ML/MIN/1.73M2
ERYTHROCYTE [DISTWIDTH] IN BLOOD BY AUTOMATED COUNT: 12.4 % (ref 10–15)
GLUCOSE BLDC GLUCOMTR-MCNC: 124 MG/DL (ref 70–99)
GLUCOSE SERPL-MCNC: 101 MG/DL (ref 70–99)
HCO3 SERPL-SCNC: 25 MMOL/L (ref 22–29)
HCT VFR BLD AUTO: 36.5 % (ref 35–47)
HGB BLD-MCNC: 11.4 G/DL (ref 11.7–15.7)
LACTATE SERPL-SCNC: 0.6 MMOL/L (ref 0.7–2)
MCH RBC QN AUTO: 30.2 PG (ref 26.5–33)
MCHC RBC AUTO-ENTMCNC: 31.2 G/DL (ref 31.5–36.5)
MCV RBC AUTO: 97 FL (ref 78–100)
PLATELET # BLD AUTO: 217 10E3/UL (ref 150–450)
POTASSIUM SERPL-SCNC: 4.5 MMOL/L (ref 3.4–5.3)
RBC # BLD AUTO: 3.77 10E6/UL (ref 3.8–5.2)
SODIUM SERPL-SCNC: 139 MMOL/L (ref 135–145)
WBC # BLD AUTO: 10.9 10E3/UL (ref 4–11)

## 2025-06-10 PROCEDURE — 96372 THER/PROPH/DIAG INJ SC/IM: CPT

## 2025-06-10 PROCEDURE — 85014 HEMATOCRIT: CPT

## 2025-06-10 PROCEDURE — 999N000147 HC STATISTIC PT IP EVAL DEFER

## 2025-06-10 PROCEDURE — 71046 X-RAY EXAM CHEST 2 VIEWS: CPT

## 2025-06-10 PROCEDURE — 97530 THERAPEUTIC ACTIVITIES: CPT | Mod: GO

## 2025-06-10 PROCEDURE — 83605 ASSAY OF LACTIC ACID: CPT

## 2025-06-10 PROCEDURE — 82435 ASSAY OF BLOOD CHLORIDE: CPT

## 2025-06-10 PROCEDURE — 250N000011 HC RX IP 250 OP 636

## 2025-06-10 PROCEDURE — 96361 HYDRATE IV INFUSION ADD-ON: CPT

## 2025-06-10 PROCEDURE — 97165 OT EVAL LOW COMPLEX 30 MIN: CPT | Mod: GO

## 2025-06-10 PROCEDURE — 36415 COLL VENOUS BLD VENIPUNCTURE: CPT

## 2025-06-10 PROCEDURE — 250N000013 HC RX MED GY IP 250 OP 250 PS 637

## 2025-06-10 PROCEDURE — 120N000002 HC R&B MED SURG/OB UMMC

## 2025-06-10 RX ORDER — NALOXONE HYDROCHLORIDE 0.4 MG/ML
0.2 INJECTION, SOLUTION INTRAMUSCULAR; INTRAVENOUS; SUBCUTANEOUS
Status: DISCONTINUED | OUTPATIENT
Start: 2025-06-10 | End: 2025-06-11 | Stop reason: HOSPADM

## 2025-06-10 RX ORDER — NALOXONE HYDROCHLORIDE 0.4 MG/ML
0.4 INJECTION, SOLUTION INTRAMUSCULAR; INTRAVENOUS; SUBCUTANEOUS
Status: DISCONTINUED | OUTPATIENT
Start: 2025-06-10 | End: 2025-06-11 | Stop reason: HOSPADM

## 2025-06-10 RX ADMIN — OXYCODONE HYDROCHLORIDE 5 MG: 5 TABLET ORAL at 07:03

## 2025-06-10 RX ADMIN — OXYCODONE HYDROCHLORIDE 5 MG: 5 TABLET ORAL at 00:58

## 2025-06-10 RX ADMIN — SERTRALINE HYDROCHLORIDE 100 MG: 100 TABLET ORAL at 08:59

## 2025-06-10 RX ADMIN — SENNOSIDES AND DOCUSATE SODIUM 1 TABLET: 50; 8.6 TABLET ORAL at 19:41

## 2025-06-10 RX ADMIN — LEVOTHYROXINE SODIUM 175 MCG: 0.17 TABLET ORAL at 09:07

## 2025-06-10 RX ADMIN — ACETAMINOPHEN 975 MG: 325 TABLET ORAL at 11:07

## 2025-06-10 RX ADMIN — GABAPENTIN 900 MG: 300 CAPSULE ORAL at 19:40

## 2025-06-10 RX ADMIN — POLYETHYLENE GLYCOL 3350 17 G: 17 POWDER, FOR SOLUTION ORAL at 08:59

## 2025-06-10 RX ADMIN — SENNOSIDES AND DOCUSATE SODIUM 1 TABLET: 50; 8.6 TABLET ORAL at 08:59

## 2025-06-10 RX ADMIN — ENOXAPARIN SODIUM 40 MG: 40 INJECTION SUBCUTANEOUS at 09:02

## 2025-06-10 RX ADMIN — ACETAMINOPHEN 975 MG: 325 TABLET ORAL at 04:31

## 2025-06-10 RX ADMIN — GABAPENTIN 900 MG: 300 CAPSULE ORAL at 15:08

## 2025-06-10 RX ADMIN — GABAPENTIN 900 MG: 300 CAPSULE ORAL at 08:59

## 2025-06-10 RX ADMIN — ACETAMINOPHEN 975 MG: 325 TABLET ORAL at 16:19

## 2025-06-10 RX ADMIN — OXYCODONE HYDROCHLORIDE 5 MG: 5 TABLET ORAL at 13:17

## 2025-06-10 ASSESSMENT — ACTIVITIES OF DAILY LIVING (ADL)
ADLS_ACUITY_SCORE: 36

## 2025-06-10 NOTE — PLAN OF CARE
Goal Outcome Evaluation:      Plan of Care Reviewed With: patient    Overall Patient Progress: no change    Reason for Admission: h/o morgagni hernia now POD#0 s/p laparoscopic morgagni hernia repair with mesh on 6/9. POD 1.    Status: watcher    RN assumed cares at 2300    Vitals: Unstable, afebrile.  Pain: C/O pain 4/10 in abd, managed with PRN oxycodone 5mg X2.  Neuro: WDL.  Cardiac: Soft Bps, bolus finished with little improvement of BP, provider aware.  Respiratory: On 1 L NC overnight due to SPO2 of 86-90 while sleeping without oxygen. Stating mid 90s on 1L NC. Continuous capnography monitoring 24 hr post op.  : Up to bathroom, voiding adequate output.  GI: LBM prior to admission.  Diet: regular diet  IV/Drains: R & L PIV SL.  Activity: SBA.  Skin: WDL, preventative mepilex in place on sacrum.  Labs: Reviewed.    Plan of Care:     Pt slept well overnight, up to bathroom multiple times overnight. Provider notified of BPs and SPO2, see provider notification notes. Provider updated vital parameters. Continue with the POC.

## 2025-06-10 NOTE — PLAN OF CARE
PT 5A: cancel/defer    PT order received. Per chart and discussion with OT, pt mobilizing SBA to IND, no acute IP PT needs. OT initiated for post surgical precautions and progression of IND mobility/ADLs. Anticipating return to home once medically ready, PT to complete orders and sign off.

## 2025-06-10 NOTE — PROVIDER NOTIFICATION
Provider Camila Wright notified @0154 of SPO2 level between 86-90 while sleeping. Provider notified that patient was put on 1 L NC. Provider responded and agreed with assessment @6238.

## 2025-06-10 NOTE — PROGRESS NOTES
THORACIC BRIEF NOTE    Was alerted about soft Bps. MAPs down to 57. Her UOP was only 200 mL in approx 4 h so she was given a 1 L LR bolus. CBC, BMP, and CXR were unrevealing for insidious causes. She was asx, even while OOB, and had no complaints. Incisions c/d/I. Getting ready to eat. NLB. Her MAPs were essentially unchanged, at 59-66. UOP improved. Will manage this as asymptomatic hypotension, particularly during sleep. Will not pursue aggressive intervention unless she develops new sx or MAPs worsen.     - SBP goal > 80, DBP > 45    Camila Wright MD   P: [phone]  Please refer to [facility] for point of contact. Page is preferred.

## 2025-06-10 NOTE — PLAN OF CARE
Goal Outcome Evaluation:  A&Ox4. Bradycardic in the 50's; B/P is normal at this time. B/P was low overnight; daily propranolol is held. Per Oscar Henry PA-C verbal order. Abdominal incision site pain is controlled with current pain regime. Up to chair with stand by assist for breakfast and walked with physical therapy on the floor and later with .Voids spontaneously. Pt reported passing gas. No BM yet.Uses IS independently.    Continue with POC

## 2025-06-10 NOTE — PROGRESS NOTES
06/10/25 0847   Appointment Info   Signing Clinician's Name / Credentials (OT) MARGIE Anaya   Student Supervision Direct supervision provided   Rehab Comments (OT) Lifting restrictions, OT only       Present no   Living Environment   People in Home spouse  ( (Brant))   Current Living Arrangements house   Home Accessibility stairs to enter home   Number of Stairs, Main Entrance 5   Stair Railings, Main Entrance railing on right side (ascending)   Transportation Anticipated family or friend will provide   Living Environment Comments Pt lives in a 1-level house with her  who is able to provide assist PRN. There are 5 steps outside the home with 1 railing. Tub/shower combo present in bathroom with no grab bars but access to shower chair.   Self-Care   Usual Activity Tolerance excellent   Current Activity Tolerance excellent   Regular Exercise Yes   Equipment Currently Used at Home none;other (see comments)  (Has shower chair, does not use)   Fall history within last six months no   Activity/Exercise/Self-Care Comment Pt previously I in all I/ADLs, condition management, work, driving, and fx mobility.   Instrumental Activities of Daily Living (IADL)   IADL Comments Pt previously I in all IADLs. 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   General Information   Onset of Illness/Injury or Date of Surgery 06/09/25   Referring Physician Marylin Grace MD   Patient/Family Therapy Goal Statement (OT) Return home.   Additional Occupational Profile Info/Pertinent History of Current Problem Per chart: Carol Guajardo is a 55 year old female with h/o morgagni hernia s/p laparoscopic morgagni hernia repair with mesh on 06/09/2025.   Existing Precautions/Restrictions lifting   Left Upper Extremity (Weight-bearing Status) other (see comments)  (10#)   Right Upper Extremity (Weight-bearing Status) other (see comments)  (10#)    General Observations and Info ADULT ICU Early Mobility Protocol   Cognitive Status Examination   Orientation Status orientation to person, place and time   Visual Perception   Visual Impairment/Limitations corrective lenses full-time   Sensory   Sensory Quick Adds sensation intact   Pain Assessment   Patient Currently in Pain Yes, see Vital Sign flowsheet  ((4/10 incisional pain))   Range of Motion Comprehensive   General Range of Motion   (Grossly WFL)   Strength Comprehensive (MMT)   Comment, General Manual Muscle Testing (MMT) Assessment BUE ROM WFL for basic self cares with generalized deconditioning (no formal MMT)   Coordination   Upper Extremity Coordination No deficits were identified   Gross Motor Coordination   (No deficits identified)   Bed Mobility   Bed Mobility supine-sit;sit-supine   Comment (Bed Mobility) SBA   Transfers   Transfers sit-stand transfer;toilet transfer;shower transfer   Transfer Comments SBA PRN   Balance   Balance Comments SBA   Activities of Daily Living   BADL Assessment/Intervention upper body dressing;grooming;toileting;bathing;lower body dressing   Bathing Assessment/Intervention   Comment, (Bathing) SBA   Upper Body Dressing Assessment/Training   Comment, (Upper Body Dressing) SBA per clinical judgement   Lower Body Dressing Assessment/Training   Comment, (Lower Body Dressing) SBA per clinical judgement   Grooming Assessment/Training   Comment, (Grooming) SBA per clinical judgement   Toileting   Comment, (Toileting) SBA per clinical judgement   Clinical Impression   Criteria for Skilled Therapeutic Interventions Met (OT) Yes, treatment indicated   OT Diagnosis Impaired IADL I   OT Problem List-Impairments impacting ADL problems related to;activity tolerance impaired;balance;mobility;strength;pain;post-surgical precautions   Assessment of Occupational Performance 1-3 Performance Deficits   Identified Performance Deficits home management, work, community mobility   Planned  Therapy Interventions (OT) ADL retraining;balance training;bed mobility training;strengthening;transfer training;risk factor education;progressive activity/exercise;home program guidelines   Clinical Decision Making Complexity (OT) problem focused assessment/low complexity   Risk & Benefits of therapy have been explained patient;evaluation/treatment results reviewed;care plan/treatment goals reviewed;risks/benefits reviewed;current/potential barriers reviewed;participants voiced agreement with care plan;participants included   Clinical Impression Comments Pt to benefit from skilled IP OT to address the above mentioned problem list.   OT Total Evaluation Time   OT Eval, Low Complexity Minutes ([id number]) 7   OT Goals   Therapy Frequency (OT) Daily  (x1 more session)   OT Predicted Duration/Target Date for Goal Attainment 06/13/25   OT Goals Hygiene/Grooming;Upper Body Dressing;Bed Mobility;Transfers;Toilet Transfer/Toileting;Home Management;OT Goal 1   OT: Hygiene/Grooming independent;within precautions   OT: Upper Body Dressing Independent;within precautions   OT: Bed Mobility Independent;within precautions   OT: Transfer Independent;within precautions  (Tub tx)   OT: Toilet Transfer/Toileting Independent;within precautions   OT: Home Management Minimal assist;ambulatory level;within precautions   OT: Goal 1 Pt will complete 5 stairs with SBA and with appropriate use of railings by discharge.   OT Discharge Planning   OT Plan flat bed mobility, g/h, functional mobility, dressing   OT Discharge Recommendation (DC Rec) home with assist   OT Rationale for DC Rec Pt mobilizing well post op. SBA PRN for mobility and ADLs. Anticipate safe to return home once medically cleared.   Total Session Time   Total Session Time (sum of timed and untimed services) 7

## 2025-06-10 NOTE — PROGRESS NOTES
Post Op Check    06/10/2025    Carol Guajardo is a 55 year old female with h/o morgagni hernia now POD#0 s/p laparoscopic morgagni hernia repair with mesh.    Patient seen at the bedside awake, alert, and interactive. Pt reports passing gas but no BM yet. Pt reports she does not feel bloated. Pt mentioned she did not feel dizzy when BP dropped yesterday. Pain managed with oxycodone PRN.    /66   Pulse 53   Temp 97.3  F (36.3  C) (Oral)   Resp 16   Ht 1.524 m (5')   Wt 77.8 kg (171 lb 8.3 oz)   SpO2 97%   BMI 33.50 kg/m      Gen: A&O x3, NAD  Chest: breathing non-labored on 1L   Abdomen: soft, appropriately tender, non-distended.    CXR: Pending    A/P: No acute post-op issues.    Plan: Discharge today 6/10/25 if patient is ambulating and no new concerns arise.    Marylin Grace  PGY-3 General Surgery

## 2025-06-10 NOTE — OP NOTE
OPERATIVE REPORT    PREOPERATIVE DIAGNOSES:  1.  Morgagni hernia    POSTOPERATIVE DIAGNOSES:  1.  Morgagni hernia    OPERATION PERFORMED:    Laparoscopic diaphragm herniorrhaphy with PTFE patch.     SURGEON:  Keli Raya MD    ASSISTANT: Marylin Grace MD    ANESTHESIA:  General endotracheal anesthesia.    ESTIMATED BLOOD LOSS:  30 mL.    COMPLICATIONS:  None.    DRAINS: None    SPECIMEN: Hernia sac.     OPERATIVE FINDINGS: herniated transverse colon. No ischemia or necrosis. No intrabdominal adhesions. The defect was repaired with a 10 x 5 cm PTFE patch (2 mm thick mesh).     DESCRIPTION OF OPERATION:  The patient was transported to the operating room and placed supine. Following induction of general endotracheal anesthesia, the abdomen was prepped and draped in sterile fashion. A time out was performed confirming the name of the patient and the correct procedure. SCDs were in place prior to induction and antibiotics were given within 30 min of incision. We placed two 12 mm ports, two finger breaths above the umbilicus on either side of the midline, and two 5 mm ports on the LUQ and RUQ. We entered the abdomen with an open Mian technique, insufflated to 15 mm of mercury and placed in steep reverse trendelenburg.     A large anterior diaphragm hernia was found with herniated transverse colon. The colon was easily reduced, there were no intrabdominal adhesions. We used Ligasure to resect the fat in the pericardiophrenic angle and under the diaphragm until we could clearly identify the edge of the diaphragm and measure the defect, which was 10 x 5 cm. We brought a 2 mm PTFE mesh to the field and secured it circumferentially with horizontal mattress stitches and full pledgets. The patch was sewn to the diaphragm inferiorly and to the anterior abdominal wall fascia superiorly. We did not enter the pleural space or the pericardium.     We confirmed hemostasis, removed the trocars under direct vision and  closed the 12 mm ports with 0 vicryl.     Skin incisions were closed with 3-0 vicryl and steri strips applied. The patient tolerated the procedure well, was extubated in the OR and transferred to PACU. All instruments and sponge counts were correct.     Keli Raya MD

## 2025-06-10 NOTE — PROGRESS NOTES
THORACIC & FOREGUT SURGERY    S:  Hypotensive overnight. Some response with fluid bolus. Pt seen at bedside resting comfortably.       O:  Vitals:    06/10/25 0615 06/10/25 0900 06/10/25 0955 06/10/25 1250   BP: 105/58 120/66 95/41 104/54   BP Location: Left arm Left arm Left arm Left arm   Cuff Size: Adult Regular      Pulse:   63 59   Resp:   17 17   Temp:   98.2  F (36.8  C) 98.2  F (36.8  C)   TempSrc:   Oral Oral   SpO2: 97%  98% 97%   Weight:   79.1 kg (174 lb 6.4 oz)    Height:           A&O, NAD  Breathing non-labored, on 1L O2 per NC  Appears well perfused soft BPs, treated with fluid bolus  Soft, NDNT+ flatus  Distal extremities warm.   No calf tenderness.    A/P: Carol Guajardo is a 55 year old female POD# 1 s/p laparoscopic Morgagni hernia repair with mesh.  - Ambulate  - 2 view CXR in AM  - Lovenox ppx    POSTOP MEDICAL ISSUES: None    Probable discharge to home tomorrow     Clinically Significant Risk Factors                              # Obesity: Estimated body mass index is 34.06 kg/m  as calculated from the following:    Height as of this encounter: 1.524 m (5').    Weight as of this encounter: 79.1 kg (174 lb 6.4 oz)., PRESENT ON ADMISSION            Seen and discussed with ARNULFO Mcginnis CNS     Thoracic and Foregut Surgery  Text page Thoracic Surgery via [facility] Paging/Directory

## 2025-06-10 NOTE — PLAN OF CARE
Goal Outcome Evaluation:    [id number]    VS: VSS  Neuro: AxOx4  Cardiac: BP in 90/60s, provider notified, LR bolus hung.          Respiratory:  WDL  GI/: Peguero out, voiding without issue.   Diet/appetite: Regular diet  Activity: Ax1  Pain: 2/10, managed with tylenol  Skin/drains: Lap sites  Lines: PIVx2 LR at 300ml

## 2025-06-10 NOTE — PROVIDER NOTIFICATION
Provider Camila Wright given an FYI (@ 0031) of continuing low BP and HR, both of which are below the parameters in the orders. Provider asked to update parameters to include a diastolic goal range for their vitals. Awaiting provider acknowledgment or orders.

## 2025-06-11 VITALS
TEMPERATURE: 97.6 F | WEIGHT: 174.4 LBS | DIASTOLIC BLOOD PRESSURE: 61 MMHG | HEART RATE: 70 BPM | BODY MASS INDEX: 34.24 KG/M2 | SYSTOLIC BLOOD PRESSURE: 108 MMHG | OXYGEN SATURATION: 98 % | HEIGHT: 60 IN | RESPIRATION RATE: 18 BRPM

## 2025-06-11 PROCEDURE — 97535 SELF CARE MNGMENT TRAINING: CPT | Mod: GO

## 2025-06-11 PROCEDURE — 250N000011 HC RX IP 250 OP 636

## 2025-06-11 PROCEDURE — 97530 THERAPEUTIC ACTIVITIES: CPT | Mod: GO

## 2025-06-11 PROCEDURE — G0378 HOSPITAL OBSERVATION PER HR: HCPCS

## 2025-06-11 PROCEDURE — 250N000013 HC RX MED GY IP 250 OP 250 PS 637

## 2025-06-11 PROCEDURE — 96372 THER/PROPH/DIAG INJ SC/IM: CPT

## 2025-06-11 RX ORDER — GABAPENTIN 300 MG/1
900 CAPSULE ORAL 3 TIMES DAILY
Qty: 200 CAPSULE | Refills: 0 | Status: ACTIVE | OUTPATIENT
Start: 2025-06-11

## 2025-06-11 RX ORDER — BISACODYL 10 MG
10 SUPPOSITORY, RECTAL RECTAL DAILY PRN
Qty: 10 SUPPOSITORY | Refills: 0 | Status: SHIPPED | OUTPATIENT
Start: 2025-06-12

## 2025-06-11 RX ORDER — ONDANSETRON 4 MG/1
4 TABLET, ORALLY DISINTEGRATING ORAL EVERY 6 HOURS PRN
Qty: 15 TABLET | Refills: 0 | Status: SHIPPED | OUTPATIENT
Start: 2025-06-11

## 2025-06-11 RX ORDER — ACETAMINOPHEN 325 MG/1
975 TABLET ORAL EVERY 8 HOURS
Qty: 200 TABLET | Refills: 0 | Status: SHIPPED | OUTPATIENT
Start: 2025-06-11

## 2025-06-11 RX ORDER — METHOCARBAMOL 500 MG/1
500 TABLET, FILM COATED ORAL 3 TIMES DAILY
Qty: 60 TABLET | Refills: 0 | Status: SHIPPED | OUTPATIENT
Start: 2025-06-11

## 2025-06-11 RX ORDER — IBUPROFEN 200 MG
400 TABLET ORAL EVERY 6 HOURS PRN
Status: DISCONTINUED | OUTPATIENT
Start: 2025-06-11 | End: 2025-06-11 | Stop reason: HOSPADM

## 2025-06-11 RX ORDER — OXYCODONE HYDROCHLORIDE 5 MG/1
5 TABLET ORAL EVERY 4 HOURS PRN
Qty: 10 TABLET | Refills: 0 | Status: SHIPPED | OUTPATIENT
Start: 2025-06-11 | End: 2025-06-12

## 2025-06-11 RX ORDER — POLYETHYLENE GLYCOL 3350 17 G/17G
17 POWDER, FOR SOLUTION ORAL DAILY
Qty: 510 G | Refills: 0 | Status: SHIPPED | OUTPATIENT
Start: 2025-06-11

## 2025-06-11 RX ORDER — AMOXICILLIN 250 MG
1 CAPSULE ORAL 2 TIMES DAILY
Qty: 50 TABLET | Refills: 0 | Status: SHIPPED | OUTPATIENT
Start: 2025-06-11

## 2025-06-11 RX ADMIN — ACETAMINOPHEN 975 MG: 325 TABLET ORAL at 11:05

## 2025-06-11 RX ADMIN — ACETAMINOPHEN 975 MG: 325 TABLET ORAL at 05:05

## 2025-06-11 RX ADMIN — GABAPENTIN 900 MG: 300 CAPSULE ORAL at 07:50

## 2025-06-11 RX ADMIN — POLYETHYLENE GLYCOL 3350 17 G: 17 POWDER, FOR SOLUTION ORAL at 07:45

## 2025-06-11 RX ADMIN — SERTRALINE HYDROCHLORIDE 100 MG: 100 TABLET ORAL at 07:50

## 2025-06-11 RX ADMIN — SENNOSIDES AND DOCUSATE SODIUM 1 TABLET: 50; 8.6 TABLET ORAL at 07:50

## 2025-06-11 RX ADMIN — OXYCODONE HYDROCHLORIDE 5 MG: 5 TABLET ORAL at 05:05

## 2025-06-11 RX ADMIN — PROPRANOLOL HYDROCHLORIDE 60 MG: 60 CAPSULE, EXTENDED RELEASE ORAL at 07:50

## 2025-06-11 RX ADMIN — ENOXAPARIN SODIUM 40 MG: 40 INJECTION SUBCUTANEOUS at 10:30

## 2025-06-11 RX ADMIN — OXYCODONE HYDROCHLORIDE 5 MG: 5 TABLET ORAL at 09:30

## 2025-06-11 RX ADMIN — PRAMIPEXOLE DIHYDROCHLORIDE 1 MG: 1 TABLET ORAL at 00:50

## 2025-06-11 RX ADMIN — LEVOTHYROXINE SODIUM 175 MCG: 0.17 TABLET ORAL at 07:50

## 2025-06-11 ASSESSMENT — ACTIVITIES OF DAILY LIVING (ADL)
ADLS_ACUITY_SCORE: 36
ADLS_ACUITY_SCORE: 38
ADLS_ACUITY_SCORE: 36
ADLS_ACUITY_SCORE: 38
ADLS_ACUITY_SCORE: 36
ADLS_ACUITY_SCORE: 36
ADLS_ACUITY_SCORE: 38
ADLS_ACUITY_SCORE: 36
ADLS_ACUITY_SCORE: 38

## 2025-06-11 NOTE — PLAN OF CARE
[id number]    Goal Outcome Evaluation:      Plan of Care Reviewed With: patient    Overall Patient Progress: improvingOverall Patient Progress: improving       Significant 24 hour events:        Level of Care: Acute    Summary Type: 5A Post Op Report   POD: #2 = 6/11  Complications:    Pain:  Controlled  Neuro:WDL  CV:WDL  Resp:WDL  GI:WDL  :WDL  Skin: .WDL except, integrity  Activity Assistance: independent, assistance, stand-by  Safety/Falls Risk: Level of Risk: Moderate Risk  Precautions: NA      A&Ox4. Afebrile.  VSS on RA. UAL in room and SBA in halls. Denies SOB, dizziness and N/V. Pain controlled w/PRN Oxycodone x1 and scheduled Tylenol.  PIV x2 SL. Uses call light appropriately. Voiding spontaneously w/ AUOP. No BM this shift. Lap sites covered w/ primapore. Able to make needs known. No acute events during shift.        Continue to monitor and follow POC           Yolis Avelar, RN......6/11/2025 5:34 AM

## 2025-06-11 NOTE — PLAN OF CARE
Goal Outcome Evaluation:    Plan of Care Reviewed With: patient  Overall Patient Progress: improving    /61 (BP Location: Right arm)   Pulse 70   Temp 97.6  F (36.4  C) (Oral)   Resp 18   Ht 1.524 m (5')   Wt 79.1 kg (174 lb 6.4 oz)   SpO2 98%   BMI 34.06 kg/m      POD#2 Morgagni Hernia Repair     A+Ox4.  Pain in abd lap sites controlled with prn and sched pain meds.  Lap sites with intact derma bond/steri-strips, no drainage noted, PREET.  +BS/flatus, no BM yet. Voiding spontaneously.  Tolerating regular diet.  Up with SBA.    Discharge instructions and follow up appt reviewed with pt and spouse using teach back. Ready fro discharge

## 2025-06-11 NOTE — PLAN OF CARE
Goal Outcome Evaluation:  A&Ox4. AVSS. Abdominal incision site pain is controlled with current pain regime. Pt is on regular diet; tolerated all her meals; denied N/V. Up to bathroom SBA; voids spontaneously. Pt reported passing gas; denied having BM. Walked in massey x3 with SBA. Uses IS independently.  was at bed side.Continue with POC

## 2025-06-11 NOTE — DISCHARGE INSTRUCTIONS
THORACIC SURGERY DISCHARGE INSTRUCTIONS    DIET: Regular diet as tolerated    If your plans upon discharge include prolonged periods of sitting (i.e a lengthy car or plane ride), it is highly beneficial to get up and walk at least once per hour to help prevent swelling and blood clots.     Small amounts of leakage are normal for 2-3 days after removal of bandages (removed 6/11).  Feel free to call with questions.    You may get incision wet 2 days after operation (6/11). Do not submerge, soak, or scrub incision or swim until seen in follow-up. Steri strips will fall off in 1-2 weeks.     Take incentive spirometer home for continued frequent use    Activity as tolerated, no strenous activity until seen in follow-up, no lifting greater than 20 pounds for the next 4 weeks.    Stay hydrated. Take over the counter fiber (metamucil or benefiber) and stool softeners (Miralax, docusate or senna) if becoming constipated.     Call for fever greater than 101.5, chills, increased size of incision, red skin around incision, vision changes, muscle strength changes, sensation changes, shortness of breath, or other concerns.    No driving while taking narcotic pain medication.    Transition to ibuprofen or tylenol/acetaminophen for pain control. Do not take tylenol/acetaminophen and acetaminophen containing narcotic (e.g., percocet or vicodin) at the same time. If you have known ulcer problems, or kidney trouble (elevated creatinine) do not take the ibuprofen.    In emergencies, call 911    For other Questions or Concerns;   A.) During weekday working hours (Monday through Friday 8am to 4:30pm)   call [phone] and ask to speak to a clinical nurse specialist.     B.) At nights (after 4:30pm), on weekends, or if urgent call [phone] and   tell the   I would like to page job code [phone], the thoracic surgery   fellow on call, please.

## 2025-06-11 NOTE — DISCHARGE SUMMARY
Thoracic Surgery Discharge Summary    NAME: Carol Guajardo   MRN: [id number]   : 1970     DATE OF ADMISSION: 2025     PRE/POSTOPERATIVE DIAGNOSES: Morgagni Hernia    PROCEDURES PERFORMED: Morgagni Hernia Repair    PATHOLOGY RESULTS: In process     CULTURE RESULTS: None     INTRAOPERATIVE COMPLICATIONS: None     POSTOPERATIVE COMPLICATIONS: None     DRAINS/TUBES PRESENT AT DISCHARGE: None    DATE OF DISCHARGE:  25     HOSPITAL COURSE: Carol Guajardo is a 55 year old female who on 2025  underwent the above-named procedures.  She tolerated the operation well and postoperatively was transferred to the general post-surgical unit.  The remainder of her course was essentially uncomplicated.  Prior to discharge, her pain was controlled well, she was able to perform ADLs and ambulate independently without difficulty, and had full return of bowel and bladder function.  On 25, she was discharged to home in stable condition with.    DISCHARGE INSTRUCTIONS:  THORACIC SURGERY DISCHARGE INSTRUCTIONS    DIET: Regular diet as tolerated    If your plans upon discharge include prolonged periods of sitting (i.e a lengthy car or plane ride), it is highly beneficial to get up and walk at least once per hour to help prevent swelling and blood clots.     Small amounts of leakage are normal for 2-3 days after removal of bandages (removed ).  Feel free to call with questions.    You may get incision wet 2 days after operation (). Do not submerge, soak, or scrub incision or swim until seen in follow-up. Steri strips will fall off in 1-2 weeks.     Take incentive spirometer home for continued frequent use    Activity as tolerated, no strenous activity until seen in follow-up, no lifting greater than 20 pounds for the next 4 weeks.    Stay hydrated. Take over the counter fiber (metamucil or benefiber) and stool softeners (Miralax, docusate or senna) if becoming constipated.     Call for fever greater than  101.5, chills, increased size of incision, red skin around incision, vision changes, muscle strength changes, sensation changes, shortness of breath, or other concerns.    No driving while taking narcotic pain medication.    Transition to ibuprofen or tylenol/acetaminophen for pain control. Do not take tylenol/acetaminophen and acetaminophen containing narcotic (e.g., percocet or vicodin) at the same time. If you have known ulcer problems, or kidney trouble (elevated creatinine) do not take the ibuprofen.    In emergencies, call 911    For other Questions or Concerns;   A.) During weekday working hours (Monday through Friday 8am to 4:30pm)   call [phone] and ask to speak to a clinical nurse specialist.     B.) At nights (after 4:30pm), on weekends, or if urgent call [phone] and   tell the   I would like to page job code [phone], the thoracic surgery   fellow on call, please.     FOLLOW UP APPOINTMENTS:   7/10/25    DISCHARGE MEDICATIONS:      Review of your medicines        START taking        Dose / Directions   bisacodyl 10 MG suppository  Commonly known as: DULCOLAX  Used for: Morgagni hernia      Dose: 10 mg  Start taking on: June 12, 2025  Place 1 suppository (10 mg) rectally daily as needed for constipation (Use if magnesium hydroxide (MILK of MAGNESIA) is not effective after 24 hours. May discontinue if patient having bowel movement.).  Quantity: 10 suppository  Refills: 0     magnesium hydroxide 400 MG/5ML suspension  Commonly known as: MILK OF MAGNESIA  Used for: Morgagni hernia      Dose: 30 mL  Take 30 mLs by mouth daily as needed for constipation (Use if polyethylene glycol (Miralax) is not effective after 24 hours.).  Quantity: 354 mL  Refills: 0     methocarbamol 500 MG tablet  Commonly known as: ROBAXIN  Used for: Morgagni hernia      Dose: 500 mg  Take 1 tablet (500 mg) by mouth 3 times daily.  Quantity: 60 tablet  Refills: 0     ondansetron 4 MG ODT tab  Commonly known as:  ZOFRAN ODT  Used for: Morgagni hernia      Dose: 4 mg  Take 1 tablet (4 mg) by mouth every 6 hours as needed for nausea or vomiting.  Quantity: 15 tablet  Refills: 0     oxyCODONE 5 MG tablet  Commonly known as: ROXICODONE  Used for: Morgagni hernia      Dose: 5 mg  Take 1 tablet (5 mg) by mouth every 4 hours as needed for moderate pain.  Quantity: 10 tablet  Refills: 0     polyethylene glycol 17 GM/Dose powder  Commonly known as: MIRALAX  Used for: Morgagni hernia      Dose: 17 g  Take 17 g by mouth daily.  Quantity: 510 g  Refills: 0     senna-docusate 8.6-50 MG tablet  Commonly known as: SENOKOT-S/PERICOLACE  Used for: Morgagni hernia      Dose: 1 tablet  Take 1 tablet by mouth 2 times daily.  Quantity: 50 tablet  Refills: 0            CONTINUE these medicines which may have CHANGED, or have new prescriptions. If we are uncertain of the size of tablets/capsules you have at home, strength may be listed as something that might have changed.        Dose / Directions   acetaminophen 325 MG tablet  Commonly known as: TYLENOL  This may have changed:   how much to take  when to take this  reasons to take this  Used for: Morgagni hernia      Dose: 975 mg  Take 3 tablets (975 mg) by mouth every 8 hours.  Quantity: 200 tablet  Refills: 0     gabapentin 300 MG capsule  Commonly known as: NEURONTIN  This may have changed: See the new instructions.  Used for: Morgagni hernia      Dose: 900 mg  Take 3 capsules (900 mg) by mouth 3 times daily.  Quantity: 200 capsule  Refills: 0     pramipexole 0.5 MG tablet  Commonly known as: MIRAPEX  This may have changed: additional instructions  Used for: Restless leg syndrome      TAKE 2 TABLETS BY MOUTH AT BEDTIME  Quantity: 180 tablet  Refills: 3     propranolol ER 60 MG 24 hr capsule  Commonly known as: INDERAL LA  This may have changed: See the new instructions.  Used for: Anxiety state      TAKE 1 CAPSULE BY MOUTH EVERY MORNING  Quantity: 90 capsule  Refills: 2     sertraline 100 MG  tablet  Commonly known as: ZOLOFT  This may have changed: when to take this  Used for: Moderate episode of recurrent major depressive disorder (H), SHEFALI (generalized anxiety disorder)      Dose: 100 mg  TAKE 1 TABLET BY MOUTH ONCE DAILY  Quantity: 90 tablet  Refills: 2            CONTINUE these medicines which have NOT CHANGED        Dose / Directions   albuterol 108 (90 Base) MCG/ACT inhaler  Commonly known as: PROAIR HFA/PROVENTIL HFA/VENTOLIN HFA  Used for: Acute cough      Dose: 2 puff  Inhale 2 puffs into the lungs every 6 hours as needed for shortness of breath, wheezing or cough.  Quantity: 18 g  Refills: 0     calcium carbonate 500 MG tablet  Commonly known as: OS-BERTA      Dose: 1 tablet  Take 1 tablet by mouth every morning.  Refills: 0     Ferrous Sulfate 324 (65 Fe) MG Tbec      Dose: 324 mg  Take 324 mg by mouth every morning.  Refills: 0     hydrOXYzine HCl 25 MG tablet  Commonly known as: ATARAX  Used for: Insomnia, unspecified type      Dose: 25 mg  Take 1 tablet (25 mg) by mouth every 6 hours as needed for anxiety.  Quantity: 30 tablet  Refills: 0     levothyroxine 175 MCG tablet  Commonly known as: SYNTHROID/LEVOTHROID  Used for: Hypothyroidism, unspecified type      Dose: 175 mcg  TAKE 1 TABLET BY MOUTH EVERY MORNING  Quantity: 90 tablet  Refills: 2     LORazepam 1 MG tablet  Commonly known as: ATIVAN  Used for: SHEFALI (generalized anxiety disorder)      Dose: 1 mg  Take 1 tablet (1 mg) by mouth daily as needed for anxiety or vomiting (panic)  Quantity: 10 tablet  Refills: 0     magnesium 250 MG tablet      Dose: 1 tablet  Take 1 tablet by mouth every evening.  Refills: 0     Vitamin D (Cholecalciferol) 10 MCG (400 UNIT) Tabs      Take by mouth every morning.  Refills: 0               Where to get your medicines        These medications were sent to [facility] - [place], [place] - [address]  [address] [address]      Phone: [phone]   acetaminophen  325 MG tablet  bisacodyl 10 MG suppository  gabapentin 300 MG capsule  magnesium hydroxide 400 MG/5ML suspension  methocarbamol 500 MG tablet  ondansetron 4 MG ODT tab  polyethylene glycol 17 GM/Dose powder  senna-docusate 8.6-50 MG tablet       Some of these will need a paper prescription and others can be bought over the counter. Ask your nurse if you have questions.    Bring a paper prescription for each of these medications  oxyCODONE 5 MG tablet           *MEDICATIONS INTENDED TO BE THE SAME AS PRIOR TO ADMISSION WITH THE EXCEPTION [none], AND ADDED PAIN MEDICATION AND STOOL SOFTENERS     Pt seen and discussed with fellow and/or chief resident and staff surgeon.     DO MARY ANN YarbroughA  [facility]  PGY-1 Resident, General Surgery

## 2025-06-11 NOTE — UTILIZATION REVIEW
"[facility]    Admission Status; Secondary Review Determination     Admission Date: 6/9/2025  5:01 AM      Under the authority of the Utilization Management Committee, the utilization review process indicated a secondary review on the above patient.  The review outcome is based on review of the medical records, discussions with staff, and applying clinical experience noted on the date of the review.          (x) Observation Status Appropriate - This patient does not meet hospital inpatient criteria and is placed in observation status. If this patient's primary payer is Medicare and was admitted as an inpatient, Condition Code 44 should be used and patient status changed to \"observation\".   Insurance Denial    RATIONALE FOR DETERMINATION   55-year-old female with a history of more Cagney hernia underwent laparoscopic more morgagni hernia repair with mesh on 6/9.  Postoperatively she developed mild hypotension with marginal urine output.  She required IV fluid boluses.  Blood pressure has been relatively stable and she is discharging today.  Case discussed with utilization review team and this is apparently an outpatient procedure.  Observation status would be appropriate.  I paged Dr. Rowe with this recommendation.    The severity of illness, intensity of service provided, expected LOS and risk for adverse outcome make the care appropriate for further observation; however, doesn't meet criteria for hospital inpatient admission.  Dr Rowe notified of this determination.        The information on this document is developed by the utilization review team in order for the business office to ensure compliance.  This only denotes the appropriateness of proper admission status and does not reflect the quality of care rendered.         The definitions of Inpatient Status and Observation Status used in making the determination above are those provided in the CMS Coverage Manual, Chapter 1 and Chapter 6, section 70.4.    "   Sincerely,     Vj Denton DO MPH   Physician Advisor  Utilization Review  [facility]

## 2025-06-12 DIAGNOSIS — Q79.0 MORGAGNI HERNIA: ICD-10-CM

## 2025-06-12 LAB
PATH REPORT.COMMENTS IMP SPEC: NORMAL
PATH REPORT.COMMENTS IMP SPEC: NORMAL
PATH REPORT.FINAL DX SPEC: NORMAL
PATH REPORT.GROSS SPEC: NORMAL
PATH REPORT.MICROSCOPIC SPEC OTHER STN: NORMAL
PATH REPORT.RELEVANT HX SPEC: NORMAL
PHOTO IMAGE: NORMAL

## 2025-06-12 RX ORDER — OXYCODONE HYDROCHLORIDE 5 MG/1
5 TABLET ORAL EVERY 4 HOURS PRN
Qty: 40 TABLET | Refills: 0 | Status: SHIPPED | OUTPATIENT
Start: 2025-06-12

## 2025-07-03 ENCOUNTER — PATIENT OUTREACH (OUTPATIENT)
Dept: SURGERY | Facility: CLINIC | Age: 55
End: 2025-07-03
Payer: COMMERCIAL

## 2025-07-03 NOTE — TELEPHONE ENCOUNTER
Called patient to follow up on [employer] message. No answer. Left message requesting return call. Did indicate that writer will be off work in 10 minutes and writer will reply to her [employer] message. Call back number provided.       Dayana Myrick RN, BSN  Thoracic Surgery RN Care Coordinator

## 2025-07-08 ENCOUNTER — PATIENT OUTREACH (OUTPATIENT)
Dept: SURGERY | Facility: CLINIC | Age: 55
End: 2025-07-08
Payer: COMMERCIAL

## 2025-07-08 DIAGNOSIS — M79.2 NERVE PAIN: Primary | ICD-10-CM

## 2025-07-08 RX ORDER — LIDOCAINE 50 MG/G
1 PATCH TOPICAL EVERY 24 HOURS
Qty: 25 PATCH | Refills: 1 | Status: SHIPPED | OUTPATIENT
Start: 2025-07-08

## 2025-07-08 NOTE — TELEPHONE ENCOUNTER
Called patient at the request of ARNULFO Ortez to inquire if she has tried Lidocaine patches. Patient verbalized that she has not but is agreeable to trying to the. Asked if a prescription could be sent to her preferred [facility] pharmacy in [place]. Writer will update ARNULFO Ortez.   Patient will wait to hear from her pharmacy.     Patient appreciated writer's return call.    Dayana Myrick RN, BSN  Thoracic Surgery RN Care Coordinator

## 2025-07-08 NOTE — TELEPHONE ENCOUNTER
"[facility]: Thoracic Surgery                                                                                       S/p Laparoscopic diaphragm herniorrhaphy with PTFE patch with Dr Maya on 6/9/2025.  Called patient to follow up on pain. Spoke with patient.   Patient reports that she is experiencing \"intense nagging pain\" just under her left breast (slightly above the top incision). Describes as feeling like lots of needles, sharp spasm, sensitive to touch. So intense it stops her in her tracks. Is constant but worsens when trying to change positions such as getting up from laying down.    Verbalizes that she is doing well in every other aspect of her recovery. Denies any incisional pain. Only taking Tylenol as needed. Reports daily bowel movements. Tolerating regular diet.     Explained to patient that what she is experiencing sounds like nerve pain and some nerve pain after her particular surgery is expected. Explained that we often prescribe gabapentin for nerve pain. Gabapentin is one of patient's pre-surgical home medications. Takes 900mg twice a day. Gabapentin was increased to three times a day post-operatively.     Verbalized she was hesistant to call because is scheduled for her follow up appointment with ARNULFO Ortez on Thursday Nisreen 10.     Writer will update ARNULFO Ortez on patient's status to see if anything additional can be done for the nerve pain. Will update patient on Delilah's response.     [employer] message sent to ARNULFO Ortez.  "

## 2025-07-09 NOTE — PROGRESS NOTES
THORACIC SURGERY FOLLOW UP VISIT      I saw Mrs. Carol Guajardo in follow-up today. The clinical summary follows:     PREOP DIAGNOSIS   Morgagni hernia  PROCEDURE   Laparoscopic diaphragm hernia repair with PTFE patch  DATE OF PROCEDURE  06/09/2025    COMPLICATIONS  None    INTERVAL STUDIES  Esophagram 07/10/2025: final report pending at time of clinic visit. To my review the contrast flows freely through the esophagus as does the barium tablet.     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, diagnosed as fibrosis    Total knee replacement status, left 09/30/2019      Past Surgical History:   Procedure Laterality Date    ARTHROPLASTY KNEE Left 09/30/2019    Procedure: Left Total Knee Arthroplasty;  Surgeon: Ion Bailey MD;  [facility]    ARTHROPLASTY KNEE Right 06/14/2022    Procedure: ARTHROPLASTY, KNEE, TOTAL RIGHT;  Surgeon: Emilio Pino MD;  Location: [facility]    BIOPSY  One year ago    Lung biopsy    BRONCHOSCOPY (RIGID OR FLEXIBLE), DIAGNOSTIC N/A 04/21/2021    Procedure: BRONCHOSCOPY, WITH BRONCHOALVEOLAR LAVAGE;  Surgeon: Keli Webber MD;  [facility]    CARPAL TUNNEL RELEASE RT/LT Bilateral     ENT SURGERY  3 years ago    EXTERNAL EAR SURGERY      IR LUNG BIOPSY MEDIASTINUM RIGHT Right 08/2018    RLL mass, diagnosed as fibrosis per pt    IR LUNG BIOPSY MEDIASTINUM RIGHT  08/31/2018    LAPAROSCOPIC DIAPHRAMIC PLICATION N/A 6/9/2025    Procedure: Laparoscopic anterior diaphragm hernia repair with [place] Dual Mesh;  Surgeon: Keli Webber MD;  [facility]    LAPAROSCOPY DIAGNOSTIC (GENERAL)  02/2019     LAPAROSCOPY, SURGICAL; REPAIR UMBILICAL HERNIA  08/22/2018    THORACOTOMY, WEDGE RESECTION LUNG, COMBINED Right 04/19/2021    Procedure: Right Thoracotomy, excision of solitary fibrous tumor, intercostal nerve cryo ablation;  Surgeon: Keli Webber MD;  [facility]    [facility] LIGATE FALLOPIAN TUBE      Description: Tubal Ligation;  Recorded: 04/09/2008;      Social History     Socioeconomic History    Marital status:      Spouse name: Not on file    Number of children: Not on file    Years of education: Not on file    Highest education level: Not on file   Occupational History    Not on file   Tobacco Use    Smoking status: Former     Current packs/day: 0.00     Average packs/day: 0.5 packs/day for 36.4 years (18.2 ttl pk-yrs)     Types: Cigarettes     Start date: 1/1/1986     Quit date: 5/15/2022     Years since quitting: 3.1     Passive exposure: Past    Smokeless tobacco: Never   Vaping Use    Vaping status: Never Used   Substance and Sexual Activity    Alcohol use: Not Currently     Alcohol/week: 6.0 standard drinks of alcohol     Types: 6 Cans of beer per week     Comment: maybe every other week    Drug use: Yes     Types: Marijuana     Comment: edibles for sleep daily    Sexual activity: Not Currently     Partners: Male     Birth control/protection: Female Surgical, Other   Other Topics Concern    Parent/sibling w/ CABG, MI or angioplasty before 65F 55M? No   Social History Narrative    Not on file     Social Drivers of Health     Financial Resource Strain: High Risk (6/9/2025)    Financial Resource Strain     Within the past 12 months, have you or your family members you live with been unable to get utilities (heat, electricity) when it was really needed?: Yes   Food Insecurity: Low Risk  (6/9/2025)    Food Insecurity     Within the past 12 months, did you worry that your food would run out before you got money to buy more?: No     Within the past 12 months, did the food you bought just not  last and you didn t have money to get more?: No   Transportation Needs: Low Risk  (6/9/2025)    Transportation Needs     Within the past 12 months, has lack of transportation kept you from medical appointments, getting your medicines, non-medical meetings or appointments, work, or from getting things that you need?: No   Physical Activity: Insufficiently Active (2/14/2025)    Exercise Vital Sign     Days of Exercise per Week: 1 day     Minutes of Exercise per Session: 10 min   Stress: Stress Concern Present (2/14/2025)    [employer] [place] [place] [employer] - Occupational Stress Questionnaire     Feeling of Stress : Very much   Social Connections: Unknown (2/14/2025)    Social Connection and Isolation Panel [NHANES]     Frequency of Communication with Friends and Family: Not on file     Frequency of Social Gatherings with Friends and Family: Patient declined     Attends [employer] Services: Not on file     Active Member of Clubs or Organizations: Not on file     Attends Club or Organization Meetings: Not on file     Marital Status: Not on file   Interpersonal Safety: Low Risk  (6/9/2025)    Interpersonal Safety     Do you feel physically and emotionally safe where you currently live?: Yes     Within the past 12 months, have you been hit, slapped, kicked or otherwise physically hurt by someone?: No     Within the past 12 months, have you been humiliated or emotionally abused in other ways by your partner or ex-partner?: No   Housing Stability: Low Risk  (6/9/2025)    Housing Stability     Do you have housing? : Yes     Are you worried about losing your housing?: No      SUBJECTIVE  Carol is doing well. She has much less shortness of breath compared to before surgery. She does not have trouble with foods getting stuck or coming back up anymore which she is really happy about. She does still have pain under the left breast that is sharp and shooting and feels like needles. 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    OBJECTIVE  /69 (BP Location: Right arm, Patient Position: Sitting, Cuff Size: Adult Regular)   Pulse 57   Temp 97.8  F (36.6  C) (Oral)   Resp 28   Wt 86.2 kg (190 lb)   SpO2 97%   BMI 37.11 kg/m       Her incisions are healing nicely.    From a personal perspective, she is her alone today.    IMPRESSION   Carol is a 55 year-old female status post laparoscopic diaphragm hernia repair with PTFE patch. She is here for post operative follow up.    I advised she allow herself some more time to recover from surgery before returning to work. Additionally, I recommend she return for half days at first and then gradually increase her hours until she gets back up to 10 hour days. She will talk to her employer about this and will get back to us with return to work information so we can draft a return to work letter for her.    Carol does have a lifetime lifting restriction of 40#. She says that at her work anything over 35# requires a team lift so her lifting restriction is manageable for when she does return to work.    PLAN  I spent 25 min on the date of the encounter in chart review, patient visit, review of tests, documentation and/or discussion with other providers about the issues documented above. I reviewed the plan as follows:  Follow up with me in April 2026 with a chest CT prior for solitary fibrous tumor resection surveillance.  All questions were answered and the patient and present family were in agreement with the plan.  I appreciate the opportunity to participate in the care of your patient and will keep you updated.  Sincerely,

## 2025-07-10 ENCOUNTER — ONCOLOGY VISIT (OUTPATIENT)
Dept: SURGERY | Facility: CLINIC | Age: 55
End: 2025-07-10
Payer: COMMERCIAL

## 2025-07-10 ENCOUNTER — ANCILLARY PROCEDURE (OUTPATIENT)
Dept: GENERAL RADIOLOGY | Facility: CLINIC | Age: 55
End: 2025-07-10
Attending: CLINICAL NURSE SPECIALIST
Payer: COMMERCIAL

## 2025-07-10 VITALS
BODY MASS INDEX: 37.11 KG/M2 | RESPIRATION RATE: 28 BRPM | HEART RATE: 57 BPM | DIASTOLIC BLOOD PRESSURE: 69 MMHG | TEMPERATURE: 97.8 F | WEIGHT: 190 LBS | OXYGEN SATURATION: 97 % | SYSTOLIC BLOOD PRESSURE: 108 MMHG

## 2025-07-10 DIAGNOSIS — K44.9 DIAPHRAGMATIC HERNIA WITHOUT OBSTRUCTION AND WITHOUT GANGRENE: ICD-10-CM

## 2025-07-10 DIAGNOSIS — Q79.0 MORGAGNI HERNIA: Primary | ICD-10-CM

## 2025-07-10 PROCEDURE — 99213 OFFICE O/P EST LOW 20 MIN: CPT | Performed by: CLINICAL NURSE SPECIALIST

## 2025-07-10 PROCEDURE — 74220 X-RAY XM ESOPHAGUS 1CNTRST: CPT | Mod: GC | Performed by: RADIOLOGY

## 2025-07-10 ASSESSMENT — PAIN SCALES - GENERAL: PAINLEVEL_OUTOF10: NO PAIN (0)

## 2025-07-10 NOTE — NURSING NOTE
Oncology Rooming Note    July 10, 2025 12:44 PM   Carol Guajardo is a 55 year old female who presents for:    Chief Complaint   Patient presents with    Oncology Clinic Visit     RTN Diaphragmatic hernia without obstruction and without gangrene     Initial Vitals: /69 (BP Location: Right arm, Patient Position: Sitting, Cuff Size: Adult Regular)   Pulse 57   Temp 97.8  F (36.6  C) (Oral)   Resp 28   Wt 86.2 kg (190 lb)   SpO2 97%   BMI 37.11 kg/m   Estimated body mass index is 37.11 kg/m  as calculated from the following:    Height as of 6/9/25: 1.524 m (5').    Weight as of this encounter: 86.2 kg (190 lb). Body surface area is 1.91 meters squared.  No Pain (0) Comment: Data Unavailable   No LMP recorded. Patient is perimenopausal.  Allergies reviewed: Yes  Medications reviewed: Yes    Medications: Medication refills not needed today.  Pharmacy name entered into [employer]: [id number] - [place], [place] - [address] [facility]    Frailty Screening:   Is the patient here for a new oncology consult visit in cancer care? 2. No    PHQ9:  Did this patient require a PHQ9?: No      Clinical concerns: Would like to discuss when can return to work and what the restrictions will be.       Juan Lei

## 2025-07-10 NOTE — LETTER
7/10/2025      Carol Guajardo  [address] [address] [id number]      Dear Colleague,    Thank you for referring your patient, Carol Guajardo, to the [facility]. Please see a copy of my visit note below.    THORACIC SURGERY FOLLOW UP VISIT      I saw Mrs. Carol Guajardo in follow-up today. The clinical summary follows:     PREOP DIAGNOSIS   Morgagni hernia  PROCEDURE   Laparoscopic diaphragm hernia repair with PTFE patch  DATE OF PROCEDURE  06/09/2025    COMPLICATIONS  None    INTERVAL STUDIES  Esophagram 07/10/2025: final report pending at time of clinic visit. To my review the contrast flows freely through the esophagus as does the barium tablet.     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, diagnosed as fibrosis     Total knee replacement status, left 09/30/2019      Past Surgical History:   Procedure Laterality Date     ARTHROPLASTY KNEE Left 09/30/2019    Procedure: Left Total Knee Arthroplasty;  Surgeon: Ion Bailey MD;  [facility]     ARTHROPLASTY KNEE Right 06/14/2022    Procedure: ARTHROPLASTY, KNEE, TOTAL RIGHT;  Surgeon: Emilio Pino MD;  Location: [facility]     BIOPSY  One year ago    Lung biopsy     BRONCHOSCOPY (RIGID OR FLEXIBLE), DIAGNOSTIC N/A 04/21/2021    Procedure: BRONCHOSCOPY, WITH BRONCHOALVEOLAR LAVAGE;  Surgeon: Keli Webber MD;  [facility]     CARPAL TUNNEL RELEASE RT/LT Bilateral      ENT SURGERY  3 years ago     EXTERNAL EAR SURGERY       IR LUNG BIOPSY MEDIASTINUM RIGHT Right 08/2018    RLL mass, diagnosed as fibrosis per pt     IR LUNG BIOPSY  MEDIASTINUM RIGHT  08/31/2018     LAPAROSCOPIC DIAPHRAMIC PLICATION N/A 6/9/2025    Procedure: Laparoscopic anterior diaphragm hernia repair with [place] Dual Mesh;  Surgeon: Keli Webber MD;  [facility]     LAPAROSCOPY DIAGNOSTIC (GENERAL)  02/2019     LAPAROSCOPY, SURGICAL; REPAIR UMBILICAL HERNIA  08/22/2018     THORACOTOMY, WEDGE RESECTION LUNG, COMBINED Right 04/19/2021    Procedure: Right Thoracotomy, excision of solitary fibrous tumor, intercostal nerve cryo ablation;  Surgeon: Keli Webber MD;  [facility]     ZZC LIGATE FALLOPIAN TUBE      Description: Tubal Ligation;  Recorded: 04/09/2008;      Social History     Socioeconomic History     Marital status:      Spouse name: Not on file     Number of children: Not on file     Years of education: Not on file     Highest education level: Not on file   Occupational History     Not on file   Tobacco Use     Smoking status: Former     Current packs/day: 0.00     Average packs/day: 0.5 packs/day for 36.4 years (18.2 ttl pk-yrs)     Types: Cigarettes     Start date: 1/1/1986     Quit date: 5/15/2022     Years since quitting: 3.1     Passive exposure: Past     Smokeless tobacco: Never   Vaping Use     Vaping status: Never Used   Substance and Sexual Activity     Alcohol use: Not Currently     Alcohol/week: 6.0 standard drinks of alcohol     Types: 6 Cans of beer per week     Comment: maybe every other week     Drug use: Yes     Types: Marijuana     Comment: edibles for sleep daily     Sexual activity: Not Currently     Partners: Male     Birth control/protection: Female Surgical, Other   Other Topics Concern     Parent/sibling w/ CABG, MI or angioplasty before 65F 55M? No   Social History Narrative     Not on file     Social Drivers of Health     Financial Resource Strain: High Risk (6/9/2025)    Financial Resource Strain      Within the past 12 months, have you or your family members you live with been unable to get utilities  (heat, electricity) when it was really needed?: Yes   Food Insecurity: Low Risk  (6/9/2025)    Food Insecurity      Within the past 12 months, did you worry that your food would run out before you got money to buy more?: No      Within the past 12 months, did the food you bought just not last and you didn t have money to get more?: No   Transportation Needs: Low Risk  (6/9/2025)    Transportation Needs      Within the past 12 months, has lack of transportation kept you from medical appointments, getting your medicines, non-medical meetings or appointments, work, or from getting things that you need?: No   Physical Activity: Insufficiently Active (2/14/2025)    Exercise Vital Sign      Days of Exercise per Week: 1 day      Minutes of Exercise per Session: 10 min   Stress: Stress Concern Present (2/14/2025)    [employer] [place] [employer] - Occupational Stress Questionnaire      Feeling of Stress : Very much   Social Connections: Unknown (2/14/2025)    Social Connection and Isolation Panel [NHANES]      Frequency of Communication with Friends and Family: Not on file      Frequency of Social Gatherings with Friends and Family: Patient declined      Attends Ep[employer] Services: Not on file      Active Member of Clubs or Organizations: Not on file      Attends Club or Organization Meetings: Not on file      Marital Status: Not on file   Interpersonal Safety: Low Risk  (6/9/2025)    Interpersonal Safety      Do you feel physically and emotionally safe where you currently live?: Yes      Within the past 12 months, have you been hit, slapped, kicked or otherwise physically hurt by someone?: No      Within the past 12 months, have you been humiliated or emotionally abused in other ways by your partner or ex-partner?: No   Housing Stability: Low Risk  (6/9/2025)    Housing Stability      Do you have housing? : Yes      Are you worried about losing your housing?: No      SUBJECTIVE  Carol is doing well. She has much  less shortness of breath compared to before surgery. She does not have trouble with foods getting stuck or coming back up anymore which she is really happy about. She does still have pain under the left breast that is sharp and shooting and feels like needles. 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    OBJECTIVE  /69 (BP Location: Right arm, Patient Position: Sitting, Cuff Size: Adult Regular)   Pulse 57   Temp 97.8  F (36.6  C) (Oral)   Resp 28   Wt 86.2 kg (190 lb)   SpO2 97%   BMI 37.11 kg/m       Her incisions are healing nicely.    From a personal perspective, she is her alone today.    IMPRESSION   Carol is a 55 year-old female status post laparoscopic diaphragm hernia repair with PTFE patch. She is here for post operative follow up.    I advised she allow herself some more time to recover from surgery before returning to work. Additionally, I recommend she return for half days at first and then gradually increase her hours until she gets back up to 10 hour days. She will talk to her employer about this and will get back to us with return to work information so we can draft a return to work letter for her.    Carol does have a lifetime lifting restriction of 40#. She says that at her work anything over 35# requires a team lift so her lifting restriction is manageable for when she does return to work.    PLAN  I spent 25 min on the date of the encounter in chart review, patient visit, review of tests, documentation and/or discussion with other providers about the issues documented above. I reviewed the plan as follows:  Follow up with me in April 2026 with a chest CT prior for solitary fibrous tumor resection surveillance.  All questions were answered and the patient and present family were in agreement with the plan.  I appreciate the opportunity to participate in the care of your patient and will  keep you updated.  Sincerely,      Again, thank you for allowing me to participate in the care of your patient.        Sincerely,        ARNULFO Hanna    Electronically signed

## 2025-07-23 ENCOUNTER — DOCUMENTATION ONLY (OUTPATIENT)
Dept: SURGERY | Facility: CLINIC | Age: 55
End: 2025-07-23
Payer: COMMERCIAL

## 2025-07-23 NOTE — PROGRESS NOTES
[employer] requesting information for evaluating patient's disability benefits. Requested documentation faxed to [phone].    Dayana Myrick RN, BSN  Thoracic Surgery RN Care Coordinator

## (undated) DEVICE — SOL NACL 0.9% INJ 1000ML BAG 2B1324X

## (undated) DEVICE — BONE CEMENT MIXEVAC III HI VAC KIT  0206-015-000

## (undated) DEVICE — SU SILK 0 SH 30" K834H

## (undated) DEVICE — ESU ELEC BLADE 6" COATED/INSULATED E1455-6

## (undated) DEVICE — ESU ELEC BLADE 2.75" COATED/INSULATED E1455

## (undated) DEVICE — SUCTION MANIFOLD NEPTUNE 2 SYS 4 PORT 0702-020-000

## (undated) DEVICE — CATH TRAY FOLEY SURESTEP 16FR W/URNE MTR STLK LATEX A303316A

## (undated) DEVICE — ENDO TROCAR SLEEVE KII Z-THREADED 05X100MM CTS02

## (undated) DEVICE — HOOD T4 PROTECTIVE STERI FACE SHIELD 400-800

## (undated) DEVICE — DRSG STERI STRIP 1/2X4" R1547

## (undated) DEVICE — SU SILK 0 TIE 6X30" A306H

## (undated) DEVICE — SYR 10ML FINGER CONTROL W/O NDL 309695

## (undated) DEVICE — SU VICRYL 3-0 SH 27" J316H

## (undated) DEVICE — DRAPE IOBAN INCISE 23X17" 6650EZ

## (undated) DEVICE — HOOD FLYTE W/PEELAWAY 408-800-100

## (undated) DEVICE — DRAPE IOBAN LG .375X23.5" 6648EZ

## (undated) DEVICE — SOL NACL 0.9% IRRIG 3000ML BAG 2B7477

## (undated) DEVICE — SU PROLENE 4-0 RB-1DA 36" 8557H

## (undated) DEVICE — BONE CLEANING TIP INTERPULSE  0210-010-000

## (undated) DEVICE — DRAIN CHEST TUBE 28FR STR 8028

## (undated) DEVICE — SU TICRON 2 GS-21DA 36" 3146-81

## (undated) DEVICE — TUBING SUCTION 10'X3/16" N510

## (undated) DEVICE — GLOVE PROTEXIS BLUE W/NEU-THERA 8.5  2D73EB85

## (undated) DEVICE — TUBING SMOKE EVAC PNEUMOCLEAR HIGH FLOW 0620050250

## (undated) DEVICE — ADH LIQUID MASTISOL TOPICAL VIAL 2-3ML 0523-48

## (undated) DEVICE — DRSG DRAIN 4X4" 7086

## (undated) DEVICE — Device

## (undated) DEVICE — CATH PROBE CRYOABLATION CRYOICE FLEX 10CM CRYO3

## (undated) DEVICE — SOL NACL 0.9% IRRIG 1000ML BOTTLE 2F7124

## (undated) DEVICE — SU VICRYL 0 CT-1 36" J946H

## (undated) DEVICE — DRSG PRIMAPORE 02X3" 7133

## (undated) DEVICE — CAST PADDING 4" COTTON WEBRIL UNSTERILE 9084

## (undated) DEVICE — SU ETHIBOND 5 LRDA 30" B499T

## (undated) DEVICE — PREP DURAPREP REMOVER 4OZ 8611

## (undated) DEVICE — ESU PENCIL W/SMOKE EVAC NEPTUNE STRYKER 0703-046-000

## (undated) DEVICE — DRAIN HEMOVAC RESERVOIR KIT 10FR 1/8" MED 00-2550-002-10

## (undated) DEVICE — ESU LIGASURE MARYLAND LAPAROSCOPIC SLR/DVDR 5MMX37CM LF1937

## (undated) DEVICE — GLOVE PROTEXIS BLUE W/NEU-THERA 7.5  2D73EB75

## (undated) DEVICE — GLOVE PROTEXIS W/NEU-THERA 8.0  2D73TE80

## (undated) DEVICE — SUCTION IRR SYSTEM W/O TIP INTERPULSE HANDPIECE 0210-100-000

## (undated) DEVICE — LINEN TOWEL PACK X30 5481

## (undated) DEVICE — NDL SPINAL 18GA 3.5" 405184

## (undated) DEVICE — SU VICRYL 0 CT-1 27" UND J260H

## (undated) DEVICE — LINEN GOWN XLG 5407

## (undated) DEVICE — ADH SKIN CLOSURE PREMIERPRO EXOFIN 1.0ML 3470

## (undated) DEVICE — PREP CHLORAPREP 26ML TINTED HI-LITE ORANGE 930815

## (undated) DEVICE — LINEN TOWEL PACK X5 5464

## (undated) DEVICE — GLOVE PROTEXIS MICRO 7.5 LT BLUE 2D73PM75

## (undated) DEVICE — ENDO TROCAR FIRST ENTRY KII FIOS Z-THRD 05X100MM CTF03

## (undated) DEVICE — SU SILK 0 TIE 6X18" A186H

## (undated) DEVICE — SU VICRYL 2-0 CT-1 27" UND J259H

## (undated) DEVICE — LINEN BACK PACK 5440

## (undated) DEVICE — SU VICRYL 4-0 SH-1 27" J315H

## (undated) DEVICE — SU VICRYL 2-0 SH 27" UND J417H

## (undated) DEVICE — TUBING INSUFFLATION PNEUMOCLEAR 0620050100

## (undated) DEVICE — SU MONOCRYL 3-0 SH 27" UND Y416H

## (undated) DEVICE — STPL ENDO ARTICULATING 45MM EC45A

## (undated) DEVICE — DEVICE SUTURE PASSER 14GA WECK EFX EFXSP2

## (undated) DEVICE — WIPES FOLEY CARE SURESTEP PROVON DFC100

## (undated) DEVICE — SU MONOCRYL 4-0 P-3 18" UND Y494G

## (undated) DEVICE — ESU PENCIL SMOKE EVAC W/ROCKER SWITCH 0703-047-000

## (undated) DEVICE — SU VICRYL 1 CT-1 36" J347H

## (undated) DEVICE — SU ETHIBOND 0 CT-1 CR 8X18" CX21D

## (undated) DEVICE — DRSG AQUACEL AG 3.5X9.75" HYDROFIBER 412011

## (undated) DEVICE — KIT CONNECTOR FOR OLYMPUS ENDOSCOPES DEFENDO 100310

## (undated) DEVICE — SU MONOCRYL 4-0 PS-2 27" UND Y426H

## (undated) DEVICE — DRAPE SLEEVE 599

## (undated) DEVICE — TOURNIQUET CUFF 34" REPRO BROWN 60-7070-106

## (undated) DEVICE — SU VICRYL 3-0 SH 27" UND J416H

## (undated) DEVICE — SUCTION DRY CHEST DRAIN OASIS 3600-100

## (undated) DEVICE — SU MONOCRYL 4-0 PS-2 18" UND Y496G

## (undated) DEVICE — DRSG TEGADERM 4X4 3/4" 1626W

## (undated) DEVICE — GOWN XLG DISP 9545

## (undated) DEVICE — GLOVE PROTEXIS BLUE W/NEU-THERA 8.0  2D73EB80

## (undated) DEVICE — BLADE SAW SAGITTAL STRK 21X90X1.19MM HD SYS 6 6221-119-090

## (undated) DEVICE — TIES BANDING T50R

## (undated) DEVICE — SPECIMEN CONTAINER 5OZ STERILE 2600SA

## (undated) DEVICE — PREP POVIDONE IODINE SCRUB 7.5% 120ML

## (undated) DEVICE — LINEN TOWEL PACK X6 WHITE 5487

## (undated) DEVICE — SU MONOCRYL 3-0 PS-2 18" UND Y497G

## (undated) DEVICE — BLADE SAW SAGITTAL STRK 19.5X90X1.27MM 2108-109-000S11

## (undated) DEVICE — LINEN GOWN X4 5410

## (undated) DEVICE — SYR 30ML SLIP TIP W/O NDL 302833

## (undated) DEVICE — PREP DURAPREP 26ML APL 8630

## (undated) DEVICE — PREP CHLORAPREP 26ML TINTED ORANGE  260815

## (undated) DEVICE — SUCTION MANIFOLD DORNOCH ULTRA CART UL-CL500

## (undated) DEVICE — CUFF TOURN 34IN STRL DISP

## (undated) DEVICE — SOL WATER IRRIG 1000ML BOTTLE 2F7114

## (undated) DEVICE — DRAPE SHEET REV FOLD 3/4 9349

## (undated) DEVICE — BLADE KNIFE SURG 15 371115

## (undated) DEVICE — DRAPE U-DRAPE 1015NSD NON-STERILE

## (undated) DEVICE — SU VICRYL 4-0 PS-2 18" UND J496H

## (undated) DEVICE — SOL NACL 0.9% IRRIG 3000ML BAG 07972-08

## (undated) DEVICE — ENDO TROCAR FIRST ENTRY KII FIOS Z-THRD 12X100MM CTF73

## (undated) DEVICE — STOCKING SLEEVE COMPRESSION CALF MED

## (undated) DEVICE — SU VICRYL+ 0 27 UR6 VLT VCP603H

## (undated) DEVICE — PREP SKIN SCRUB TRAY 4461A

## (undated) DEVICE — DRAIN PENROSE 3/8X18" LATEX 30416-038

## (undated) DEVICE — GLOVE PROTEXIS W/NEU-THERA 7.0  2D73TE70

## (undated) DEVICE — BLADE SAW SAGITTAL STRK 25X90X1.37MM 4H SYS 6 6125-137-090

## (undated) DEVICE — ENDO VALVE BX EVIS MAJ-210

## (undated) DEVICE — STPL RELOAD REG TISSUE ECHELON 45 X 3.6MM BLUE GST45B

## (undated) DEVICE — SU PLEDGET SOFT TFE 13MMX7MMX1.5MM D7044

## (undated) DEVICE — ESU GROUND PAD ADULT W/CORD E7507

## (undated) DEVICE — IMM KNEE 24" 79-80030

## (undated) DEVICE — CLIP HORIZON LG ORANGE 004200

## (undated) DEVICE — SYR 50ML LL W/O NDL 309653

## (undated) DEVICE — SU PDO 1 STRATAFIX 36X36CM CTX TAPERPOINT SXPD2B405

## (undated) DEVICE — BASIN SET MAJOR

## (undated) DEVICE — GLOVE PROTEXIS W/NEU-THERA 7.5  2D73TE75

## (undated) DEVICE — ANTIFOG SOLUTION W/FOAM PAD 31142527

## (undated) DEVICE — ENDO VALVE SUCTION BRONCH EVIS MAJ-209

## (undated) DEVICE — ENDO SCOPE WARMER SEAL  C3101

## (undated) DEVICE — SU VICRYL 0 UR-6 27" J603H

## (undated) DEVICE — DRAPE CONVERTORS U-DRAPE 60X72" 8476

## (undated) DEVICE — NDL COUNTER 20CT 31142493

## (undated) DEVICE — DRAPE STERI U 1015

## (undated) DEVICE — DRAPE SHEET MED 44X70" 9355

## (undated) DEVICE — CLIP HORIZON MED BLUE 002200

## (undated) DEVICE — SU VICRYL 0 CT-1 36" J346H

## (undated) DEVICE — LUBRICANT INST KIT ENDO-LUBE 220-90

## (undated) DEVICE — STRAP KNEE/BODY 31143004

## (undated) RX ORDER — FENTANYL CITRATE 50 UG/ML
INJECTION, SOLUTION INTRAMUSCULAR; INTRAVENOUS
Status: DISPENSED
Start: 2022-06-14

## (undated) RX ORDER — PROPOFOL 10 MG/ML
INJECTION, EMULSION INTRAVENOUS
Status: DISPENSED
Start: 2025-06-09

## (undated) RX ORDER — LIDOCAINE HYDROCHLORIDE 20 MG/ML
SOLUTION OROPHARYNGEAL
Status: DISPENSED
Start: 2021-04-21

## (undated) RX ORDER — PHENYLEPHRINE HCL IN 0.9% NACL 1 MG/10 ML
SYRINGE (ML) INTRAVENOUS
Status: DISPENSED
Start: 2019-09-30

## (undated) RX ORDER — HYDROMORPHONE HYDROCHLORIDE 1 MG/ML
INJECTION, SOLUTION INTRAMUSCULAR; INTRAVENOUS; SUBCUTANEOUS
Status: DISPENSED
Start: 2025-06-09

## (undated) RX ORDER — GABAPENTIN 300 MG/1
CAPSULE ORAL
Status: DISPENSED
Start: 2021-04-19

## (undated) RX ORDER — FENTANYL CITRATE 50 UG/ML
INJECTION, SOLUTION INTRAMUSCULAR; INTRAVENOUS
Status: DISPENSED
Start: 2025-06-09

## (undated) RX ORDER — HEPARIN SODIUM 5000 [USP'U]/.5ML
INJECTION, SOLUTION INTRAVENOUS; SUBCUTANEOUS
Status: DISPENSED
Start: 2021-04-19

## (undated) RX ORDER — CELECOXIB 200 MG/1
CAPSULE ORAL
Status: DISPENSED
Start: 2021-04-19

## (undated) RX ORDER — FENTANYL CITRATE-0.9 % NACL/PF 10 MCG/ML
PLASTIC BAG, INJECTION (ML) INTRAVENOUS
Status: DISPENSED
Start: 2025-06-09

## (undated) RX ORDER — TRANEXAMIC ACID 650 MG/1
TABLET ORAL
Status: DISPENSED
Start: 2022-06-14

## (undated) RX ORDER — PROPOFOL 10 MG/ML
INJECTION, EMULSION INTRAVENOUS
Status: DISPENSED
Start: 2022-06-14

## (undated) RX ORDER — GLYCOPYRROLATE 0.2 MG/ML
INJECTION, SOLUTION INTRAMUSCULAR; INTRAVENOUS
Status: DISPENSED
Start: 2025-06-09

## (undated) RX ORDER — ACETAMINOPHEN 325 MG/1
TABLET ORAL
Status: DISPENSED
Start: 2025-06-09

## (undated) RX ORDER — OXYCODONE HYDROCHLORIDE 5 MG/1
TABLET ORAL
Status: DISPENSED
Start: 2022-06-14

## (undated) RX ORDER — KETOROLAC TROMETHAMINE 30 MG/ML
INJECTION, SOLUTION INTRAMUSCULAR; INTRAVENOUS
Status: DISPENSED
Start: 2025-06-09

## (undated) RX ORDER — BUPIVACAINE HYDROCHLORIDE AND EPINEPHRINE 2.5; 5 MG/ML; UG/ML
INJECTION, SOLUTION EPIDURAL; INFILTRATION; INTRACAUDAL; PERINEURAL
Status: DISPENSED
Start: 2025-06-09

## (undated) RX ORDER — EPINEPHRINE 1 MG/ML
INJECTION, SOLUTION, CONCENTRATE INTRAVENOUS
Status: DISPENSED
Start: 2022-06-14

## (undated) RX ORDER — ONDANSETRON 2 MG/ML
INJECTION INTRAMUSCULAR; INTRAVENOUS
Status: DISPENSED
Start: 2022-06-14

## (undated) RX ORDER — ONDANSETRON 2 MG/ML
INJECTION INTRAMUSCULAR; INTRAVENOUS
Status: DISPENSED
Start: 2025-06-09

## (undated) RX ORDER — EPHEDRINE SULFATE 50 MG/ML
INJECTION, SOLUTION INTRAMUSCULAR; INTRAVENOUS; SUBCUTANEOUS
Status: DISPENSED
Start: 2025-06-09

## (undated) RX ORDER — ONDANSETRON 2 MG/ML
INJECTION INTRAMUSCULAR; INTRAVENOUS
Status: DISPENSED
Start: 2019-09-30

## (undated) RX ORDER — FENTANYL CITRATE 50 UG/ML
INJECTION, SOLUTION INTRAMUSCULAR; INTRAVENOUS
Status: DISPENSED
Start: 2021-04-19

## (undated) RX ORDER — CLINDAMYCIN PHOSPHATE 900 MG/50ML
INJECTION, SOLUTION INTRAVENOUS
Status: DISPENSED
Start: 2019-09-30

## (undated) RX ORDER — OXYCODONE HYDROCHLORIDE 5 MG/1
TABLET ORAL
Status: DISPENSED
Start: 2025-06-09

## (undated) RX ORDER — GABAPENTIN 300 MG/1
CAPSULE ORAL
Status: DISPENSED
Start: 2019-09-30

## (undated) RX ORDER — KETOROLAC TROMETHAMINE 30 MG/ML
INJECTION, SOLUTION INTRAMUSCULAR; INTRAVENOUS
Status: DISPENSED
Start: 2022-06-14

## (undated) RX ORDER — ACETAMINOPHEN 325 MG/1
TABLET ORAL
Status: DISPENSED
Start: 2021-04-19

## (undated) RX ORDER — PROPOFOL 10 MG/ML
INJECTION, EMULSION INTRAVENOUS
Status: DISPENSED
Start: 2019-09-30

## (undated) RX ORDER — ACETAMINOPHEN 325 MG/1
TABLET ORAL
Status: DISPENSED
Start: 2022-06-14

## (undated) RX ORDER — KETOROLAC TROMETHAMINE 30 MG/ML
INJECTION, SOLUTION INTRAMUSCULAR; INTRAVENOUS
Status: DISPENSED
Start: 2019-09-30

## (undated) RX ORDER — OXYCODONE HYDROCHLORIDE 5 MG/1
TABLET ORAL
Status: DISPENSED
Start: 2019-09-30

## (undated) RX ORDER — GABAPENTIN 300 MG/1
CAPSULE ORAL
Status: DISPENSED
Start: 2022-06-14

## (undated) RX ORDER — HYDROMORPHONE HCL IN WATER/PF 6 MG/30 ML
PATIENT CONTROLLED ANALGESIA SYRINGE INTRAVENOUS
Status: DISPENSED
Start: 2025-06-09

## (undated) RX ORDER — VANCOMYCIN HYDROCHLORIDE 1 G/20ML
INJECTION, POWDER, LYOPHILIZED, FOR SOLUTION INTRAVENOUS
Status: DISPENSED
Start: 2022-06-14

## (undated) RX ORDER — CLINDAMYCIN PHOSPHATE 900 MG/50ML
INJECTION, SOLUTION INTRAVENOUS
Status: DISPENSED
Start: 2021-04-19

## (undated) RX ORDER — DEXAMETHASONE SODIUM PHOSPHATE 10 MG/ML
INJECTION, SOLUTION INTRAMUSCULAR; INTRAVENOUS
Status: DISPENSED
Start: 2022-06-14

## (undated) RX ORDER — DEXAMETHASONE SODIUM PHOSPHATE 4 MG/ML
INJECTION, SOLUTION INTRA-ARTICULAR; INTRALESIONAL; INTRAMUSCULAR; INTRAVENOUS; SOFT TISSUE
Status: DISPENSED
Start: 2019-09-30

## (undated) RX ORDER — ALBUTEROL SULFATE 0.83 MG/ML
SOLUTION RESPIRATORY (INHALATION)
Status: DISPENSED
Start: 2021-03-30

## (undated) RX ORDER — CLINDAMYCIN PHOSPHATE 900 MG/50ML
INJECTION, SOLUTION INTRAVENOUS
Status: DISPENSED
Start: 2022-06-14

## (undated) RX ORDER — BUPIVACAINE HYDROCHLORIDE 5 MG/ML
INJECTION, SOLUTION PERINEURAL
Status: DISPENSED
Start: 2022-06-14

## (undated) RX ORDER — DEXAMETHASONE SODIUM PHOSPHATE 4 MG/ML
INJECTION, SOLUTION INTRA-ARTICULAR; INTRALESIONAL; INTRAMUSCULAR; INTRAVENOUS; SOFT TISSUE
Status: DISPENSED
Start: 2025-06-09

## (undated) RX ORDER — SODIUM CHLORIDE, SODIUM LACTATE, POTASSIUM CHLORIDE, CALCIUM CHLORIDE 600; 310; 30; 20 MG/100ML; MG/100ML; MG/100ML; MG/100ML
INJECTION, SOLUTION INTRAVENOUS
Status: DISPENSED
Start: 2021-04-19

## (undated) RX ORDER — FENTANYL CITRATE 50 UG/ML
INJECTION, SOLUTION INTRAMUSCULAR; INTRAVENOUS
Status: DISPENSED
Start: 2019-09-30

## (undated) RX ORDER — TRANEXAMIC ACID 10 MG/ML
INJECTION, SOLUTION INTRAVENOUS
Status: DISPENSED
Start: 2022-06-14

## (undated) RX ORDER — ACETAMINOPHEN 325 MG/1
TABLET ORAL
Status: DISPENSED
Start: 2019-09-30

## (undated) RX ORDER — EPHEDRINE SULFATE 50 MG/ML
INJECTION, SOLUTION INTRAMUSCULAR; INTRAVENOUS; SUBCUTANEOUS
Status: DISPENSED
Start: 2019-09-30